# Patient Record
Sex: MALE | Race: WHITE | NOT HISPANIC OR LATINO | Employment: OTHER | URBAN - METROPOLITAN AREA
[De-identification: names, ages, dates, MRNs, and addresses within clinical notes are randomized per-mention and may not be internally consistent; named-entity substitution may affect disease eponyms.]

---

## 2019-08-13 ENCOUNTER — RECORDS - HEALTHEAST (OUTPATIENT)
Dept: LAB | Facility: CLINIC | Age: 74
End: 2019-08-13

## 2019-08-13 LAB
CHOLEST SERPL-MCNC: 190 MG/DL
FASTING STATUS PATIENT QL REPORTED: ABNORMAL
HDLC SERPL-MCNC: 37 MG/DL
LDLC SERPL CALC-MCNC: 111 MG/DL
PSA SERPL-MCNC: 5.3 NG/ML (ref 0–6.5)
TRIGL SERPL-MCNC: 210 MG/DL

## 2019-11-01 ENCOUNTER — RECORDS - HEALTHEAST (OUTPATIENT)
Dept: LAB | Facility: CLINIC | Age: 74
End: 2019-11-01

## 2019-11-01 LAB — HBA1C MFR BLD: 6 % (ref 4.2–6.1)

## 2019-11-07 ENCOUNTER — RECORDS - HEALTHEAST (OUTPATIENT)
Dept: ADMINISTRATIVE | Facility: OTHER | Age: 74
End: 2019-11-07

## 2019-11-07 ENCOUNTER — RECORDS - HEALTHEAST (OUTPATIENT)
Dept: LAB | Facility: CLINIC | Age: 74
End: 2019-11-07

## 2019-11-07 LAB
ALBUMIN SERPL-MCNC: 4 G/DL (ref 3.5–5)
ALP SERPL-CCNC: 93 U/L (ref 45–120)
ALT SERPL W P-5'-P-CCNC: 53 U/L (ref 0–45)
ANION GAP SERPL CALCULATED.3IONS-SCNC: 7 MMOL/L (ref 5–18)
AST SERPL W P-5'-P-CCNC: 33 U/L (ref 0–40)
BASOPHILS # BLD AUTO: 0.5 THOU/UL (ref 0–0.2)
BASOPHILS NFR BLD AUTO: 2 % (ref 0–2)
BILIRUB SERPL-MCNC: 0.6 MG/DL (ref 0–1)
BUN SERPL-MCNC: 30 MG/DL (ref 8–28)
CALCIUM SERPL-MCNC: 10.1 MG/DL (ref 8.5–10.5)
CHLORIDE BLD-SCNC: 105 MMOL/L (ref 98–107)
CO2 SERPL-SCNC: 28 MMOL/L (ref 22–31)
CREAT SERPL-MCNC: 1.14 MG/DL (ref 0.7–1.3)
EOSINOPHIL # BLD AUTO: 1.1 THOU/UL (ref 0–0.4)
EOSINOPHIL NFR BLD AUTO: 5 % (ref 0–6)
ERYTHROCYTE [DISTWIDTH] IN BLOOD BY AUTOMATED COUNT: 18.5 % (ref 11–14.5)
GFR SERPL CREATININE-BSD FRML MDRD: >60 ML/MIN/1.73M2
GLUCOSE BLD-MCNC: 88 MG/DL (ref 70–125)
HCT VFR BLD AUTO: 53.5 % (ref 40–54)
HGB BLD-MCNC: 16.8 G/DL (ref 14–18)
LYMPHOCYTES # BLD AUTO: 2.8 THOU/UL (ref 0.8–4.4)
LYMPHOCYTES NFR BLD AUTO: 12 % (ref 20–40)
MAGNESIUM SERPL-MCNC: 2 MG/DL (ref 1.8–2.6)
MCH RBC QN AUTO: 30 PG (ref 27–34)
MCHC RBC AUTO-ENTMCNC: 31.4 G/DL (ref 32–36)
MCV RBC AUTO: 96 FL (ref 80–100)
MONOCYTES # BLD AUTO: 1.2 THOU/UL (ref 0–0.9)
MONOCYTES NFR BLD AUTO: 5 % (ref 2–10)
NEUTROPHILS # BLD AUTO: 17.3 THOU/UL (ref 2–7.7)
NEUTROPHILS NFR BLD AUTO: 74 % (ref 50–70)
PLATELET # BLD AUTO: 805 THOU/UL (ref 140–440)
PMV BLD AUTO: 12.6 FL (ref 8.5–12.5)
POTASSIUM BLD-SCNC: 5.2 MMOL/L (ref 3.5–5)
PROT SERPL-MCNC: 6.9 G/DL (ref 6–8)
RBC # BLD AUTO: 5.6 MILL/UL (ref 4.4–6.2)
SODIUM SERPL-SCNC: 140 MMOL/L (ref 136–145)
WBC: 23.4 THOU/UL (ref 4–11)

## 2019-11-14 ENCOUNTER — RECORDS - HEALTHEAST (OUTPATIENT)
Dept: ADMINISTRATIVE | Facility: OTHER | Age: 74
End: 2019-11-14

## 2019-11-19 ENCOUNTER — RECORDS - HEALTHEAST (OUTPATIENT)
Dept: LAB | Facility: CLINIC | Age: 74
End: 2019-11-19

## 2019-11-19 LAB
BASOPHILS # BLD AUTO: 0.4 THOU/UL (ref 0–0.2)
BASOPHILS NFR BLD AUTO: 2 % (ref 0–2)
EOSINOPHIL # BLD AUTO: 1.1 THOU/UL (ref 0–0.4)
EOSINOPHIL NFR BLD AUTO: 5 % (ref 0–6)
ERYTHROCYTE [DISTWIDTH] IN BLOOD BY AUTOMATED COUNT: 18.5 % (ref 11–14.5)
HCT VFR BLD AUTO: 51.3 % (ref 40–54)
HGB BLD-MCNC: 16.3 G/DL (ref 14–18)
LYMPHOCYTES # BLD AUTO: 2.3 THOU/UL (ref 0.8–4.4)
LYMPHOCYTES NFR BLD AUTO: 12 % (ref 20–40)
MCH RBC QN AUTO: 30.1 PG (ref 27–34)
MCHC RBC AUTO-ENTMCNC: 31.8 G/DL (ref 32–36)
MCV RBC AUTO: 95 FL (ref 80–100)
MONOCYTES # BLD AUTO: 1.1 THOU/UL (ref 0–0.9)
MONOCYTES NFR BLD AUTO: 5 % (ref 2–10)
NEUTROPHILS # BLD AUTO: 14.8 THOU/UL (ref 2–7.7)
NEUTROPHILS NFR BLD AUTO: 74 % (ref 50–70)
PLATELET # BLD AUTO: 800 THOU/UL (ref 140–440)
PMV BLD AUTO: 12.5 FL (ref 8.5–12.5)
RBC # BLD AUTO: 5.41 MILL/UL (ref 4.4–6.2)
WBC: 20 THOU/UL (ref 4–11)

## 2020-10-29 ENCOUNTER — RECORDS - HEALTHEAST (OUTPATIENT)
Dept: LAB | Facility: CLINIC | Age: 75
End: 2020-10-29

## 2020-10-31 LAB — BACTERIA SPEC CULT: NO GROWTH

## 2021-05-03 ENCOUNTER — RECORDS - HEALTHEAST (OUTPATIENT)
Dept: LAB | Facility: CLINIC | Age: 76
End: 2021-05-03

## 2021-05-03 LAB
ANION GAP SERPL CALCULATED.3IONS-SCNC: 8 MMOL/L (ref 5–18)
BUN SERPL-MCNC: 18 MG/DL (ref 8–28)
CALCIUM SERPL-MCNC: 8.9 MG/DL (ref 8.5–10.5)
CHLORIDE BLD-SCNC: 104 MMOL/L (ref 98–107)
CHOLEST SERPL-MCNC: 110 MG/DL
CO2 SERPL-SCNC: 27 MMOL/L (ref 22–31)
CREAT SERPL-MCNC: 0.84 MG/DL (ref 0.7–1.3)
FASTING STATUS PATIENT QL REPORTED: ABNORMAL
GFR SERPL CREATININE-BSD FRML MDRD: >60 ML/MIN/1.73M2
GLUCOSE BLD-MCNC: 112 MG/DL (ref 70–125)
HDLC SERPL-MCNC: 35 MG/DL
LDLC SERPL CALC-MCNC: 52 MG/DL
POTASSIUM BLD-SCNC: 4.2 MMOL/L (ref 3.5–5)
SODIUM SERPL-SCNC: 139 MMOL/L (ref 136–145)
TRIGL SERPL-MCNC: 115 MG/DL

## 2021-06-02 ENCOUNTER — RECORDS - HEALTHEAST (OUTPATIENT)
Dept: ADMINISTRATIVE | Facility: CLINIC | Age: 76
End: 2021-06-02

## 2021-06-14 ENCOUNTER — HOSPITAL ENCOUNTER (EMERGENCY)
Dept: EMERGENCY MEDICINE | Facility: CLINIC | Age: 76
Discharge: HOME | End: 2021-06-14
Attending: EMERGENCY MEDICINE
Payer: COMMERCIAL

## 2021-06-14 DIAGNOSIS — L03.115 CELLULITIS OF RIGHT LOWER EXTREMITY: ICD-10-CM

## 2021-06-14 DIAGNOSIS — R50.9 FEVER, UNSPECIFIED FEVER CAUSE: ICD-10-CM

## 2021-06-14 LAB
ALBUMIN UR-MCNC: ABNORMAL G/DL
ANION GAP SERPL CALCULATED.3IONS-SCNC: 8 MMOL/L (ref 5–18)
APPEARANCE UR: CLEAR
BACTERIA #/AREA URNS HPF: ABNORMAL /[HPF]
BASE EXCESS BLDV CALC-SCNC: 3.3 MMOL/L
BILIRUB UR QL STRIP: NEGATIVE
BUN SERPL-MCNC: 24 MG/DL (ref 8–28)
CALCIUM SERPL-MCNC: 8.7 MG/DL (ref 8.5–10.5)
CHLORIDE BLD-SCNC: 105 MMOL/L (ref 98–107)
CO2 SERPL-SCNC: 25 MMOL/L (ref 22–31)
COLOR UR AUTO: YELLOW
CREAT SERPL-MCNC: 1.08 MG/DL (ref 0.7–1.3)
ERYTHROCYTE [DISTWIDTH] IN BLOOD BY AUTOMATED COUNT: 14.5 % (ref 11–14.5)
GFR SERPL CREATININE-BSD FRML MDRD: >60 ML/MIN/1.73M2
GLUCOSE BLD-MCNC: 118 MG/DL (ref 70–125)
GLUCOSE UR STRIP-MCNC: NEGATIVE MG/DL
HCO3, VENOUS, CALC - HISTORICAL: 25.2 MMOL/L (ref 24–30)
HCT VFR BLD AUTO: 42 % (ref 40–54)
HGB BLD-MCNC: 13.7 G/DL (ref 14–18)
HGB UR QL STRIP: NEGATIVE
KETONES UR STRIP-MCNC: NEGATIVE MG/DL
LACTATE SERPL-SCNC: 1.7 MMOL/L (ref 0.7–2)
LEUKOCYTE ESTERASE UR QL STRIP: NEGATIVE
MCH RBC QN AUTO: 38.7 PG (ref 27–34)
MCHC RBC AUTO-ENTMCNC: 32.6 G/DL (ref 32–36)
MCV RBC AUTO: 119 FL (ref 80–100)
MUCOUS THREADS #/AREA URNS LPF: PRESENT LPF
NITRATE UR QL: NEGATIVE
OXYHEMOGLOBIN (VBG) - HISTORICAL: 32.5 % (ref 70–75)
OXYHGB MFR BLDV: 33.1 % (ref 70–75)
PCO2 BLDV: 45 MM HG (ref 35–50)
PH BLDV: 7.41 [PH] (ref 7.35–7.45)
PH UR STRIP: 6 [PH] (ref 5–8)
PLATELET # BLD AUTO: 457 THOU/UL (ref 140–440)
PMV BLD AUTO: 10.9 FL (ref 8.5–12.5)
PO2 BLDV: 21 MM HG (ref 25–47)
POTASSIUM BLD-SCNC: 4 MMOL/L (ref 3.5–5)
RBC # BLD AUTO: 3.54 MILL/UL (ref 4.4–6.2)
RBC #/AREA URNS AUTO: ABNORMAL HPF
RBC URINE: 1 HPF
SODIUM SERPL-SCNC: 138 MMOL/L (ref 136–145)
SP GR UR STRIP: 1.02 (ref 1–1.03)
SQUAMOUS #/AREA URNS AUTO: ABNORMAL LPF
SQUAMOUS EPITHELIAL: <1 /HPF
UROBILINOGEN UR STRIP-ACNC: NORMAL
WBC #/AREA URNS AUTO: ABNORMAL HPF
WBC URINE: 1 HPF
WBC: 23.7 THOU/UL (ref 4–11)

## 2021-06-14 ASSESSMENT — MIFFLIN-ST. JEOR: SCORE: 1568.61

## 2021-06-15 LAB
AEROBIC BLOOD CULTURE BOTTLE: ABNORMAL
ANAEROBIC BLOOD CULTURE BOTTLE: ABNORMAL

## 2021-06-16 PROBLEM — L03.90 CELLULITIS: Status: ACTIVE | Noted: 2019-10-31

## 2021-06-16 PROBLEM — Z86.73 HISTORY OF STROKE WITHOUT RESIDUAL DEFICITS: Status: ACTIVE | Noted: 2021-06-14

## 2021-06-16 PROBLEM — R26.9 ABNORMAL GAIT: Status: ACTIVE | Noted: 2021-06-14

## 2021-06-18 LAB
BACTERIA SPEC CULT: ABNORMAL
GRAM STAIN RESULT: ABNORMAL
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
## 2021-06-26 ENCOUNTER — HEALTH MAINTENANCE LETTER (OUTPATIENT)
Age: 76
End: 2021-06-26
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
## 2021-06-26 NOTE — ED PROVIDER NOTES
8:36 AM  I called the patient and the wife called me back. She states he is improved from when he was seen yesterday. He continues to be sleeping a lot, but he was up and more active than he previously has been. She has not yet filled his antibiotic prescription and plans to get that filled at 9 am when the pharmacy opens. I discussed the positive blood culture and concern for blood infection vs. Skin contaminant. I recommend she bring him back into the ED if he is not improving and appears worse or not improved. She states she will fill his prescription this morning and see how he is feeling, though she does feel he is feeling better.      Isabella Resendez MD  06/15/21 2400

## 2021-07-06 VITALS — HEIGHT: 65 IN | BODY MASS INDEX: 33.49 KG/M2 | WEIGHT: 201 LBS

## 2021-10-16 ENCOUNTER — HEALTH MAINTENANCE LETTER (OUTPATIENT)
Age: 76
End: 2021-10-16

## 2021-12-11 ENCOUNTER — HEALTH MAINTENANCE LETTER (OUTPATIENT)
Age: 76
End: 2021-12-11

## 2022-04-01 ENCOUNTER — HOSPITAL ENCOUNTER (INPATIENT)
Facility: CLINIC | Age: 77
LOS: 6 days | Discharge: HOME | DRG: 175 | End: 2022-04-07
Attending: EMERGENCY MEDICINE | Admitting: FAMILY MEDICINE
Payer: COMMERCIAL

## 2022-04-01 ENCOUNTER — APPOINTMENT (OUTPATIENT)
Dept: ULTRASOUND IMAGING | Facility: CLINIC | Age: 77
DRG: 175 | End: 2022-04-01
Attending: STUDENT IN AN ORGANIZED HEALTH CARE EDUCATION/TRAINING PROGRAM
Payer: COMMERCIAL

## 2022-04-01 ENCOUNTER — APPOINTMENT (OUTPATIENT)
Dept: RADIOLOGY | Facility: CLINIC | Age: 77
DRG: 175 | End: 2022-04-01
Attending: EMERGENCY MEDICINE
Payer: COMMERCIAL

## 2022-04-01 ENCOUNTER — APPOINTMENT (OUTPATIENT)
Dept: CT IMAGING | Facility: CLINIC | Age: 77
DRG: 175 | End: 2022-04-01
Attending: EMERGENCY MEDICINE
Payer: COMMERCIAL

## 2022-04-01 DIAGNOSIS — D64.9 ANEMIA, UNSPECIFIED TYPE: ICD-10-CM

## 2022-04-01 DIAGNOSIS — R79.89 ELEVATED BRAIN NATRIURETIC PEPTIDE (BNP) LEVEL: ICD-10-CM

## 2022-04-01 DIAGNOSIS — R79.89 ELEVATED D-DIMER: ICD-10-CM

## 2022-04-01 DIAGNOSIS — J96.01 ACUTE RESPIRATORY FAILURE WITH HYPOXIA (H): ICD-10-CM

## 2022-04-01 DIAGNOSIS — I26.94 MULTIPLE SUBSEGMENTAL PULMONARY EMBOLI WITHOUT ACUTE COR PULMONALE (H): ICD-10-CM

## 2022-04-01 DIAGNOSIS — R06.02 SOB (SHORTNESS OF BREATH): ICD-10-CM

## 2022-04-01 DIAGNOSIS — R79.89 ELEVATED TROPONIN: ICD-10-CM

## 2022-04-01 DIAGNOSIS — J18.9 PNEUMONIA OF BOTH LUNGS DUE TO INFECTIOUS ORGANISM, UNSPECIFIED PART OF LUNG: ICD-10-CM

## 2022-04-01 DIAGNOSIS — I24.89 DEMAND ISCHEMIA (H): ICD-10-CM

## 2022-04-01 PROBLEM — D47.1 CHRONIC MYELOPROLIFERATIVE DISEASE (H): Status: ACTIVE | Noted: 2022-04-01

## 2022-04-01 PROBLEM — I87.2 PERIPHERAL VENOUS INSUFFICIENCY: Status: ACTIVE | Noted: 2022-04-01

## 2022-04-01 LAB
ANION GAP SERPL CALCULATED.3IONS-SCNC: 10 MMOL/L (ref 5–18)
APTT PPP: 36 SECONDS (ref 22–38)
ATRIAL RATE - MUSE: 85 BPM
BASE EXCESS BLDV CALC-SCNC: 0.6 MMOL/L
BASOPHILS # BLD AUTO: 0 10E3/UL (ref 0–0.2)
BASOPHILS NFR BLD AUTO: 1 %
BNP SERPL-MCNC: 379 PG/ML (ref 0–78)
BUN SERPL-MCNC: 35 MG/DL (ref 8–28)
CALCIUM SERPL-MCNC: 8.6 MG/DL (ref 8.5–10.5)
CHLORIDE BLD-SCNC: 107 MMOL/L (ref 98–107)
CO2 SERPL-SCNC: 23 MMOL/L (ref 22–31)
CREAT SERPL-MCNC: 1.11 MG/DL (ref 0.7–1.3)
D DIMER PPP FEU-MCNC: 4.49 UG/ML FEU (ref 0–0.5)
DIASTOLIC BLOOD PRESSURE - MUSE: NORMAL MMHG
EOSINOPHIL # BLD AUTO: 0.1 10E3/UL (ref 0–0.7)
EOSINOPHIL NFR BLD AUTO: 1 %
ERYTHROCYTE [DISTWIDTH] IN BLOOD BY AUTOMATED COUNT: 16.8 % (ref 10–15)
FLUAV RNA SPEC QL NAA+PROBE: NEGATIVE
FLUBV RNA RESP QL NAA+PROBE: NEGATIVE
GFR SERPL CREATININE-BSD FRML MDRD: 69 ML/MIN/1.73M2
GLUCOSE BLD-MCNC: 108 MG/DL (ref 70–125)
HCO3 BLDV-SCNC: 24 MMOL/L (ref 24–30)
HCT VFR BLD AUTO: 23.9 % (ref 40–53)
HGB BLD-MCNC: 7.2 G/DL (ref 13.3–17.7)
HGB BLD-MCNC: 7.8 G/DL (ref 13.3–17.7)
HOLD SPECIMEN: NORMAL
IMM GRANULOCYTES # BLD: 0.1 10E3/UL
IMM GRANULOCYTES NFR BLD: 1 %
INR PPP: 1.22 (ref 0.85–1.15)
INTERPRETATION ECG - MUSE: NORMAL
LYMPHOCYTES # BLD AUTO: 0.8 10E3/UL (ref 0.8–5.3)
LYMPHOCYTES NFR BLD AUTO: 20 %
MCH RBC QN AUTO: 45.9 PG (ref 26.5–33)
MCHC RBC AUTO-ENTMCNC: 32.6 G/DL (ref 31.5–36.5)
MCV RBC AUTO: 141 FL (ref 78–100)
MONOCYTES # BLD AUTO: 0.7 10E3/UL (ref 0–1.3)
MONOCYTES NFR BLD AUTO: 17 %
NEUTROPHILS # BLD AUTO: 2.5 10E3/UL (ref 1.6–8.3)
NEUTROPHILS NFR BLD AUTO: 60 %
NRBC # BLD AUTO: 0 10E3/UL
NRBC BLD AUTO-RTO: 1 /100
OXYHGB MFR BLDV: 47.2 % (ref 70–75)
P AXIS - MUSE: 52 DEGREES
PCO2 BLDV: 42 MM HG (ref 35–50)
PH BLDV: 7.39 [PH] (ref 7.35–7.45)
PLATELET # BLD AUTO: 464 10E3/UL (ref 150–450)
PO2 BLDV: 29 MM HG (ref 25–47)
POTASSIUM BLD-SCNC: 4.4 MMOL/L (ref 3.5–5)
PR INTERVAL - MUSE: 130 MS
QRS DURATION - MUSE: 110 MS
QT - MUSE: 360 MS
QTC - MUSE: 428 MS
R AXIS - MUSE: 38 DEGREES
RADIOLOGIST FLAGS: ABNORMAL
RADIOLOGIST FLAGS: ABNORMAL
RBC # BLD AUTO: 1.7 10E6/UL (ref 4.4–5.9)
SAO2 % BLDV: 48.3 % (ref 70–75)
SARS-COV-2 RNA RESP QL NAA+PROBE: NEGATIVE
SODIUM SERPL-SCNC: 140 MMOL/L (ref 136–145)
SYSTOLIC BLOOD PRESSURE - MUSE: NORMAL MMHG
T AXIS - MUSE: 29 DEGREES
TROPONIN I SERPL-MCNC: 0.9 NG/ML (ref 0–0.29)
TROPONIN I SERPL-MCNC: 1.2 NG/ML (ref 0–0.29)
UFH PPP CHRO-ACNC: 0.48 IU/ML
VENTRICULAR RATE- MUSE: 85 BPM
WBC # BLD AUTO: 4.2 10E3/UL (ref 4–11)

## 2022-04-01 PROCEDURE — 96366 THER/PROPH/DIAG IV INF ADDON: CPT

## 2022-04-01 PROCEDURE — 93970 EXTREMITY STUDY: CPT

## 2022-04-01 PROCEDURE — 87636 SARSCOV2 & INF A&B AMP PRB: CPT | Performed by: EMERGENCY MEDICINE

## 2022-04-01 PROCEDURE — 82805 BLOOD GASES W/O2 SATURATION: CPT | Performed by: EMERGENCY MEDICINE

## 2022-04-01 PROCEDURE — 36415 COLL VENOUS BLD VENIPUNCTURE: CPT | Performed by: STUDENT IN AN ORGANIZED HEALTH CARE EDUCATION/TRAINING PROGRAM

## 2022-04-01 PROCEDURE — 85730 THROMBOPLASTIN TIME PARTIAL: CPT | Performed by: EMERGENCY MEDICINE

## 2022-04-01 PROCEDURE — 85520 HEPARIN ASSAY: CPT | Performed by: EMERGENCY MEDICINE

## 2022-04-01 PROCEDURE — 85379 FIBRIN DEGRADATION QUANT: CPT | Performed by: EMERGENCY MEDICINE

## 2022-04-01 PROCEDURE — 250N000013 HC RX MED GY IP 250 OP 250 PS 637: Performed by: EMERGENCY MEDICINE

## 2022-04-01 PROCEDURE — 84484 ASSAY OF TROPONIN QUANT: CPT | Performed by: EMERGENCY MEDICINE

## 2022-04-01 PROCEDURE — 85018 HEMOGLOBIN: CPT | Performed by: STUDENT IN AN ORGANIZED HEALTH CARE EDUCATION/TRAINING PROGRAM

## 2022-04-01 PROCEDURE — 80048 BASIC METABOLIC PNL TOTAL CA: CPT | Performed by: EMERGENCY MEDICINE

## 2022-04-01 PROCEDURE — 36415 COLL VENOUS BLD VENIPUNCTURE: CPT | Performed by: EMERGENCY MEDICINE

## 2022-04-01 PROCEDURE — 96365 THER/PROPH/DIAG IV INF INIT: CPT | Mod: 59

## 2022-04-01 PROCEDURE — 84484 ASSAY OF TROPONIN QUANT: CPT | Mod: 91 | Performed by: STUDENT IN AN ORGANIZED HEALTH CARE EDUCATION/TRAINING PROGRAM

## 2022-04-01 PROCEDURE — 93005 ELECTROCARDIOGRAM TRACING: CPT | Performed by: EMERGENCY MEDICINE

## 2022-04-01 PROCEDURE — 250N000011 HC RX IP 250 OP 636: Performed by: STUDENT IN AN ORGANIZED HEALTH CARE EDUCATION/TRAINING PROGRAM

## 2022-04-01 PROCEDURE — 85610 PROTHROMBIN TIME: CPT | Performed by: EMERGENCY MEDICINE

## 2022-04-01 PROCEDURE — 71045 X-RAY EXAM CHEST 1 VIEW: CPT

## 2022-04-01 PROCEDURE — 99285 EMERGENCY DEPT VISIT HI MDM: CPT | Mod: 25

## 2022-04-01 PROCEDURE — C9113 INJ PANTOPRAZOLE SODIUM, VIA: HCPCS | Performed by: STUDENT IN AN ORGANIZED HEALTH CARE EDUCATION/TRAINING PROGRAM

## 2022-04-01 PROCEDURE — 250N000011 HC RX IP 250 OP 636: Performed by: EMERGENCY MEDICINE

## 2022-04-01 PROCEDURE — 85025 COMPLETE CBC W/AUTO DIFF WBC: CPT | Performed by: EMERGENCY MEDICINE

## 2022-04-01 PROCEDURE — 71275 CT ANGIOGRAPHY CHEST: CPT

## 2022-04-01 PROCEDURE — 210N000002 HC R&B HEART CARE

## 2022-04-01 PROCEDURE — 96367 TX/PROPH/DG ADDL SEQ IV INF: CPT

## 2022-04-01 PROCEDURE — 83880 ASSAY OF NATRIURETIC PEPTIDE: CPT | Performed by: EMERGENCY MEDICINE

## 2022-04-01 PROCEDURE — 99223 1ST HOSP IP/OBS HIGH 75: CPT | Mod: AI

## 2022-04-01 PROCEDURE — 94640 AIRWAY INHALATION TREATMENT: CPT

## 2022-04-01 PROCEDURE — C9803 HOPD COVID-19 SPEC COLLECT: HCPCS

## 2022-04-01 RX ORDER — ACETAMINOPHEN 650 MG/1
650 SUPPOSITORY RECTAL EVERY 6 HOURS PRN
Status: DISCONTINUED | OUTPATIENT
Start: 2022-04-01 | End: 2022-04-07 | Stop reason: HOSPADM

## 2022-04-01 RX ORDER — POLYETHYLENE GLYCOL 3350 17 G/17G
17 POWDER, FOR SOLUTION ORAL DAILY PRN
Status: DISCONTINUED | OUTPATIENT
Start: 2022-04-01 | End: 2022-04-07 | Stop reason: HOSPADM

## 2022-04-01 RX ORDER — ATORVASTATIN CALCIUM 10 MG/1
20 TABLET, FILM COATED ORAL EVERY MORNING
Status: DISCONTINUED | OUTPATIENT
Start: 2022-04-02 | End: 2022-04-07 | Stop reason: HOSPADM

## 2022-04-01 RX ORDER — CEFTRIAXONE 2 G/1
2 INJECTION, POWDER, FOR SOLUTION INTRAMUSCULAR; INTRAVENOUS ONCE
Status: COMPLETED | OUTPATIENT
Start: 2022-04-01 | End: 2022-04-01

## 2022-04-01 RX ORDER — METHYLPREDNISOLONE SODIUM SUCCINATE 125 MG/2ML
125 INJECTION, POWDER, LYOPHILIZED, FOR SOLUTION INTRAMUSCULAR; INTRAVENOUS ONCE
Status: COMPLETED | OUTPATIENT
Start: 2022-04-01 | End: 2022-04-01

## 2022-04-01 RX ORDER — ONDANSETRON 4 MG/1
4 TABLET, ORALLY DISINTEGRATING ORAL EVERY 6 HOURS PRN
Status: DISCONTINUED | OUTPATIENT
Start: 2022-04-01 | End: 2022-04-07 | Stop reason: HOSPADM

## 2022-04-01 RX ORDER — PROCHLORPERAZINE MALEATE 5 MG
5 TABLET ORAL EVERY 6 HOURS PRN
Status: DISCONTINUED | OUTPATIENT
Start: 2022-04-01 | End: 2022-04-07 | Stop reason: HOSPADM

## 2022-04-01 RX ORDER — METOPROLOL SUCCINATE 25 MG/1
25 TABLET, EXTENDED RELEASE ORAL DAILY
COMMUNITY

## 2022-04-01 RX ORDER — PREDNISONE 20 MG/1
40 TABLET ORAL DAILY
Status: COMPLETED | OUTPATIENT
Start: 2022-04-02 | End: 2022-04-05

## 2022-04-01 RX ORDER — PROCHLORPERAZINE 25 MG
12.5 SUPPOSITORY, RECTAL RECTAL EVERY 12 HOURS PRN
Status: DISCONTINUED | OUTPATIENT
Start: 2022-04-01 | End: 2022-04-07 | Stop reason: HOSPADM

## 2022-04-01 RX ORDER — ACETAMINOPHEN 325 MG/1
650 TABLET ORAL EVERY 6 HOURS PRN
Status: DISCONTINUED | OUTPATIENT
Start: 2022-04-01 | End: 2022-04-07 | Stop reason: HOSPADM

## 2022-04-01 RX ORDER — HYDROXYUREA 500 MG/1
1000 CAPSULE ORAL DAILY
Status: ON HOLD | COMMUNITY
End: 2022-05-01

## 2022-04-01 RX ORDER — IOPAMIDOL 755 MG/ML
100 INJECTION, SOLUTION INTRAVASCULAR ONCE
Status: COMPLETED | OUTPATIENT
Start: 2022-04-01 | End: 2022-04-01

## 2022-04-01 RX ORDER — METOPROLOL SUCCINATE 25 MG/1
25 TABLET, EXTENDED RELEASE ORAL DAILY
Status: DISCONTINUED | OUTPATIENT
Start: 2022-04-02 | End: 2022-04-07 | Stop reason: HOSPADM

## 2022-04-01 RX ORDER — HYDROXYUREA 500 MG/1
1000 CAPSULE ORAL
Status: DISCONTINUED | OUTPATIENT
Start: 2022-04-02 | End: 2022-04-07 | Stop reason: HOSPADM

## 2022-04-01 RX ORDER — AZITHROMYCIN 500 MG/5ML
500 INJECTION, POWDER, LYOPHILIZED, FOR SOLUTION INTRAVENOUS ONCE
Status: COMPLETED | OUTPATIENT
Start: 2022-04-01 | End: 2022-04-01

## 2022-04-01 RX ORDER — HYDROXYUREA 500 MG/1
1500 CAPSULE ORAL
Status: DISCONTINUED | OUTPATIENT
Start: 2022-04-04 | End: 2022-04-07 | Stop reason: HOSPADM

## 2022-04-01 RX ORDER — HYDROXYUREA 500 MG/1
1500 CAPSULE ORAL
COMMUNITY
End: 2022-04-19

## 2022-04-01 RX ORDER — LEVOFLOXACIN 5 MG/ML
500 INJECTION, SOLUTION INTRAVENOUS EVERY 24 HOURS
Status: DISCONTINUED | OUTPATIENT
Start: 2022-04-01 | End: 2022-04-04

## 2022-04-01 RX ORDER — ALBUTEROL SULFATE 90 UG/1
6 AEROSOL, METERED RESPIRATORY (INHALATION)
Status: DISCONTINUED | OUTPATIENT
Start: 2022-04-01 | End: 2022-04-05

## 2022-04-01 RX ORDER — LIDOCAINE 40 MG/G
CREAM TOPICAL
Status: DISCONTINUED | OUTPATIENT
Start: 2022-04-01 | End: 2022-04-07 | Stop reason: HOSPADM

## 2022-04-01 RX ORDER — HEPARIN SODIUM 10000 [USP'U]/100ML
0-5000 INJECTION, SOLUTION INTRAVENOUS CONTINUOUS
Status: DISCONTINUED | OUTPATIENT
Start: 2022-04-01 | End: 2022-04-07 | Stop reason: HOSPADM

## 2022-04-01 RX ORDER — ONDANSETRON 2 MG/ML
4 INJECTION INTRAMUSCULAR; INTRAVENOUS EVERY 6 HOURS PRN
Status: DISCONTINUED | OUTPATIENT
Start: 2022-04-01 | End: 2022-04-07 | Stop reason: HOSPADM

## 2022-04-01 RX ORDER — CEFTRIAXONE 1 G/1
1 INJECTION, POWDER, FOR SOLUTION INTRAMUSCULAR; INTRAVENOUS ONCE
Status: DISCONTINUED | OUTPATIENT
Start: 2022-04-01 | End: 2022-04-01

## 2022-04-01 RX ADMIN — CEFTRIAXONE SODIUM 2 G: 2 INJECTION, POWDER, FOR SOLUTION INTRAMUSCULAR; INTRAVENOUS at 14:08

## 2022-04-01 RX ADMIN — IOPAMIDOL 100 ML: 755 INJECTION, SOLUTION INTRAVENOUS at 13:02

## 2022-04-01 RX ADMIN — LEVOFLOXACIN 500 MG: 5 INJECTION, SOLUTION INTRAVENOUS at 19:58

## 2022-04-01 RX ADMIN — HEPARIN SODIUM 1650 UNITS/HR: 10000 INJECTION, SOLUTION INTRAVENOUS at 22:04

## 2022-04-01 RX ADMIN — ALBUTEROL SULFATE 6 PUFF: 90 AEROSOL, METERED RESPIRATORY (INHALATION) at 18:12

## 2022-04-01 RX ADMIN — METHYLPREDNISOLONE SODIUM SUCCINATE 125 MG: 125 INJECTION, POWDER, FOR SOLUTION INTRAMUSCULAR; INTRAVENOUS at 12:47

## 2022-04-01 RX ADMIN — Medication 500 MG: at 14:40

## 2022-04-01 RX ADMIN — ALBUTEROL SULFATE 6 PUFF: 90 AEROSOL, METERED RESPIRATORY (INHALATION) at 12:50

## 2022-04-01 RX ADMIN — HEPARIN SODIUM 1650 UNITS/HR: 10000 INJECTION, SOLUTION INTRAVENOUS at 14:09

## 2022-04-01 RX ADMIN — PANTOPRAZOLE SODIUM 40 MG: 40 INJECTION, POWDER, FOR SOLUTION INTRAVENOUS at 19:53

## 2022-04-01 ASSESSMENT — ACTIVITIES OF DAILY LIVING (ADL)
ADLS_ACUITY_SCORE: 7
WEAR_GLASSES_OR_BLIND: YES
ADLS_ACUITY_SCORE: 10
CHANGE_IN_FUNCTIONAL_STATUS_SINCE_ONSET_OF_CURRENT_ILLNESS/INJURY: NO
ADLS_ACUITY_SCORE: 10
ADLS_ACUITY_SCORE: 7
DOING_ERRANDS_INDEPENDENTLY_DIFFICULTY: NO
ADLS_ACUITY_SCORE: 6
ADLS_ACUITY_SCORE: 10
ADLS_ACUITY_SCORE: 6
DRESSING/BATHING_DIFFICULTY: NO
TOILETING_ISSUES: NO
ADLS_ACUITY_SCORE: 10
WALKING_OR_CLIMBING_STAIRS_DIFFICULTY: NO
DIFFICULTY_EATING/SWALLOWING: NO
DIFFICULTY_COMMUNICATING: NO
HEARING_DIFFICULTY_OR_DEAF: NO
ADLS_ACUITY_SCORE: 7
VISION_MANAGEMENT: GLASSES
CONCENTRATING,_REMEMBERING_OR_MAKING_DECISIONS_DIFFICULTY: NO
FALL_HISTORY_WITHIN_LAST_SIX_MONTHS: NO
ADLS_ACUITY_SCORE: 7

## 2022-04-01 NOTE — H&P
[facility]    History and Physical - Hospitalist Service       Date of Admission:  4/1/2022    Assessment & Plan      Shaji Pompa is a 76 year old male with a past medical history of COPD, HTN, HLD, myeloproliferative disease, and cerebral infarction. He was admitted on 4/1/2022 due to acute respiratory failure with hypoxia secondary to pneumonia and bilateral pulmonary emboli.    Acute respiratory distress with hypoxia  PE, bilateral  Pneumonia  COPD  Elevated troponin and BNP  BNP of 379, D-dimer 4.49, and troponin 1.2. CT PE with small bilateral pulmonary emboli without evidence of right heart strain and patch areas of infectious/inflammatory opacities with likely component of superimposed aspiration. Provoked due to drive from 3 day drive from [place] to [place]. ED started patient on heparin drip and given azithromycin and ceftriaxone. Troponin and BNP elevated likely due to PE but would like to rule out cardiac issue.  - Cardiac telemetry  - Trend troponin  - Lower extremity ultrasound for DVT  - Echocardiogram  - Start Levaquin 500 mg daily. Switch to oral on discharge  - IP consult for insurance coverage of DOAC  - Consider SLP for swallow study for possible aspiration pneumonia    Macrocytic anemia  On admission it was 7.8. Last lab on 6/14 was 13.7 and wife has information of hemoglobin being 11.1 on 2/14. Hemoglobin expected to decrease with heparin use. Patient denies bleeding and any black or bloody stools.  - q8h hemoglobin  - Hemoccult test  - Transfuse if hemoglobin <7  - Start on Protonix for possible GI bleed    Hypertension  - Continue PTA furosemide 20 mg daily  - Continue metoprolol succinate 25 mg daily    Hyperlipidemia  - Continue PTA atorvastatin 20 mg daily    COPD  - Hold PTA Spiriva daily when doing duonebs  - Begin duoneb 3 times during the day  - Prednisone 40 mg for 5 days    CML  - PTA hydroxyurea 1500 mg Monday-Friday  - PTA hydroxyurea 1000 mg  Saturday-Sunday     Diet: Regular Diet Adult  DVT Prophylaxis: heparin gtt  Garrett Catheter: Not present  Fluids: PO  Central Lines: None  Cardiac Monitoring: None  Code Status:  Full Code      Disposition Plan   Expected Discharge: 04/03/2022  Anticipated discharge location:  Awaiting care coordination huddle       The patient's care was discussed with the David Gray.    Bing Gutierrez MD  Hospitalist Service  [facility]  Text page via [employer] Paging/Directory       ______________________________________________________________________    Chief Complaint   Shortness of breath    History is obtained from the patient and his wife.    History of Present Illness   Shaji Pompa is a 76 year old male with a past medical history of COPD, HTN, HLD, myeloproliferative disease, and cerebral infarction. He was admitted on 4/1/2022 due to acute respiratory failure with hypoxia secondary to pneumonia and bilateral pulmonary emboli.    He has been short of breath for about a week. He denies fever, chills, cough, chest pain, and abdominal pain. He and his wife started driving from [place] on 3/30 (2 days prior to coming to the ED). They drove 9 hours, 9 hours, and 5 hours with very few stop times to move and stretch throughout the road trip.     Tingling in feet bilaterally; it has been going on for 3 years.     Review of Systems    The 10 point Review of Systems is negative other than noted in the HPI.    Past Medical History    I have reviewed this patient's medical history and updated it with pertinent information if needed.   Past Medical History:   Diagnosis Date     Cerebral infarction (H)      COPD (chronic obstructive pulmonary disease) (H)      HLD (hyperlipidemia)      Hypertension      Myeloproliferative disease (H)      Past Surgical History   I have reviewed this patient's surgical history and updated it with pertinent information if needed.  Past Surgical History:   Procedure  Laterality Date     OPEN REDUCTION INTERNAL FIXATION FOOT Right 2010     OTHER SURGICAL HISTORY  2001    Lip lesion removal     TONSILLECTOMY & ADENOIDECTOMY        Social History   I have reviewed this patient's social history and updated it with pertinent information if needed. Shaji Pompa  reports that he quit smoking about 10 years ago. He started smoking about 56 years ago. He smoked 1.00 pack per day. He has never used smokeless tobacco. He reports current alcohol use of about 19.0 standard drinks of alcohol per week. He reports that he does not use drugs.    He has 2 adult sons.     Family History   I have reviewed this patient's family history and updated it with pertinent information if needed.  Family History   Problem Relation Age of Onset     Alcoholism Mother        Prior to Admission Medications   Prior to Admission Medications   Prescriptions Last Dose Informant Patient Reported? Taking?   albuterol (PROAIR HFA;PROVENTIL HFA;VENTOLIN HFA) 90 mcg/actuation inhaler 4/1/2022 at dispensed to ED  Yes Yes   Sig: [ALBUTEROL (PROAIR HFA;PROVENTIL HFA;VENTOLIN HFA) 90 MCG/ACTUATION INHALER] Inhale 2 puffs every 6 (six) hours as needed for wheezing.   aspirin 325 MG EC tablet 4/1/2022 at am  Yes Yes   Sig: [ASPIRIN 325 MG EC TABLET] Take 325 mg by mouth daily.   atorvastatin (LIPITOR) 20 MG tablet 4/1/2022 at Unknown time  Yes Yes   Sig: Take 20 mg by mouth every morning    hydroxyurea (HYDREA) 500 MG capsule 4/1/2022 at Unknown time  Yes Yes   Sig: Take 1,500 mg by mouth five times a week Monday-Friday   hydroxyurea (HYDREA) 500 MG capsule 3/27/2022  Yes Yes   Sig: Take 1,000 mg by mouth twice a week Saturday and Sunday   metoprolol succinate ER (TOPROL-XL) 25 MG 24 hr tablet 4/1/2022 at am  Yes Yes   Sig: Take 25 mg by mouth daily   tiotropium (SPIRIVA) 18 mcg inhalation capsule 4/1/2022 at am; can bring  Yes Yes   Sig: [TIOTROPIUM (SPIRIVA) 18 MCG INHALATION CAPSULE] Place 18 mcg into inhaler and  inhale daily.      Facility-Administered Medications: None     Allergies   No Known Allergies    Physical Exam   Vital Signs: Temp: 98.1  F (36.7  C) Temp src: Oral BP: 124/61 Pulse: 97   Resp: 28 SpO2: 94 %      Weight: 205 lbs 0 oz    General appearance: alert, in no distress, cooperative, appears stated age  Head: normocephalic, without obvious abnormalities  Eyes: conjunctivae/corneas clear, PERRLA, EOM intact  Ears: hearing grossly intact  Nose: nares normal, no drainage  Throat: lips, mucosa, and tongue normal, teeth and gums normal, no palpable lymphadenopathy  Lung: Increased work of breathing, expiratory wheezing in all lung fields, increased rhonchi of the right lower lung field, bibasilar crackles   Chest wall: no tenderness  Heart: regular rate and rhythm, S1, S2 normal, no murmur, click, rub, or gallop  Abdomen: limited by obesity, soft, non-tender, bowel sounds present in all four quadrants  Extremities: warm, dry, atraumatic, no cyanosis, +1 pitting edema right leg up to mid shin, and trace edema in left foot.   Skin: normal color, texture, and turgor. No rashes or lesions  Neurologic: sensation intact of lower extremities bilaterally, 5/5 strength in upper extremities  Psychologic: appropriate mood, regular rate of speech, no tangential thoughts    Data   Data reviewed today: I reviewed all medications, new labs and imaging results over the last 24 hours.    Recent Labs   Lab 04/01/22  1208 04/01/22  1207   WBC  --  4.2   HGB  --  7.8*   MCV  --  141*   PLT  --  464*   INR 1.22*  --      --    POTASSIUM 4.4  --    CHLORIDE 107  --    CO2 23  --    BUN 35*  --    CR 1.11  --    ANIONGAP 10  --    GLENDY 8.6  --      --      Recent Results (from the past 24 hour(s))   XR Chest Port 1 View    Narrative    EXAM: XR CHEST PORT 1 VIEW  LOCATION: [facility]  DATE/TIME: 4/1/2022 12:30 PM    INDICATION: Shortness of breath  COMPARISON: 06/14/2021      Impression     IMPRESSION: Lungs are clear. Heart and pulmonary vascularity are normal. No signs of acute disease. Periarticular loose bodies again seen in the region of the left subacromial bursa.   CT Chest Pulmonary Embolism w Contrast   Result Value    Radiologist flags New diagnosis of pulmonary embolism (AA)    Narrative    EXAM: CT CHEST PULMONARY EMBOLISM W CONTRAST  LOCATION: [facility]  DATE/TIME: 4/1/2022 1:01 PM    INDICATION: PE suspected, low/intermediate probability, positive D-dimer, shortness of breath.  COMPARISON: None.  TECHNIQUE: CT chest pulmonary angiogram during arterial phase injection of IV contrast. Multiplanar reformats and MIP reconstructions were performed. Dose reduction techniques were used.   CONTRAST: Isovue 370 100 mL    FINDINGS:  ANGIOGRAM CHEST: Small volume bilateral nonocclusive segmental and subsegmental pulmonary artery filling defects. No evidence of right heart strain. The aorta is normal in caliber.    LUNGS AND PLEURA: Small left effusion and associated atelectasis/consolidation. There are a few patchy areas of groundglass, tree-in-bud, consolidative opacities seen throughout the bilateral lungs. Small volume airway secretions and mild bronchial wall   thickening. No pneumothorax.    MEDIASTINUM/AXILLAE: Heart size is normal. There are a few borderline enlarged mediastinal lymph nodes with the largest in the left lower paratracheal chain measuring 15 x 11 mm (series 5/32). These are likely reactive. Small hiatal hernia with findings   suggestive of reflux esophagitis.    CORONARY ARTERY CALCIFICATION: Moderate.    UPPER ABDOMEN: No significant finding.    MUSCULOSKELETAL: Mild degenerative changes of the spine and bilateral shoulders.      Impression    IMPRESSION:  1.  Small volume bilateral pulmonary emboli. No evidence of right heart strain.  2.  Small left effusion and associated atelectasis.  3.  Patchy areas of infectious/inflammatory opacities seen  throughout the bilateral lungs with likely a component of superimposed aspiration.  4.  Small hiatal hernia with findings suggestive of reflux esophagitis.      [Critical Result: New diagnosis of pulmonary embolism]    Finding was identified on 4/1/2022 1:34 PM.     Armida Cancino MD, was contacted by me on 4/1/2022 1:39 PM and verbalized understanding of the critical result.

## 2022-04-01 NOTE — PHARMACY-ADMISSION MEDICATION HISTORY
Pharmacy Note - Admission Medication History    Pertinent Provider Information: n/a     ______________________________________________________________________    Prior To Admission (PTA) med list completed and updated in EMR.       PTA Med List   Medication Sig Last Dose     albuterol (PROAIR HFA;PROVENTIL HFA;VENTOLIN HFA) 90 mcg/actuation inhaler [ALBUTEROL (PROAIR HFA;PROVENTIL HFA;VENTOLIN HFA) 90 MCG/ACTUATION INHALER] Inhale 2 puffs every 6 (six) hours as needed for wheezing. PRN     aspirin 325 MG EC tablet [ASPIRIN 325 MG EC TABLET] Take 325 mg by mouth daily. 4/1/2022 at am     atorvastatin (LIPITOR) 20 MG tablet Take 20 mg by mouth every morning  4/1/2022 at Unknown time     hydroxyurea (HYDREA) 500 MG capsule Take 1,500 mg by mouth five times a week Monday-Friday 4/1/2022 at Unknown time     hydroxyurea (HYDREA) 500 MG capsule Take 1,000 mg by mouth twice a week Saturday and Daniel 3/27/2022     metoprolol succinate ER (TOPROL-XL) 25 MG 24 hr tablet Take 25 mg by mouth daily 4/1/2022 at am     tiotropium (SPIRIVA) 18 mcg inhalation capsule [TIOTROPIUM (SPIRIVA) 18 MCG INHALATION CAPSULE] Place 18 mcg into inhaler and inhale daily. 4/1/2022 at am; can bring       Information source(s): Patient and CareEverywhere/[facility]  Method of interview communication: in-person    Summary of Changes to PTA Med List  New: hydroxyurea, metoprolol  Discontinued: fish oil, lasix  Changed: none    Patient was asked about OTC/herbal products specifically.  PTA med list reflects this.    In the past week, patient estimated taking medication this percent of the time:  greater than 90%.    Allergies were reviewed, assessed, and updated with the patient.      Medications available for use during hospital stay: spiriva.     The information provided in this note is only as accurate as the sources available at the time of the update(s).    Thank you for the opportunity to participate in the care of this patient.    Coleen ODONNELL  [facility]  4/1/2022 3:20 PM

## 2022-04-01 NOTE — ED PROVIDER NOTES
EMERGENCY DEPARTMENT ENCOUNTER      NAME: Shaji Pompa  AGE: 76 year old male  YOB: 1945  MRN: [id number]  EVALUATION DATE & TIME: No admission date for patient encounter.    PCP: Steven Caraballo    ED PROVIDER: Armida Cancino MD    Chief Complaint   Patient presents with     Shortness of Breath         FINAL IMPRESSION:  1. Acute respiratory failure with hypoxia (H)    2. SOB (shortness of breath)    3. Elevated d-dimer    4. Anemia, unspecified type    5. Elevated brain natriuretic peptide (BNP) level    6. Multiple subsegmental pulmonary emboli without acute cor pulmonale (H)    7. Elevated troponin    8. Demand ischemia (H)    9. Pneumonia of both lungs due to infectious organism, unspecified part of lung          ED COURSE & MEDICAL DECISION MAKING:    Pertinent Labs & Imaging studies reviewed. (See chart for details)  76 year old male with history of HTN, HLD, previous CVA, COPD and underlying myeloproliferative disease who presents to the Emergency Department for evaluation of approximately 9 days of shortness of breath.  Hypoxic upon arrival.  Differential includes viral syndrome, influenza, COVID-19, pneumonia, COPD exacerbation.  Patient became symptomatic while in [place] but had been there for some time.  Overall low risk for PE.  He does have some chronic peripheral edema, unchanged.  No known heart failure to suspect pulmonary edema, CHF.  No associated chest pain, and with hypoxia less likely that this is ACS, symptomatic anemia or arrhythmia.    Patient placed on monitor, IV established and blood obtained.  Placed on 2 L nasal cannula with sats in the mid 90s.  Given 125 mg Solu-Medrol, 6 puffs of albuterol MDI.  Twelve-lead EKG shows sinus rhythm with premature supraventricular complexes.  No ischemic changes.  Portable chest x-ray unremarkable.  CBC, BMP, BNP, troponin, D-dimer, VBG notable for hemoglobin of 7.8.  Most recent I have to compare to is from 9 months ago and  was 13.7.  D-dimer elevated at 4.49.  .  No previous to compare.  Troponin of 1.20.  Covid/influenza swab negative.  CT PE study shows evidence of small volume bilateral PE.  No evidence of RV strain radiographically.  Patchy infectious or inflammatory changes concerning for aspiration.  Patient was covered with heparin, Rocephin and azithromycin.  He again is anemic.  Wife was able to pull some outpatient laboratory testing and hemoglobin most recent was at 11.1 back on 14 February.  He vehemently denies any sources of bleeding and at baseline is not anticoagulated.  Will certainly need to trend his hemoglobin given anticoagulation here.  Case discussed with body interventional radiology as well as intensivist.  Neither of whom feel patient would be candidate for any interventional thrombectomy, catheter directed lytics, systemic thrombolytics and agree with heparinization and admission.    11:49 AM I met with patient for initial interview and encounter. PPE worn includes surgical mask.  1:43 PM I updated patient with plan for admission.  1:53 PM I spoke with the resident regarding plan for admission.      ED Course as of 04/01/22 1441   Fri Apr 01, 2022   1146 SpO2(!): 85 %   1221 Hemoglobin(!): 7.8  Down from 13.7, 9mo ago   1240 D-Dimer Quantitative(!): 4.49   1252 BNP(!): 379  No previous to compare   1259 SARS CoV2 PCR: Negative   1311 Troponin I(!!): 1.20   1338 Bilat small volume segmental/subsegmental. Infectious opacities.  No RV strain.    1352 Spoke w body IR re: thrombectomy/cath directed lytics. Recommend heparin.    1400 Hgb 11.1 on 2/14/22   1406 Spoke w Dr. Zepeda, intensivist          At the conclusion of the encounter I discussed the results of all of the tests and the disposition. The questions were answered. The patient or family acknowledged understanding and was agreeable with the care plan.    CONSULTS:  Body IR  Intensivist    CRITICAL CARE:  Critical Care  Performed by: Armida  MD Barak  Authorized by: Armida Cancino MD     Total critical care time: 67 minutes  Criticalcare time was exclusive of separately billable procedures and treating other patients.  Critical care was necessary to treat or prevent imminent or life-threatening deterioration of the following conditions: Acute hypoxic respiratory failure  Critical care was time spent personally by me on the following activities: development of treatment plan with patient or surrogate, discussions with consultants, examination of patient, evaluation of patient's response totreatment, obtaining history from patient or surrogate, ordering and performing treatments and interventions, ordering and review of laboratory studies, ordering and review of radiographic studies and re-evaluation ofpatient's condition, this excludes any separately billable procedures.      MEDICATIONS GIVEN IN THE EMERGENCY:  Medications   albuterol (PROVENTIL HFA/VENTOLIN HFA) inhaler (6 puffs Inhalation Given 4/1/22 1250)   heparin 25,000 units in 0.45% NaCl 250 mL ANTICOAGULANT infusion (1,650 Units/hr Intravenous New Bag 4/1/22 1409)   azithromycin 500 mg (ZITHROMAX) in 0.9% NaCl 250 mL intermittent infusion 500 mg (500 mg Intravenous New Bag 4/1/22 1440)   methylPREDNISolone sodium succinate (solu-MEDROL) injection 125 mg (125 mg Intravenous Given 4/1/22 1247)   iopamidol (ISOVUE-370) solution 100 mL (100 mLs Intravenous Given 4/1/22 1302)   heparin ANTICOAGULANT Loading dose for HIGH INTENSITY TREATMENT * Give BEFORE starting heparin infusion (7,450 Units Intravenous Given 4/1/22 1415)   cefTRIAXone (ROCEPHIN) 2 g vial to attach to  ml bag for ADULTS or NS 50 ml bag for PEDS (0 g Intravenous Stopped 4/1/22 1430)       NEW PRESCRIPTIONS STARTED AT TODAY'S ER VISIT  New Prescriptions    No medications on file          =================================================================    HPI    Patient information was obtained from: Patient     Use of  Intrepreter: N/A        Shaji Pompa is a 76 year old male with pertinent medical history of COPD, stroke, HTN, HLD who presents via walk-in for evaluation of shortness of breath.    Patient reports he has had increasing shortness of breath and mild increased cough and wheezing over the past 9 days.  He takes his breathing treatments, will with slight relief.  He adds that he has had 2-3 episodes of sharp, left-sided chest pain with coughing over the past week, but no other chest pain. He does have chronic peripheral edema which he states is unchanged. States that he has intentionally lost ~15 pounds over last several months however. Patient had been vaccinationing down in [place] for the past month and drove 1700 miles from [place] to this ED. no history of blood clots, bleeding disorders.  Denies any other complaints.       REVIEW OF SYSTEMS  Constitutional:  Denies fever, chills, weight loss or weakness  HENT:  Denies sore throat, ear pain, congestion  Respiratory: Positive for shortness of breath, cough, wheezing  Cardiovascular:  No CP, palpitations  GI:  Denies abdominal pain, nausea, vomiting, diarrhea  Musculoskeletal:  Denies any new muscle/joint pain, swelling or loss of function.  All other systems negative unless noted in HPI.      PAST MEDICAL HISTORY:  Past Medical History:   Diagnosis Date     Cerebral infarction (H)      COPD (chronic obstructive pulmonary disease) (H)      HLD (hyperlipidemia)      Hypertension      Myeloproliferative disease (H)        PAST SURGICAL HISTORY:  Past Surgical History:   Procedure Laterality Date     OPEN REDUCTION INTERNAL FIXATION FOOT Right 2010     OTHER SURGICAL HISTORY  2001    Lip lesion removal     TONSILLECTOMY & ADENOIDECTOMY         CURRENT MEDICATIONS:    Prior to Admission Medications   Prescriptions Last Dose Informant Patient Reported? Taking?   albuterol (PROAIR HFA;PROVENTIL HFA;VENTOLIN HFA) 90 mcg/actuation inhaler   Yes No   Sig: [ALBUTEROL  "(PROAIR HFA;PROVENTIL HFA;VENTOLIN HFA) 90 MCG/ACTUATION INHALER] Inhale 2 puffs every 6 (six) hours as needed for wheezing.   aspirin 325 MG EC tablet   Yes No   Sig: [ASPIRIN 325 MG EC TABLET] Take 325 mg by mouth daily.   atorvastatin (LIPITOR) 10 MG tablet   Yes No   Sig: [ATORVASTATIN (LIPITOR) 10 MG TABLET] Take 20 mg by mouth every morning.   furosemide (LASIX) 20 MG tablet   Yes No   Sig: [FUROSEMIDE (LASIX) 20 MG TABLET] Take 20 mg by mouth daily.   omega-3/dha/epa/fish oil (FISH OIL-OMEGA-3 FATTY ACIDS) 300-1,000 mg capsule   Yes No   Sig: [OMEGA-3/DHA/EPA/FISH OIL (FISH OIL-OMEGA-3 FATTY ACIDS) 300-1,000 MG CAPSULE] Take 1 g by mouth daily.   tiotropium (SPIRIVA) 18 mcg inhalation capsule   Yes No   Sig: [TIOTROPIUM (SPIRIVA) 18 MCG INHALATION CAPSULE] Place 18 mcg into inhaler and inhale daily.      Facility-Administered Medications: None       ALLERGIES:  No Known Allergies    FAMILY HISTORY:  Family History   Problem Relation Age of Onset     Heart Disease Mother        SOCIAL HISTORY:  Social History     Tobacco Use     Smoking status: Former Smoker     Packs/day: 1.00     Start date: 6/8/1965     Quit date: 1/1/2012     Years since quitting: 10.2     Smokeless tobacco: Never Used   Substance Use Topics     Alcohol use: Yes     Alcohol/week: 19.0 standard drinks     Drug use: Never        VITALS:  Patient Vitals for the past 24 hrs:   BP Temp Temp src Pulse Resp SpO2 Height Weight   04/01/22 1300 125/68 -- -- 79 28 97 % -- --   04/01/22 1200 127/59 -- -- 80 26 97 % -- --   04/01/22 1140 (!) 147/69 98.1  F (36.7  C) Oral 88 22 (!) 85 % 1.727 m (5' 8\") 93 kg (205 lb)       PHYSICAL EXAM    General Appearance: Pleasant elderly male in no acute distress  Head:  Normocephalic, atraumatic  Eyes:  conjunctiva/corneas clear  ENT:   membranes are moist without pallor  Neck:  Supple  Chest:  No tenderness or deformity, no crepitus  Cardio:  Regular rate and rhythm, no murmur/gallop/rub, 2+ pulses symmetric in " all extremities, 1+ periferal edema  Pulm:  hypoxic 85% on room air improved to mid 90% on L NC. No respiratory distress, clear to auscultation bilaterally, expiratory wheezing  Back:  No CVA tenderness, normal ROM  Abdomen:  Soft, non-tender, non distended,no rebound or guarding.  Extremities: Moves all extremities normally, normal gait.  No peripheral edema  Skin:  Skin warm, dry, no rashes  Neuro:  Alert and oriented ×3, moving all extremities, no gross sensory defects     RADIOLOGY/LABS:  Reviewed all pertinent imaging. Please see official radiology report. All pertinent labs reviewed and interpreted.    Results for orders placed or performed during the hospital encounter of 04/01/22   XR Chest Port 1 View    Impression    IMPRESSION: Lungs are clear. Heart and pulmonary vascularity are normal. No signs of acute disease. Periarticular loose bodies again seen in the region of the left subacromial bursa.   CT Chest Pulmonary Embolism w Contrast   Result Value Ref Range    Radiologist flags New diagnosis of pulmonary embolism (AA)     Impression    IMPRESSION:  1.  Small volume bilateral pulmonary emboli. No evidence of right heart strain.  2.  Small left effusion and associated atelectasis.  3.  Patchy areas of infectious/inflammatory opacities seen throughout the bilateral lungs with likely a component of superimposed aspiration.  4.  Small hiatal hernia with findings suggestive of reflux esophagitis.      [Critical Result: New diagnosis of pulmonary embolism]    Finding was identified on 4/1/2022 1:34 PM.     Armida Cancino MD, was contacted by me on 4/1/2022 1:39 PM and verbalized understanding of the critical result.      Basic metabolic panel   Result Value Ref Range    Sodium 140 136 - 145 mmol/L    Potassium 4.4 3.5 - 5.0 mmol/L    Chloride 107 98 - 107 mmol/L    Carbon Dioxide (CO2) 23 22 - 31 mmol/L    Anion Gap 10 5 - 18 mmol/L    Urea Nitrogen 35 (H) 8 - 28 mg/dL    Creatinine 1.11 0.70 - 1.30 mg/dL     Calcium 8.6 8.5 - 10.5 mg/dL    Glucose 108 70 - 125 mg/dL    GFR Estimate 69 >60 mL/min/1.73m2   D dimer quantitative   Result Value Ref Range    D-Dimer Quantitative 4.49 (H) 0.00 - 0.50 ug/mL FEU   Result Value Ref Range    Troponin I 1.20 (HH) 0.00 - 0.29 ng/mL   B-Type Natriuretic Peptide ([facility] Only)   Result Value Ref Range     (H) 0 - 78 pg/mL   Blood gas venous   Result Value Ref Range    pH Venous 7.39 7.35 - 7.45    pCO2 Venous 42 35 - 50 mm Hg    pO2 Venous 29 25 - 47 mm Hg    Bicarbonate Venous 24 24 - 30 mmol/L    Base Excess/Deficit (+/-) 0.6   mmol/L    Oxyhemoglobin Venous 47.2 (L) 70.0 - 75.0 %    O2 Sat, Venous 48.3 (L) 70.0 - 75.0 %   Symptomatic; Unknown Influenza A/B & SARS-CoV2 (COVID-19) Virus PCR Multiplex Nasopharyngeal    Specimen: Nasopharyngeal; Swab   Result Value Ref Range    Influenza A PCR Negative Negative    Influenza B PCR Negative Negative    SARS CoV2 PCR Negative Negative   CBC with platelets and differential   Result Value Ref Range    WBC Count 4.2 4.0 - 11.0 10e3/uL    RBC Count 1.70 (L) 4.40 - 5.90 10e6/uL    Hemoglobin 7.8 (L) 13.3 - 17.7 g/dL    Hematocrit 23.9 (L) 40.0 - 53.0 %     (H) 78 - 100 fL    MCH 45.9 (H) 26.5 - 33.0 pg    MCHC 32.6 31.5 - 36.5 g/dL    RDW 16.8 (H) 10.0 - 15.0 %    Platelet Count 464 (H) 150 - 450 10e3/uL    % Neutrophils 60 %    % Lymphocytes 20 %    % Monocytes 17 %    % Eosinophils 1 %    % Basophils 1 %    % Immature Granulocytes 1 %    NRBCs per 100 WBC 1 (H) <1 /100    Absolute Neutrophils 2.5 1.6 - 8.3 10e3/uL    Absolute Lymphocytes 0.8 0.8 - 5.3 10e3/uL    Absolute Monocytes 0.7 0.0 - 1.3 10e3/uL    Absolute Eosinophils 0.1 0.0 - 0.7 10e3/uL    Absolute Basophils 0.0 0.0 - 0.2 10e3/uL    Absolute Immature Granulocytes 0.1 <=0.4 10e3/uL    Absolute NRBCs 0.0 10e3/uL   Extra Green Top (Lithium Heparin) Tube   Result Value Ref Range    Hold Specimen JIC    Result Value Ref Range    INR 1.22 (H) 0.85 - 1.15   Result Value Ref  Range    aPTT 36 22 - 38 Seconds       EKG:  Performed at: 1149    Impression:   Sinus rhythm with premature supraventricular complexes  Possible inferior infarct (cited on or before 4/1/2022)  Cannot rule out anterior infarct,age undetermined  Abnormal ECG    Rate: 85  Rhythm: Sinus rhythm with premature supraventricular complexes  Axis: 38  IA Interval: 130  QRS Interval: 110  QTc Interval: 428  ST Changes: None  Comparison:   When compared to ECG of 10/31/2019: Premature supraventricular complexes are now present.     I have independently reviewed and interpreted the EKG(s) documented above.      The creation of this record is based on the scribe s observations of the work being performed by Armida Cancino MD and the provider s statements to them. It was created on his behalf by Garfield Davison, a trained medical scribe. This document has been checked and approved by the attending provider.    Armida Cancino MD  Emergency Medicine  [facility] EMERGENCY ROOM  [address] [id number] [phone]  Dept: [phone]     Armida Cancino MD  04/01/22 1442

## 2022-04-02 ENCOUNTER — APPOINTMENT (OUTPATIENT)
Dept: PHYSICAL THERAPY | Facility: CLINIC | Age: 77
DRG: 175 | End: 2022-04-02
Attending: STUDENT IN AN ORGANIZED HEALTH CARE EDUCATION/TRAINING PROGRAM
Payer: COMMERCIAL

## 2022-04-02 ENCOUNTER — APPOINTMENT (OUTPATIENT)
Dept: CARDIOLOGY | Facility: CLINIC | Age: 77
DRG: 175 | End: 2022-04-02
Attending: STUDENT IN AN ORGANIZED HEALTH CARE EDUCATION/TRAINING PROGRAM
Payer: COMMERCIAL

## 2022-04-02 LAB
ABO/RH(D): NORMAL
ANION GAP SERPL CALCULATED.3IONS-SCNC: 7 MMOL/L (ref 5–18)
ANTIBODY SCREEN: NEGATIVE
BLD PROD TYP BPU: NORMAL
BLOOD COMPONENT TYPE: NORMAL
BUN SERPL-MCNC: 32 MG/DL (ref 8–28)
CALCIUM SERPL-MCNC: 8.4 MG/DL (ref 8.5–10.5)
CHLORIDE BLD-SCNC: 109 MMOL/L (ref 98–107)
CO2 SERPL-SCNC: 24 MMOL/L (ref 22–31)
CODING SYSTEM: NORMAL
CREAT SERPL-MCNC: 0.88 MG/DL (ref 0.7–1.3)
CROSSMATCH: NORMAL
ERYTHROCYTE [DISTWIDTH] IN BLOOD BY AUTOMATED COUNT: 16.5 % (ref 10–15)
GFR SERPL CREATININE-BSD FRML MDRD: 89 ML/MIN/1.73M2
GLUCOSE BLD-MCNC: 133 MG/DL (ref 70–125)
HCT VFR BLD AUTO: 21.1 % (ref 40–53)
HGB BLD-MCNC: 6.9 G/DL (ref 13.3–17.7)
HGB BLD-MCNC: 7.9 G/DL (ref 13.3–17.7)
HGB BLD-MCNC: 8.1 G/DL (ref 13.3–17.7)
ISSUE DATE AND TIME: NORMAL
LVEF ECHO: NORMAL
MCH RBC QN AUTO: 45.4 PG (ref 26.5–33)
MCHC RBC AUTO-ENTMCNC: 32.7 G/DL (ref 31.5–36.5)
MCV RBC AUTO: 139 FL (ref 78–100)
PLATELET # BLD AUTO: 397 10E3/UL (ref 150–450)
POTASSIUM BLD-SCNC: 4.4 MMOL/L (ref 3.5–5)
RBC # BLD AUTO: 1.52 10E6/UL (ref 4.4–5.9)
SODIUM SERPL-SCNC: 140 MMOL/L (ref 136–145)
SPECIMEN EXPIRATION DATE: NORMAL
TROPONIN I SERPL-MCNC: 0.67 NG/ML (ref 0–0.29)
UFH PPP CHRO-ACNC: 0.33 IU/ML
UNIT ABO/RH: NORMAL
UNIT NUMBER: NORMAL
UNIT STATUS: NORMAL
UNIT TYPE ISBT: 5100
WBC # BLD AUTO: 4.2 10E3/UL (ref 4–11)

## 2022-04-02 PROCEDURE — 250N000009 HC RX 250: Performed by: STUDENT IN AN ORGANIZED HEALTH CARE EDUCATION/TRAINING PROGRAM

## 2022-04-02 PROCEDURE — P9016 RBC LEUKOCYTES REDUCED: HCPCS | Performed by: STUDENT IN AN ORGANIZED HEALTH CARE EDUCATION/TRAINING PROGRAM

## 2022-04-02 PROCEDURE — 36415 COLL VENOUS BLD VENIPUNCTURE: CPT | Performed by: STUDENT IN AN ORGANIZED HEALTH CARE EDUCATION/TRAINING PROGRAM

## 2022-04-02 PROCEDURE — 86923 COMPATIBILITY TEST ELECTRIC: CPT | Performed by: STUDENT IN AN ORGANIZED HEALTH CARE EDUCATION/TRAINING PROGRAM

## 2022-04-02 PROCEDURE — 250N000011 HC RX IP 250 OP 636: Performed by: STUDENT IN AN ORGANIZED HEALTH CARE EDUCATION/TRAINING PROGRAM

## 2022-04-02 PROCEDURE — 97162 PT EVAL MOD COMPLEX 30 MIN: CPT | Mod: GP | Performed by: PHYSICAL THERAPIST

## 2022-04-02 PROCEDURE — C9113 INJ PANTOPRAZOLE SODIUM, VIA: HCPCS | Performed by: STUDENT IN AN ORGANIZED HEALTH CARE EDUCATION/TRAINING PROGRAM

## 2022-04-02 PROCEDURE — 86901 BLOOD TYPING SEROLOGIC RH(D): CPT | Performed by: FAMILY MEDICINE

## 2022-04-02 PROCEDURE — 999N000157 HC STATISTIC RCP TIME EA 10 MIN

## 2022-04-02 PROCEDURE — 250N000012 HC RX MED GY IP 250 OP 636 PS 637: Performed by: STUDENT IN AN ORGANIZED HEALTH CARE EDUCATION/TRAINING PROGRAM

## 2022-04-02 PROCEDURE — 36415 COLL VENOUS BLD VENIPUNCTURE: CPT

## 2022-04-02 PROCEDURE — 255N000002 HC RX 255 OP 636: Performed by: FAMILY MEDICINE

## 2022-04-02 PROCEDURE — 97110 THERAPEUTIC EXERCISES: CPT | Mod: GP | Performed by: PHYSICAL THERAPIST

## 2022-04-02 PROCEDURE — 85018 HEMOGLOBIN: CPT | Performed by: STUDENT IN AN ORGANIZED HEALTH CARE EDUCATION/TRAINING PROGRAM

## 2022-04-02 PROCEDURE — 80048 BASIC METABOLIC PNL TOTAL CA: CPT

## 2022-04-02 PROCEDURE — 97116 GAIT TRAINING THERAPY: CPT | Mod: GP | Performed by: PHYSICAL THERAPIST

## 2022-04-02 PROCEDURE — 85041 AUTOMATED RBC COUNT: CPT

## 2022-04-02 PROCEDURE — 99233 SBSQ HOSP IP/OBS HIGH 50: CPT | Mod: GC | Performed by: STUDENT IN AN ORGANIZED HEALTH CARE EDUCATION/TRAINING PROGRAM

## 2022-04-02 PROCEDURE — 250N000013 HC RX MED GY IP 250 OP 250 PS 637

## 2022-04-02 PROCEDURE — 93306 TTE W/DOPPLER COMPLETE: CPT | Mod: 26 | Performed by: INTERNAL MEDICINE

## 2022-04-02 PROCEDURE — 210N000002 HC R&B HEART CARE

## 2022-04-02 PROCEDURE — 94640 AIRWAY INHALATION TREATMENT: CPT

## 2022-04-02 PROCEDURE — 85520 HEPARIN ASSAY: CPT | Performed by: FAMILY MEDICINE

## 2022-04-02 PROCEDURE — 94664 DEMO&/EVAL PT USE INHALER: CPT

## 2022-04-02 RX ORDER — IPRATROPIUM BROMIDE AND ALBUTEROL SULFATE 2.5; .5 MG/3ML; MG/3ML
3 SOLUTION RESPIRATORY (INHALATION) EVERY 4 HOURS PRN
Status: DISCONTINUED | OUTPATIENT
Start: 2022-04-02 | End: 2022-04-05

## 2022-04-02 RX ORDER — IPRATROPIUM BROMIDE AND ALBUTEROL SULFATE 2.5; .5 MG/3ML; MG/3ML
3 SOLUTION RESPIRATORY (INHALATION)
Status: DISCONTINUED | OUTPATIENT
Start: 2022-04-02 | End: 2022-04-02

## 2022-04-02 RX ADMIN — PERFLUTREN 2 ML: 6.52 INJECTION, SUSPENSION INTRAVENOUS at 08:30

## 2022-04-02 RX ADMIN — HYDROXYUREA 1000 MG: 500 CAPSULE ORAL at 10:08

## 2022-04-02 RX ADMIN — METOPROLOL SUCCINATE 25 MG: 25 TABLET, EXTENDED RELEASE ORAL at 10:08

## 2022-04-02 RX ADMIN — PREDNISONE 40 MG: 20 TABLET ORAL at 10:08

## 2022-04-02 RX ADMIN — PANTOPRAZOLE SODIUM 40 MG: 40 INJECTION, POWDER, FOR SOLUTION INTRAVENOUS at 10:08

## 2022-04-02 RX ADMIN — IPRATROPIUM BROMIDE AND ALBUTEROL SULFATE 3 ML: 2.5; .5 SOLUTION RESPIRATORY (INHALATION) at 04:24

## 2022-04-02 RX ADMIN — HEPARIN SODIUM 1650 UNITS/HR: 10000 INJECTION, SOLUTION INTRAVENOUS at 13:46

## 2022-04-02 RX ADMIN — LEVOFLOXACIN 500 MG: 5 INJECTION, SOLUTION INTRAVENOUS at 18:58

## 2022-04-02 RX ADMIN — ATORVASTATIN CALCIUM 20 MG: 10 TABLET, FILM COATED ORAL at 10:10

## 2022-04-02 ASSESSMENT — ACTIVITIES OF DAILY LIVING (ADL)
ADLS_ACUITY_SCORE: 6
ADLS_ACUITY_SCORE: 6
ADLS_ACUITY_SCORE: 10
ADLS_ACUITY_SCORE: 6
ADLS_ACUITY_SCORE: 10
ADLS_ACUITY_SCORE: 6
ADLS_ACUITY_SCORE: 10
ADLS_ACUITY_SCORE: 6
ADLS_ACUITY_SCORE: 10
ADLS_ACUITY_SCORE: 6
ADLS_ACUITY_SCORE: 10
ADLS_ACUITY_SCORE: 6
ADLS_ACUITY_SCORE: 6
ADLS_ACUITY_SCORE: 10
ADLS_ACUITY_SCORE: 6
ADLS_ACUITY_SCORE: 10

## 2022-04-02 NOTE — PROVIDER NOTIFICATION
04/02/22 0424   Vital Signs   Resp 20   Pulse 83   Pulse Rate Source Monitor   Oxygen Therapy   SpO2 95 %   O2 Device Nasal cannula   Oxygen Delivery 3 LPM   Breath Sounds   Breath Sounds All Fields   All Lung Fields Breath Sounds diminished   RLL Breath Sounds crackles, fine;diminished   LLL Breath Sounds crackles, fine;diminished   Nebulizer Assessment & Treatment   $RT Use ONLY Delivery Method Nebulizer - Initial   $Demo/Eval Nebulizer/Inhaler - RT ONLY Completed   Pretreatment Heart Rate (beats/min) 73   Pretreatment Resp Rate (breaths/min) 20   Pretreatment O2 sats - (TCU only) 95   Pretreat Breath Sounds - Bilat - All Lobes diminished   Pretreat Breath Sounds - LLL crackles, fine;pleural rub   Pretreat Breath Sounds - RLL crackles, fine;diminished   Patient Position semi-Saucedo's     PRN treatment given, no change in BS post treatment will continue to follow.    Liliana Lanier BSRC, RRT

## 2022-04-02 NOTE — PROGRESS NOTES
Brief Progress Note:    Hgb 6.9 at 0239, down from 7.8 on admission. No signs or symptoms of bleeding. Discussed risks and benefits of PRBC transfusion, patient accepts. Consent signed. Type and screen pending. 1 unit PRBCs ordered.     Daja Tobias MD, PGY-2  [facility] Medicine Residency  April 2, 2022

## 2022-04-02 NOTE — PROVIDER NOTIFICATION
Paged MD regarding positive results from DVT.    Addendum: MD okay with current heparin orders. Next Hep10A draw @ 8355.

## 2022-04-02 NOTE — PROGRESS NOTES
[facility]    Progress Note - Hospitalist Service       Date of Admission:  4/1/2022    Assessment and Plan  Shaji Pompa is a 76 year old male with a past medical history of COPD, HTN, HLD, myeloproliferative disease, and cerebral infarction. He was admitted on 4/1/2022 due to acute respiratory failure with hypoxia secondary to pneumonia and bilateral pulmonary emboli. Patient also found to have R-sided DVTs. Patient required 1 unit PRBC transfusion for hgb <7.      Acute respiratory distress with hypoxia  PE, bilateral  Right DVT  Pneumonia  COPD  Elevated troponin and BNP  BNP of 379, D-dimer 4.49, and troponin 1.2. CT PE with small bilateral pulmonary emboli without evidence of right heart strain and patch areas of infectious/inflammatory opacities with likely component of superimposed aspiration. Nonocclusive DVT of R popliteal vein and occlusive DVT in posterior tibial vein. Provoked due to drive from 3 day drive from [place] to [place]. ED started patient on heparin drip and given azithromycin and ceftriaxone. Troponin and BNP elevated, trop now down-trending, likely due to PE but would like to rule out cardiac issue.  - Cardiac telemetry  - Echocardiogram  - Start Levaquin 500 mg IV daily. Switch to oral on discharge  - IP consult for insurance coverage of DOAC  - Consider SLP for swallow study for possible aspiration pneumonia  - plan to call outpatient hem/onc to see what they'd recommend for anticoagulation on discharge for provoked DVT/PE     Macrocytic anemia  On admission it was 7.8, 6.9 on repeat now s/p 1 unit PRBC 4/2 AM with improvement to 7.9. Last lab on 6/14 was 13.7 and wife has information of hemoglobin being 11.1 on 2/14. Hemoglobin expected to decrease with heparin use. Patient denies bleeding and any black or bloody stools.  - hemoglobin recheck at 3PM  - Hemoccult test  - Transfuse if hemoglobin <7  - Started on Protonix for possible GI bleed  -  "recommend following up with hem/onc outpatient as soon as able     COPD  - Hold PTA Spiriva daily when doing duonebs  - Begin duoneb 3 times during the day  - Prednisone 40 mg for 5 days    Hypertension  - Continue PTA furosemide 20 mg daily  - Continue metoprolol succinate 25 mg daily     Hyperlipidemia  - Continue PTA atorvastatin 20 mg daily     CML  - PTA hydroxyurea 1500 mg Monday-Friday  - PTA hydroxyurea 1000 mg Saturday-Sunday  - follow up with hem/onc outpatient to discuss ?hydroxyurea-induced neuropathy     Diet: Regular Diet Adult    DVT Prophylaxis: Heparin gtt  Garrett Catheter: Not present  Fluids: PO  Central Lines: None  Cardiac Monitoring: None  Code Status: Full Code      Disposition Plan   Expected Discharge: 04/03/2022     Anticipated discharge location:  Awaiting care coordination huddle  Delays: medical        The patient's care was discussed with the Attending Physician, Dr. David Gray, patient and his wife. .    Benita Behm, MD PGY2  [facility]  Senior Pager: [phone], available 24/7  Text page via [employer] Paging/Directory     Clinically Significant Risk Factors Present on Admission     # Coagulation Defect: INR = 1.22 (Ref range: 0.85 - 1.15) and/or PTT = 36 Seconds (Ref range: 22 - 38 Seconds) on admission, will monitor for bleeding  # Platelet Defect: home medication list includes an antiplatelet medication   # Obesity: Estimated body mass index is 32.12 kg/m  as calculated from the following:    Height as of this encounter: 1.676 m (5' 5.98\").    Weight as of this encounter: 90.2 kg (198 lb 14.4 oz).      ______________________________________________________________________    Interval History  Hemoglobin <7 overnight, patient was consented for PRBCs and finished transfusion ~8 AM.     Patient notes his breathing feels much improved today, still on ~5L supplemental O2 via nasal canula. Denies headache, fevers, chills, chest pain, abdominal pain, constipation, " diarrhea, dysuria, swelling. Denies hemoptysis, hematemesis, melena, hematochezia.     Patient has chronic numbness/tingling in his fingers and lower extremities, wife notes he was told hydroxyurea can have this effect.      Data reviewed today: I reviewed all medications, new labs and imaging results over the last 24 hours.    Physical Exam  Vital Signs: Temp: 98.1  F (36.7  C) Temp src: Oral BP: 125/58 Pulse: 73   Resp: 21 SpO2: 95 % O2 Device: Nasal cannula Oxygen Delivery: 2 LPM  Weight: 198 lbs 14.38 oz  General appearance: alert, cooperative, appears stated age and no distress  Head: Normocephalic, without obvious abnormality, atraumatic  Eyes: conjunctivae/corneas clear. PERRL, EOM's intact.   Ears: hearing grossly intact  Nose: nares normal, septum midline, mucosa normal, no drainage  Throat: lips, mucosa, and tongue normal; teeth and gums normal  Lungs: clear to auscultation bilaterally though distant, respirations unlabored  Chest wall: no tenderness to palpation  Heart: regular rate and rhythm, S1, S2 normal, no murmur, click, rub or gallop  Abdomen: soft, non-tender; bowel sounds normal; no masses, no organomegaly  Extremities: extremities normal, atraumatic, no cyanosis. 1+ pedal edema bilaterally  Pulses: 2+, R pedal pulse not as brisk compared to L pedal pulse. Cap refill <2 sec bilaterally  Skin: Skin color, texture, turgor normal. No rashes or lesions, non-diaphoretic  Neurologic: CNII-XII intact, normal strength, sensation and reflexes throughout    Data  Recent Labs   Lab 04/02/22  1019 04/02/22  0239 04/01/22  1948 04/01/22  1208 04/01/22  1207   WBC  --  4.2  --   --  4.2   HGB 7.9* 6.9* 7.2*  --  7.8*   MCV  --  139*  --   --  141*   PLT  --  397  --   --  464*   INR  --   --   --  1.22*  --    NA  --  140  --  140  --    POTASSIUM  --  4.4  --  4.4  --    CHLORIDE  --  109*  --  107  --    CO2  --  24  --  23  --    BUN  --  32*  --  35*  --    CR  --  0.88  --  1.11  --    ANIONGAP  --  7  --   10  --    GLENDY  --  8.4*  --  8.6  --    GLC  --  133*  --  108  --      Recent Results (from the past 24 hour(s))   CT Chest Pulmonary Embolism w Contrast   Result Value    Radiologist flags New diagnosis of pulmonary embolism (AA)    Narrative    EXAM: CT CHEST PULMONARY EMBOLISM W CONTRAST  LOCATION: [facility]  DATE/TIME: 4/1/2022 1:01 PM    INDICATION: PE suspected, low/intermediate probability, positive D-dimer, shortness of breath.  COMPARISON: None.  TECHNIQUE: CT chest pulmonary angiogram during arterial phase injection of IV contrast. Multiplanar reformats and MIP reconstructions were performed. Dose reduction techniques were used.   CONTRAST: Isovue 370 100 mL    FINDINGS:  ANGIOGRAM CHEST: Small volume bilateral nonocclusive segmental and subsegmental pulmonary artery filling defects. No evidence of right heart strain. The aorta is normal in caliber.    LUNGS AND PLEURA: Small left effusion and associated atelectasis/consolidation. There are a few patchy areas of groundglass, tree-in-bud, consolidative opacities seen throughout the bilateral lungs. Small volume airway secretions and mild bronchial wall   thickening. No pneumothorax.    MEDIASTINUM/AXILLAE: Heart size is normal. There are a few borderline enlarged mediastinal lymph nodes with the largest in the left lower paratracheal chain measuring 15 x 11 mm (series 5/32). These are likely reactive. Small hiatal hernia with findings   suggestive of reflux esophagitis.    CORONARY ARTERY CALCIFICATION: Moderate.    UPPER ABDOMEN: No significant finding.    MUSCULOSKELETAL: Mild degenerative changes of the spine and bilateral shoulders.      Impression    IMPRESSION:  1.  Small volume bilateral pulmonary emboli. No evidence of right heart strain.  2.  Small left effusion and associated atelectasis.  3.  Patchy areas of infectious/inflammatory opacities seen throughout the bilateral lungs with likely a component of superimposed  aspiration.  4.  Small hiatal hernia with findings suggestive of reflux esophagitis.      [Critical Result: New diagnosis of pulmonary embolism]    Finding was identified on 4/1/2022 1:34 PM.     Armida Cancino MD, was contacted by me on 4/1/2022 1:39 PM and verbalized understanding of the critical result.      US Lower Extremity Venous Duplex Bilateral   Result Value    Radiologist flags DVT right leg (AA)    Narrative    EXAM: US LOWER EXTREMITY VENOUS DUPLEX BILATERAL  LOCATION: [facility]  DATE/TIME: 4/1/2022 10:09 PM    INDICATION: New PE, more swelling in right lower leg. Please scan bilaterally.  COMPARISON: Left leg ultrasound examination 10/31/2019  TECHNIQUE: Venous Duplex ultrasound of bilateral lower extremities with and without compression, augmentation and duplex. Color flow and spectral Doppler with waveform analysis performed.    FINDINGS: Exam includes the common femoral, femoral, popliteal veins as well as segmentally visualized deep calf veins and greater saphenous vein.     RIGHT: There is partially occlusive deep vein thrombosis seen involving the popliteal vein with some extension into the gastrocnemius veins. There is occlusive deep vein thrombosis seen in the posterior tibial vein from its origin to the ankle. No   superficial thrombophlebitis. No popliteal cyst.    LEFT: No deep vein thrombosis. No superficial thrombophlebitis. No popliteal cyst.      Impression    IMPRESSION:    [Critical Result: DVT right leg]    Finding was identified on 4/1/2022 11:29 PM.     Glenny HIDALGO RN was contacted by me on 4/1/2022 11:45PM and verbalized understanding of the critical result.     1.  Nonocclusive deep vein thrombosis in the right popliteal vein with some extension into the gastrocnemius veins. Occlusive deep vein thrombosis in the posterior tibial vein from its origin to the ankle.       Echocardiogram Complete   Result Value    LVEF  60-65%    Narrative     [id number]  [id number]  [id number]  [id number]^CHRIS^INES^[place] [address]     Name: HELENA FNETON  MRN: [id number]  : 1945  Study Date: 2022 08:05 AM  Age: 76 yrs  Gender: Male  Patient Location: [facility]  Reason For Study: Pulmonary Emboli  Ordering Physician: INES PEREZ  Performed By: JEANINE     BSA: 2.0 m2  Height: 66 in  Weight: 198 lb  HR: 84  ______________________________________________________________________________  Procedure  Complete Echo Adult. Definity (NDC #15056-944) given intravenously.  ______________________________________________________________________________  Interpretation Summary     Left ventricular size, wall motion and function are normal. The ejection  fraction is 60-65%.  Normal right ventricle size and systolic function.  The right ventricular systolic pressure is approximated at 50mmHg plus the  right atrial pressure.  IVC diameter and respiratory changes fall into an intermediate range  suggesting an RA pressure of 8 mmHg.  ______________________________________________________________________________  Left Ventricle  Left ventricular size, wall motion and function are normal. The ejection  fraction is 60-65%. There is normal left ventricular wall thickness. Left  ventricular diastolic function is indeterminate. No regional wall motion  abnormalities noted.     Right Ventricle  Normal right ventricle size and systolic function.     Atria  The left atrium is mildly dilated. Right atrial size is normal. There is no  color Doppler evidence of an atrial shunt.     Mitral Valve  Mitral valve leaflets appear normal. There is no evidence of mitral stenosis  or clinically significant mitral regurgitation.     Tricuspid Valve  Tricuspid valve leaflets appear normal. There is mild (1+) tricuspid  regurgitation. The right ventricular systolic pressure is approximated at  50mmHg plus the right atrial pressure. IVC  diameter and respiratory changes  fall into an intermediate range suggesting an RA pressure of 8 mmHg.     Aortic Valve  Aortic valve leaflets appear normal. There is no evidence of aortic stenosis  or clinically significant aortic regurgitation.     Pulmonic Valve  The pulmonic valve is not well seen, but is grossly normal. This degree of  valvular regurgitation is within normal limits.     Vessels  The aorta root is normal. Normal size ascending aorta. IVC diameter <2.1 cm  collapsing >50% with sniff suggests a normal RA pressure of 3 mmHg.     Pericardium  There is no pericardial effusion.     ______________________________________________________________________________  MMode/2D Measurements & Calculations  Ao root diam: 3.2 cm  asc Aorta Diam: 2.9 cm     LVOT diam: 2.0 cm  LVOT area: 3.2 cm2  LA Volume Indexed (AL/bp): 34.6 ml/m2     Time Measurements  MM HR: 84.0 BPM     Doppler Measurements & Calculations  MV E max joe: 122.0 cm/sec  MV A max joe: 129.6 cm/sec  MV E/A: 0.94  MV max P.9 mmHg  MV mean PG: 3.4 mmHg  MV V2 VTI: 28.4 cm  MVA(VTI): 2.7 cm2  MV dec slope: 844.9 cm/sec2  MV dec time: 0.14 sec     Ao V2 max: 181.3 cm/sec  Ao max P.0 mmHg  Ao V2 mean: 119.4 cm/sec  Ao mean P.5 mmHg  Ao V2 VTI: 34.9 cm  JAVIER(I,D): 2.2 cm2  JAVIER(V,D): 2.2 cm2  LV V1 max P.1 mmHg  LV V1 max: 123.8 cm/sec  LV V1 VTI: 23.8 cm  SV(LVOT): 77.3 ml  SI(LVOT): 38.8 ml/m2  PA acc time: 0.08 sec  PI end-d joe: 108.6 cm/sec  TR max joe: 354.2 cm/sec  TR max P.2 mmHg  AV Joe Ratio (DI): 0.68  JAVIER Index (cm2/m2): 1.1  E/E' avg: 15.7  Lateral E/e': 10.0  Medial E/e': 21.4     ______________________________________________________________________________  Report approved by: Ryan Gonzalez 2022 11:35 AM

## 2022-04-02 NOTE — PLAN OF CARE
Problem: Respiratory Compromise (Pneumonia)  Goal: Effective Oxygenation and Ventilation  Outcome: Ongoing, Progressing    Problem: VTE (Venous Thromboembolism)  Goal: VTE (Venous Thromboembolism) Symptom Resolution  Outcome: Ongoing, Progressing     A&Ox4, pleasant. Denied any pain overnight. Weaned from 4 to 3 liters overnight.  RR tachypneic @ times. Heparin infusing @ 16.5 mL/hr. Next Hep10A due in AM on 4/3. Hemoglobin back @ 6.9 on shift. One unit of blood ordered and is currently infusing. Next check @ 1000. LS diminished with crackles in bases. Infrequent non-productive cough. PRN neb given on shift. BELL, even moving to edge of bed. Patient doesn't c/o being SOB though despite WOB being elevated and RR elevated in mid 20's. Voided in urinal on shift @ edge of bed. Still need stool sample. Tele: NSR. Discharge pending.

## 2022-04-02 NOTE — PROGRESS NOTES
04/02/22 1313   Quick Adds   Type of Visit Initial PT Evaluation   Living Environment   People in Home spouse   Current Living Arrangements house   Home Accessibility stairs to enter home;stairs within home   Number of Stairs, Main Entrance 2   Stair Railings, Main Entrance none   Number of Stairs, Within Home, Primary ten   Stair Railings, Within Home, Primary railing on left side (ascending)   Transportation Anticipated family or friend will provide   Living Environment Comments patient is independent with mobility in the home at baseline   Self-Care   Equipment Currently Used at Home none   Fall history within last six months no   General Information   Onset of Illness/Injury or Date of Surgery 04/01/22   Referring Physician David Gray   Patient/Family Therapy Goals Statement (PT) return to home   Pertinent History of Current Problem (include personal factors and/or comorbidities that impact the POC) Patient became anemic during recent travels home from [place]; precise etiology of current illness still under investigation   General Observations Encountered on 1L O2 via nasal cannula   Cognition   Affect/Mental Status (Cognition) WNL   Posture    Posture Forward head position;Protracted shoulders;Kyphosis;Lordosis   Strength (Manual Muscle Testing)   Strength Comments WFL   Transfers   Transfers sit-stand transfer   Sit-Stand Transfer   Sit-Stand [place] (Transfers) modified independence;verbal cues;set up;supervision   Assistive Device (Sit-Stand Transfers) walker, front-wheeled   Gait/Stairs (Locomotion)   [place] Level (Gait) supervision   Assistive Device (Gait) walker, front-wheeled   Distance in Feet (Required for LE Total Joints) 35   Pattern (Gait) step-through   Deviations/Abnormal Patterns (Gait) rosa decreased;gait speed decreased;stride length decreased;weight shifting decreased   Comment, (Gait/Stairs) On 1L O2 via NC; patient desaturated to 84% during this brief walk and  resaturated to 90% following 1 min with 4L   Balance   Balance Comments WFL   Clinical Impression   Criteria for Skilled Therapeutic Intervention Yes, treatment indicated   PT Diagnosis (PT) impaired functional moblity    Influenced by the following impairments activity tolerance; need for supplemental O2   Functional limitations due to impairments gait, stair climbing   Clinical Presentation (PT Evaluation Complexity) Evolving/Changing   Clinical Presentation Rationale presents as diagnosed   Clinical Decision Making (Complexity) moderate complexity   Planned Therapy Interventions (PT) gait training;home exercise program;strengthening;transfer training;stair training   Risk & Benefits of therapy have been explained evaluation/treatment results reviewed;care plan/treatment goals reviewed;risks/benefits reviewed;current/potential barriers reviewed;participants voiced agreement with care plan;participants included;patient;spouse/significant other   PT Discharge Planning   PT Discharge Recommendation (DC Rec) home with assist   PT Rationale for DC Rec Patient may need some assistance from spouse upone return home   Total Evaluation Time   Total Evaluation Time (Minutes) 15

## 2022-04-02 NOTE — PROGRESS NOTES
04/02/22 1313   Signing Clinician's Name / Credentials   Signing clinician's name / credentials Driss Diaz DPT   Quick Adds   Rehab Discipline PT   Functional Transfer Training   Minutes of Treatment 6   Symptoms Noted During/After Treatment none   Treatment Detail STS x 2 Erick with VCs for hand position and procedure   Gait Training   Minutes of Treatment (Gait Training) 9   Symptoms Noted During/After Treatment (Gait Training) shortness of breath;fatigue   Distance in Feet (Required for LE Total Joints) 35   Treatment Detail ambulated in room on 1L via NC. Desated to 84% and required 2 min to resat to 90% SnO2 on 4L O2.    Lane Level (Gait Training) contact guard   Physical Assistance Level (Gait Training) supervision;verbal cues;nonverbal cues (demo/gestures);1 person assist   Assistive Device (Gait Training) rolling walker   Gait Analysis Deviations decreased rosa   Therapeutic Exercise   Minutes of Treatment 8   Symptoms Noted During/After Treatment none   Treatment Detail Seated LAQ alternating 5s holds to fatigue; education regarding importance of threx during periods of NWB    PT Discharge Planning   PT Discharge Recommendation (DC Rec) home with assist   PT Rationale for DC Rec Patient may need some assistance from spouse upone return home   Additional Documentation   PT Plan activity tolerance, gait, stairs    Total Session Time   Total Session Time (minutes) 23 minutes

## 2022-04-03 ENCOUNTER — APPOINTMENT (OUTPATIENT)
Dept: PHYSICAL THERAPY | Facility: CLINIC | Age: 77
DRG: 175 | End: 2022-04-03
Payer: COMMERCIAL

## 2022-04-03 PROBLEM — I10 ESSENTIAL HYPERTENSION: Status: ACTIVE | Noted: 2021-06-14

## 2022-04-03 PROBLEM — J44.9 CHRONIC OBSTRUCTIVE PULMONARY DISEASE (H): Status: ACTIVE | Noted: 2021-06-14

## 2022-04-03 PROBLEM — E78.2 MIXED HYPERLIPIDEMIA: Status: ACTIVE | Noted: 2021-06-14

## 2022-04-03 PROBLEM — I82.431 ACUTE DEEP VEIN THROMBOSIS (DVT) OF POPLITEAL VEIN OF RIGHT LOWER EXTREMITY (H): Status: ACTIVE | Noted: 2022-04-03

## 2022-04-03 LAB
ANION GAP SERPL CALCULATED.3IONS-SCNC: 11 MMOL/L (ref 5–18)
BUN SERPL-MCNC: 31 MG/DL (ref 8–28)
CALCIUM SERPL-MCNC: 8.5 MG/DL (ref 8.5–10.5)
CHLORIDE BLD-SCNC: 111 MMOL/L (ref 98–107)
CO2 SERPL-SCNC: 22 MMOL/L (ref 22–31)
CREAT SERPL-MCNC: 0.81 MG/DL (ref 0.7–1.3)
ERYTHROCYTE [DISTWIDTH] IN BLOOD BY AUTOMATED COUNT: ABNORMAL %
GFR SERPL CREATININE-BSD FRML MDRD: >90 ML/MIN/1.73M2
GLUCOSE BLD-MCNC: 101 MG/DL (ref 70–125)
HCT VFR BLD AUTO: 26.2 % (ref 40–53)
HEMOCCULT STL QL: NEGATIVE
HGB BLD-MCNC: 8.8 G/DL (ref 13.3–17.7)
HOLD SPECIMEN: NORMAL
MCH RBC QN AUTO: 44.4 PG (ref 26.5–33)
MCHC RBC AUTO-ENTMCNC: 33.6 G/DL (ref 31.5–36.5)
MCV RBC AUTO: 132 FL (ref 78–100)
PLATELET # BLD AUTO: 526 10E3/UL (ref 150–450)
POTASSIUM BLD-SCNC: 4.7 MMOL/L (ref 3.5–5)
RBC # BLD AUTO: 1.98 10E6/UL (ref 4.4–5.9)
SODIUM SERPL-SCNC: 144 MMOL/L (ref 136–145)
UFH PPP CHRO-ACNC: 0.32 IU/ML
WBC # BLD AUTO: 6.9 10E3/UL (ref 4–11)

## 2022-04-03 PROCEDURE — 99233 SBSQ HOSP IP/OBS HIGH 50: CPT | Mod: GC

## 2022-04-03 PROCEDURE — 250N000011 HC RX IP 250 OP 636: Performed by: STUDENT IN AN ORGANIZED HEALTH CARE EDUCATION/TRAINING PROGRAM

## 2022-04-03 PROCEDURE — 210N000002 HC R&B HEART CARE

## 2022-04-03 PROCEDURE — 250N000012 HC RX MED GY IP 250 OP 636 PS 637: Performed by: STUDENT IN AN ORGANIZED HEALTH CARE EDUCATION/TRAINING PROGRAM

## 2022-04-03 PROCEDURE — 36415 COLL VENOUS BLD VENIPUNCTURE: CPT | Performed by: STUDENT IN AN ORGANIZED HEALTH CARE EDUCATION/TRAINING PROGRAM

## 2022-04-03 PROCEDURE — 97110 THERAPEUTIC EXERCISES: CPT | Mod: GP | Performed by: PHYSICAL THERAPIST

## 2022-04-03 PROCEDURE — 36415 COLL VENOUS BLD VENIPUNCTURE: CPT | Performed by: FAMILY MEDICINE

## 2022-04-03 PROCEDURE — 85520 HEPARIN ASSAY: CPT | Performed by: FAMILY MEDICINE

## 2022-04-03 PROCEDURE — 250N000013 HC RX MED GY IP 250 OP 250 PS 637

## 2022-04-03 PROCEDURE — 80048 BASIC METABOLIC PNL TOTAL CA: CPT | Performed by: STUDENT IN AN ORGANIZED HEALTH CARE EDUCATION/TRAINING PROGRAM

## 2022-04-03 PROCEDURE — 97530 THERAPEUTIC ACTIVITIES: CPT | Mod: GP | Performed by: PHYSICAL THERAPIST

## 2022-04-03 PROCEDURE — C9113 INJ PANTOPRAZOLE SODIUM, VIA: HCPCS | Performed by: STUDENT IN AN ORGANIZED HEALTH CARE EDUCATION/TRAINING PROGRAM

## 2022-04-03 PROCEDURE — 85027 COMPLETE CBC AUTOMATED: CPT | Performed by: STUDENT IN AN ORGANIZED HEALTH CARE EDUCATION/TRAINING PROGRAM

## 2022-04-03 PROCEDURE — 82272 OCCULT BLD FECES 1-3 TESTS: CPT | Performed by: FAMILY MEDICINE

## 2022-04-03 RX ADMIN — HYDROXYUREA 1000 MG: 500 CAPSULE ORAL at 08:14

## 2022-04-03 RX ADMIN — LEVOFLOXACIN 500 MG: 5 INJECTION, SOLUTION INTRAVENOUS at 17:33

## 2022-04-03 RX ADMIN — PREDNISONE 40 MG: 20 TABLET ORAL at 08:16

## 2022-04-03 RX ADMIN — METOPROLOL SUCCINATE 25 MG: 25 TABLET, EXTENDED RELEASE ORAL at 08:16

## 2022-04-03 RX ADMIN — ATORVASTATIN CALCIUM 20 MG: 10 TABLET, FILM COATED ORAL at 08:16

## 2022-04-03 RX ADMIN — HEPARIN SODIUM 1650 UNITS/HR: 10000 INJECTION, SOLUTION INTRAVENOUS at 21:17

## 2022-04-03 RX ADMIN — HEPARIN SODIUM 1650 UNITS/HR: 10000 INJECTION, SOLUTION INTRAVENOUS at 04:28

## 2022-04-03 RX ADMIN — PANTOPRAZOLE SODIUM 40 MG: 40 INJECTION, POWDER, FOR SOLUTION INTRAVENOUS at 08:13

## 2022-04-03 ASSESSMENT — ACTIVITIES OF DAILY LIVING (ADL)
ADLS_ACUITY_SCORE: 6
DEPENDENT_IADLS:: INDEPENDENT
ADLS_ACUITY_SCORE: 6

## 2022-04-03 NOTE — DISCHARGE SUMMARY
[facility] [place] [facility]  Discharge Summary - Medicine & Pediatrics       Date of Admission:  4/1/2022  Date of Discharge:  4/7/2022  2:10 PM  Discharging Provider: Dr. Gayle  Discharge Service: Hospitalist Service    Discharge Diagnoses   Active Problems:    Chronic obstructive pulmonary disease (H) (6/14/2021)    Essential hypertension (6/14/2021)    Mixed hyperlipidemia (6/14/2021)    Chronic myeloproliferative disease (H) (4/1/2022)    SOB (shortness of breath) (4/1/2022)    Elevated brain natriuretic peptide (BNP) level (4/1/2022)    Acute respiratory failure with hypoxia (H) (4/1/2022)    Elevated d-dimer (4/1/2022)    Anemia, unspecified type (4/1/2022)    Elevated troponin (4/1/2022)    Pneumonia of both lungs due to infectious organism, unspecified part of lung (4/1/2022)    Multiple subsegmental pulmonary emboli without acute cor pulmonale (H) (4/1/2022)    Acute deep vein thrombosis (DVT) of popliteal vein of right lower extremity (H) (4/3/2022)    Follow-ups Needed After Discharge   Routine, Follow-up with hem onc doctor, Dr. Cee, with appointment scheduled for Monday 4/11/22 for management of new warfarin. Aspirin 325 was discontinued, can reassess. Follow-up with PCP or lung doctor in 2-4 weeks to reassess oxygen needs and COPD      Discharge Disposition   Discharged to home  Condition at discharge: Stable    Hospital Course   Shaji Pompa is a 76 year old male with a past medical history of COPD, HTN, HLD, myeloproliferative disease, and cerebral infarction. He was admitted on 4/1/2022 due to acute respiratory failure with hypoxia secondary to pneumonia and bilateral pulmonary emboli. Patient also found to have right-sided DVTs. Patient required 1 unit PRBC transfusion for hgb <7.     Patient was experiencing shortness of breath in [place]. They doctor in [place], so they took 3 days to drive back to [place]. Patient was found to be hypoxic requiring maximum of 5 LPM. BNP,  d-dimer, and troponin elevated. CT PE showed bilateral PE with pneumonia and patient was started on heparin drip and antibiotics. Lower extremity ultrsound found multiple DVTs in right leg. Troponin and BNP elevations likely due to PE. Completed 5 day of antibiotics for pneumonia. Given duo-nebs three times daily, finished prednisone course with good improvement and decrease in oxygen requirement.  Patient bridged to warfarin and warfarin in therapeutic range at time of discharge.  Home oxygen trial showing patient requires 2 L of oxygen to maintain appropriate O2 saturations.  Patient with significant clinical improvement including significant decrease in oxygen requirements at discharge, given stability of course appropriate to discharge to home at this time with home oxygen.  Patient to have close follow-up with heme-onc doctor scheduled for Monday, 4/11/2022 to discuss warfarin management.  Patient was provided education on warfarin and patient, all questions answered.  Patient to follow-up with PCP regarding COPD and oxygen needs.  All questions answered, no further concerns at this time.      Will be sent with home oxygen (see below)  Oxygen Documentation:   I certify that this patient, Shaji Pompa has been under my care (or a nurse practitioner or physican's assistant working with me). This is the face-to-face encounter for oxygen medical necessity.      Shaji Pompa is now in a chronic stable state and continues to require supplemental oxygen. Patient has continued oxygen desaturation due to acute respiratory failure secondary to bilateral PE and pneumonia, complicated by history of COPD.    Alternative treatment(s) tried or considered and deemed clinically infective for treatment of acute respiratory failure secondary to bilateral PE and pneumonia, complicated by history of COPD. include inhalers and steroids.  If portability is ordered, is the patient mobile within the home?  yes    **Patients who qualify for home O2 coverage under the CMS guidelines require ABG tests or O2 sat readings obtained closest to, but no earlier than 2 days prior to the discharge, as evidence of the need for home oxygen therapy. Testing must be performed while patient is in the chronic stable state. See notes for O2 sats.**        Consultations This Hospital Stay   PHARMACY IP CONSULT  PHARMACY IP CONSULT  PHYSICAL THERAPY ADULT IP CONSULT  PHARMACY IP CONSULT  SOCIAL WORK IP CONSULT  PHARMACY TO DOSE WARFARIN    Code Status   Full Code       The patient was discussed with Dr. Irwin Rodarte MD  [facility]  [address] [address]  Phone: [phone]  Fax: [phone]  ______________________________________________________________________    Physical Exam   Vital Signs: Temp: 98.1  F (36.7  C) Temp src: Oral BP: 119/56 Pulse: 75   Resp: 20 SpO2: 94 % O2 Device: Nasal cannula Oxygen Delivery: 1.5 LPM  Weight: 204 lbs 11.2 oz  General appearance: alert, cooperative, sitting up comfortably in chair, speaking in full sentences  Head: Normocephalic, without obvious abnormality  Eyes: conjunctivae/corneas clear, EOM's intact.   Ears: hearing grossly intact  Lungs: Clear to ascultation throughout all lung fields, no crackles or wheezing, no respiratory distress  Heart: regular rate and rhythm, S1, S2 normal, no murmur, click, rub or gallop  Extremities: extremities normal, atraumatic, no cyanosis, increased edema of the right leg compared to the left though improved, capillary refill <2 sec  Skin: Skin color, texture, turgor normal. No rashes or lesions, non-diaphoretic         Primary Care Physician   Steven Caraballo MD    Discharge Orders      Reason for your hospital stay    Respiratory failure and low oxygen due to bilateral PE and pneumonia     Activity    Your activity upon discharge: activity as  tolerated     Follow-up and recommended labs and tests     Follow-up with hem onc doctor, Dr. Cee, with appointment scheduled for Monday 4/11/22 for management of new warfarin. Aspirin 325 was discontinued, can reassess.    Follow-up with PCP or lung doctor in 2-4 weeks to reassess oxygen needs and COPD     Home Oxygen Order for DME - ONLY FOR DME    Oxygen Documentation:   I certify that this patient, Shaji Pompa has been under my care (or a nurse practitioner or physican's assistant working with me). This is the face-to-face encounter for oxygen medical necessity.      Shaji Pompa is now in a chronic stable state and continues to require supplemental oxygen. Patient has continued oxygen desaturation due to acute respiratory failure secondary to bilateral PE and pneumonia, complicated by history of COPD.    Alternative treatment(s) tried or considered and deemed clinically infective for treatment of acute respiratory failure secondary to bilateral PE and pneumonia, complicated by history of COPD. include inhalers and steroids.  If portability is ordered, is the patient mobile within the home? yes    **Patients who qualify for home O2 coverage under the CMS guidelines require ABG tests or O2 sat readings obtained closest to, but no earlier than 2 days prior to the discharge, as evidence of the need for home oxygen therapy. Testing must be performed while patient is in the chronic stable state. See notes for O2 sats.**     Diet    Follow this diet upon discharge: Please see warfarin list for reference       Significant Results and Procedures   Most Recent 3 CBC's:  Recent Labs   Lab Test 04/07/22  0435 04/06/22  0432 04/05/22  0436   WBC 11.3* 10.1 8.7   HGB 9.5* 9.1* 9.1*   * 132* 135*    398 444     Most Recent 3 BMP's:  Recent Labs   Lab Test 04/06/22  0432 04/05/22  0436 04/04/22  0440    142 142   POTASSIUM 3.8 4.6 4.5   CHLORIDE 109* 110* 108*   CO2 23 23 22   BUN 25 27  30*   CR 0.77 0.77 0.79   ANIONGAP 10 9 12   GLENDY 8.4* 8.6 8.6   GLC 90 97 114     Most Recent 3 Hemoglobins:  Recent Labs   Lab Test 04/07/22  0435 04/06/22  0432 04/05/22  0436   HGB 9.5* 9.1* 9.1*     Most Recent 3 Troponin's:No lab results found.  Most Recent D-dimer:  Recent Labs   Lab Test 04/01/22  1208   DD 4.49*   ,   Results for orders placed or performed during the hospital encounter of 04/01/22   XR Chest Port 1 View    Narrative    EXAM: XR CHEST PORT 1 VIEW  LOCATION: [facility]  DATE/TIME: 4/1/2022 12:30 PM    INDICATION: Shortness of breath  COMPARISON: 06/14/2021      Impression    IMPRESSION: Lungs are clear. Heart and pulmonary vascularity are normal. No signs of acute disease. Periarticular loose bodies again seen in the region of the left subacromial bursa.   CT Chest Pulmonary Embolism w Contrast     Value    Radiologist flags New diagnosis of pulmonary embolism (AA)    Narrative    EXAM: CT CHEST PULMONARY EMBOLISM W CONTRAST  LOCATION: [facility]  DATE/TIME: 4/1/2022 1:01 PM    INDICATION: PE suspected, low/intermediate probability, positive D-dimer, shortness of breath.  COMPARISON: None.  TECHNIQUE: CT chest pulmonary angiogram during arterial phase injection of IV contrast. Multiplanar reformats and MIP reconstructions were performed. Dose reduction techniques were used.   CONTRAST: Isovue 370 100 mL    FINDINGS:  ANGIOGRAM CHEST: Small volume bilateral nonocclusive segmental and subsegmental pulmonary artery filling defects. No evidence of right heart strain. The aorta is normal in caliber.    LUNGS AND PLEURA: Small left effusion and associated atelectasis/consolidation. There are a few patchy areas of groundglass, tree-in-bud, consolidative opacities seen throughout the bilateral lungs. Small volume airway secretions and mild bronchial wall   thickening. No pneumothorax.    MEDIASTINUM/AXILLAE: Heart size is normal. There are a few  borderline enlarged mediastinal lymph nodes with the largest in the left lower paratracheal chain measuring 15 x 11 mm (series 5/32). These are likely reactive. Small hiatal hernia with findings   suggestive of reflux esophagitis.    CORONARY ARTERY CALCIFICATION: Moderate.    UPPER ABDOMEN: No significant finding.    MUSCULOSKELETAL: Mild degenerative changes of the spine and bilateral shoulders.      Impression    IMPRESSION:  1.  Small volume bilateral pulmonary emboli. No evidence of right heart strain.  2.  Small left effusion and associated atelectasis.  3.  Patchy areas of infectious/inflammatory opacities seen throughout the bilateral lungs with likely a component of superimposed aspiration.  4.  Small hiatal hernia with findings suggestive of reflux esophagitis.      [Critical Result: New diagnosis of pulmonary embolism]    Finding was identified on 4/1/2022 1:34 PM.     Armida Cancino MD, was contacted by me on 4/1/2022 1:39 PM and verbalized understanding of the critical result.      US Lower Extremity Venous Duplex Bilateral     Value    Radiologist flags DVT right leg (AA)    Narrative    EXAM: US LOWER EXTREMITY VENOUS DUPLEX BILATERAL  LOCATION: [facility]  DATE/TIME: 4/1/2022 10:09 PM    INDICATION: New PE, more swelling in right lower leg. Please scan bilaterally.  COMPARISON: Left leg ultrasound examination 10/31/2019  TECHNIQUE: Venous Duplex ultrasound of bilateral lower extremities with and without compression, augmentation and duplex. Color flow and spectral Doppler with waveform analysis performed.    FINDINGS: Exam includes the common femoral, femoral, popliteal veins as well as segmentally visualized deep calf veins and greater saphenous vein.     RIGHT: There is partially occlusive deep vein thrombosis seen involving the popliteal vein with some extension into the gastrocnemius veins. There is occlusive deep vein thrombosis seen in the posterior tibial vein from  its origin to the ankle. No   superficial thrombophlebitis. No popliteal cyst.    LEFT: No deep vein thrombosis. No superficial thrombophlebitis. No popliteal cyst.      Impression    IMPRESSION:    [Critical Result: DVT right leg]    Finding was identified on 2022 11:29 PM.     Glenny HIDALGO RN was contacted by me on 2022 11:45PM and verbalized understanding of the critical result.     1.  Nonocclusive deep vein thrombosis in the right popliteal vein with some extension into the gastrocnemius veins. Occlusive deep vein thrombosis in the posterior tibial vein from its origin to the ankle.       Echocardiogram Complete     Value    LVEF  60-65%    Narrative    [id number]  [id number]  [id number]  [id number]^CHRIS^INES^[place] [address]     Name: HELENA FENTON  MRN: [id number]  : 1945  Study Date: 2022 08:05 AM  Age: 76 yrs  Gender: Male  Patient Location: [facility]  Reason For Study: Pulmonary Emboli  Ordering Physician: INES PEREZ  Performed By: JEANINE     BSA: 2.0 m2  Height: 66 in  Weight: 198 lb  HR: 84  ______________________________________________________________________________  Procedure  Complete Echo Adult. Definity (NDC #[id number]) given intravenously.  ______________________________________________________________________________  Interpretation Summary     Left ventricular size, wall motion and function are normal. The ejection  fraction is 60-65%.  Normal right ventricle size and systolic function.  The right ventricular systolic pressure is approximated at 50mmHg plus the  right atrial pressure.  IVC diameter and respiratory changes fall into an intermediate range  suggesting an RA pressure of 8 mmHg.  ______________________________________________________________________________  Left Ventricle  Left ventricular size, wall motion and function are normal. The ejection  fraction is 60-65%. There is normal left ventricular wall  thickness. Left  ventricular diastolic function is indeterminate. No regional wall motion  abnormalities noted.     Right Ventricle  Normal right ventricle size and systolic function.     Atria  The left atrium is mildly dilated. Right atrial size is normal. There is no  color Doppler evidence of an atrial shunt.     Mitral Valve  Mitral valve leaflets appear normal. There is no evidence of mitral stenosis  or clinically significant mitral regurgitation.     Tricuspid Valve  Tricuspid valve leaflets appear normal. There is mild (1+) tricuspid  regurgitation. The right ventricular systolic pressure is approximated at  50mmHg plus the right atrial pressure. IVC diameter and respiratory changes  fall into an intermediate range suggesting an RA pressure of 8 mmHg.     Aortic Valve  Aortic valve leaflets appear normal. There is no evidence of aortic stenosis  or clinically significant aortic regurgitation.     Pulmonic Valve  The pulmonic valve is not well seen, but is grossly normal. This degree of  valvular regurgitation is within normal limits.     Vessels  The aorta root is normal. Normal size ascending aorta. IVC diameter <2.1 cm  collapsing >50% with sniff suggests a normal RA pressure of 3 mmHg.     Pericardium  There is no pericardial effusion.     ______________________________________________________________________________  MMode/2D Measurements & Calculations  Ao root diam: 3.2 cm  asc Aorta Diam: 2.9 cm     LVOT diam: 2.0 cm  LVOT area: 3.2 cm2  LA Volume Indexed (AL/bp): 34.6 ml/m2     Time Measurements  MM HR: 84.0 BPM     Doppler Measurements & Calculations  MV E max aaron: 122.0 cm/sec  MV A max aaron: 129.6 cm/sec  MV E/A: 0.94  MV max P.9 mmHg  MV mean PG: 3.4 mmHg  MV V2 VTI: 28.4 cm  MVA(VTI): 2.7 cm2  MV dec slope: 844.9 cm/sec2  MV dec time: 0.14 sec     Ao V2 max: 181.3 cm/sec  Ao max P.0 mmHg  Ao V2 mean: 119.4 cm/sec  Ao mean P.5 mmHg  Ao V2 VTI: 34.9 cm  JAVIER(I,D): 2.2 cm2  JAVIER(V,D):  2.2 cm2  LV V1 max P.1 mmHg  LV V1 max: 123.8 cm/sec  LV V1 VTI: 23.8 cm  SV(LVOT): 77.3 ml  SI(LVOT): 38.8 ml/m2  PA acc time: 0.08 sec  PI end-d joe: 108.6 cm/sec  TR max joe: 354.2 cm/sec  TR max P.2 mmHg  AV Jeo Ratio (DI): 0.68  JAVIER Index (cm2/m2): 1.1  E/E' avg: 15.7  Lateral E/e': 10.0  Medial E/e': 21.4     ______________________________________________________________________________  Report approved by: Ryan Gonzalez 2022 11:35 AM               Discharge Medications   Discharge Medication List as of 2022  2:00 PM      START taking these medications    Details   warfarin ANTICOAGULANT (COUMADIN) 2.5 MG tablet Take 1 tablet (2.5 mg) by mouth daily, Disp-14 tablet, R-0, E-Prescribe         CONTINUE these medications which have NOT CHANGED    Details   albuterol (PROAIR HFA;PROVENTIL HFA;VENTOLIN HFA) 90 mcg/actuation inhaler [ALBUTEROL (PROAIR HFA;PROVENTIL HFA;VENTOLIN HFA) 90 MCG/ACTUATION INHALER] Inhale 2 puffs every 6 (six) hours as needed for wheezing., HistoricalMay substitute the equivalent medication per insurance preference.      atorvastatin (LIPITOR) 20 MG tablet Take 20 mg by mouth every morning , Historical      !! hydroxyurea (HYDREA) 500 MG capsule Take 1,500 mg by mouth five times a week Monday-Friday, Historical      !! hydroxyurea (HYDREA) 500 MG capsule Take 1,000 mg by mouth twice a week Saturday and , Historical      metoprolol succinate ER (TOPROL-XL) 25 MG 24 hr tablet Take 25 mg by mouth daily, Historical      tiotropium (SPIRIVA) 18 mcg inhalation capsule [TIOTROPIUM (SPIRIVA) 18 MCG INHALATION CAPSULE] Place 18 mcg into inhaler and inhale daily., Historical       !! - Potential duplicate medications found. Please discuss with provider.      STOP taking these medications       aspirin 325 MG EC tablet Comments:   Reason for Stopping:             Allergies   No Known Allergies

## 2022-04-03 NOTE — CONSULTS
Care Management Initial Consult    General Information  Assessment completed with: Patient,    Type of CM/SW Visit: Initial Assessment    Primary Care Provider verified and updated as needed: Yes   Readmission within the last 30 days: no previous admission in last 30 days      Reason for Consult: discharge planning  Advance Care Planning:            Communication Assessment  Patient's communication style: spoken language (English or Bilingual)    Hearing Difficulty or Deaf: no   Wear Glasses or Blind: yes    Cognitive  Cognitive/Neuro/Behavioral: WDL                      Living Environment:   People in home: spouse     Current living Arrangements: house      Able to return to prior arrangements: yes       Family/Social Support:  Care provided by: self  Provides care for: no one  Marital Status:   Wife, Neighbor, Other (specify) (family)          Description of Support System: Supportive, Involved    Support Assessment: Adequate family and caregiver support    Current Resources:   Patient receiving home care services: No     Community Resources: None  Equipment currently used at home: none  Supplies currently used at home: None    Employment/Financial:  Employment Status:          Financial Concerns:             Lifestyle & Psychosocial Needs:  Social Determinants of Health     Tobacco Use: Medium Risk     Smoking Tobacco Use: Former Smoker     Smokeless Tobacco Use: Never Used   Alcohol Use: Not on file   Financial Resource Strain: Not on file   Food Insecurity: Not on file   Transportation Needs: Not on file   Physical Activity: Not on file   Stress: Not on file   Social Connections: Not on file   Intimate Partner Violence: Not on file   Depression: Not on file   Housing Stability: Not on file       Functional Status:  Prior to admission patient needed assistance:   Dependent ADLs:: Independent  Dependent IADLs:: Independent       Mental Health Status:          Chemical Dependency Status:                 Values/Beliefs:  Spiritual, Cultural Beliefs, Scientology Practices, Values that affect care:                 Additional Information:  11:21 AM  Assessed.  Lives in a house with wife.  Independent at baseline.  Wife will transport home at discharge.      PATSY Dee

## 2022-04-03 NOTE — PLAN OF CARE
Problem: Plan of Care - These are the overarching goals to be used throughout the patient stay.    Goal: Absence of Hospital-Acquired Illness or Injury  Intervention: Identify and Manage Fall Risk  Recent Flowsheet Documentation  Taken 4/3/2022 0800 by Jeanmarie Park RN  Safety Promotion/Fall Prevention:    patient and family education    nonskid shoes/slippers when out of bed    mobility aid in reach    lighting adjusted    increase visualization of patient    increased rounding and observation    fall prevention program maintained     Problem: Respiratory Compromise (Pneumonia)  Goal: Effective Oxygenation and Ventilation  Intervention: Promote Airway Secretion Clearance  Recent Flowsheet Documentation  Taken 4/3/2022 0800 by Jeanmarie Park RN  Cough And Deep Breathing: done independently per patient     Problem: VTE (Venous Thromboembolism)  Goal: VTE (Venous Thromboembolism) Symptom Resolution  Outcome: Ongoing, Progressing     Patient alert and oriented. On Heparin gtt. VSS. LS clear diminished. O2 1-2L NC O2 Sat 94%. Shortness of breath with activity. Home O2 eval completed. Tolerating regular diet. Voiding spontaneously. Worked with PT/OT. On Tele NSR. Denies chest pain or shortness of breath. On IV Abx Levaquin. Plan discharge home pending medical progression. Will monitor and continue plan of care.

## 2022-04-03 NOTE — PLAN OF CARE
Problem: Plan of Care - These are the overarching goals to be used throughout the patient stay.    Goal: Plan of Care Review/Shift Note  Description: The Plan of Care Review/Shift note should be completed every shift.  The Outcome Evaluation is a brief statement about your assessment that the patient is improving, declining, or no change.  This information will be displayed automatically on your shift note.  Outcome: Ongoing, Progressing   Goal Outcome Evaluation:             Pt denies pain, VSS. NSR on tele, getting oxygen via NC, has SOB with exertion. Tolerating regular diet, voiding. Heparin infusing per orders, next recheck in the AM. Pt is hopeful to go home in the next couple of days.

## 2022-04-03 NOTE — PLAN OF CARE
Problem: VTE (Venous Thromboembolism)  Goal: VTE (Venous Thromboembolism) Symptom Resolution  Outcome: Ongoing, Progressing  Intervention: Prevent or Manage VTE (Venous Thromboembolism)    Problem: Infection (Pneumonia)  Goal: Resolution of Infection Signs and Symptoms  Outcome: Ongoing, Progressing     Pt remains on heparin infusion. Min cough, dry. Dyspnea with exertion, ok at rest with 2.5L NC. VSS, tele NSR.

## 2022-04-03 NOTE — PROGRESS NOTES
[facility]    Progress Note - Hospitalist Service       Date of Admission:  4/1/2022    Assessment and Plan  Shaji Pompa is a 76 year old male with a past medical history of COPD, HTN, HLD, myeloproliferative disease, and cerebral infarction. He was admitted on 4/1/2022 due to acute respiratory failure with hypoxia secondary to pneumonia and bilateral pulmonary emboli. Patient also found to have right-sided DVTs. Patient required 1 unit PRBC transfusion for hgb <7.      Acute respiratory distress with hypoxia  PE, bilateral  Right DVT  Pneumonia  COPD  Elevated troponin and BNP  BNP of 379, D-dimer 4.49, and troponin 1.2. CT PE with small bilateral pulmonary emboli without evidence of right heart strain and patch areas of infectious/inflammatory opacities with likely component of superimposed aspiration. Nonocclusive DVT of R popliteal vein and occlusive DVT in posterior tibial vein. Provoked due to drive from 3 day drive from [place] to [place]. ED started patient on heparin drip and given azithromycin and ceftriaxone. Troponin now down-trending, likely due to PE but would like to rule out cardiac issue.  - Cardiac telemetry  - Echocardiogram showing normal ejection fraction with diastolic dysfunction  - Levaquin 500 mg IV daily for 5 days (3/31-4/4)  - IP consult for insurance coverage of DOAC  - Plan to call outpatient hem/onc to see what they'd recommend for anticoagulation on discharge for provoked DVT/PE on 4/3  - Home oxygen trial morning of 4/3 (ordered)     Macrocytic anemia  On admission hemoglobin was 7.8. Recheck was 6.9. Given 1 unit PRBC 4/2 AM with improvement to 7.9. Lat hemoglobin found was 11.1 on 2/14. Hemoglobin expected to decrease with heparin use. Patient denies bleeding and any black or bloody stools, and bloody emesis. Hemoccult negative.  - Transfuse if hemoglobin <7  - Started on Protonix for possible GI bleed  - Recommend following up with hem/onc  outpatient as soon as able. Dr. Cee.     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 David Gray, patient and his wife.     Bing Gutierrez MD PGY1  [facility]  Senior Pager: [phone], available 24/7  Text page via [facility] Paging/Directory     ______________________________________________________________________    Interval History  Patient was seen and examined in his chair. He states he is doing much better since he was admitted. However, he continue to have shortness of breath when moving around or going to the bathroom. He is comfortable at rest. He is requiring 2 LPM now, but required 4 LPM overnight. He endorses pain in his left lower ribs with coughing. His coughing is much improved though since admission.    Denies headache, fevers, chills, abdominal pain, constipation, diarrhea, dysuria, swelling. Denies hemoptysis, hematemesis, melena, hematochezia.     Data reviewed today: I reviewed all medications, new labs and imaging results over the last 24 hours.    Physical Exam  Vital Signs: Temp: 97.6  F (36.4  C) Temp src: Oral BP: 120/58 Pulse: 66   Resp: 20 SpO2: 97 % O2 Device:  Nasal cannula Oxygen Delivery: 2 LPM  Weight: 206 lbs 4.8 oz  General appearance: alert, cooperative, appears stated age and no distress  Head: Normocephalic, without obvious abnormality  Eyes: conjunctivae/corneas clear, EOM's intact.   Ears: hearing grossly intact  Nose: nares normal, no drainage, nasal cannula in place  Throat: lips, mucosa, and tongue normal  Lungs: slight bibasilar crackles, spontaneous wheezing auscultated diffusely but improved from admission, respirations unlabored without audible wheezing without stethoscope  Chest wall: no tenderness to palpation  Heart: regular rate and rhythm, S1, S2 normal, no murmur, click, rub or gallop  Abdomen: soft, non-tender; bowel sounds normal; no masses, no organomegaly  Extremities: extremities normal, atraumatic, no cyanosis, increased edema of the right leg compared to the left, capillary refill <2 sec  Skin: Skin color, texture, turgor normal. No rashes or lesions, non-diaphoretic  Neurologic: ambulatory, spontaneously moving upper and lower extremities without pain    Data  Recent Labs   Lab 04/03/22  1022 04/03/22  0656 04/02/22  1551 04/02/22  1019 04/02/22  0239 04/01/22  1948 04/01/22  1208 04/01/22  1207   WBC 6.9  --   --   --  4.2  --   --  4.2   HGB 8.8*  --  8.1* 7.9* 6.9*   < >  --  7.8*   *  --   --   --  139*  --   --  141*   *  --   --   --  397  --   --  464*   INR  --   --   --   --   --   --  1.22*  --    NA  --  144  --   --  140  --  140  --    POTASSIUM  --  4.7  --   --  4.4  --  4.4  --    CHLORIDE  --  111*  --   --  109*  --  107  --    CO2  --  22  --   --  24  --  23  --    BUN  --  31*  --   --  32*  --  35*  --    CR  --  0.81  --   --  0.88  --  1.11  --    ANIONGAP  --  11  --   --  7  --  10  --    GLENDY  --  8.5  --   --  8.4*  --  8.6  --    GLC  --  101  --   --  133*  --  108  --     < > = values in this interval not displayed.     No results found for this or any previous visit (from the past 24 hour(s)).

## 2022-04-03 NOTE — PLAN OF CARE
Goal Outcome Evaluation:    Problem: Plan of Care - These are the overarching goals to be used throughout the patient stay.    Goal: Optimal Comfort and Wellbeing  Outcome: Ongoing, Progressing     Problem: Infection (Pneumonia)  Goal: Resolution of Infection Signs and Symptoms  Outcome: Ongoing, Progressing     Problem: Respiratory Compromise (Pneumonia)  Goal: Effective Oxygenation and Ventilation  Outcome: Ongoing, Progressing  Patient is up with a standby assist. Tolerating activity fairly well at this time. Dyspnea with exertion and shortness of breath. Titrated down to 2L of oxygen via nasal cannula. Continues to be on heparin gtt - following protocol. IV abx. Patient in good spirits and reports feeling better today. Echo. X1 unit of PRBCs - last hgb was 8.1. Spouse present throughout day - supportive. Call light placed within reach and purposeful rounding performed. Kiya Delatorre RN

## 2022-04-03 NOTE — PLAN OF CARE
Problem: Infection (Pneumonia)  Goal: Resolution of Infection Signs and Symptoms  Outcome: Ongoing, Progressing     Problem: VTE (Venous Thromboembolism)  Goal: VTE (Venous Thromboembolism) Symptom Resolution  Outcome: Ongoing, Progressing  Intervention: Prevent or Manage VTE (Venous Thromboembolism)     Goal Outcome Evaluation:  Pt resting in bed through the night. Denies pain. Gets significantly SOB with activity. Heparin infusing.

## 2022-04-04 ENCOUNTER — APPOINTMENT (OUTPATIENT)
Dept: PHYSICAL THERAPY | Facility: CLINIC | Age: 77
DRG: 175 | End: 2022-04-04
Payer: COMMERCIAL

## 2022-04-04 LAB
ANION GAP SERPL CALCULATED.3IONS-SCNC: 12 MMOL/L (ref 5–18)
BUN SERPL-MCNC: 30 MG/DL (ref 8–28)
CALCIUM SERPL-MCNC: 8.6 MG/DL (ref 8.5–10.5)
CHLORIDE BLD-SCNC: 108 MMOL/L (ref 98–107)
CO2 SERPL-SCNC: 22 MMOL/L (ref 22–31)
CREAT SERPL-MCNC: 0.79 MG/DL (ref 0.7–1.3)
ERYTHROCYTE [DISTWIDTH] IN BLOOD BY AUTOMATED COUNT: ABNORMAL %
GFR SERPL CREATININE-BSD FRML MDRD: >90 ML/MIN/1.73M2
GLUCOSE BLD-MCNC: 114 MG/DL (ref 70–125)
HCT VFR BLD AUTO: 26.7 % (ref 40–53)
HGB BLD-MCNC: 8.5 G/DL (ref 13.3–17.7)
HOLD SPECIMEN: NORMAL
MCH RBC QN AUTO: 42.7 PG (ref 26.5–33)
MCHC RBC AUTO-ENTMCNC: 31.8 G/DL (ref 31.5–36.5)
MCV RBC AUTO: 134 FL (ref 78–100)
PLATELET # BLD AUTO: 468 10E3/UL (ref 150–450)
POTASSIUM BLD-SCNC: 4.5 MMOL/L (ref 3.5–5)
RBC # BLD AUTO: 1.99 10E6/UL (ref 4.4–5.9)
SODIUM SERPL-SCNC: 142 MMOL/L (ref 136–145)
UFH PPP CHRO-ACNC: 0.28 IU/ML
UFH PPP CHRO-ACNC: 0.52 IU/ML
UFH PPP CHRO-ACNC: 0.6 IU/ML
WBC # BLD AUTO: 7.5 10E3/UL (ref 4–11)

## 2022-04-04 PROCEDURE — 80048 BASIC METABOLIC PNL TOTAL CA: CPT | Performed by: STUDENT IN AN ORGANIZED HEALTH CARE EDUCATION/TRAINING PROGRAM

## 2022-04-04 PROCEDURE — 97116 GAIT TRAINING THERAPY: CPT | Mod: GP | Performed by: PHYSICAL THERAPIST

## 2022-04-04 PROCEDURE — 97530 THERAPEUTIC ACTIVITIES: CPT | Mod: GP | Performed by: PHYSICAL THERAPIST

## 2022-04-04 PROCEDURE — 99233 SBSQ HOSP IP/OBS HIGH 50: CPT | Mod: AI

## 2022-04-04 PROCEDURE — 250N000013 HC RX MED GY IP 250 OP 250 PS 637: Performed by: FAMILY MEDICINE

## 2022-04-04 PROCEDURE — 250N000013 HC RX MED GY IP 250 OP 250 PS 637: Performed by: STUDENT IN AN ORGANIZED HEALTH CARE EDUCATION/TRAINING PROGRAM

## 2022-04-04 PROCEDURE — 85048 AUTOMATED LEUKOCYTE COUNT: CPT | Performed by: STUDENT IN AN ORGANIZED HEALTH CARE EDUCATION/TRAINING PROGRAM

## 2022-04-04 PROCEDURE — 36415 COLL VENOUS BLD VENIPUNCTURE: CPT | Performed by: FAMILY MEDICINE

## 2022-04-04 PROCEDURE — 85520 HEPARIN ASSAY: CPT | Performed by: FAMILY MEDICINE

## 2022-04-04 PROCEDURE — 250N000011 HC RX IP 250 OP 636: Performed by: STUDENT IN AN ORGANIZED HEALTH CARE EDUCATION/TRAINING PROGRAM

## 2022-04-04 PROCEDURE — 250N000013 HC RX MED GY IP 250 OP 250 PS 637

## 2022-04-04 PROCEDURE — 36415 COLL VENOUS BLD VENIPUNCTURE: CPT | Performed by: STUDENT IN AN ORGANIZED HEALTH CARE EDUCATION/TRAINING PROGRAM

## 2022-04-04 PROCEDURE — 85027 COMPLETE CBC AUTOMATED: CPT | Performed by: STUDENT IN AN ORGANIZED HEALTH CARE EDUCATION/TRAINING PROGRAM

## 2022-04-04 PROCEDURE — 250N000012 HC RX MED GY IP 250 OP 636 PS 637: Performed by: STUDENT IN AN ORGANIZED HEALTH CARE EDUCATION/TRAINING PROGRAM

## 2022-04-04 PROCEDURE — 210N000002 HC R&B HEART CARE

## 2022-04-04 PROCEDURE — C9113 INJ PANTOPRAZOLE SODIUM, VIA: HCPCS | Performed by: STUDENT IN AN ORGANIZED HEALTH CARE EDUCATION/TRAINING PROGRAM

## 2022-04-04 RX ORDER — LEVOFLOXACIN 500 MG/1
500 TABLET, FILM COATED ORAL DAILY
Status: DISCONTINUED | OUTPATIENT
Start: 2022-04-04 | End: 2022-04-05

## 2022-04-04 RX ORDER — WARFARIN SODIUM 5 MG/1
5 TABLET ORAL
Status: COMPLETED | OUTPATIENT
Start: 2022-04-04 | End: 2022-04-04

## 2022-04-04 RX ADMIN — ATORVASTATIN CALCIUM 20 MG: 10 TABLET, FILM COATED ORAL at 09:05

## 2022-04-04 RX ADMIN — PREDNISONE 40 MG: 20 TABLET ORAL at 09:05

## 2022-04-04 RX ADMIN — LEVOFLOXACIN 500 MG: 500 TABLET, FILM COATED ORAL at 18:39

## 2022-04-04 RX ADMIN — METOPROLOL SUCCINATE 25 MG: 25 TABLET, EXTENDED RELEASE ORAL at 09:04

## 2022-04-04 RX ADMIN — PANTOPRAZOLE SODIUM 40 MG: 40 INJECTION, POWDER, FOR SOLUTION INTRAVENOUS at 09:06

## 2022-04-04 RX ADMIN — HYDROXYUREA 1500 MG: 500 CAPSULE ORAL at 09:08

## 2022-04-04 RX ADMIN — WARFARIN SODIUM 5 MG: 5 TABLET ORAL at 18:39

## 2022-04-04 RX ADMIN — HEPARIN SODIUM 1800 UNITS/HR: 10000 INJECTION, SOLUTION INTRAVENOUS at 10:44

## 2022-04-04 ASSESSMENT — ACTIVITIES OF DAILY LIVING (ADL)
ADLS_ACUITY_SCORE: 6
ADLS_ACUITY_SCORE: 8
ADLS_ACUITY_SCORE: 6

## 2022-04-04 NOTE — PLAN OF CARE
Problem: VTE (Venous Thromboembolism)  Goal: VTE (Venous Thromboembolism) Symptom Resolution  Outcome: Ongoing, Progressing     Problem: Respiratory Compromise (Pneumonia)  Goal: Effective Oxygenation and Ventilation  Outcome: Ongoing, Progressing     Problem: Infection (Pneumonia)  Goal: Resolution of Infection Signs and Symptoms  Outcome: Ongoing, Progressing     Resting comfortably, no c/o pain. NSR on telemetry. On 2LNC with SpO2 = 99%. Lung sounds clear/diminished. Receiving heparin, Anti Xa timed for AM draw.     Current drips- heparin.

## 2022-04-04 NOTE — PLAN OF CARE
Vss. Pt denies pain. Titrated down from 2.5L to 1.5L O2 via NC at rest and 3L O2 with activity. Sating in the low to mid 90's. Anti Xa came back within therapeutic range. Will re-check for possible 2nd therapeutic range at 1900. Starting on Warfarin tonight. Switched antibiotic to PO. Heparin currently running at 1,800 units/hr. Barriers to discharge include bridging from IV to PO anti-coag and trying to titrate O2 down to RA which is his baseline. Will continue to monitor.

## 2022-04-04 NOTE — PHARMACY-ANTICOAGULATION SERVICE
Clinical Pharmacy - Warfarin Dosing Consult     Pharmacy has been consulted to manage this patient s warfarin therapy.  Indication: DVT/ PE Treatment  Therapy Goal: INR 2-3  Provider/Team: BRANDON  Warfarin Prior to Admission: No  Significant drug interactions: qlon and prednisone  Dose Comments: baseline INR 1.22    INR   Date Value Ref Range Status   04/01/2022 1.22 (H) 0.85 - 1.15 Final       Recommend warfarin 5 mg today.  Pharmacy will monitor Shaji Pompa daily and order warfarin doses to achieve specified goal.      Please contact pharmacy as soon as possible if the warfarin needs to be held for a procedure or if the warfarin goals change.      
no

## 2022-04-04 NOTE — PROGRESS NOTES
[facility]    Progress Note - Hospitalist Service       Date of Admission:  4/1/2022    Assessment and Plan  Shaji Pompa is a 76 year old male with a past medical history of COPD, HTN, HLD, myeloproliferative disease, and cerebral infarction. He was admitted on 4/1/2022 due to acute respiratory failure with hypoxia secondary to pneumonia and bilateral pulmonary emboli following long road trip. Patient also found to have right-sided DVTs. Patient required 1 unit PRBC transfusion for hgb <7.  Patient is stable on with slightly improved oxygen requirements, bridging to warfarin.     Acute respiratory distress with hypoxia  PE, bilateral, provoked  Right DVT  Pneumonia  History of COPD  Elevated BNP and troponin, downtrended  BNP of 379, D-dimer 4.49, and troponin 1.2, downtrending x3. ED started patient on heparin drip and given azithromycin and ceftriaxone. Xarelto and Eliquis with high co-pay, patient cannot afford.  Discussed with patient Dr. Cee, patient's heme-onc doctor, that best option is to start warfarin. On therapeutic dose of heparin.  - Continue cardiac telemetry  - Levaquin 500 mg IV daily for 5 days (3/31-4/4), plan to discontinue 3-5 afternoon  -Pharmacy to bridge to warfarin, recommended range of 2-3  -Patient to follow-up with Dr. Cee outpatient for warfarin management, patient to discuss outpatient length of warfarin course.  -Prior to discharge, will discuss with pharmacy warfarin medication plan  -Plan to do home O2 trial prior to discharge  -Continue to encourage IS     Macrocytic anemia, s/p 1 unit PRBC 4/2  On admission hemoglobin was 7.8, given 1 unit PRBC 4/2, hemoglobin expected to decrease with heparin use. Discussed high MCV with Dr. Cee, no further work-up needed as this is secondary to patient's hydroxyurea.   - Transfuse if hemoglobin <7  - Started on Protonix for possible GI bleed  - Hem/onc outpatient follow-up as above Dr. Cee.     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 Kelsey Rodarte MD PGY1  [facility]  Senior Pager: [phone], available 24/7  Text page via [facility] Paging/Directory     ______________________________________________________________________    Interval History  No acute overnight events, VSS, afebrile.  Patient stable and satting well at 2 L NC.  Patient was seen and examined in his chair.  Wife was present and attentive in the room.  Patient reports feeling better today and would like to go home soon as possible.  Patient is frustrated that he is not able to walk on his own.  Discussed overall course and concerns around continued low oxygen.  Discussed risk of falls.  Patient reports improvement of lower extremity swelling.  Patient reports continued coughing and shortness of breath.  Reports no chest pain, abdominal discomfort, diarrhea/constipation, headache, fevers, dizziness.      Discussed with patient and wife treatment plan.  They report that the co-pay for Xarelto or Eliquis is too high and they are unable to budget for that this time.  Discussed warfarin as the  best option given this.  Discussed risks and benefits of warfarin, patient and wife agree to treatment.    Patient's outpatient heme-onc doctor, Dr. Cee, was called and discussed overall course and warfarin treatment.  Okay from heme-onc perspective and patient can follow-up with him following discharge.  Discussed elevation of MCV is secondary to hydroxyurea and no further outpatient or inpatient follow-up as needed.    Data reviewed today: I reviewed all medications, new labs and imaging results over the last 24 hours.    Physical Exam  Vital Signs: Temp: 98  F (36.7  C) Temp src: Oral BP: (!) 145/66 (pt up to use urinal) Pulse: 76   Resp: 22 SpO2: 99 % O2 Device: Nasal cannula Oxygen Delivery: 2.5 LPM  Weight: 208 lbs 1.6 oz  General appearance: alert, cooperative, sitting up comfortably in chair, speaking in full sentences  Head: Normocephalic, without obvious abnormality  Eyes: conjunctivae/corneas clear, EOM's intact.   Ears: hearing grossly intact  Lungs: slight bibasilar crackles, diffuse wheezing, good air movement throughout all lung fields, no respiratory distress  Chest wall: no tenderness to palpation  Heart: regular rate and rhythm, S1, S2 normal, no murmur, click, rub or gallop  Abdomen: soft, non-tender; bowel sounds normal; no masses  Extremities: extremities normal, atraumatic, no cyanosis, increased edema of the right leg compared to the left though improved, capillary refill <2 sec  Skin: Skin color, texture, turgor normal. No rashes or lesions, non-diaphoretic  Neurologic: ambulatory, spontaneously moving upper and lower extremities without pain    Data  Recent Labs   Lab 04/04/22  0440 04/03/22  1022 04/03/22  0656 04/02/22  1551 04/02/22  1019 04/02/22  0239 04/01/22  1948 04/01/22  1208   WBC 7.5 6.9  --   --   --  4.2  --   --    HGB 8.5* 8.8*  --  8.1*   < > 6.9*   < >  --    * 132*  --   --   --  139*  --   --    * 526*  --   --   --  397  --   --    INR  --   --   --   --    --   --   --  1.22*     --  144  --   --  140  --  140   POTASSIUM 4.5  --  4.7  --   --  4.4  --  4.4   CHLORIDE 108*  --  111*  --   --  109*  --  107   CO2 22  --  22  --   --  24  --  23   BUN 30*  --  31*  --   --  32*  --  35*   CR 0.79  --  0.81  --   --  0.88  --  1.11   ANIONGAP 12  --  11  --   --  7  --  10   GLENDY 8.6  --  8.5  --   --  8.4*  --  8.6     --  101  --   --  133*  --  108    < > = values in this interval not displayed.     No results found for this or any previous visit (from the past 24 hour(s)).

## 2022-04-05 ENCOUNTER — APPOINTMENT (OUTPATIENT)
Dept: PHYSICAL THERAPY | Facility: CLINIC | Age: 77
DRG: 175 | End: 2022-04-05
Payer: COMMERCIAL

## 2022-04-05 LAB
ANION GAP SERPL CALCULATED.3IONS-SCNC: 9 MMOL/L (ref 5–18)
BUN SERPL-MCNC: 27 MG/DL (ref 8–28)
CALCIUM SERPL-MCNC: 8.6 MG/DL (ref 8.5–10.5)
CHLORIDE BLD-SCNC: 110 MMOL/L (ref 98–107)
CO2 SERPL-SCNC: 23 MMOL/L (ref 22–31)
CREAT SERPL-MCNC: 0.77 MG/DL (ref 0.7–1.3)
ERYTHROCYTE [DISTWIDTH] IN BLOOD BY AUTOMATED COUNT: 23 % (ref 10–15)
GFR SERPL CREATININE-BSD FRML MDRD: >90 ML/MIN/1.73M2
GLUCOSE BLD-MCNC: 97 MG/DL (ref 70–125)
HCT VFR BLD AUTO: 28.8 % (ref 40–53)
HGB BLD-MCNC: 9.1 G/DL (ref 13.3–17.7)
INR PPP: 1.25 (ref 0.85–1.15)
MCH RBC QN AUTO: 42.7 PG (ref 26.5–33)
MCHC RBC AUTO-ENTMCNC: 31.6 G/DL (ref 31.5–36.5)
MCV RBC AUTO: 135 FL (ref 78–100)
PLATELET # BLD AUTO: 444 10E3/UL (ref 150–450)
POTASSIUM BLD-SCNC: 4.6 MMOL/L (ref 3.5–5)
RBC # BLD AUTO: 2.13 10E6/UL (ref 4.4–5.9)
SODIUM SERPL-SCNC: 142 MMOL/L (ref 136–145)
UFH PPP CHRO-ACNC: 0.51 IU/ML
WBC # BLD AUTO: 8.7 10E3/UL (ref 4–11)

## 2022-04-05 PROCEDURE — 250N000013 HC RX MED GY IP 250 OP 250 PS 637: Performed by: FAMILY MEDICINE

## 2022-04-05 PROCEDURE — C9113 INJ PANTOPRAZOLE SODIUM, VIA: HCPCS | Performed by: STUDENT IN AN ORGANIZED HEALTH CARE EDUCATION/TRAINING PROGRAM

## 2022-04-05 PROCEDURE — 36415 COLL VENOUS BLD VENIPUNCTURE: CPT | Performed by: STUDENT IN AN ORGANIZED HEALTH CARE EDUCATION/TRAINING PROGRAM

## 2022-04-05 PROCEDURE — 80048 BASIC METABOLIC PNL TOTAL CA: CPT | Performed by: STUDENT IN AN ORGANIZED HEALTH CARE EDUCATION/TRAINING PROGRAM

## 2022-04-05 PROCEDURE — 250N000011 HC RX IP 250 OP 636: Performed by: STUDENT IN AN ORGANIZED HEALTH CARE EDUCATION/TRAINING PROGRAM

## 2022-04-05 PROCEDURE — 85520 HEPARIN ASSAY: CPT | Performed by: EMERGENCY MEDICINE

## 2022-04-05 PROCEDURE — 85610 PROTHROMBIN TIME: CPT

## 2022-04-05 PROCEDURE — 97116 GAIT TRAINING THERAPY: CPT | Mod: GP

## 2022-04-05 PROCEDURE — 250N000013 HC RX MED GY IP 250 OP 250 PS 637

## 2022-04-05 PROCEDURE — 99233 SBSQ HOSP IP/OBS HIGH 50: CPT | Mod: GC

## 2022-04-05 PROCEDURE — 85027 COMPLETE CBC AUTOMATED: CPT | Performed by: STUDENT IN AN ORGANIZED HEALTH CARE EDUCATION/TRAINING PROGRAM

## 2022-04-05 PROCEDURE — 210N000002 HC R&B HEART CARE

## 2022-04-05 PROCEDURE — 250N000012 HC RX MED GY IP 250 OP 636 PS 637: Performed by: STUDENT IN AN ORGANIZED HEALTH CARE EDUCATION/TRAINING PROGRAM

## 2022-04-05 RX ORDER — ALBUTEROL SULFATE 90 UG/1
6 AEROSOL, METERED RESPIRATORY (INHALATION) EVERY 6 HOURS
Status: ACTIVE | OUTPATIENT
Start: 2022-04-05 | End: 2022-04-06

## 2022-04-05 RX ORDER — TIOTROPIUM BROMIDE 18 UG/1
18 CAPSULE ORAL; RESPIRATORY (INHALATION) DAILY
Status: DISCONTINUED | OUTPATIENT
Start: 2022-04-05 | End: 2022-04-07 | Stop reason: HOSPADM

## 2022-04-05 RX ORDER — WARFARIN SODIUM 5 MG/1
5 TABLET ORAL
Status: COMPLETED | OUTPATIENT
Start: 2022-04-05 | End: 2022-04-05

## 2022-04-05 RX ADMIN — PREDNISONE 40 MG: 20 TABLET ORAL at 09:42

## 2022-04-05 RX ADMIN — HEPARIN SODIUM 1800 UNITS/HR: 10000 INJECTION, SOLUTION INTRAVENOUS at 02:50

## 2022-04-05 RX ADMIN — METOPROLOL SUCCINATE 25 MG: 25 TABLET, EXTENDED RELEASE ORAL at 09:48

## 2022-04-05 RX ADMIN — HYDROXYUREA 1500 MG: 500 CAPSULE ORAL at 09:45

## 2022-04-05 RX ADMIN — WARFARIN SODIUM 5 MG: 5 TABLET ORAL at 17:42

## 2022-04-05 RX ADMIN — ATORVASTATIN CALCIUM 20 MG: 10 TABLET, FILM COATED ORAL at 09:42

## 2022-04-05 RX ADMIN — PANTOPRAZOLE SODIUM 40 MG: 40 INJECTION, POWDER, FOR SOLUTION INTRAVENOUS at 09:43

## 2022-04-05 RX ADMIN — HEPARIN SODIUM 1800 UNITS/HR: 10000 INJECTION, SOLUTION INTRAVENOUS at 17:38

## 2022-04-05 ASSESSMENT — ACTIVITIES OF DAILY LIVING (ADL)
ADLS_ACUITY_SCORE: 8

## 2022-04-05 NOTE — PROGRESS NOTES
[facility]    Progress Note - Hospitalist Service       Date of Admission:  4/1/2022    Assessment and Plan  Shaji Pompa is a 76 year old male with a past medical history of COPD, HTN, HLD, myeloproliferative disease, and cerebral infarction. He was admitted on 4/1/2022 due to acute respiratory failure with hypoxia secondary to pneumonia and bilateral pulmonary emboli following long road trip. Patient also found to have right-sided DVTs. Patient required 1 unit PRBC transfusion for hgb <7.  Patient is stable on with slightly improved oxygen requirements, bridging to warfarin 4/4.     Acute respiratory distress with hypoxia, improved  PE, bilateral, provoked  Right DVT  Pneumonia  History of COPD  Elevated BNP and troponin, downtrended  ED started patient on heparin drip and given azithromycin and ceftriaxone. Xarelto and Eliquis with high co-pay, patient cannot afford.  Discussed with patient Dr. Cee, patient's heme-onc doctor, that best option is to start warfarin. On therapeutic dose of heparin, warfarin started 4/4, goal range is 2-3. Levequin course completed 4/4. VSS, afebrile, decreased oxygen requirements.   - Discontinued cardiac telemetry  - Discontinued Levaquin 500 mg IV daily after 5 day course (3/31-4/4)  -Pharmacy to bridge to warfarin, recommended range of 2-3  -Patient to follow-up with Dr. Cee outpatient for warfarin management, patient to discuss outpatient length of warfarin course.  -Prior to discharge, will discuss with pharmacy warfarin medication plan  -Plan to do home O2 trial prior to discharge  -Continue to encourage IS  -Scheduled albuterol q6h x3 then PRN  -Umeclidinium daily      Macrocytic anemia, s/p 1 unit PRBC 4/2  On admission hemoglobin was 7.8, given 1 unit PRBC 4/2, hemoglobin expected to decrease with heparin use. Discussed high MCV with Dr. Cee, no further work-up needed as this is secondary to patient's hydroxyurea.   - Transfuse if  hemoglobin <7  - Started on Protonix for possible GI bleed  - Hem/onc outpatient follow-up as above Dr. Cee.     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 Irwin Rodarte MD PGY1  [facility]  Senior Pager: [phone], available 24/7  Text page via [facility] Paging/Directory     ______________________________________________________________________    Interval History  No acute overnight events, VSS, afebrile.  Patient stable and satting well at 2.5 L NC.  Patient was seen and examined in his chair.  Wife was present and attentive in the room.  Patient reports feeling well and eager to go home soon as possible. Patient reports no shortness of breath.  Reports no chest pain, abdominal discomfort, diarrhea/constipation, headache, fevers, dizziness.      Discussed with patient and wife importance of having warfarin in therapeutic range that this can take time, but this and oxygen requirements are the barriers to discharge.  All questions answered      Data reviewed today: I reviewed  all medications, new labs and imaging results over the last 24 hours.    Physical Exam  Vital Signs: Temp: 97.6  F (36.4  C) Temp src: Oral BP: 121/58 Pulse: 71   Resp: 20 SpO2: 97 % O2 Device: Nasal cannula Oxygen Delivery: 2.5 LPM  Weight: 208 lbs 14.4 oz  General appearance: alert, cooperative, sitting up comfortably in chair, speaking in full sentences  Head: Normocephalic, without obvious abnormality  Eyes: conjunctivae/corneas clear, EOM's intact.   Ears: hearing grossly intact  Lungs: Diffuse wheezing, good air movement throughout all lung fields, no respiratory distress  Heart: regular rate and rhythm, S1, S2 normal, no murmur, click, rub or gallop  Extremities: extremities normal, atraumatic, no cyanosis, increased edema of the right leg compared to the left though improved, capillary refill <2 sec  Skin: Skin color, texture, turgor normal. No rashes or lesions, non-diaphoretic    Data  Recent Labs   Lab 04/05/22  0436 04/04/22  0440 04/03/22  1022 04/03/22  0656 04/01/22  1948 04/01/22  1208   WBC 8.7 7.5 6.9  --    < >  --    HGB 9.1* 8.5* 8.8*  --    < >  --    * 134* 132*  --    < >  --     468* 526*  --    < >  --    INR 1.25*  --   --   --   --  1.22*    142  --  144   < > 140   POTASSIUM 4.6 4.5  --  4.7   < > 4.4   CHLORIDE 110* 108*  --  111*   < > 107   CO2 23 22  --  22   < > 23   BUN 27 30*  --  31*   < > 35*   CR 0.77 0.79  --  0.81   < > 1.11   ANIONGAP 9 12  --  11   < > 10   GLENDY 8.6 8.6  --  8.5   < > 8.6   GLC 97 114  --  101   < > 108    < > = values in this interval not displayed.     No results found for this or any previous visit (from the past 24 hour(s)).

## 2022-04-05 NOTE — PLAN OF CARE
Goal Outcome Evaluation:    Plan of Care Reviewed With: patient     Patient has been up in the lounge chair for most of the day. His wife, Nidhi was here for a few hours. Discussed with both of them the use of Coumadin and the monitoring of Vitamin K foods and follow up labs. Patient continues to require oxygen per Nasal Cannula. Will attempt to wean down, as patient is not on oxygen at home. Heparin remains at 1800 units. Will continue to monitor.

## 2022-04-06 ENCOUNTER — APPOINTMENT (OUTPATIENT)
Dept: PHYSICAL THERAPY | Facility: CLINIC | Age: 77
DRG: 175 | End: 2022-04-06
Payer: COMMERCIAL

## 2022-04-06 LAB
ANION GAP SERPL CALCULATED.3IONS-SCNC: 10 MMOL/L (ref 5–18)
BUN SERPL-MCNC: 25 MG/DL (ref 8–28)
CALCIUM SERPL-MCNC: 8.4 MG/DL (ref 8.5–10.5)
CHLORIDE BLD-SCNC: 109 MMOL/L (ref 98–107)
CO2 SERPL-SCNC: 23 MMOL/L (ref 22–31)
CREAT SERPL-MCNC: 0.77 MG/DL (ref 0.7–1.3)
ERYTHROCYTE [DISTWIDTH] IN BLOOD BY AUTOMATED COUNT: 22.3 % (ref 10–15)
GFR SERPL CREATININE-BSD FRML MDRD: >90 ML/MIN/1.73M2
GLUCOSE BLD-MCNC: 90 MG/DL (ref 70–125)
HCT VFR BLD AUTO: 28.3 % (ref 40–53)
HGB BLD-MCNC: 9.1 G/DL (ref 13.3–17.7)
INR PPP: 1.7 (ref 0.85–1.15)
MCH RBC QN AUTO: 42.3 PG (ref 26.5–33)
MCHC RBC AUTO-ENTMCNC: 32.2 G/DL (ref 31.5–36.5)
MCV RBC AUTO: 132 FL (ref 78–100)
PLATELET # BLD AUTO: 398 10E3/UL (ref 150–450)
POTASSIUM BLD-SCNC: 3.8 MMOL/L (ref 3.5–5)
RBC # BLD AUTO: 2.15 10E6/UL (ref 4.4–5.9)
SODIUM SERPL-SCNC: 142 MMOL/L (ref 136–145)
UFH PPP CHRO-ACNC: 0.47 IU/ML
WBC # BLD AUTO: 10.1 10E3/UL (ref 4–11)

## 2022-04-06 PROCEDURE — 210N000002 HC R&B HEART CARE

## 2022-04-06 PROCEDURE — 250N000013 HC RX MED GY IP 250 OP 250 PS 637: Performed by: FAMILY MEDICINE

## 2022-04-06 PROCEDURE — 85014 HEMATOCRIT: CPT | Performed by: STUDENT IN AN ORGANIZED HEALTH CARE EDUCATION/TRAINING PROGRAM

## 2022-04-06 PROCEDURE — 85520 HEPARIN ASSAY: CPT | Performed by: EMERGENCY MEDICINE

## 2022-04-06 PROCEDURE — 250N000011 HC RX IP 250 OP 636: Performed by: STUDENT IN AN ORGANIZED HEALTH CARE EDUCATION/TRAINING PROGRAM

## 2022-04-06 PROCEDURE — 250N000013 HC RX MED GY IP 250 OP 250 PS 637

## 2022-04-06 PROCEDURE — 80048 BASIC METABOLIC PNL TOTAL CA: CPT | Performed by: STUDENT IN AN ORGANIZED HEALTH CARE EDUCATION/TRAINING PROGRAM

## 2022-04-06 PROCEDURE — 36415 COLL VENOUS BLD VENIPUNCTURE: CPT | Performed by: EMERGENCY MEDICINE

## 2022-04-06 PROCEDURE — 99232 SBSQ HOSP IP/OBS MODERATE 35: CPT | Mod: GC

## 2022-04-06 PROCEDURE — C9113 INJ PANTOPRAZOLE SODIUM, VIA: HCPCS | Performed by: STUDENT IN AN ORGANIZED HEALTH CARE EDUCATION/TRAINING PROGRAM

## 2022-04-06 PROCEDURE — 85610 PROTHROMBIN TIME: CPT

## 2022-04-06 PROCEDURE — 97116 GAIT TRAINING THERAPY: CPT | Mod: GP

## 2022-04-06 RX ORDER — ALBUTEROL SULFATE 90 UG/1
2 AEROSOL, METERED RESPIRATORY (INHALATION) EVERY 4 HOURS PRN
Status: DISCONTINUED | OUTPATIENT
Start: 2022-04-06 | End: 2022-04-07 | Stop reason: HOSPADM

## 2022-04-06 RX ORDER — WARFARIN SODIUM 5 MG/1
5 TABLET ORAL
Status: COMPLETED | OUTPATIENT
Start: 2022-04-06 | End: 2022-04-06

## 2022-04-06 RX ADMIN — HEPARIN SODIUM 1800 UNITS/HR: 10000 INJECTION, SOLUTION INTRAVENOUS at 08:46

## 2022-04-06 RX ADMIN — HYDROXYUREA 1500 MG: 500 CAPSULE ORAL at 08:56

## 2022-04-06 RX ADMIN — PANTOPRAZOLE SODIUM 40 MG: 40 INJECTION, POWDER, FOR SOLUTION INTRAVENOUS at 08:55

## 2022-04-06 RX ADMIN — WARFARIN SODIUM 5 MG: 5 TABLET ORAL at 17:37

## 2022-04-06 RX ADMIN — METOPROLOL SUCCINATE 25 MG: 25 TABLET, EXTENDED RELEASE ORAL at 08:56

## 2022-04-06 RX ADMIN — TIOTROPIUM BROMIDE 18 MCG: 18 CAPSULE ORAL; RESPIRATORY (INHALATION) at 08:57

## 2022-04-06 RX ADMIN — HEPARIN SODIUM 1800 UNITS/HR: 10000 INJECTION, SOLUTION INTRAVENOUS at 22:51

## 2022-04-06 RX ADMIN — ATORVASTATIN CALCIUM 20 MG: 10 TABLET, FILM COATED ORAL at 08:55

## 2022-04-06 ASSESSMENT — ACTIVITIES OF DAILY LIVING (ADL)
ADLS_ACUITY_SCORE: 8
ADLS_ACUITY_SCORE: 6
ADLS_ACUITY_SCORE: 6
ADLS_ACUITY_SCORE: 8
ADLS_ACUITY_SCORE: 6
ADLS_ACUITY_SCORE: 8
ADLS_ACUITY_SCORE: 6

## 2022-04-06 NOTE — PROGRESS NOTES
[facility]    Progress Note - Hospitalist Service       Date of Admission:  4/1/2022    Assessment and Plan  Shaji Pompa is a 76 year old male with a past medical history of COPD, HTN, HLD, myeloproliferative disease, and cerebral infarction. He was admitted on 4/1/2022 due to acute respiratory failure with hypoxia secondary to pneumonia and bilateral pulmonary emboli following long road trip. Patient also found to have right-sided DVTs. Patient required 1 unit PRBC transfusion for hgb <7.  Patient is stable with improved oxygen requirements, bridging to warfarin 4/4.     Acute respiratory distress with hypoxia, improved  PE, bilateral, provoked  Right DVT  Pneumonia  History of COPD  Elevated BNP and troponin, downtrended  On therapeutic dose of heparin, warfarin started 4/4, goal range is 2-3, he is not yet in goal range. Levequin course completed 4/4. VSS, afebrile, decreased oxygen requirements, currently satting well on 1L nc.   -Continue pharmacy bridging to warfarin, goal range of 2-3  -Patient to follow-up with Dr. Cee outpatient for warfarin management, patient to discuss outpatient length of warfarin course.  -Prior to discharge, will discuss with pharmacy warfarin medication plan  -Plan to do home O2 trial prior to discharge, likely 4/7  -Continue to encourage IS  -Continue albuterol PRN q6h  -Continue umeclidinium daily      Macrocytic anemia, s/p 1 unit PRBC 4/2, stable  On admission hemoglobin was 7.8, given 1 unit PRBC 4/2, hemoglobin expected to decrease with heparin use. Hgb has been stable.   - Transfuse if hemoglobin <7  - Started on Protonix for possible GI bleed  - Hem/onc outpatient follow-up as above Dr. Cee.     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 Irwin Rodarte MD PGY1  [facility]  Senior Pager: [phone], available 24/7  Text page via [facility] Paging/Directory     ______________________________________________________________________    Interval History  No acute overnight events, VSS, afebrile.  Patient's O2 status improved, now satting well on 1L NC.  Patient was seen and examined in his chair.  Wife was present and attentive in the room.  Patient reports feeling well and eager to go home soon as possible. Patient reports no shortness of breath.  Reports no chest pain, abdominal discomfort, diarrhea/constipation, headache, fevers, dizziness.      Further discussion of barriers to discharge being the INR in goal range and passing home O2 trial.  All questions answered      Data reviewed today: I reviewed all medications, new labs and imaging results over the last 24 hours.    Physical Exam  Vital Signs: Temp: 97.4  F (36.3  C) Temp src: Oral BP: 138/64 Pulse: 70   Resp: 20 SpO2: 95 % O2 Device: Nasal cannula Oxygen Delivery: 1 LPM  Weight: 205 lbs 9.6 oz  General appearance: alert, cooperative, sitting up comfortably in chair, speaking in full sentences  Head: Normocephalic, without obvious abnormality  Eyes:  conjunctivae/corneas clear, EOM's intact.   Ears: hearing grossly intact  Lungs: Clear to ascultation throughout all lung fields, no crackles or wheezing, no respiratory distress  Heart: regular rate and rhythm, S1, S2 normal, no murmur, click, rub or gallop  Extremities: extremities normal, atraumatic, no cyanosis, increased edema of the right leg compared to the left though improved, capillary refill <2 sec  Skin: Skin color, texture, turgor normal. No rashes or lesions, non-diaphoretic    Data  Recent Labs   Lab 04/06/22  0432 04/05/22  0436 04/04/22  0440 04/01/22  1948 04/01/22  1208   WBC 10.1 8.7 7.5   < >  --    HGB 9.1* 9.1* 8.5*   < >  --    * 135* 134*   < >  --     444 468*   < >  --    INR 1.70* 1.25*  --   --  1.22*    142 142   < > 140   POTASSIUM 3.8 4.6 4.5   < > 4.4   CHLORIDE 109* 110* 108*   < > 107   CO2 23 23 22   < > 23   BUN 25 27 30*   < > 35*   CR 0.77 0.77 0.79   < > 1.11   ANIONGAP 10 9 12   < > 10   GLENDY 8.4* 8.6 8.6   < > 8.6   GLC 90 97 114   < > 108    < > = values in this interval not displayed.     No results found for this or any previous visit (from the past 24 hour(s)).

## 2022-04-06 NOTE — PROGRESS NOTES
Pt slept until 04, got up to BR & and is sitting in the chair. States he is feeling much better & wanting to go home today. On 3l/nc sats 90-93% and BELL with fairly quick recovery. Up with standby assist. Heparin Gtt infusing.

## 2022-04-06 NOTE — PROGRESS NOTES
Care Management Follow Up    Length of Stay (days): 5    Expected Discharge Date: 04/07/2022     Concerns to be Addressed:       Patient plan of care discussed at interdisciplinary rounds: Yes    Anticipated Discharge Disposition: Home     Anticipated Discharge Services: None  Anticipated Discharge DME:      Patient/family educated on Medicare website which has current facility and service quality ratings:    Education Provided on the Discharge Plan:    Patient/Family in Agreement with the Plan: yes    Referrals Placed by CM/SW:    Private pay costs discussed: Not applicable    Additional Information:  Met with pt and wife to discuss recommendation for home PT.  Pt declining services at this time, does not feel home PT is needed.  Care Management available as needed for discharge plans.  Pt planning to return home with wife.      TATO Young

## 2022-04-07 VITALS
DIASTOLIC BLOOD PRESSURE: 56 MMHG | RESPIRATION RATE: 20 BRPM | HEIGHT: 66 IN | TEMPERATURE: 98.1 F | SYSTOLIC BLOOD PRESSURE: 119 MMHG | HEART RATE: 75 BPM | BODY MASS INDEX: 32.9 KG/M2 | OXYGEN SATURATION: 94 % | WEIGHT: 204.7 LBS

## 2022-04-07 LAB
ERYTHROCYTE [DISTWIDTH] IN BLOOD BY AUTOMATED COUNT: 22 % (ref 10–15)
HCT VFR BLD AUTO: 29.7 % (ref 40–53)
HGB BLD-MCNC: 9.5 G/DL (ref 13.3–17.7)
INR PPP: 2.4 (ref 0.85–1.15)
MCH RBC QN AUTO: 42.4 PG (ref 26.5–33)
MCHC RBC AUTO-ENTMCNC: 32 G/DL (ref 31.5–36.5)
MCV RBC AUTO: 133 FL (ref 78–100)
PLATELET # BLD AUTO: 341 10E3/UL (ref 150–450)
RBC # BLD AUTO: 2.24 10E6/UL (ref 4.4–5.9)
UFH PPP CHRO-ACNC: 0.42 IU/ML
WBC # BLD AUTO: 11.3 10E3/UL (ref 4–11)

## 2022-04-07 PROCEDURE — C9113 INJ PANTOPRAZOLE SODIUM, VIA: HCPCS | Performed by: STUDENT IN AN ORGANIZED HEALTH CARE EDUCATION/TRAINING PROGRAM

## 2022-04-07 PROCEDURE — 99238 HOSP IP/OBS DSCHRG MGMT 30/<: CPT | Mod: GC

## 2022-04-07 PROCEDURE — 85610 PROTHROMBIN TIME: CPT

## 2022-04-07 PROCEDURE — 250N000013 HC RX MED GY IP 250 OP 250 PS 637

## 2022-04-07 PROCEDURE — 36415 COLL VENOUS BLD VENIPUNCTURE: CPT | Performed by: STUDENT IN AN ORGANIZED HEALTH CARE EDUCATION/TRAINING PROGRAM

## 2022-04-07 PROCEDURE — 250N000011 HC RX IP 250 OP 636: Performed by: STUDENT IN AN ORGANIZED HEALTH CARE EDUCATION/TRAINING PROGRAM

## 2022-04-07 PROCEDURE — 85027 COMPLETE CBC AUTOMATED: CPT | Performed by: STUDENT IN AN ORGANIZED HEALTH CARE EDUCATION/TRAINING PROGRAM

## 2022-04-07 PROCEDURE — 85520 HEPARIN ASSAY: CPT | Performed by: FAMILY MEDICINE

## 2022-04-07 RX ORDER — WARFARIN SODIUM 2.5 MG/1
2.5 TABLET ORAL DAILY
Qty: 14 TABLET | Refills: 0 | Status: SHIPPED | OUTPATIENT
Start: 2022-04-07 | End: 2022-04-19

## 2022-04-07 RX ADMIN — ATORVASTATIN CALCIUM 20 MG: 10 TABLET, FILM COATED ORAL at 09:46

## 2022-04-07 RX ADMIN — PANTOPRAZOLE SODIUM 40 MG: 40 INJECTION, POWDER, FOR SOLUTION INTRAVENOUS at 09:51

## 2022-04-07 RX ADMIN — TIOTROPIUM BROMIDE 18 MCG: 18 CAPSULE ORAL; RESPIRATORY (INHALATION) at 09:53

## 2022-04-07 RX ADMIN — HYDROXYUREA 1500 MG: 500 CAPSULE ORAL at 09:47

## 2022-04-07 RX ADMIN — ALBUTEROL SULFATE 2 PUFF: 90 INHALANT RESPIRATORY (INHALATION) at 09:51

## 2022-04-07 RX ADMIN — METOPROLOL SUCCINATE 25 MG: 25 TABLET, EXTENDED RELEASE ORAL at 09:47

## 2022-04-07 ASSESSMENT — ACTIVITIES OF DAILY LIVING (ADL)
ADLS_ACUITY_SCORE: 6

## 2022-04-07 NOTE — PLAN OF CARE
Goal Outcome Evaluation:    Plan of Care Reviewed With: patient, spouse       Reviewed Discharge Instructions with patient and his wife,Nidhi. Home oxygen delivered to patient in his room, and patient instructed on proper use.Patient will follow up with his primary MD on Monday, April 11,2022. All questions answered and patient was very happy to be going home.

## 2022-04-07 NOTE — DISCHARGE INSTRUCTIONS
Oxygen Provider:  Arranged through [employer], contact number [phone].  If you have any questions or concerns please call the oxygen company directly.

## 2022-04-07 NOTE — PROGRESS NOTES
Received intake call for home oxygen at 10:55AM.   11:00AM- Spoke with nurse, Madiha confirmed we received the order, but need some further documentation. She will get it completed  11:20am-Documents are in. Reviewed patient's chart; Patient qualifies under insurance guidelines and all documentation is in the chart including a good order.

## 2022-04-07 NOTE — PROGRESS NOTES
Physical Therapy Discharge Summary    Reason for therapy discharge:    Discharged to home.    Progress towards therapy goal(s). See goals on Care Plan in [facility] electronic health record for goal details.  Goals partially met.  Barriers to achieving goals:   limited tolerance for therapy.    Therapy recommendation(s):    Continue home exercise program.   Home PT had been recommended, patient declining at this time.

## 2022-04-07 NOTE — PROGRESS NOTES
Patient continues to be alert and oriented. Appetie is good on a Regular Diet. Home Oxygen Evaluation completed and patient's sat level was 91% on Room Air when resting. Oxygen level was 92 % on 1 Liter of Oxygen at rest. Patient was up walking in his room and Oxygen Level dropped to 85% with Activity on Room Air. @ Liters of Oxygen was administered and Oxygen Sat went to 89 % on 2 Liters per nasal cannula with Activity. After about 5 minutes patient returned to 90 % on 2 Liters per Nasal Cannula. Resident updated on need for home Oxygen.

## 2022-04-07 NOTE — PLAN OF CARE
Patient A&Ox4. Patient is currently on 1L O2 nasal canula, with sats remaining 92-93%. Patient continues on the heparin drip @1800 units/hr. Patient denies pain. VSS

## 2022-04-07 NOTE — PLAN OF CARE
Goal Outcome Evaluation:     Patient has no complaints of pain or SOB. Pt remains on a heparin drip running at 1800 units/hr. Pt's O2 fell to 88% so oxygen was increased to 2L and saturations went to 91%. Pt is normal sinus rhythm on tele. Will continue to monitor.    Leslie Corona RN

## 2022-04-08 ENCOUNTER — PATIENT OUTREACH (OUTPATIENT)
Dept: CARE COORDINATION | Facility: CLINIC | Age: 77
End: 2022-04-08
Payer: COMMERCIAL

## 2022-04-08 DIAGNOSIS — Z71.89 OTHER SPECIFIED COUNSELING: ICD-10-CM

## 2022-04-08 NOTE — PROGRESS NOTES
Clinic Care Coordination Contact  Care Team Conversations    Patient identified for care management outreach, however [facility] RN staff has already followed up with patient to ensure they are following up with PCP and have needs and resources met. Clinic RN will refer back to [facility] if needed/appropriate.    TATO Li  Social Work Care Coordinator - [facility]  Care Coordination  [email or web address]  Cell Phone: [phone]  Gender pronouns: she/her  Employed by [facility]

## 2022-04-11 ENCOUNTER — TRANSFERRED RECORDS (OUTPATIENT)
Dept: HEALTH INFORMATION MANAGEMENT | Facility: CLINIC | Age: 77
End: 2022-04-11
Payer: COMMERCIAL

## 2022-04-12 ENCOUNTER — DOCUMENTATION ONLY (OUTPATIENT)
Dept: OTHER | Facility: CLINIC | Age: 77
End: 2022-04-12
Payer: COMMERCIAL

## 2022-04-19 ENCOUNTER — HOSPITAL ENCOUNTER (INPATIENT)
Facility: CLINIC | Age: 77
LOS: 7 days | Discharge: ANOTHER HOSPITAL | DRG: 186 | End: 2022-04-26
Attending: EMERGENCY MEDICINE | Admitting: STUDENT IN AN ORGANIZED HEALTH CARE EDUCATION/TRAINING PROGRAM
Payer: COMMERCIAL

## 2022-04-19 ENCOUNTER — APPOINTMENT (OUTPATIENT)
Dept: CT IMAGING | Facility: CLINIC | Age: 77
DRG: 186 | End: 2022-04-19
Attending: EMERGENCY MEDICINE
Payer: COMMERCIAL

## 2022-04-19 ENCOUNTER — TRANSFERRED RECORDS (OUTPATIENT)
Dept: HEALTH INFORMATION MANAGEMENT | Facility: CLINIC | Age: 77
End: 2022-04-19

## 2022-04-19 ENCOUNTER — APPOINTMENT (OUTPATIENT)
Dept: ULTRASOUND IMAGING | Facility: CLINIC | Age: 77
DRG: 186 | End: 2022-04-19
Attending: EMERGENCY MEDICINE
Payer: COMMERCIAL

## 2022-04-19 DIAGNOSIS — I50.21 ACUTE SYSTOLIC CONGESTIVE HEART FAILURE (H): Primary | ICD-10-CM

## 2022-04-19 DIAGNOSIS — J90 PLEURAL EFFUSION ON LEFT: ICD-10-CM

## 2022-04-19 DIAGNOSIS — J96.01 ACUTE RESPIRATORY FAILURE WITH HYPOXIA (H): ICD-10-CM

## 2022-04-19 DIAGNOSIS — I25.5 ISCHEMIC CARDIOMYOPATHY: ICD-10-CM

## 2022-04-19 DIAGNOSIS — I25.119 CORONARY ARTERY DISEASE INVOLVING NATIVE CORONARY ARTERY OF NATIVE HEART WITH ANGINA PECTORIS (H): ICD-10-CM

## 2022-04-19 DIAGNOSIS — D64.9 ANEMIA, UNSPECIFIED TYPE: ICD-10-CM

## 2022-04-19 DIAGNOSIS — J81.0 ACUTE PULMONARY EDEMA (H): ICD-10-CM

## 2022-04-19 PROBLEM — I50.9 ACUTE CONGESTIVE HEART FAILURE (H): Status: ACTIVE | Noted: 2022-04-19

## 2022-04-19 PROBLEM — Z86.711 HISTORY OF PULMONARY EMBOLISM: Status: ACTIVE | Noted: 2022-04-19

## 2022-04-19 PROBLEM — D53.9 MACROCYTIC ANEMIA: Status: ACTIVE | Noted: 2022-04-01

## 2022-04-19 PROBLEM — D69.6 THROMBOCYTOPENIA (H): Status: ACTIVE | Noted: 2022-04-19

## 2022-04-19 PROBLEM — J44.1 COPD EXACERBATION (H): Status: ACTIVE | Noted: 2022-04-19

## 2022-04-19 LAB
ABO/RH(D): NORMAL
ALBUMIN SERPL-MCNC: 1.9 G/DL (ref 3.5–5)
ALP SERPL-CCNC: 88 U/L (ref 45–120)
ALT SERPL W P-5'-P-CCNC: 22 U/L (ref 0–45)
ANION GAP SERPL CALCULATED.3IONS-SCNC: 10 MMOL/L (ref 5–18)
ANION GAP SERPL CALCULATED.3IONS-SCNC: 9 MMOL/L (ref 5–18)
ANTIBODY SCREEN: NEGATIVE
APPEARANCE FLD: ABNORMAL
APTT PPP: 50 SECONDS (ref 22–38)
AST SERPL W P-5'-P-CCNC: 18 U/L (ref 0–40)
ATRIAL RATE - MUSE: 89 BPM
BASOPHILS # BLD AUTO: 0.1 10E3/UL (ref 0–0.2)
BASOPHILS # BLD AUTO: 0.1 10E3/UL (ref 0–0.2)
BASOPHILS NFR BLD AUTO: 1 %
BASOPHILS NFR BLD AUTO: 1 %
BILIRUB SERPL-MCNC: 1 MG/DL (ref 0–1)
BLD PROD TYP BPU: NORMAL
BLOOD COMPONENT TYPE: NORMAL
BNP SERPL-MCNC: 1221 PG/ML (ref 0–78)
BUN SERPL-MCNC: 18 MG/DL (ref 8–28)
BUN SERPL-MCNC: 21 MG/DL (ref 8–28)
CALCIUM SERPL-MCNC: 8 MG/DL (ref 8.5–10.5)
CALCIUM SERPL-MCNC: 8.2 MG/DL (ref 8.5–10.5)
CELL COUNT BODY FLUID SOURCE: ABNORMAL
CHLORIDE BLD-SCNC: 104 MMOL/L (ref 98–107)
CHLORIDE BLD-SCNC: 106 MMOL/L (ref 98–107)
CO2 SERPL-SCNC: 25 MMOL/L (ref 22–31)
CO2 SERPL-SCNC: 26 MMOL/L (ref 22–31)
CODING SYSTEM: NORMAL
COLOR FLD: ABNORMAL
CREAT SERPL-MCNC: 0.76 MG/DL (ref 0.7–1.3)
CREAT SERPL-MCNC: 0.79 MG/DL (ref 0.7–1.3)
CROSSMATCH: NORMAL
DIASTOLIC BLOOD PRESSURE - MUSE: 73 MMHG
EOSINOPHIL # BLD AUTO: 0 10E3/UL (ref 0–0.7)
EOSINOPHIL # BLD AUTO: 0 10E3/UL (ref 0–0.7)
EOSINOPHIL NFR BLD AUTO: 0 %
EOSINOPHIL NFR BLD AUTO: 0 %
ERYTHROCYTE [DISTWIDTH] IN BLOOD BY AUTOMATED COUNT: 19.9 % (ref 10–15)
ERYTHROCYTE [DISTWIDTH] IN BLOOD BY AUTOMATED COUNT: 26.3 % (ref 10–15)
GFR SERPL CREATININE-BSD FRML MDRD: >90 ML/MIN/1.73M2
GFR SERPL CREATININE-BSD FRML MDRD: >90 ML/MIN/1.73M2
GLUCOSE BLD-MCNC: 102 MG/DL (ref 70–125)
GLUCOSE BLD-MCNC: 106 MG/DL (ref 70–125)
GLUCOSE BLDC GLUCOMTR-MCNC: 101 MG/DL (ref 70–99)
GLUCOSE BODY FLUID SOURCE: NORMAL
GLUCOSE FLD-MCNC: 20 MG/DL
HCT VFR BLD AUTO: 21.6 % (ref 40–53)
HCT VFR BLD AUTO: 21.6 % (ref 40–53)
HGB BLD-MCNC: 6.7 G/DL (ref 13.3–17.7)
HGB BLD-MCNC: 7 G/DL (ref 13.3–17.7)
IMM GRANULOCYTES # BLD: 0.1 10E3/UL
IMM GRANULOCYTES # BLD: 0.3 10E3/UL
IMM GRANULOCYTES NFR BLD: 1 %
IMM GRANULOCYTES NFR BLD: 2 %
INR PPP: 3.06 (ref 0.85–1.15)
INTERPRETATION ECG - MUSE: NORMAL
ISSUE DATE AND TIME: NORMAL
LD BODY BODY FLUID SOURCE: NORMAL
LDH FLD L TO P-CCNC: 1282 U/L
LDH SERPL L TO P-CCNC: 351 U/L (ref 125–220)
LYMPHOCYTES # BLD AUTO: 0.9 10E3/UL (ref 0.8–5.3)
LYMPHOCYTES # BLD AUTO: 1 10E3/UL (ref 0.8–5.3)
LYMPHOCYTES NFR BLD AUTO: 10 %
LYMPHOCYTES NFR BLD AUTO: 9 %
LYMPHOCYTES NFR FLD MANUAL: NORMAL %
MAGNESIUM SERPL-MCNC: 2.1 MG/DL (ref 1.8–2.6)
MAGNESIUM SERPL-MCNC: 2.1 MG/DL (ref 1.8–2.6)
MCH RBC QN AUTO: 39.1 PG (ref 26.5–33)
MCH RBC QN AUTO: 40.4 PG (ref 26.5–33)
MCHC RBC AUTO-ENTMCNC: 31 G/DL (ref 31.5–36.5)
MCHC RBC AUTO-ENTMCNC: 32.4 G/DL (ref 31.5–36.5)
MCV RBC AUTO: 121 FL (ref 78–100)
MCV RBC AUTO: 130 FL (ref 78–100)
MONOCYTES # BLD AUTO: 0.4 10E3/UL (ref 0–1.3)
MONOCYTES # BLD AUTO: 0.5 10E3/UL (ref 0–1.3)
MONOCYTES NFR BLD AUTO: 5 %
MONOCYTES NFR BLD AUTO: 5 %
MONOS+MACROS NFR FLD MANUAL: 3 %
NEUTROPHILS # BLD AUTO: 7.2 10E3/UL (ref 1.6–8.3)
NEUTROPHILS # BLD AUTO: 8.9 10E3/UL (ref 1.6–8.3)
NEUTROPHILS NFR BLD AUTO: 83 %
NEUTROPHILS NFR BLD AUTO: 83 %
NEUTS BAND NFR FLD MANUAL: 97 %
NRBC # BLD AUTO: 0 10E3/UL
NRBC # BLD AUTO: 0 10E3/UL
NRBC BLD AUTO-RTO: 0 /100
NRBC BLD AUTO-RTO: 0 /100
P AXIS - MUSE: 48 DEGREES
PLATELET # BLD AUTO: 120 10E3/UL (ref 150–450)
PLATELET # BLD AUTO: 123 10E3/UL (ref 150–450)
POTASSIUM BLD-SCNC: 3.7 MMOL/L (ref 3.5–5)
POTASSIUM BLD-SCNC: 4 MMOL/L (ref 3.5–5)
PR INTERVAL - MUSE: 136 MS
PROCALCITONIN SERPL-MCNC: 0.09 NG/ML (ref 0–0.49)
PROT FLD-MCNC: 3.5 G/DL
PROT SERPL-MCNC: 5.9 G/DL (ref 6–8)
PROT SERPL-MCNC: 6.1 G/DL (ref 6–8)
PROTEIN BODY FLUID SOURCE: NORMAL
QRS DURATION - MUSE: 108 MS
QT - MUSE: 368 MS
QTC - MUSE: 447 MS
R AXIS - MUSE: 39 DEGREES
RADIOLOGIST FLAGS: ABNORMAL
RBC # BLD AUTO: 1.66 10E6/UL (ref 4.4–5.9)
RBC # BLD AUTO: 1.79 10E6/UL (ref 4.4–5.9)
RBC # FLD: ABNORMAL /UL
SARS-COV-2 RNA RESP QL NAA+PROBE: NEGATIVE
SODIUM SERPL-SCNC: 140 MMOL/L (ref 136–145)
SODIUM SERPL-SCNC: 140 MMOL/L (ref 136–145)
SPECIMEN EXPIRATION DATE: NORMAL
SYSTOLIC BLOOD PRESSURE - MUSE: 146 MMHG
T AXIS - MUSE: -53 DEGREES
TROPONIN I SERPL-MCNC: 0.1 NG/ML (ref 0–0.29)
TROPONIN I SERPL-MCNC: 0.11 NG/ML (ref 0–0.29)
UFH PPP CHRO-ACNC: <=0.1 IU/ML
UNIT ABO/RH: NORMAL
UNIT NUMBER: NORMAL
UNIT STATUS: NORMAL
UNIT TYPE ISBT: 5100
VENTRICULAR RATE- MUSE: 89 BPM
WBC # BLD AUTO: 10.7 10E3/UL (ref 4–11)
WBC # BLD AUTO: 8.7 10E3/UL (ref 4–11)
WBC # FLD AUTO: 1087 /UL

## 2022-04-19 PROCEDURE — 87635 SARS-COV-2 COVID-19 AMP PRB: CPT | Performed by: EMERGENCY MEDICINE

## 2022-04-19 PROCEDURE — 84484 ASSAY OF TROPONIN QUANT: CPT | Performed by: EMERGENCY MEDICINE

## 2022-04-19 PROCEDURE — 250N000011 HC RX IP 250 OP 636: Performed by: EMERGENCY MEDICINE

## 2022-04-19 PROCEDURE — 83735 ASSAY OF MAGNESIUM: CPT | Performed by: STUDENT IN AN ORGANIZED HEALTH CARE EDUCATION/TRAINING PROGRAM

## 2022-04-19 PROCEDURE — 93005 ELECTROCARDIOGRAM TRACING: CPT | Performed by: EMERGENCY MEDICINE

## 2022-04-19 PROCEDURE — 93010 ELECTROCARDIOGRAM REPORT: CPT | Performed by: INTERNAL MEDICINE

## 2022-04-19 PROCEDURE — 82945 GLUCOSE OTHER FLUID: CPT | Performed by: EMERGENCY MEDICINE

## 2022-04-19 PROCEDURE — 84157 ASSAY OF PROTEIN OTHER: CPT | Performed by: EMERGENCY MEDICINE

## 2022-04-19 PROCEDURE — 85610 PROTHROMBIN TIME: CPT | Performed by: EMERGENCY MEDICINE

## 2022-04-19 PROCEDURE — 89050 BODY FLUID CELL COUNT: CPT | Performed by: EMERGENCY MEDICINE

## 2022-04-19 PROCEDURE — C9803 HOPD COVID-19 SPEC COLLECT: HCPCS

## 2022-04-19 PROCEDURE — 272N000706 US THORACENTESIS

## 2022-04-19 PROCEDURE — 83615 LACTATE (LD) (LDH) ENZYME: CPT | Performed by: EMERGENCY MEDICINE

## 2022-04-19 PROCEDURE — 71250 CT THORAX DX C-: CPT

## 2022-04-19 PROCEDURE — 93005 ELECTROCARDIOGRAM TRACING: CPT | Performed by: STUDENT IN AN ORGANIZED HEALTH CARE EDUCATION/TRAINING PROGRAM

## 2022-04-19 PROCEDURE — 86923 COMPATIBILITY TEST ELECTRIC: CPT | Performed by: EMERGENCY MEDICINE

## 2022-04-19 PROCEDURE — 250N000009 HC RX 250: Performed by: EMERGENCY MEDICINE

## 2022-04-19 PROCEDURE — 999N000157 HC STATISTIC RCP TIME EA 10 MIN

## 2022-04-19 PROCEDURE — 85520 HEPARIN ASSAY: CPT | Performed by: STUDENT IN AN ORGANIZED HEALTH CARE EDUCATION/TRAINING PROGRAM

## 2022-04-19 PROCEDURE — 84484 ASSAY OF TROPONIN QUANT: CPT | Performed by: STUDENT IN AN ORGANIZED HEALTH CARE EDUCATION/TRAINING PROGRAM

## 2022-04-19 PROCEDURE — 36430 TRANSFUSION BLD/BLD COMPNT: CPT

## 2022-04-19 PROCEDURE — 99291 CRITICAL CARE FIRST HOUR: CPT

## 2022-04-19 PROCEDURE — 86901 BLOOD TYPING SEROLOGIC RH(D): CPT | Performed by: EMERGENCY MEDICINE

## 2022-04-19 PROCEDURE — 250N000012 HC RX MED GY IP 250 OP 636 PS 637: Performed by: STUDENT IN AN ORGANIZED HEALTH CARE EDUCATION/TRAINING PROGRAM

## 2022-04-19 PROCEDURE — 93005 ELECTROCARDIOGRAM TRACING: CPT

## 2022-04-19 PROCEDURE — 85025 COMPLETE CBC W/AUTO DIFF WBC: CPT | Performed by: EMERGENCY MEDICINE

## 2022-04-19 PROCEDURE — 250N000011 HC RX IP 250 OP 636: Performed by: STUDENT IN AN ORGANIZED HEALTH CARE EDUCATION/TRAINING PROGRAM

## 2022-04-19 PROCEDURE — 83880 ASSAY OF NATRIURETIC PEPTIDE: CPT | Performed by: EMERGENCY MEDICINE

## 2022-04-19 PROCEDURE — 94640 AIRWAY INHALATION TREATMENT: CPT

## 2022-04-19 PROCEDURE — 36415 COLL VENOUS BLD VENIPUNCTURE: CPT | Performed by: EMERGENCY MEDICINE

## 2022-04-19 PROCEDURE — 0W9B3ZZ DRAINAGE OF LEFT PLEURAL CAVITY, PERCUTANEOUS APPROACH: ICD-10-PCS | Performed by: RADIOLOGY

## 2022-04-19 PROCEDURE — 85025 COMPLETE CBC W/AUTO DIFF WBC: CPT | Performed by: STUDENT IN AN ORGANIZED HEALTH CARE EDUCATION/TRAINING PROGRAM

## 2022-04-19 PROCEDURE — 84145 PROCALCITONIN (PCT): CPT | Performed by: STUDENT IN AN ORGANIZED HEALTH CARE EDUCATION/TRAINING PROGRAM

## 2022-04-19 PROCEDURE — 85730 THROMBOPLASTIN TIME PARTIAL: CPT | Performed by: STUDENT IN AN ORGANIZED HEALTH CARE EDUCATION/TRAINING PROGRAM

## 2022-04-19 PROCEDURE — 210N000002 HC R&B HEART CARE

## 2022-04-19 PROCEDURE — 84155 ASSAY OF PROTEIN SERUM: CPT | Performed by: EMERGENCY MEDICINE

## 2022-04-19 PROCEDURE — 87070 CULTURE OTHR SPECIMN AEROBIC: CPT | Performed by: EMERGENCY MEDICINE

## 2022-04-19 PROCEDURE — 999N000054 HC STATISTIC EKG NON-CHARGEABLE

## 2022-04-19 PROCEDURE — 96374 THER/PROPH/DIAG INJ IV PUSH: CPT

## 2022-04-19 PROCEDURE — 83735 ASSAY OF MAGNESIUM: CPT | Performed by: EMERGENCY MEDICINE

## 2022-04-19 PROCEDURE — P9016 RBC LEUKOCYTES REDUCED: HCPCS | Performed by: EMERGENCY MEDICINE

## 2022-04-19 RX ORDER — HEPARIN SODIUM 10000 [USP'U]/100ML
0-5000 INJECTION, SOLUTION INTRAVENOUS CONTINUOUS
Status: DISCONTINUED | OUTPATIENT
Start: 2022-04-19 | End: 2022-04-25 | Stop reason: CLARIF

## 2022-04-19 RX ORDER — PREDNISONE 20 MG/1
40 TABLET ORAL DAILY
Status: DISCONTINUED | OUTPATIENT
Start: 2022-04-19 | End: 2022-04-20

## 2022-04-19 RX ORDER — ALBUTEROL SULFATE 5 MG/ML
2.5 SOLUTION RESPIRATORY (INHALATION) EVERY 6 HOURS PRN
Status: DISCONTINUED | OUTPATIENT
Start: 2022-04-19 | End: 2022-04-19

## 2022-04-19 RX ORDER — CEFTRIAXONE 1 G/1
1 INJECTION, POWDER, FOR SOLUTION INTRAMUSCULAR; INTRAVENOUS EVERY 24 HOURS
Status: DISCONTINUED | OUTPATIENT
Start: 2022-04-19 | End: 2022-04-20

## 2022-04-19 RX ORDER — ONDANSETRON 2 MG/ML
4 INJECTION INTRAMUSCULAR; INTRAVENOUS EVERY 6 HOURS PRN
Status: DISCONTINUED | OUTPATIENT
Start: 2022-04-19 | End: 2022-04-26 | Stop reason: HOSPADM

## 2022-04-19 RX ORDER — FUROSEMIDE 10 MG/ML
40 INJECTION INTRAMUSCULAR; INTRAVENOUS ONCE
Status: COMPLETED | OUTPATIENT
Start: 2022-04-19 | End: 2022-04-19

## 2022-04-19 RX ORDER — ATORVASTATIN CALCIUM 10 MG/1
20 TABLET, FILM COATED ORAL EVERY MORNING
Status: DISCONTINUED | OUTPATIENT
Start: 2022-04-20 | End: 2022-04-21

## 2022-04-19 RX ORDER — ALBUTEROL SULFATE 5 MG/ML
2.5 SOLUTION RESPIRATORY (INHALATION) EVERY 6 HOURS PRN
Status: DISCONTINUED | OUTPATIENT
Start: 2022-04-19 | End: 2022-04-26 | Stop reason: HOSPADM

## 2022-04-19 RX ORDER — LIDOCAINE 40 MG/G
CREAM TOPICAL
Status: DISCONTINUED | OUTPATIENT
Start: 2022-04-19 | End: 2022-04-26 | Stop reason: HOSPADM

## 2022-04-19 RX ORDER — HYDROXYUREA 500 MG/1
1000 CAPSULE ORAL DAILY
Status: DISCONTINUED | OUTPATIENT
Start: 2022-04-20 | End: 2022-04-20

## 2022-04-19 RX ORDER — ONDANSETRON 4 MG/1
4 TABLET, ORALLY DISINTEGRATING ORAL EVERY 6 HOURS PRN
Status: DISCONTINUED | OUTPATIENT
Start: 2022-04-19 | End: 2022-04-26 | Stop reason: HOSPADM

## 2022-04-19 RX ORDER — WARFARIN SODIUM 2 MG/1
2 TABLET ORAL DAILY
Status: ON HOLD | COMMUNITY
End: 2022-04-25 | Stop reason: ALTCHOICE

## 2022-04-19 RX ORDER — HYDRALAZINE HYDROCHLORIDE 20 MG/ML
10 INJECTION INTRAMUSCULAR; INTRAVENOUS EVERY 6 HOURS PRN
Status: DISCONTINUED | OUTPATIENT
Start: 2022-04-19 | End: 2022-04-26 | Stop reason: HOSPADM

## 2022-04-19 RX ORDER — METOPROLOL SUCCINATE 25 MG/1
25 TABLET, EXTENDED RELEASE ORAL DAILY
Status: DISCONTINUED | OUTPATIENT
Start: 2022-04-20 | End: 2022-04-26 | Stop reason: HOSPADM

## 2022-04-19 RX ORDER — PROCHLORPERAZINE 25 MG
12.5 SUPPOSITORY, RECTAL RECTAL EVERY 12 HOURS PRN
Status: DISCONTINUED | OUTPATIENT
Start: 2022-04-19 | End: 2022-04-26 | Stop reason: HOSPADM

## 2022-04-19 RX ORDER — IPRATROPIUM BROMIDE AND ALBUTEROL SULFATE 2.5; .5 MG/3ML; MG/3ML
3 SOLUTION RESPIRATORY (INHALATION) ONCE
Status: COMPLETED | OUTPATIENT
Start: 2022-04-19 | End: 2022-04-19

## 2022-04-19 RX ORDER — PROCHLORPERAZINE MALEATE 5 MG
5 TABLET ORAL EVERY 6 HOURS PRN
Status: DISCONTINUED | OUTPATIENT
Start: 2022-04-19 | End: 2022-04-26 | Stop reason: HOSPADM

## 2022-04-19 RX ORDER — TIOTROPIUM BROMIDE 18 UG/1
18 CAPSULE ORAL; RESPIRATORY (INHALATION) DAILY
Status: DISCONTINUED | OUTPATIENT
Start: 2022-04-19 | End: 2022-04-19 | Stop reason: CLARIF

## 2022-04-19 RX ORDER — ALBUTEROL SULFATE 90 UG/1
2 AEROSOL, METERED RESPIRATORY (INHALATION) EVERY 6 HOURS PRN
Status: DISCONTINUED | OUTPATIENT
Start: 2022-04-19 | End: 2022-04-26 | Stop reason: HOSPADM

## 2022-04-19 RX ADMIN — IPRATROPIUM BROMIDE AND ALBUTEROL SULFATE 3 ML: 2.5; .5 SOLUTION RESPIRATORY (INHALATION) at 15:16

## 2022-04-19 RX ADMIN — PREDNISONE 40 MG: 20 TABLET ORAL at 21:18

## 2022-04-19 RX ADMIN — CEFTRIAXONE 1 G: 1 INJECTION, POWDER, FOR SOLUTION INTRAMUSCULAR; INTRAVENOUS at 21:03

## 2022-04-19 RX ADMIN — FUROSEMIDE 40 MG: 10 INJECTION, SOLUTION INTRAMUSCULAR; INTRAVENOUS at 16:08

## 2022-04-19 ASSESSMENT — ACTIVITIES OF DAILY LIVING (ADL)
WALKING_OR_CLIMBING_STAIRS_DIFFICULTY: NO
ADLS_ACUITY_SCORE: 6
DOING_ERRANDS_INDEPENDENTLY_DIFFICULTY: NO
ADLS_ACUITY_SCORE: 6
DIFFICULTY_EATING/SWALLOWING: NO
ADLS_ACUITY_SCORE: 8
ADLS_ACUITY_SCORE: 6
TOILETING_ISSUES: NO
DIFFICULTY_COMMUNICATING: NO
ADLS_ACUITY_SCORE: 8
CONCENTRATING,_REMEMBERING_OR_MAKING_DECISIONS_DIFFICULTY: NO
ADLS_ACUITY_SCORE: 6
HEARING_DIFFICULTY_OR_DEAF: NO
VISION_MANAGEMENT: GLASSES
CHANGE_IN_FUNCTIONAL_STATUS_SINCE_ONSET_OF_CURRENT_ILLNESS/INJURY: NO
DRESSING/BATHING_DIFFICULTY: NO
ADLS_ACUITY_SCORE: 8
WEAR_GLASSES_OR_BLIND: YES
FALL_HISTORY_WITHIN_LAST_SIX_MONTHS: NO

## 2022-04-19 ASSESSMENT — ENCOUNTER SYMPTOMS
CHOKING: 0
WHEEZING: 0
SORE THROAT: 0
DIAPHORESIS: 0
DIFFICULTY URINATING: 0
PALPITATIONS: 0
APNEA: 0
CONSTIPATION: 0
SHORTNESS OF BREATH: 1
NUMBNESS: 0
COUGH: 1
FEVER: 0
NAUSEA: 0
WEAKNESS: 0
FATIGUE: 0
CHILLS: 0
ABDOMINAL PAIN: 0
DIARRHEA: 0
VOMITING: 0
FACIAL SWELLING: 0
ABDOMINAL DISTENTION: 0

## 2022-04-19 NOTE — ED PROVIDER NOTES
Patient coming from [facility] with worsening shortness of breath and white out of the left lung.  Recent diagnosis of PE.  Currently on anticoagulation.  Recent admission.         Ohl, Mathew Negrete,   04/19/22 4190

## 2022-04-19 NOTE — H&P
[facility]    History and Physical - Hospitalist Service       Date of Admission:  4/19/2022    Assessment & Plan      Shaji Pompa is a 76 year old old male with a past medical history of myeloproliferative disease, COPD, HTN, CVA, recent DVT and PEs who presented to the [facility] Emergency Department on the day of admission with complaints of worsening dyspnea.    Acute Hypoxic Respiratory Failure  Left complex loculated pleural effusion    Patient with progressive dyspnea with recent hx of PNA and PE with CT chest showing complex loculated left pleural effusion concerning for pulmonary infarct with necrosis vs pulmonary abscess/empyema. Thoracentesis with output of 100 cc of reddish-brown fluid, fluid studies pending.  COVID-negative.  Received Lasix 40 mg IV in ED.  Possible that patient is having a COPD exacerbation as well. Symptoms likely multifactorial including acute congestive heart failure, complex pleural effusion, recent bilateral pulmonary emboli, suspected COPD exacerbation. Satting 89-93% on 2L NC comfortably.     Admit inpatient    Follow thoracentesis fluid studies    O2 supplementation    Congestive heart failure, acute  BNP 1221.  Last Echo in our system 4/2/22 with EF 60-65% without evidence of right heart strain, interval change from EF 55% in 2019, negative troponin x1.  Recent hospitalization with acute congestive heart failure 4/1/2022.  Previously charted as taking Lasix 20 mg daily but both patient and wife did denied him ever using Lasix at home, received Lasix 40 mg IV in ER.  Bilateral lower extremity edema  R>L, EKG sinus rhythm with occasional PVCs.    Cardiology consult, appreciate cares and recs    A.m. BMP    Trend troponin    Telemetry    Daily weights, strict I's and O's    O2 supplementation as needed    Cardiac diet    Continue PTA metoprolol    COPD exacerbation, suspected  Presents with dyspnea, productive cough, chest tightness with  history of COPD.  No viewable PFTs in chart, 30+ pack year history with smoking cessation 15 years ago.  Exam significant for diminished bibasilar breath sounds, currently on 2L NC.  CT with findings as noted above.  EKG with normal sinus rhythm with occasional PVCs, troponin negative x1.  Recently received 5-day course of levofloxacin during 4/1/22 hospitalization.     Prednisone 40 mg x 5 days    Ceftriaxone 1 g IV every 24 hours    Pro-Get    Cardiac telemetry    Supplemental O2 as needed    R CAT consult    Sputum culture if possible    Bedside swallow study     PTA Spiriva daily    Albuterol as needed    Macrocytic Anemia  Thrombocytopenia  Myelodysplastic syndrome  Hgb 6.7 on admission, now s/p 1 unit PRBC with repeat check pending. Patient's macrocytic anemia attributed to hydroxyurea on last admission, can be attributed to myelodysplastic syndrome as well. No current concerns of GI bleed, patient denies black or bloody stools or hematemesis.  Anemia worsened by bleeding into pleural cavity as well. Follows with oncologist Dr. Cee as outpatient    Hemoglobin 2 and 6 hours posttransfusion    AM CBC    Heme-onc consult, appreciate cares and recs    Transfuse if hemoglobin <7    Continue PTA hydroxyurea     Recent bilateral pulmonary emboli  On warfarin for provoked DVT and PE after a long road trip 4/1/2022. INR 3.06 on ED intake. Will transition to heparin despite anemia and monitor for signs of bleeds, possible that he is bleeding into his left pleural cavity as well.  With INR near therapeutic range, will hold any pharmacologic anticoagulation until tomorrow    Hold heparin drip    Hold PTA Warfarin    Hypertension    Continue PTA metoprolol    Hydralazine IV as needed    CVA, 2019    Continue PTA statin     Fluids: PO  Diet: Low Saturated Fat Na <2400 mg   PPX: SCDs, INR currently therapeutic  Garrett Catheter: Not present  Central Lines: None  Cardiac Monitoring: ACTIVE order. Indication: Acute  "decompensated heart failure (48 hours)  Code Status: Full Code    Clinically Significant Risk Factors Present on Admission             # Hypoalbuminemia: Albumin = 1.9 g/dL (Ref range: 3.5 - 5.0 g/dL) on admission, will monitor as appropriate   # Coagulation Defect: home medication list includes an anticoagulant medication  # Thrombocytopenia: Plts = 123 10e3/uL (Ref range: 150 - 450 10e3/uL) on admission, will monitor for bleeding   # Obesity: Estimated body mass index is 33.91 kg/m  as calculated from the following:    Height as of 4/1/22: 1.676 m (5' 5.98\").    Weight as of this encounter: 95.3 kg (210 lb).        Disposition Plan   Status: Inpatient  Expected Discharge: TBD   Anticipated discharge location: TBD       Patient was staffed with supervising physician, Dr. Ez Phillips, who agrees with the findings and plan. Patient to be seen in the morning by attending, Dr. Destini Starr,   Hospitalist Service  [facility]  Please page/text page the senior pager with any questions or concerns, 24/7: [phone], or search ID# [id number] on [email or web address]  Securely message with the Vocera Web Console (learn more here)  Text page via [email or web address] Paging/Directory     Chief Complaint   Dyspnea    History of Present Illness   Shaji Pompa is a 76 year old old male with a past medical history of myeloproliferative disease, COPD, HTN, CVA, recent DVT and PEs who presented to the [facility] Emergency Department on the day of admission with complaints of worsening dyspnea.    Since discharge from hospital on 4/7/2022, initially improved in terms of dyspnea but has been worsening 2 to 3 days after discharge.  Now dyspneic at rest that worsens whenever he lays back with productive cough.  Has been taking his medications as directed.     Outpatient heme-onc follow-up with Dr. Hong on 4/11/2022 resulted in patient increasing his hydroxyurea to 2 tabs daily, otherwise no changes.     Patient was " recently admitted 4/1-4/7 for AHRF 2/2 pneumonia and bilateral PE with R-sided DVTs and required 1unit PRBC.  Finished a 5-day course of antibiotics and prednisone course while hospitalized, patient was bridged to warfarin from heparin and was scheduled with heme/onc  for outpatient follow up. Discharged out on 2 L of oxygen to use at home    History is obtained from the patient and patient's wife, Nidhi    ED COURSE:  ED Triage Vitals   Enc Vitals Group      BP 04/19/22 1323 (!) 143/68      Pulse 04/19/22 1323 105      Resp 04/19/22 1323 (!) 34      Temp 04/19/22 1328 98.1  F (36.7  C)      Temp src 04/19/22 1328 Oral      SpO2 04/19/22 1323 90 %      Weight 04/19/22 1323 95.3 kg (210 lb)      Height --       Head Circumference --       Peak Flow --       Pain Score --       Pain Loc --       Pain Edu? --       Excl. in GC? --      Initial evaluation in the Emergency Department: Dyspneic on presentation to ED with using abdominal muscles.  CMP fairly unremarkable, BNP 1200, .  Troponin negative x1.  CBC with hemoglobin 6.7, received 1 unit PRBCs.  INR 3.06.  CT chest showed increase in left-sided loculated pleural effusion with bubbles of air at left base concerning for pulmonary infarct with necrosis versus sterile or infected pulmonary abscess/empyema.  US thoracentesis with 100 cc of fluid removed and sent for analysis.    Received Lasix 40 mg IV x1, DuoNebs x1.    Patient was admitted to the Med Surg for further workup and management.     Review of Systems   Review of Systems   Constitutional: Negative for chills, diaphoresis, fatigue and fever.   HENT: Negative for congestion, facial swelling, hearing loss and sore throat.    Respiratory: Positive for cough (Productive), chest tightness and shortness of breath. Negative for apnea, choking and wheezing.    Cardiovascular: Positive for leg swelling (Bilateral, R>L). Negative for chest pain and palpitations.   Gastrointestinal: Negative for  abdominal distention, abdominal pain, constipation, diarrhea, nausea and vomiting.   Genitourinary: Negative for decreased urine volume and difficulty urinating.   Skin: Negative for rash.   Neurological: Negative for syncope, weakness and numbness.     A 12 point comprehensive review of systems was negative except as noted above or in HPI    Past Medical History    I have reviewed this patient's medical history and updated it with pertinent information if needed.   Past Medical History:   Diagnosis Date     Cerebral infarction (H)      COPD (chronic obstructive pulmonary disease) (H)      HLD (hyperlipidemia)      Hypertension      Myeloproliferative disease (H)      Past Surgical History   I have reviewed this patient's surgical history and updated it with pertinent information if needed.  Past Surgical History:   Procedure Laterality Date     OPEN REDUCTION INTERNAL FIXATION FOOT Right 2010     OTHER SURGICAL HISTORY  2001    Lip lesion removal     TONSILLECTOMY & ADENOIDECTOMY       Social History   I have reviewed this patient's social history and updated it with pertinent information if needed.  Marital Status:   Work History: Retired:  for [employer]  Surrogate decision maker/POA: Wife, Nidhi  Social History     Social History Narrative     Not on file     Social History     Tobacco Use     Smoking status: Former Smoker     Packs/day: 1.00     Start date: 6/8/1965     Quit date: 1/1/2012     Years since quitting: 10.3     Smokeless tobacco: Never Used   Substance Use Topics     Alcohol use: Yes     Alcohol/week: 19.0 standard drinks     Drug use: Never       Family History   I have reviewed this patient's family history and updated it with pertinent information if needed.  Family History   Problem Relation Age of Onset     Alcoholism Mother        Prior to Admission Medications   Prior to Admission Medications   Prescriptions Last Dose Informant Patient Reported? Taking?   albuterol  (PROAIR HFA;PROVENTIL HFA;VENTOLIN HFA) 90 mcg/actuation inhaler prn at does not have  Yes Yes   Sig: [ALBUTEROL (PROAIR HFA;PROVENTIL HFA;VENTOLIN HFA) 90 MCG/ACTUATION INHALER] Inhale 2 puffs every 6 (six) hours as needed for wheezing.   atorvastatin (LIPITOR) 20 MG tablet 4/19/2022  Yes Yes   Sig: Take 20 mg by mouth every morning    hydroxyurea (HYDREA) 500 MG capsule 4/19/2022  Yes Yes   Sig: Take 1,000 mg by mouth daily   metoprolol succinate ER (TOPROL-XL) 25 MG 24 hr tablet 4/19/2022  Yes Yes   Sig: Take 25 mg by mouth daily   tiotropium (SPIRIVA) 18 mcg inhalation capsule 4/19/2022 at does not have  Yes Yes   Sig: [TIOTROPIUM (SPIRIVA) 18 MCG INHALATION CAPSULE] Place 18 mcg into inhaler and inhale daily.   warfarin ANTICOAGULANT (COUMADIN) 2 MG tablet 4/18/2022  Yes Yes   Sig: Take 2 mg by mouth daily      Facility-Administered Medications: None     Allergies   No Known Allergies    Physical Exam   Temp:  [97.8  F (36.6  C)-98.7  F (37.1  C)] 98.7  F (37.1  C)  Pulse:  [] 75  Resp:  [20-38] 22  BP: (123-160)/(59-83) 160/75  SpO2:  [89 %-99 %] 93 %+  Vital Signs: Temp: 98.7  F (37.1  C) Temp src: Oral BP: (!) 160/75 Pulse: 75   Resp: 22 SpO2: 93 % O2 Device: Nasal cannula Oxygen Delivery: 2 LPM  Weight: 210 lbs 0 oz    Physical Exam  Vitals reviewed.   Constitutional:       Appearance: He is obese.   HENT:      Head: Normocephalic.      Right Ear: Tympanic membrane normal.      Left Ear: Tympanic membrane normal.      Nose: Nose normal.      Mouth/Throat:      Mouth: Mucous membranes are moist.   Eyes:      Extraocular Movements: Extraocular movements intact.      Pupils: Pupils are equal, round, and reactive to light.   Cardiovascular:      Rate and Rhythm: Normal rate and regular rhythm.      Pulses: Normal pulses.   Pulmonary:      Effort: Pulmonary effort is normal. No respiratory distress.      Comments: Nasal cannula in place, diminished bibasilar breath sounds L>R  Chest:      Chest wall: No  tenderness.   Abdominal:      General: Abdomen is flat. Bowel sounds are normal. There is no distension.      Palpations: Abdomen is soft.   Musculoskeletal:         General: Normal range of motion.      Cervical back: Normal range of motion and neck supple.      Right lower leg: Edema (2+ nonpitting) present.      Left lower leg: Edema (1+ nonpitting) present.   Skin:     General: Skin is warm and dry.      Findings: No rash.      Comments: Scattered hyperpigmentation on upper and lower extremities   Neurological:      General: No focal deficit present.      Mental Status: He is alert and oriented to person, place, and time.       Data   Pertinent Labs  Lab Results: personally reviewed.   Recent Labs   Lab 04/19/22  1441   WBC 10.7   HGB 6.7*   *   *   INR 3.06*      POTASSIUM 4.0   CHLORIDE 106   CO2 25   BUN 21   CR 0.79   ANIONGAP 9   GLENDY 8.2*      ALBUMIN 1.9*   PROTTOTAL 5.9*   BILITOTAL 1.0   ALKPHOS 88   ALT 22   AST 18     Most Recent 3 CBC's:  Recent Labs   Lab Test 04/19/22  1441 04/07/22  0435 04/06/22  0432   WBC 10.7 11.3* 10.1   HGB 6.7* 9.5* 9.1*   * 133* 132*   * 341 398     Most Recent 3 BMP's:  Recent Labs   Lab Test 04/19/22  1441 04/06/22  0432 04/05/22  0436    142 142   POTASSIUM 4.0 3.8 4.6   CHLORIDE 106 109* 110*   CO2 25 23 23   BUN 21 25 27   CR 0.79 0.77 0.77   ANIONGAP 9 10 9   GLENDY 8.2* 8.4* 8.6    90 97     Most Recent 2 LFT's:  Recent Labs   Lab Test 04/19/22  1441 11/07/19  1042   AST 18 33   ALT 22 53*   ALKPHOS 88 93   BILITOTAL 1.0 0.6     Most Recent 3 INR's:  Recent Labs   Lab Test 04/19/22  1441 04/07/22  0435 04/06/22  0432   INR 3.06* 2.40* 1.70*     Most Recent Anemia Panel:  Recent Labs   Lab Test 04/19/22  1441   WBC 10.7   HGB 6.7*   HCT 21.6*   *   *       Pertinent Radiology:  Radiology Results: personally reviewed.   Recent Results (from the past 24 hour(s))   Chest CT w/o contrast   Result Value     Radiologist flags (AA)     Pulmonary abscess and/or empyema on the left as noted above along with severe anemia.    Narrative    EXAM: CT CHEST W/O CONTRAST  LOCATION: [facility]  DATE/TIME: 4/19/2022 3:04 PM    INDICATION: Shortness of breath. Dyspnea. Recent pulmonary embolus.  COMPARISON: Chest CTA 04/01/2022  TECHNIQUE: CT chest without IV contrast. Multiplanar reformats were obtained. Dose reduction techniques were used.  CONTRAST: None.    FINDINGS:   LUNGS AND PLEURA: There is a large, complex loculated left pleural effusion with loculated components of septated are inferiorly air  and increase in the amount of fluid since study done 18 days ago. Fluid has a density of 10 Hounsfield units. There is   associated compressive atelectasis of most of the left lower lobe. Few small rounded or ovoid groundglass opacities have developed in the right lower lobe (images 148, 160 171).      MEDIASTINUM/AXILLAE: Blood pool is much less dense than the heart muscle. There is mild mediastinal and subcarinal adenopathy with subcarinal nodes measuring 1.2 cm in short axis.    CORONARY ARTERY CALCIFICATION: Severe.    UPPER ABDOMEN: Incidental splenule.    MUSCULOSKELETAL: No suspicious lesions. Severe degenerative changes in both shoulders.      Impression    IMPRESSION:   1.  Notable increase in the complex, loculated left-sided pleural effusion with now loculated bubbles of air in the left base. This fluid has a density of 10 Hounsfield units. Given the prior significant pulmonary emboli, need to  consider pulmonary   infarct with necrosis and either a sterile or infected pulmonary abscess/empyema.  2.  Patient's quite anemic.    [Critical Result: Pulmonary abscess and/or empyema on the left as noted above along with severe anemia.]    Finding was identified on 4/19/2022 3:39 PM.     Dr. Alonso was contacted by me on 4/19/2022 3:48 PM and verbalized understanding of the critical result.      US  Thoracentesis    Narrative    EXAM:   1. LEFT THORACENTESIS  2. ULTRASOUND GUIDANCE  LOCATION: [facility]  DATE/TIME: 4/19/2022 4:17 PM    INDICATION: Loculated left pleural effusion on CT chest..    PROCEDURE: Informed consent obtained. Time out performed. A limited ultrasound of the left thorax demonstrated a complex left pleural effusion with extensive septations/loculations. The largest collection of fluid was localized and the overlying skin was   prepped and draped in sterile fashion. 10 mL of 1 % lidocaine was infused into the local soft tissues. Under direct ultrasound guidance, a 5 [place] catheter system was placed into the pleural effusion.     100 mL of sterile saline is fluid was removed and sent for specified labs.    Patient tolerated procedure well.    Ultrasound imaging was obtained and placed in the patient's permanent medical record.      Impression    IMPRESSION:  Status post left ultrasound-guided diagnostic thoracentesis. The left loculated effusion and demonstrates extensive internal septations/loculations.    Reference CPT Code: 20681       Cardiographics:   EKG Results: personally reviewed.   EKG: normal sinus rhythm, occasional PVC noted, unifocal.         This note was created with help of Dragon dictation system. Grammatical /typing errors are not intentional.

## 2022-04-19 NOTE — ED TRIAGE NOTES
Pt reports worsening SOB and BELL since being discharged from the hospital on 4/7. Pt was being treated for pulmonary emboli and pneumonia. Pt was seen for follow up appointment and sent to ED for further eval. Pt arrives tachypneic with accessory muscle use.

## 2022-04-19 NOTE — ED PROVIDER NOTES
"EMERGENCY DEPARTMENT ENCOUNTER      NAME: Shaji Pompa  AGE: 76 year old male  YOB: 1945  MRN: [id number]  EVALUATION DATE & TIME: 4/19/2022  1:19 PM    PCP: Steven Caraballo    ED PROVIDER: Alexx Alonso M.D.      Chief Complaint   Patient presents with     Shortness of Breath         FINAL IMPRESSION:  Acute dyspnea  Anemia  Left pleural effusion  Pulmonary edema    ED COURSE & MEDICAL DECISION MAKING:    Pertinent Labs & Imaging studies reviewed. (See chart for details)  76 year old male presents to the Emergency Department for evaluation of worsening dyspnea.  Patient hospitalized earlier this month with shortness of breath.  Patient treated for COPD exacerbation and transfused.  Patient with a history of myelodysplastic syndrome.  This was his first transfusion.  Felt better on going home but then began declining within a few days.  Now with marked exertional dyspnea.  Denies any cough.  No obvious fevers.  Was seen in clinic earlier today.  Chest x-ray with \"white out\" in left chest.  Caller did not receive from clinic prior to arrival.  On exam he is a elderly male moderately obese in moderate distress.  Abdominal breathing.  Diminished breath sounds bilaterally.  Cardiac exam unremarkable.  Abdomen soft nontender.  Lower extremities with mild edema on the right moderate edema on the left.  Patient recently diagnosed with DVT/PE.  Baseline blood work being obtained.  CT imaging the chest to be obtained.  Concern is of loculated effusion versus potential hemothorax.  Patient is anticoagulated with Coumadin.  INR being obtained.  Patient likely will require thoracentesis.  Multiple nebulizations ordered for symptomatic relief.    1:26 PM I met with the patient for the initial interview and physical examination. Discussed plan for treatment and workup in the ED.    3:01 PM.  Hemoglobin returns markedly reduced at 6.7.  Patient consented for transfusion.  Patient proceeding for CT scan.  " Patient has not yet received his nebulizations.  He is to receive these as soon as he returns from CT.  4 PM.  CT results discussed with radiology.  Patient with a large loculated effusion.  Possible air-fluid level.  We will proceed with thoracentesis.  Patient also with markedly elevated BNP suggesting failure.  Intravenous Lasix x40 mg ordered to initiate diuresis prior to transfusion.  4:20 PM.  Patient discussed with nurse.  Patient initiated transfusion after return from thoracentesis.  Transfusion to be given over 4 hours given elevated BNP and suggestion of failure.  4:25 PM.  Patient discussed with the resident for admission  At the conclusion of the encounter I discussed the results of all of the tests and the disposition. The questions were answered and return precautions provided. The patient or family acknowledged understanding and was agreeable with the care plan.       Patient represents critical care situation.  Approximately 40-minute spent direct involved patient care and plan of any procedures.    PPE: Provider wore gloves, N95 mask, eye protection, surgical cap.     MEDICATIONS GIVEN IN THE EMERGENCY:  Medications   ipratropium - albuterol 0.5 mg/2.5 mg/3 mL (DUONEB) neb solution 3 mL (has no administration in time range)   albuterol (PROVENTIL) neb solution 2.5 mg (has no administration in time range)       NEW PRESCRIPTIONS STARTED AT TODAY'S ER VISIT  New Prescriptions    No medications on file          =================================================================    HPI    Patient information was obtained from: Patient    Use of Intrepreter: N/A         Shaji Pompa is a 76 year old male with a pertient medical history of HTN, COPD, HLD, and stroke and DVT (on warfarin), who presents to the ED for evaluation of shortness of breath.    Patient reports shortness of breath since he was discharged from the hospital earlier this month. He is now on Warfarin for blood clots, and had a  blood transfusion. Patient states he felt better when he first got home, but has been progressively worsening since then. Patient notes a history of COPD, he uses nebulizers at home, which has not been helping as much recently. He can walk about 20 feet now, which is a lot less than when he was first discharged. He endorses he has a tank of oxygen at home that he uses as needed. Patient reports he had a chest xray today which showed fluid in the lung. Denies falls or chest pain. Patient endorses he is vaccinated against Covid. Denies any other current complaints.      REVIEW OF SYSTEMS   Constitutional:  Denies fever, chills  Respiratory:  Denies productive cough. Positive for increased work of breathing  Cardiovascular:  Denies chest pain, palpitations  GI:  Denies abdominal pain, nausea, vomiting, or change in bowel or bladder habits   Musculoskeletal:  Denies any new muscle/joint swelling  Skin:  Denies rash   Neurologic:  Denies focal weakness  All systems negative except as marked.     PAST MEDICAL HISTORY:  Past Medical History:   Diagnosis Date     Cerebral infarction (H)      COPD (chronic obstructive pulmonary disease) (H)      HLD (hyperlipidemia)      Hypertension      Myeloproliferative disease (H)        PAST SURGICAL HISTORY:  Past Surgical History:   Procedure Laterality Date     OPEN REDUCTION INTERNAL FIXATION FOOT Right 2010     OTHER SURGICAL HISTORY  2001    Lip lesion removal     TONSILLECTOMY & ADENOIDECTOMY           CURRENT MEDICATIONS:      Current Facility-Administered Medications:      albuterol (PROVENTIL) neb solution 2.5 mg, 2.5 mg, Nebulization, Q6H PRN, Alexx Alonso MD     ipratropium - albuterol 0.5 mg/2.5 mg/3 mL (DUONEB) neb solution 3 mL, 3 mL, Nebulization, Once, Alexx Alonso MD    Current Outpatient Medications:      albuterol (PROAIR HFA;PROVENTIL HFA;VENTOLIN HFA) 90 mcg/actuation inhaler, [ALBUTEROL (PROAIR HFA;PROVENTIL HFA;VENTOLIN HFA) 90 MCG/ACTUATION INHALER]  Inhale 2 puffs every 6 (six) hours as needed for wheezing., Disp: , Rfl:      atorvastatin (LIPITOR) 20 MG tablet, Take 20 mg by mouth every morning , Disp: , Rfl:      hydroxyurea (HYDREA) 500 MG capsule, Take 1,500 mg by mouth five times a week Monday-Friday, Disp: , Rfl:      hydroxyurea (HYDREA) 500 MG capsule, Take 1,000 mg by mouth twice a week Saturday and Sunday, Disp: , Rfl:      metoprolol succinate ER (TOPROL-XL) 25 MG 24 hr tablet, Take 25 mg by mouth daily, Disp: , Rfl:      tiotropium (SPIRIVA) 18 mcg inhalation capsule, [TIOTROPIUM (SPIRIVA) 18 MCG INHALATION CAPSULE] Place 18 mcg into inhaler and inhale daily., Disp: , Rfl:      warfarin ANTICOAGULANT (COUMADIN) 2.5 MG tablet, Take 1 tablet (2.5 mg) by mouth daily, Disp: 14 tablet, Rfl: 0    ALLERGIES:  No Known Allergies    FAMILY HISTORY:  Family History   Problem Relation Age of Onset     Alcoholism Mother        SOCIAL HISTORY:   Social History     Socioeconomic History     Marital status:      Spouse name: None     Number of children: None     Years of education: None     Highest education level: None   Tobacco Use     Smoking status: Former Smoker     Packs/day: 1.00     Start date: 6/8/1965     Quit date: 1/1/2012     Years since quitting: 10.3     Smokeless tobacco: Never Used   Substance and Sexual Activity     Alcohol use: Yes     Alcohol/week: 19.0 standard drinks     Drug use: Never       VITALS:  Patient Vitals for the past 24 hrs:   BP Temp Temp src Pulse Resp SpO2 Weight   04/19/22 1328 -- 98.1  F (36.7  C) Oral -- -- 96 % --   04/19/22 1323 (!) 143/68 -- -- 105 (!) 34 90 % 95.3 kg (210 lb)        PHYSICAL EXAM    Constitutional:  Awake, alert, in no apparent distress  HENT:  Normocephalic, Atraumatic. Bilateral external ears normal. Oropharynx moist. Nose normal. Neck- Normal range of motion with no guarding, No midline cervical tenderness, Supple, No stridor.   Eyes:  PERRL, EOMI with no signs of entrapment, Conjunctiva  normal, No discharge.   Respiratory:  Moderate respiratory distress, No wheezing.  Diminished breath sounds.  Cardiovascular:  Normal heart rate, Normal rhythm, No appreciable rubs or gallops.   GI:  Soft, No tenderness, No distension, No palpable masses  Musculoskeletal:  Intact distal pulses. Good range of motion in all major joints. No tenderness to palpation or major deformities noted. Lower extremity edema, moderate on right and mild on left.  Integument:  Warm, Dry, No erythema, No rash. Pallor.  Neurologic:  Alert & oriented, Normal motor function, Normal sensory function, No focal deficits noted.   Psychiatric:  Affect normal, Judgment normal, Mood normal.     LAB:  All pertinent labs reviewed and interpreted.  Results for orders placed or performed during the hospital encounter of 04/19/22   Chest CT w/o contrast     Status: Abnormal   Result Value Ref Range    Radiologist flags (AA)      Pulmonary abscess and/or empyema on the left as noted above along with severe anemia.    Narrative    EXAM: CT CHEST W/O CONTRAST  LOCATION: [facility]  DATE/TIME: 4/19/2022 3:04 PM    INDICATION: Shortness of breath. Dyspnea. Recent pulmonary embolus.  COMPARISON: Chest CTA 04/01/2022  TECHNIQUE: CT chest without IV contrast. Multiplanar reformats were obtained. Dose reduction techniques were used.  CONTRAST: None.    FINDINGS:   LUNGS AND PLEURA: There is a large, complex loculated left pleural effusion with loculated components of septated are inferiorly air  and increase in the amount of fluid since study done 18 days ago. Fluid has a density of 10 Hounsfield units. There is   associated compressive atelectasis of most of the left lower lobe. Few small rounded or ovoid groundglass opacities have developed in the right lower lobe (images 148, 160 171).      MEDIASTINUM/AXILLAE: Blood pool is much less dense than the heart muscle. There is mild mediastinal and subcarinal adenopathy with  subcarinal nodes measuring 1.2 cm in short axis.    CORONARY ARTERY CALCIFICATION: Severe.    UPPER ABDOMEN: Incidental splenule.    MUSCULOSKELETAL: No suspicious lesions. Severe degenerative changes in both shoulders.      Impression    IMPRESSION:   1.  Notable increase in the complex, loculated left-sided pleural effusion with now loculated bubbles of air in the left base. This fluid has a density of 10 Hounsfield units. Given the prior significant pulmonary emboli, need to  consider pulmonary   infarct with necrosis and either a sterile or infected pulmonary abscess/empyema.  2.  Patient's quite anemic.    [Critical Result: Pulmonary abscess and/or empyema on the left as noted above along with severe anemia.]    Finding was identified on 4/19/2022 3:39 PM.     Dr. Alonso was contacted by me on 4/19/2022 3:48 PM and verbalized understanding of the critical result.      Troponin I     Status: Normal   Result Value Ref Range    Troponin I 0.10 0.00 - 0.29 ng/mL   Magnesium     Status: Normal   Result Value Ref Range    Magnesium 2.1 1.8 - 2.6 mg/dL   Comprehensive metabolic panel     Status: Abnormal   Result Value Ref Range    Sodium 140 136 - 145 mmol/L    Potassium 4.0 3.5 - 5.0 mmol/L    Chloride 106 98 - 107 mmol/L    Carbon Dioxide (CO2) 25 22 - 31 mmol/L    Anion Gap 9 5 - 18 mmol/L    Urea Nitrogen 21 8 - 28 mg/dL    Creatinine 0.79 0.70 - 1.30 mg/dL    Calcium 8.2 (L) 8.5 - 10.5 mg/dL    Glucose 106 70 - 125 mg/dL    Alkaline Phosphatase 88 45 - 120 U/L    AST 18 0 - 40 U/L    ALT 22 0 - 45 U/L    Protein Total 5.9 (L) 6.0 - 8.0 g/dL    Albumin 1.9 (L) 3.5 - 5.0 g/dL    Bilirubin Total 1.0 0.0 - 1.0 mg/dL    GFR Estimate >90 >60 mL/min/1.73m2   B-Type Natriuretic Peptide ([facility] Only)     Status: Abnormal   Result Value Ref Range    BNP 1,221 (H) 0 - 78 pg/mL   INR     Status: Abnormal   Result Value Ref Range    INR 3.06 (H) 0.85 - 1.15   CBC with platelets and differential     Status: Abnormal   Result  Value Ref Range    WBC Count 10.7 4.0 - 11.0 10e3/uL    RBC Count 1.66 (L) 4.40 - 5.90 10e6/uL    Hemoglobin 6.7 (LL) 13.3 - 17.7 g/dL    Hematocrit 21.6 (L) 40.0 - 53.0 %     (H) 78 - 100 fL    MCH 40.4 (H) 26.5 - 33.0 pg    MCHC 31.0 (L) 31.5 - 36.5 g/dL    RDW 19.9 (H) 10.0 - 15.0 %    Platelet Count 123 (L) 150 - 450 10e3/uL    % Neutrophils 83 %    % Lymphocytes 9 %    % Monocytes 5 %    % Eosinophils 0 %    % Basophils 1 %    % Immature Granulocytes 2 %    NRBCs per 100 WBC 0 <1 /100    Absolute Neutrophils 8.9 (H) 1.6 - 8.3 10e3/uL    Absolute Lymphocytes 1.0 0.8 - 5.3 10e3/uL    Absolute Monocytes 0.5 0.0 - 1.3 10e3/uL    Absolute Eosinophils 0.0 0.0 - 0.7 10e3/uL    Absolute Basophils 0.1 0.0 - 0.2 10e3/uL    Absolute Immature Granulocytes 0.3 <=0.4 10e3/uL    Absolute NRBCs 0.0 10e3/uL   Asymptomatic COVID-19 Virus (Coronavirus) by PCR Nasopharyngeal     Status: Normal    Specimen: Nasopharyngeal; Swab   Result Value Ref Range    SARS CoV2 PCR Negative Negative    Narrative    Testing was performed using the [employer]le  SARS-CoV-2 & Influenza A/B Assay on the [employer]  Jessica  System.  This test should be ordered for the detection of SARS-COV-2 in individuals who meet SARS-CoV-2 clinical and/or epidemiological criteria. Test performance is unknown in asymptomatic patients.  This test is for in vitro diagnostic use under the FDA EUA for laboratories certified under CLIA to perform moderate and/or high complexity testing. This test has not been FDA cleared or approved.  A negative test does not rule out the presence of PCR inhibitors in the specimen or target RNA in concentration below the limit of detection for the assay. The possibility of a false negative should be considered if the patient's recent exposure or clinical presentation suggests COVID-19.  [facility] are certified under the Clinical Laboratory Improvement Amendments of 1988 (CLIA-88) as qualified to perform moderate and/or  high complexity laboratory testing.   Lactate Dehydrogenase     Status: Abnormal   Result Value Ref Range    Lactate Dehydrogenase 351 (H) 125 - 220 U/L   Adult Type and Screen     Status: None   Result Value Ref Range    ABO/RH(D) O POS     Antibody Screen Negative Negative    SPECIMEN EXPIRATION DATE [id number]    Prepare red blood cells (unit)     Status: None (Preliminary result)   Result Value Ref Range    CROSSMATCH Compatible     UNIT ABO/RH O Pos     Unit Number [id number]     Unit Status Ready     Blood Component Type Red Blood Cells     Product Code O2605S80     CODING SYSTEM [id number]     UNIT TYPE ISBT 5100    CBC with Platelets & Differential     Status: Abnormal    Narrative    The following orders were created for panel order CBC with Platelets & Differential.  Procedure                               Abnormality         Status                     ---------                               -----------         ------                     CBC with platelets and d...[[id number]]  Abnormal            Final result                 Please view results for these tests on the individual orders.   ABO/Rh type and screen     Status: None    Narrative    The following orders were created for panel order ABO/Rh type and screen.  Procedure                               Abnormality         Status                     ---------                               -----------         ------                     Adult Type and Screen[[id number]]                            Edited Result - FINAL        Please view results for these tests on the individual orders.     RADIOLOGY:  Reviewed all pertinent imaging. Please see official radiology report.  XR Chest Port 1 View    (Results Pending)   Chest CT w/o contrast    (Results Pending)       EKG:    Normal sinus rhythm with occasional PVC.  Q waves inferiorly.  No acute ST segment abnormalities.  Essentially unchanged from April 1, 2022.  I have independently reviewed and  interpreted the EKG(s) documented above.        I, Meg Banerjee, am serving as a scribe to document services personally performed by Alexx Alonso MD, based on my observation and the provider's statements to me. I, Alexx Alonso MD attest that Meg Banerjee is acting in a scribe capacity, has observed my performance of the services and has documented them in accordance with my direction.    Alexx Alonso M.D.  Emergency Medicine  [facility] EMERGENCY ROOM     Alexx Alonso MD  04/19/22 [id number]

## 2022-04-19 NOTE — PHARMACY-ADMISSION MEDICATION HISTORY
Pharmacy Note - Admission Medication History    Pertinent Provider Information:      ______________________________________________________________________    Prior To Admission (PTA) med list completed and updated in EMR.       PTA Med List   Medication Sig Last Dose     albuterol (PROAIR HFA;PROVENTIL HFA;VENTOLIN HFA) 90 mcg/actuation inhaler [ALBUTEROL (PROAIR HFA;PROVENTIL HFA;VENTOLIN HFA) 90 MCG/ACTUATION INHALER] Inhale 2 puffs every 6 (six) hours as needed for wheezing. prn at does not have     atorvastatin (LIPITOR) 20 MG tablet Take 20 mg by mouth every morning  4/19/2022     hydroxyurea (HYDREA) 500 MG capsule Take 1,000 mg by mouth daily 4/19/2022     metoprolol succinate ER (TOPROL-XL) 25 MG 24 hr tablet Take 25 mg by mouth daily 4/19/2022     tiotropium (SPIRIVA) 18 mcg inhalation capsule [TIOTROPIUM (SPIRIVA) 18 MCG INHALATION CAPSULE] Place 18 mcg into inhaler and inhale daily. 4/19/2022 at does not have     warfarin ANTICOAGULANT (COUMADIN) 2 MG tablet Take 2 mg by mouth daily 4/18/2022       Information source(s): Family member and CareEverywhere/SureScripts  Method of interview communication: in-person    Summary of Changes to PTA Med List  New: none  Discontinued: none  Changed: hydrea, warfarin    Patient was asked about OTC/herbal products specifically.  PTA med list reflects this.    In the past week, patient estimated taking medication this percent of the time:  greater than 90%.    Allergies were reviewed, assessed, and updated with the patient.      Patient did not bring any medications to the hospital and can't retrieve from home. No multi-dose medications are available for use during hospital stay.     The information provided in this note is only as accurate as the sources available at the time of the update(s).    Thank you for the opportunity to participate in the care of this patient.    Marlene Hall RPH  4/19/2022 2:15 PM

## 2022-04-20 ENCOUNTER — APPOINTMENT (OUTPATIENT)
Dept: CARDIOLOGY | Facility: CLINIC | Age: 77
DRG: 186 | End: 2022-04-20
Attending: INTERNAL MEDICINE
Payer: COMMERCIAL

## 2022-04-20 LAB
ALBUMIN SERPL-MCNC: 1.9 G/DL (ref 3.5–5)
ALP SERPL-CCNC: 78 U/L (ref 45–120)
ALT SERPL W P-5'-P-CCNC: 22 U/L (ref 0–45)
ANION GAP SERPL CALCULATED.3IONS-SCNC: 8 MMOL/L (ref 5–18)
AST SERPL W P-5'-P-CCNC: 17 U/L (ref 0–40)
ATRIAL RATE - MUSE: 85 BPM
BASOPHILS # BLD AUTO: 0 10E3/UL (ref 0–0.2)
BASOPHILS NFR BLD AUTO: 0 %
BILIRUB SERPL-MCNC: 1.3 MG/DL (ref 0–1)
BUN SERPL-MCNC: 19 MG/DL (ref 8–28)
CALCIUM SERPL-MCNC: 8.2 MG/DL (ref 8.5–10.5)
CHLORIDE BLD-SCNC: 105 MMOL/L (ref 98–107)
CO2 SERPL-SCNC: 27 MMOL/L (ref 22–31)
CREAT SERPL-MCNC: 0.79 MG/DL (ref 0.7–1.3)
DIASTOLIC BLOOD PRESSURE - MUSE: NORMAL MMHG
EOSINOPHIL # BLD AUTO: 0 10E3/UL (ref 0–0.7)
EOSINOPHIL NFR BLD AUTO: 0 %
ERYTHROCYTE [DISTWIDTH] IN BLOOD BY AUTOMATED COUNT: 26.3 % (ref 10–15)
GFR SERPL CREATININE-BSD FRML MDRD: >90 ML/MIN/1.73M2
GLUCOSE BLD-MCNC: 128 MG/DL (ref 70–125)
HCT VFR BLD AUTO: 23.6 % (ref 40–53)
HGB BLD-MCNC: 7.4 G/DL (ref 13.3–17.7)
HGB BLD-MCNC: 7.4 G/DL (ref 13.3–17.7)
IMM GRANULOCYTES # BLD: 0.2 10E3/UL
IMM GRANULOCYTES NFR BLD: 2 %
INR PPP: 2.67 (ref 0.85–1.15)
INTERPRETATION ECG - MUSE: NORMAL
LYMPHOCYTES # BLD AUTO: 0.6 10E3/UL (ref 0.8–5.3)
LYMPHOCYTES NFR BLD AUTO: 7 %
MAGNESIUM SERPL-MCNC: 2.1 MG/DL (ref 1.8–2.6)
MCH RBC QN AUTO: 38.1 PG (ref 26.5–33)
MCHC RBC AUTO-ENTMCNC: 31.4 G/DL (ref 31.5–36.5)
MCV RBC AUTO: 122 FL (ref 78–100)
MONOCYTES # BLD AUTO: 0.1 10E3/UL (ref 0–1.3)
MONOCYTES NFR BLD AUTO: 1 %
NEUTROPHILS # BLD AUTO: 7.1 10E3/UL (ref 1.6–8.3)
NEUTROPHILS NFR BLD AUTO: 90 %
NRBC # BLD AUTO: 0 10E3/UL
NRBC BLD AUTO-RTO: 0 /100
P AXIS - MUSE: 50 DEGREES
PLATELET # BLD AUTO: 103 10E3/UL (ref 150–450)
POTASSIUM BLD-SCNC: 3.9 MMOL/L (ref 3.5–5)
PR INTERVAL - MUSE: 136 MS
PROT SERPL-MCNC: 6 G/DL (ref 6–8)
QRS DURATION - MUSE: 110 MS
QT - MUSE: 392 MS
QTC - MUSE: 466 MS
R AXIS - MUSE: 20 DEGREES
RBC # BLD AUTO: 1.94 10E6/UL (ref 4.4–5.9)
SODIUM SERPL-SCNC: 140 MMOL/L (ref 136–145)
SYSTOLIC BLOOD PRESSURE - MUSE: NORMAL MMHG
T AXIS - MUSE: -45 DEGREES
TROPONIN I SERPL-MCNC: 0.09 NG/ML (ref 0–0.29)
VENTRICULAR RATE- MUSE: 85 BPM
WBC # BLD AUTO: 8 10E3/UL (ref 4–11)

## 2022-04-20 PROCEDURE — 210N000002 HC R&B HEART CARE

## 2022-04-20 PROCEDURE — 93306 TTE W/DOPPLER COMPLETE: CPT | Mod: 26 | Performed by: INTERNAL MEDICINE

## 2022-04-20 PROCEDURE — 99221 1ST HOSP IP/OBS SF/LOW 40: CPT | Performed by: INTERNAL MEDICINE

## 2022-04-20 PROCEDURE — 250N000013 HC RX MED GY IP 250 OP 250 PS 637: Performed by: STUDENT IN AN ORGANIZED HEALTH CARE EDUCATION/TRAINING PROGRAM

## 2022-04-20 PROCEDURE — 255N000002 HC RX 255 OP 636: Performed by: STUDENT IN AN ORGANIZED HEALTH CARE EDUCATION/TRAINING PROGRAM

## 2022-04-20 PROCEDURE — 250N000011 HC RX IP 250 OP 636: Performed by: STUDENT IN AN ORGANIZED HEALTH CARE EDUCATION/TRAINING PROGRAM

## 2022-04-20 PROCEDURE — 85610 PROTHROMBIN TIME: CPT | Performed by: STUDENT IN AN ORGANIZED HEALTH CARE EDUCATION/TRAINING PROGRAM

## 2022-04-20 PROCEDURE — 84484 ASSAY OF TROPONIN QUANT: CPT | Performed by: STUDENT IN AN ORGANIZED HEALTH CARE EDUCATION/TRAINING PROGRAM

## 2022-04-20 PROCEDURE — 85018 HEMOGLOBIN: CPT | Performed by: STUDENT IN AN ORGANIZED HEALTH CARE EDUCATION/TRAINING PROGRAM

## 2022-04-20 PROCEDURE — 80053 COMPREHEN METABOLIC PANEL: CPT | Performed by: STUDENT IN AN ORGANIZED HEALTH CARE EDUCATION/TRAINING PROGRAM

## 2022-04-20 PROCEDURE — 99223 1ST HOSP IP/OBS HIGH 75: CPT | Mod: GC | Performed by: INTERNAL MEDICINE

## 2022-04-20 PROCEDURE — 250N000012 HC RX MED GY IP 250 OP 636 PS 637: Performed by: STUDENT IN AN ORGANIZED HEALTH CARE EDUCATION/TRAINING PROGRAM

## 2022-04-20 PROCEDURE — 258N000003 HC RX IP 258 OP 636: Performed by: STUDENT IN AN ORGANIZED HEALTH CARE EDUCATION/TRAINING PROGRAM

## 2022-04-20 PROCEDURE — 94640 AIRWAY INHALATION TREATMENT: CPT

## 2022-04-20 PROCEDURE — 99223 1ST HOSP IP/OBS HIGH 75: CPT | Mod: AI | Performed by: STUDENT IN AN ORGANIZED HEALTH CARE EDUCATION/TRAINING PROGRAM

## 2022-04-20 PROCEDURE — 85025 COMPLETE CBC W/AUTO DIFF WBC: CPT | Performed by: STUDENT IN AN ORGANIZED HEALTH CARE EDUCATION/TRAINING PROGRAM

## 2022-04-20 PROCEDURE — 83735 ASSAY OF MAGNESIUM: CPT | Performed by: INTERNAL MEDICINE

## 2022-04-20 PROCEDURE — 250N000009 HC RX 250

## 2022-04-20 PROCEDURE — 999N000157 HC STATISTIC RCP TIME EA 10 MIN

## 2022-04-20 RX ORDER — PIPERACILLIN SODIUM, TAZOBACTAM SODIUM 3; .375 G/15ML; G/15ML
3.38 INJECTION, POWDER, LYOPHILIZED, FOR SOLUTION INTRAVENOUS EVERY 8 HOURS
Status: DISCONTINUED | OUTPATIENT
Start: 2022-04-20 | End: 2022-04-25

## 2022-04-20 RX ORDER — IPRATROPIUM BROMIDE AND ALBUTEROL SULFATE 2.5; .5 MG/3ML; MG/3ML
3 SOLUTION RESPIRATORY (INHALATION) EVERY 4 HOURS PRN
Status: DISCONTINUED | OUTPATIENT
Start: 2022-04-20 | End: 2022-04-23

## 2022-04-20 RX ORDER — FUROSEMIDE 10 MG/ML
20 INJECTION INTRAMUSCULAR; INTRAVENOUS EVERY 12 HOURS
Status: DISCONTINUED | OUTPATIENT
Start: 2022-04-20 | End: 2022-04-24

## 2022-04-20 RX ORDER — PIPERACILLIN SODIUM, TAZOBACTAM SODIUM 3; .375 G/15ML; G/15ML
3.38 INJECTION, POWDER, LYOPHILIZED, FOR SOLUTION INTRAVENOUS EVERY 8 HOURS
Status: DISCONTINUED | OUTPATIENT
Start: 2022-04-20 | End: 2022-04-20

## 2022-04-20 RX ORDER — PIPERACILLIN SODIUM, TAZOBACTAM SODIUM 3; .375 G/15ML; G/15ML
3.38 INJECTION, POWDER, LYOPHILIZED, FOR SOLUTION INTRAVENOUS ONCE
Status: COMPLETED | OUTPATIENT
Start: 2022-04-20 | End: 2022-04-20

## 2022-04-20 RX ORDER — HYDROXYUREA 500 MG/1
500 CAPSULE ORAL DAILY
Status: DISCONTINUED | OUTPATIENT
Start: 2022-04-21 | End: 2022-04-26 | Stop reason: HOSPADM

## 2022-04-20 RX ORDER — IPRATROPIUM BROMIDE AND ALBUTEROL SULFATE 2.5; .5 MG/3ML; MG/3ML
3 SOLUTION RESPIRATORY (INHALATION) ONCE
Status: COMPLETED | OUTPATIENT
Start: 2022-04-20 | End: 2022-04-20

## 2022-04-20 RX ADMIN — PERFLUTREN 3 ML: 6.52 INJECTION, SUSPENSION INTRAVENOUS at 10:45

## 2022-04-20 RX ADMIN — HYDROXYUREA 1000 MG: 500 CAPSULE ORAL at 08:44

## 2022-04-20 RX ADMIN — METOPROLOL SUCCINATE 25 MG: 25 TABLET, FILM COATED, EXTENDED RELEASE ORAL at 08:45

## 2022-04-20 RX ADMIN — PREDNISONE 40 MG: 20 TABLET ORAL at 08:45

## 2022-04-20 RX ADMIN — IPRATROPIUM BROMIDE AND ALBUTEROL SULFATE 3 ML: 2.5; .5 SOLUTION RESPIRATORY (INHALATION) at 12:24

## 2022-04-20 RX ADMIN — FUROSEMIDE 20 MG: 10 INJECTION, SOLUTION INTRAMUSCULAR; INTRAVENOUS at 22:08

## 2022-04-20 RX ADMIN — PIPERACILLIN AND TAZOBACTAM 3.38 G: 3; .375 INJECTION, POWDER, LYOPHILIZED, FOR SOLUTION INTRAVENOUS at 18:43

## 2022-04-20 RX ADMIN — ATORVASTATIN CALCIUM 20 MG: 10 TABLET, FILM COATED ORAL at 08:44

## 2022-04-20 RX ADMIN — FUROSEMIDE 20 MG: 10 INJECTION, SOLUTION INTRAMUSCULAR; INTRAVENOUS at 11:03

## 2022-04-20 RX ADMIN — PIPERACILLIN AND TAZOBACTAM 3.38 G: 3; .375 INJECTION, POWDER, LYOPHILIZED, FOR SOLUTION INTRAVENOUS at 22:07

## 2022-04-20 RX ADMIN — UMECLIDINIUM 1 PUFF: 62.5 AEROSOL, POWDER ORAL at 08:46

## 2022-04-20 RX ADMIN — VANCOMYCIN HYDROCHLORIDE 1500 MG: 5 INJECTION, POWDER, LYOPHILIZED, FOR SOLUTION INTRAVENOUS at 18:43

## 2022-04-20 ASSESSMENT — ACTIVITIES OF DAILY LIVING (ADL)
ADLS_ACUITY_SCORE: 10
ADLS_ACUITY_SCORE: 6
ADLS_ACUITY_SCORE: 6
ADLS_ACUITY_SCORE: 10
ADLS_ACUITY_SCORE: 6
ADLS_ACUITY_SCORE: 10
ADLS_ACUITY_SCORE: 6
ADLS_ACUITY_SCORE: 10
ADLS_ACUITY_SCORE: 10
DEPENDENT_IADLS:: INDEPENDENT
ADLS_ACUITY_SCORE: 10
ADLS_ACUITY_SCORE: 8
ADLS_ACUITY_SCORE: 10
ADLS_ACUITY_SCORE: 6
ADLS_ACUITY_SCORE: 10
ADLS_ACUITY_SCORE: 6

## 2022-04-20 ASSESSMENT — ENCOUNTER SYMPTOMS: CHEST TIGHTNESS: 1

## 2022-04-20 NOTE — PROVIDER NOTIFICATION
Notified team about EKG with ST inversion and increase Ectopies . Patient denied any chest pain . Plan to check lab , trop and stat EKG ordered .

## 2022-04-20 NOTE — CONSULTS
Impression and Plan     Assessment:  1. Elevated BNP concerning for cardiomyopathy/Acute CHF (HFpEF or pHTN with RV systolic dysfunction). BNP elevated to 1221 compared to 379 two weeks ago. Troponin x2 negative. EKG appears to have new inverted T waves in the inferior leads, concerning for historical ischemic event. Echo completed earlier this month showed normal systolic function, but significantly elevated pulmonary HTN (in the context of bilateral PT). CT chest showed significant calcification in the LAD. Notably he is down 3 lbs from his last admission.   2. Acute hypoxic respiratory failure, left complex loculated pleural effusion  3. Macrocytic anemia, thrombocytopenia, myelodysplastic syndrome/CML  4. Recent bilateral PE   5. HTN  6.   Abnormal ECG with dynamic inferolateral ECG changes.      Plan:    Echo. If continues to have significant pulmonary HTN, will consider catheterization.    Lexiscan tomorrow    Continue PTA metoprolol succinate 25 mg    Start Lasix 20 mg IV BID    Daily weights    Na restriction    Strict I/Os    Transfuse if hgb <7      Primary Cardiologist: NA    History of Present Illness      Mr. Shaji Pompa is a 76 year old male with a past medical history of myeloproliferative disease, COPD, HTN, CVA, recent DVT and PE who presented to the hospital for worsening dyspnea.     Was recently hospitalized for COPD exacerbation with pneumonia and found to have bilateral PE with right-sided DVT.  He reports he was feeling better right after discharge, but then started worsening again 2 to 3 days after discharge.  He had more difficulty with shortness of breath, especially with activity. Shortness of breath is worse when laying down flat. His stomach feels more distended than normal. Continues to have lower extremity swelling the the right leg.     Outpatient heme-onc follow-up with Dr. Hong on 4/11/2022 resulted in patient increasing his hydroxyurea to 2 tabs daily    Other  than noted above, Mr. Pompa denies any chest pain/pressure/tightness, light headedness/dizziness, pre-syncope, syncope, palpitations, paroxysmal nocturnal dyspnea (PND).    Since admission, Mr. Pompa feels his breathing is significantly improved.     Telemetry: Normal sinus rhythm.  No sustained ventricular or atrial arrhythmias.  1 episode of ventricular tachycardia event appears to be artifact. Personally reviewed.     Review of Systems:  Further review of systems is otherwise negative/noncontributory (based on review of medical record (admission H&P) and 13 point review of systems reviewed. Pertinent positives noted).    Cardiac Diagnostics     ECG: Personally reviewed and interpreted: Sinus rhythm, premature atrial complexes present, appears to have new inverted T in inferior leads    Most recent:  Echocardiogram (results reviewed):   Study Date: 04/02/2022 08:05 AM  Interpretation Summary     Left ventricular size, wall motion and function are normal. The ejection  fraction is 60-65%.  Normal right ventricle size and systolic function.  The right ventricular systolic pressure is approximated at 50mmHg plus the  right atrial pressure.  IVC diameter and respiratory changes fall into an intermediate range  suggesting an RA pressure of 8 mmHg.    Cardiac Cath (results reviewed): NA    Cardiac Stress Testing (results reviewed): NA    Medical History  Surgical History Family History Social History   Past Medical History:   Diagnosis Date     Cerebral infarction (H)      COPD (chronic obstructive pulmonary disease) (H)      HLD (hyperlipidemia)      Hypertension      Myeloproliferative disease (H)      Past Surgical History:   Procedure Laterality Date     OPEN REDUCTION INTERNAL FIXATION FOOT Right 2010     OTHER SURGICAL HISTORY  2001    Lip lesion removal     TONSILLECTOMY & ADENOIDECTOMY       Family History   Problem Relation Age of Onset     Alcoholism Mother            Social History     Socioeconomic  History     Marital status:      Spouse name: Not on file     Number of children: Not on file     Years of education: Not on file     Highest education level: Not on file   Occupational History     Not on file   Tobacco Use     Smoking status: Former Smoker     Packs/day: 1.00     Start date: 6/8/1965     Quit date: 1/1/2012     Years since quitting: 10.3     Smokeless tobacco: Never Used   Substance and Sexual Activity     Alcohol use: Yes     Alcohol/week: 19.0 standard drinks     Drug use: Never     Sexual activity: Not on file   Other Topics Concern     Not on file   Social History Narrative     Not on file     Social Determinants of Health     Financial Resource Strain: Not on file   Food Insecurity: Not on file   Transportation Needs: Not on file   Physical Activity: Not on file   Stress: Not on file   Social Connections: Not on file   Intimate Partner Violence: Not on file   Housing Stability: Not on file             Physical Examination   VITALS: BP (!) 145/70 (BP Location: Right arm)   Pulse 76   Temp 98.7  F (37.1  C) (Oral)   Resp 16   Wt 91.3 kg (201 lb 3.2 oz)   SpO2 92%   BMI 32.49 kg/m    BMI: Body mass index is 32.49 kg/m .  Wt Readings from Last 3 Encounters:   04/20/22 91.3 kg (201 lb 3.2 oz)   04/07/22 92.9 kg (204 lb 11.2 oz)       Intake/Output Summary (Last 24 hours) at 4/20/2022 0823  Last data filed at 4/20/2022 0530  Gross per 24 hour   Intake 450 ml   Output 1650 ml   Net -1200 ml       General Appearance:  Alert, cooperative, no distress, appears stated age    Head:  Normocephalic, without obvious abnormality, atraumatic   Eyes:  PERRL, conjunctiva/corneas clear, EOM's intact   Ears:  Normal external ear canals bilaterally   Nose: Nares normal, septum midline, no drainage   Throat: Lips, mucosa, and tongue normal; teeth and gums normal   Neck: Elevated JVD. Supple, symmetrical, trachea midline.   Back:   Symmetric, no abnormal curvature, ROM normal   Lungs:   Diffuse  expiratory wheezing and decreased lung sounds in the bases   Chest Wall:  No tenderness or deformity   Heart:  Regular rate and rhythm , S1, S2 normal,no murmur, rub or gallop   Abdomen:   Soft, non-tender, bowel sounds active all four quadrants,  no masses, no organomegaly   Extremities: 1+ pitting edema to the mid shin in the right lower extremity.   Skin: Skin color, texture, turgor normal, no rashes or lesions   Psychiatric: Normal affect, pleasant and cooperative    Neurologic: Alert and oriented X 3, Moves all 4 extremities            Imaging      EXAM: CT CHEST W/O CONTRAST  LOCATION: [facility]  DATE/TIME: 4/19/2022 3:04 PM     INDICATION: Shortness of breath. Dyspnea. Recent pulmonary embolus.  COMPARISON: Chest CTA 04/01/2022                                                                   IMPRESSION:   1.  Notable increase in the complex, loculated left-sided pleural effusion with now loculated bubbles of air in the left base. This fluid has a density of 10 Hounsfield units. Given the prior significant pulmonary emboli, need to  consider pulmonary   infarct with necrosis and either a sterile or infected pulmonary abscess/empyema.  2.  Patient's quite anemic.    EXAM:   1. LEFT THORACENTESIS  2. ULTRASOUND GUIDANCE  LOCATION: [facility]  DATE/TIME: 4/19/2022 4:17 PM     INDICATION: Loculated left pleural effusion on CT chest..                                                                   IMPRESSION:  Status post left ultrasound-guided diagnostic thoracentesis. The left loculated effusion and demonstrates extensive internal septations/loculations.        Lab Results    Chemistry/lipid CBC Cardiac Enzymes/BNP/TSH/INR   Recent Labs   Lab Test 05/03/21  1557   CHOL 110   HDL 35*   LDL 52   TRIG 115     Recent Labs   Lab Test 05/03/21  1557 08/13/19  0926   LDL 52 111     Recent Labs   Lab Test 04/20/22  0405      POTASSIUM 3.9   CHLORIDE 105   CO2  27   *   BUN 19   CR 0.79   GFRESTIMATED >90   GLENDY 8.2*     Recent Labs   Lab Test 04/20/22  0405 04/19/22  2143 04/19/22  1441   CR 0.79 0.76 0.79     Recent Labs   Lab Test 10/31/19  2000   A1C 6.0          Recent Labs   Lab Test 04/20/22  0405   WBC 8.0   HGB 7.4*   HCT 23.6*   *   *     Recent Labs   Lab Test 04/20/22  0405 04/20/22  0142 04/19/22  2143   HGB 7.4* 7.4* 7.0*    Recent Labs   Lab Test 04/20/22  0405 04/19/22  2143 04/19/22  1441   TROPONINI 0.09 0.11 0.10     Recent Labs   Lab Test 04/19/22  1441 04/01/22  1208   BNP 1,221* 379*     No results for input(s): TSH in the last 77656 hours.  Recent Labs   Lab Test 04/20/22  0405 04/19/22  1441 04/07/22  0435   INR 2.67* 3.06* 2.40*           Current Inpatient Scheduled Medications   Scheduled Meds:    atorvastatin  20 mg Oral QAM     cefTRIAXone  1 g Intravenous Q24H     hydroxyurea  1,000 mg Oral Daily     metoprolol succinate ER  25 mg Oral Daily     predniSONE  40 mg Oral Daily     sodium chloride (PF)  3 mL Intracatheter Q8H     umeclidinium  1 puff Inhalation Daily     Continuous Infusions:    [Held by provider] heparin       No current outpatient medications on file.          Medications Prior to Admission   Prior to Admission medications    Medication Sig Start Date End Date Taking? Authorizing Provider   albuterol (PROAIR HFA;PROVENTIL HFA;VENTOLIN HFA) 90 mcg/actuation inhaler [ALBUTEROL (PROAIR HFA;PROVENTIL HFA;VENTOLIN HFA) 90 MCG/ACTUATION INHALER] Inhale 2 puffs every 6 (six) hours as needed for wheezing. 10/31/19  Yes Provider, Historical   atorvastatin (LIPITOR) 20 MG tablet Take 20 mg by mouth every morning  10/31/19  Yes Provider, Historical   hydroxyurea (HYDREA) 500 MG capsule Take 1,000 mg by mouth daily   Yes Unknown, Entered By History   metoprolol succinate ER (TOPROL-XL) 25 MG 24 hr tablet Take 25 mg by mouth daily   Yes Unknown, Entered By History   tiotropium (SPIRIVA) 18 mcg inhalation capsule  [TIOTROPIUM (SPIRIVA) 18 MCG INHALATION CAPSULE] Place 18 mcg into inhaler and inhale daily. 10/31/19  Yes Provider, Historical   warfarin ANTICOAGULANT (COUMADIN) 2 MG tablet Take 2 mg by mouth daily   Yes Unknown, Entered By History          Lucia Chen MD PGY3  [facility]  4/20/2022, 8:23 AM    Faculty Supervision of Residents  I performed a history and physical examination of the patient and discussed the patients managementwith the resident.  I participated in the history,chief complaint, physical examination and medical decision making.  I was present during key portions of the services provided by the resident.  I reviewed and edited theresident's note and agree with the documented finding and plan of care.    Sp Larson DO  Non-Invasive Cardiologist  [phone]

## 2022-04-20 NOTE — PLAN OF CARE
Major Shift Events:  A&OX4  ,denied any pain . Reported no change in his breathing status  . Hemodynamically stable  afebrile  . Tele SR/ST with ST inversion ( MD is aware) , on 1-2 NC . Breath sound very dim /absent on left side . Passed the swallow study . Voided via the urinal . S/p one unit of PRBC for hemoglobin 6.7  , recheck was 6.7>> 7.4>>7.4 . Troponin 0.11>>0.09 .INR 3.06>> 2.67. Heparin gtt held  per team .   Plan: Heme/pedrito, cardiology consults .pt is a hard stick ,needs a PICC line .  continue to monitor patient condition closely .  For vital signs and complete assessments, please see documentation flowsheets.      Problem: Fluid Imbalance (Pneumonia)  Goal: Fluid Balance  Outcome: Ongoing, Not Progressing     Problem: Infection (Pneumonia)  Goal: Resolution of Infection Signs and Symptoms  Outcome: Ongoing, Not Progressing  Intervention: Prevent Infection Progression  Recent Flowsheet Documentation  Taken 4/20/2022 0000 by Cj Lawrence RN  Isolation Precautions: cytotoxic precautions maintained    Problem: Respiratory Compromise (Pneumonia)  Goal: Effective Oxygenation and Ventilation  Outcome: Ongoing, Not Progressing  Intervention: Promote Airway Secretion Clearance  Recent Flowsheet Documentation  Taken 4/20/2022 0000 by Cj Lawrence RN  Cough And Deep Breathing: done independently per patient    Problem: COPD (Chronic Obstructive Pulmonary Disease) Comorbidity  Goal: Maintenance of COPD Symptom Control  Outcome: Ongoing, Not Progressing

## 2022-04-20 NOTE — CONSULTS
HEMATOLOGY/ONCOLOGY CONSULT:    DOS:  4/20/2022    PRIMARY ONCOLOGIST:  Gerardo Cee MD  [facility] [phone]    REASON FOR ADMISSION:  1)  Worsening dyspnea with acute hypoxic respriatory failure.  Left complex loculated pleural effusion.  2)  Congestive heart failure.  3)  Suspected COPD exacerbation  4)  Macrocytic Anemia/Thrombocytopenia/MDS  5)  Bilateral pulmonary emboli - diagnosed 4/1/2022.  On warfarin.    HEMATOLOGIC HISTORY:    August 2019:  Evaluated for 2 episodes of lightheadedness and dizziness. There was no actual syncopal episode. He had a CBC on 8/13/2019 which showed WBC of 23.4 and a platelet count of 805K. Differential WBC shows neutrophils of 17.3 with a slightly elevated monocytes, eosinophils and basophils but normal lymphocytes. Hemoglobin was normal.    November 2019:  Evaluated for hypertension and had a CBC which showed a WBC of 20.0 and platelet count of 800K. Differential counts show a neutrophil count of 14.8 with a slightly elevated monocytes, eosinophils and basophils. Lymphocytes and hemoglobin are normal.    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 Bone marrow cellularity 75%. Bone marrow blasts less than 1%. Market myeloid hyperplasia with market atypical megakaryocytes. Marrow reticulin fibrosis: Increased [MF-2 of 3]. No collagen fibrosis. Pradeep 2 mutation positive.    12/26/2019:  Initiated treatment with Hydrea 500 mg po daily.  Dose increased to 1000 mg daily on 4/9/2020.  On 12/21/2021, dose increased to 1500 mg daily.      4/1/2022 through 4/7/2022: Admitted to the hospital with DVT, PE and pneumonia. Started on anticoagulation of warfarin 2.5 mg daily.       4/11/2022:  Office visit with Dr. Cee.  Hgb = 9.0, WBC = 15.4 with ANC of 11.9, Platelets = 280,000.  Hydrea dose dropped to 1000 mg daily.      4/14/2022:  INR = 3.5.  Dose decreased to 2 mg  daily.      4/19/2022:  Presented to [facility].  INR = 3.06.  Hgb = 6.9, Platelets = 123,000, WBC = 10.7, ANC = 8.9.  CT Scan of chest shows notable increase in the complex, loculated left-sided pleural effusion with now loculated bubbles of air in the left base. This fluid has a density of 10 Hounsfield units. Given the prior significant pulmonary emboli, need to  consider pulmonary infarct with necrosis and either a sterile or infected pulmonary abscess/empyema.    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 Vitale.  Would recommend decreasing Hydrea to 500 mg daily in light of multiple medical issues and decreased platelet count.      2)  Anemia:  Labs reviewed with Dr. Vitale.  Please check B12 and folate levels to rule out any vitamin deficiency contributing to his anemia.  Continue daily CBC.  Transfuse if Hgb <7.0.  3)  Leukocytosis:  Current administration of prednisone is contributing to elevation of WBC.  Will need to be followed as an outpatient.    3)  Anticoagulation for bilateral PE/DVT.  Currently on hold due to potential bleed into lung.      SUBJECTIVE:  Patient reports that his breathing is much better after having thoracentesis.  Is aware of plan for chest tube placement and abnormal echocardiogram.  Reviewed that platelet count of 103,000 is acceptable for undergoing procedures.        OBJECTIVE:    Vitals reviewed.  General:  Alert, oriented and breathing comfortably on supplemental nasal cannula oxygen.    No further exam.      Labs:    WBC = 8.0, ANC = 7.1, Hgb = 7.4, Platelets = 103,000.    Please contact our office with any questions or concerns.      Day Layne, RN, MS, CNP  [facility] [place] [facility]  [phone]  Cell:  [phone] (Off Thursday and Friday this week)  Please call office number for assistance.

## 2022-04-20 NOTE — PHARMACY-VANCOMYCIN DOSING SERVICE
"Pharmacy Vancomycin Initial Note  Date of Service 2022  Patient's  1945  76 year old, male    Indication: Loculated effusion, work up for empyema    Current estimated CrCl = Estimated Creatinine Clearance: 84.2 mL/min (based on SCr of 0.79 mg/dL).    Creatinine for last 3 days  2022:  2:41 PM Creatinine 0.79 mg/dL;  9:43 PM Creatinine 0.76 mg/dL  2022:  4:05 AM Creatinine 0.79 mg/dL    Recent Vancomycin Level(s) for last 3 days  No results found for requested labs within last 72 hours.      Vancomycin IV Administrations (past 72 hours)      No vancomycin orders with administrations in past 72 hours.                Nephrotoxins and other renal medications (From now, onward)    Start     Dose/Rate Route Frequency Ordered Stop    22 0430  vancomycin 1000 mg in 0.9% NaCl 250 mL intermittent infusion 1,000 mg         1,000 mg  over 60 Minutes Intravenous EVERY 12 HOURS 22 1553      22 2200  piperacillin-tazobactam (ZOSYN) 3.375 g vial to attach to  mL bag        Note to Pharmacy: Extended infusion dosing to start 6 hours after initial infusion.   \"Followed by\" Linked Group Details    3.375 g  over 240 Minutes Intravenous EVERY 8 HOURS 22 1600      22 1630  vancomycin 1500 mg in 0.9% NaCl 250 ml intermittent infusion 1,500 mg         1,500 mg  over 90 Minutes Intravenous ONCE 22 1605      22 1600  piperacillin-tazobactam (ZOSYN) 3.375 g vial to attach to  mL bag        \"Followed by\" Linked Group Details    3.375 g  over 30 Minutes Intravenous ONCE 22 1600      22 1000  furosemide (LASIX) injection 20 mg         20 mg  over 1-3 Minutes Intravenous EVERY 12 HOURS 22 1005            Contrast Orders - past 72 hours (72h ago, onward)    Start     Dose/Rate Route Frequency Stop    22 1100  perflutren lipid microsphere (DEFINITY) injection SUSP 3 mL         3 mL Intravenous ONCE 22 1045          [employer] Prediction of " Planned Initial Vancomycin Regimen  Loading dose: 1500 mg at 16:30 04/20/2022.  Regimen: 1000 mg IV every 12 hours.  Start time: 16:06 on 04/20/2022  Exposure target: AUC24 (range)400-600 mg/L.hr   AUC24,ss: 497 mg/L.hr  Probability of AUC24 > 400: 73 %  Ctrough,ss: 16.1 mg/L  Probability of Ctrough,ss > 20: 31 %  Probability of nephrotoxicity (Lodise LAINA 2009): 11 %        Plan:  1. Start vancomycin  1500 mg IV once, then 1000 mg IV q12h.   2. Vancomycin monitoring method: AUC  3. Vancomycin therapeutic monitoring goal: 400-600 mg*h/L  4. Pharmacy will check vancomycin levels as appropriate in 1-3 Days.    5. Serum creatinine levels will be ordered daily for the first week of therapy and at least twice weekly for subsequent weeks.      Sandip Reed [facility]

## 2022-04-20 NOTE — PROGRESS NOTES
Care Management Initial Consult    General Information  Assessment completed with: Patient and wife (bedside)  Type of CM/SW Visit: Initial Assessment    Primary Care Provider verified and updated as needed: Yes (Dr Aj Aponte- CM updated in [facility] per patient request)   Readmission within the last 30 days: previous discharge plan unsuccessful   Return Category: New Diagnosis    Reason for Consult: initial assessment and discharge planning  Advance Care Planning: has scanned documents       Communication Assessment  Patient's communication style: spoken language (English or Bilingual)    Hearing Difficulty or Deaf: no   Wear Glasses or Blind: yes    Living Environment:   People in home: spouse (Nidhi)  Current living Arrangements: house      Able to return to prior arrangements: yes       Family/Social Support:  Care provided by: self  Provides care for: no one  Marital Status:   Wife  Nidhi       Description of Support System: Supportive, Involved    Support Assessment: Adequate social supports    Current Resources:   Patient receiving home care services: No     Community Resources: None  Equipment currently used at home: none  Supplies currently used at home: Oxygen Tubing/Supplies    Employment/Financial:  Employment Status: retired        Financial Concerns: No concerns identified      Functional Status:  Prior to admission patient needed assistance:   Dependent ADLs:: Independent  Dependent IADLs:: Independent       Mental Health Status:  Mental Health Status: No Current Concerns       Chemical Dependency Status:  Chemical Dependency Status: No Current Concerns             Values/Beliefs:  Spiritual, Cultural Beliefs, Taoism Practices, Values that affect care: no               Additional Information:  Chart reviewed. CM met with patient and wife (Nidhi- bedside). Patient and wife live in a private home. Patient is independent at baselines including driving. CM updated PCP per patient request. Patient  states he has a health care directive (scanned into [employer]). He had no home care services prior to this hospital admission. No community services prior to admission.  Patient is a readmit. Patient discharged home from the last admission with  home 02. Patient is retired. He worked as a  at the [employer]. Patient anticipates discharge home no needs. Wife will transport at time of discharge.     Disha Lund RN

## 2022-04-20 NOTE — TREATMENT PLAN
Echocardiogram was reviewed which demonstrated focal wall motion abnormality concerning for obstructive coronary disease.  CT scan of the chest demonstrated severe coronary calcifications.  EKG does demonstrate inferior lateral ST segment abnormalities.  Findings likely represent chronic occlusive coronary artery disease.  Given severity of his pleural effusion Case was discussed with pulmonology team.  Plan will be to proceed with chest tube given concern for infectious process.  Once loculated pleural effusion is stabilized would then consider coronary angiogram.  No plan for coronary angiogram tomorrow.  We will cancel stress testing.  Recommend keeping hemoglobin above 7.  If any chest pain or worsening hypoxia consider increasing hemoglobin above 8 (give 20 IV lasix with each unit of pRBCs if required).

## 2022-04-20 NOTE — PROVIDER NOTIFICATION
Notified team about Hemoglobin level 7.0 post One Unit of PRBC transfusion . PTT 50 , INR >3.0 . Plan to hold heparin gtt and recheck Hemoglobin at 0200.

## 2022-04-20 NOTE — CONSULTS
PULMONARY / CRITICAL CARE CONSULT NOTE    Date / Time of Admission:  4/19/2022  1:19 PM    Assessment:     Shaji Pompa is a 76 year old male with history of COPD, HTN, myeloproliferative disease, recent diagnosis of bilateral pulmonary embolism 4/1/22 anticoagulated.  Presents to ED for evaluation of worsening shortness of breath. Denies fever, chills, night sweats. No cough. Denies chests pain, unchanged mild swelling of right LE.   Chest CT scan showed loculated left effusion, air pockets.   Labs showed anemia, Hb 6.7, (previously 9.5), normal WBC, normal basic chem.   Diagnostic thoracentesis , complex effusion, 100 ml of yellowish fluid was obtained, exudate fluid, predominant neutrophilic, gram stain negative. Patient was started on Abx. Systemic steroids.   Received RBC transfusion. EKG showed inverted T wave in inferior leads, concerning for ischemic event, cardiac enzymes negative. Echocardiogram showed EF 40%, severe inferior hypokinesis, moderate AS  wall hypokinesis, distal hypokinesis. Cardiology was consulted and plan for coronary angiogram.    1. Dyspnea  2. Loculated left pleural effusion   Small pleural effusion was seen on 4/1 chest CT scan, patient was diagnosed with bilateral pulmonary embolism at that time, CT done on 4/19 showed loculated left side effusion and air pockets.   US guided thoracentesis showed complex effusion, extensive septations, 100 ml yellowish fluid obtained , predominantly neutrophilic, gram stain negative.   Continue broad Abx. Follow up chest CT scan, plan for chest tube placement.   Continue broad Abx.   3. Acute decompensated cardiomyopathy   Case was discussed with cardiology, significant coronary artery calcifications. Recent echocardiogram showed EF 40%, severe inferior hypokinesis, moderate AS  wall hypokinesis, distal hypokinesis. Plan for coronary angiogram.   Continue diuresis, titrate BP meds, ASA.   4. Anemia s/p RBC transfusion   5. Hx bilateral  pulmonary emboli   Anticoagulation on hold in view of anemia and plan for diagnostic procedure   6. COPD   Stable , no signs of exacerbation , discontinue systemic steroids, continue bronchodilators   7. HTN  8. Myeloproliferative disorder    Advance Directives: Full code    Plan:   1. Titrate FiO2, keep SpO2 > 90%  2. Scheduled bronchodilators  3. No clinical signs to suggest COPD exacerbation, discontinue systemic steroids  4. Follow up pleural fluid cultures   5. Follow up chest CT scan, IR consult for left side chest tube placement in view of loculated effusion.   6. Broad Abx zosyn and vancomycin  7. Continue IV diuresis   8. Titrate BP meds accordingly   9. Follow up cardiology recommendations, plan for coronary angiogram in the near future  10. Hematology is planning to reduce dose of hydroxyurea.   11. Continue to hold anticoagulation for now.     Please contact me if you have any questions.    Aniceto Bynum  Pulmonary / Critical Care  04/20/2022  11:49 AM    Reason for consult : Loculated pleural effusion      HPI:  Shaji Pompa is a 76 year old male with history of COPD, HTN, myeloproliferative disease, recent diagnosis of bilateral pulmonary embolism 4/1/22 anticoagulated.  Presents to ED for evaluation of worsening shortness of breath. Denies fever, chills, night sweats. No cough. Denies chests pain, unchanged mild swelling of right LE.   Chest CT scan showed loculated left effusion, air pockets.   Labs showed anemia, Hb 6.7, (previously 9.5), normal WBC, normal basic chem.   Diagnostic thoracentesis , complex effusion, 100 ml of yellowish fluid was obtained, exudate fluid, predominant neutrophilic, gram stain negative. Patient was started on Abx. Systemic steroids.   Received RBC transfusion. EKG showed inverted T wave in inferior leads, concerning for ischemic event, cardiac enzymes negative. Echocardiogram showed EF 40%, severe inferior hypokinesis, moderate AS  wall hypokinesis,  distal hypokinesis. Cardiology was consulted and plan for coronary angiogram.     Past Medical History:   Diagnosis Date     Cerebral infarction (H)      COPD (chronic obstructive pulmonary disease) (H)      HLD (hyperlipidemia)      Hypertension      Myeloproliferative disease (H)      Allergies: Patient has no known allergies.     MEDS:  Current Facility-Administered Medications   Medication     albuterol (PROVENTIL HFA/VENTOLIN HFA) inhaler     albuterol (PROVENTIL) neb solution 2.5 mg     atorvastatin (LIPITOR) tablet 20 mg     cefTRIAXone (ROCEPHIN) 1 g vial to attach to  mL bag for ADULTS or NS 50 mL bag for PEDS     furosemide (LASIX) injection 20 mg     [Held by provider] heparin 25,000 units in 0.45% NaCl 250 mL ANTICOAGULANT infusion     hydrALAZINE (APRESOLINE) injection 10 mg     hydroxyurea (HYDREA) capsule 1,000 mg     ipratropium - albuterol 0.5 mg/2.5 mg/3 mL (DUONEB) neb solution 3 mL     lidocaine (LMX4) cream     lidocaine 1 % 0.1-1 mL     melatonin tablet 1 mg     metoprolol succinate ER (TOPROL-XL) 24 hr tablet 25 mg     ondansetron (ZOFRAN-ODT) ODT tab 4 mg    Or     ondansetron (ZOFRAN) injection 4 mg     predniSONE (DELTASONE) tablet 40 mg     prochlorperazine (COMPAZINE) injection 5 mg    Or     prochlorperazine (COMPAZINE) tablet 5 mg    Or     prochlorperazine (COMPAZINE) suppository 12.5 mg     sodium chloride (PF) 0.9% PF flush 3 mL     sodium chloride (PF) 0.9% PF flush 3 mL     umeclidinium (INCRUSE ELLIPTA) 62.5 MCG/INH inhaler 1 puff     Social History     Socioeconomic History     Marital status:      Spouse name: Not on file     Number of children: Not on file     Years of education: Not on file     Highest education level: Not on file   Occupational History     Not on file   Tobacco Use     Smoking status: Former Smoker     Packs/day: 1.00     Start date: 6/8/1965     Quit date: 1/1/2012     Years since quitting: 10.3     Smokeless tobacco: Never Used   Substance and  Sexual Activity     Alcohol use: Yes     Alcohol/week: 19.0 standard drinks     Drug use: Never     Sexual activity: Not on file   Other Topics Concern     Not on file   Social History Narrative     Not on file     Social Determinants of Health     Financial Resource Strain: Not on file   Food Insecurity: Not on file   Transportation Needs: Not on file   Physical Activity: Not on file   Stress: Not on file   Social Connections: Not on file   Intimate Partner Violence: Not on file   Housing Stability: Not on file       Objective:   VITALS:  BP (!) 145/70 (BP Location: Right arm)   Pulse 76   Temp 98.7  F (37.1  C) (Oral)   Resp 16   Wt 91.3 kg (201 lb 3.2 oz)   SpO2 92%   BMI 32.49 kg/m    VENT:  Resp: 16    EXAM:   Gen: awake, alert, no distress  HEENT: pale conjunctiva, moist mucosa, Mallampati III/IV  Neck: no thyromegaly, masses or JVD  Lungs: decrease breath sound left base  CV: regular, no murmurs or gallops appreciated  Abdomen: soft, NT, BS wnl  Ext: trace edema  Neuro: CN II-XII intact, non focal       Data Review:  Recent Labs   Lab 04/20/22  0405 04/19/22  2143 04/19/22  2036 04/19/22  1441   * 102 101* 106      04/20/22 04:05   Sodium 140   Potassium 3.9   Chloride 105   Carbon Dioxide 27   Urea Nitrogen 19   Creatinine 0.79   GFR Estimate >90 [1]   Calcium 8.2 (L)   Anion Gap 8   Magnesium 2.1   Albumin 1.9 (L)   Protein Total 6.0   Bilirubin Total 1.3 (H)   Alkaline Phosphatase 78   ALT 22   AST 17      04/20/22 04:05   WBC 8.0   Hemoglobin 7.4 (L)   Hematocrit 23.6 (L)   Platelet Count 103 (L)   RBC Count 1.94 (L)    (H)   MCH 38.1 (H)   MCHC 31.4 (L)   RDW 26.3 (H)   % Neutrophils 90   % Lymphocytes 7   % Monocytes 1   % Eosinophils 0   % Basophils 0      04/19/22 16:53   Cell Count Fluid Source Pleural Cavity, Left   Color Fluid Red !   Appearance Fluid Hazy !   RBC Fluid 14,000   % Mono/Macro Fluid 3   % Neutrophils Fluid 97   Glucose Fluid Source Pleural Cavity, Left   Glucose  Fluid 20   LD Fluid Source Pleural Cavity, Left   Lactate Dehydrogenase Fluid 1,282 [1]   Protein Fluid Source Pleural Cavity, Left   Protein Total Fluid 3.5     Gram Stain Result  No organisms seen      2+ WBC seen          CT CHEST W/O CONTRAST  LOCATION: [facility]  DATE/TIME: 4/19/2022 3:04 PM  INDICATION: Shortness of breath. Dyspnea. Recent pulmonary embolus.  COMPARISON: Chest CTA 04/01/2022  FINDINGS:   LUNGS AND PLEURA: There is a large, complex loculated left pleural effusion with loculated components of septated are inferiorly air  and increase in the amount of fluid since study done 18 days ago. Fluid has a density of 10 Hounsfield units. There is associated compressive atelectasis of most of the left lower lobe. Few small rounded or ovoid groundglass opacities have developed in the right lower lobe (images 148, 160 171).    MEDIASTINUM/AXILLAE: Blood pool is much less dense than the heart muscle. There is mild mediastinal and subcarinal adenopathy with subcarinal nodes measuring 1.2 cm in short axis.  CORONARY ARTERY CALCIFICATION: Severe.  UPPER ABDOMEN: Incidental splenule.  MUSCULOSKELETAL: No suspicious lesions. Severe degenerative changes in both shoulders.                                  IMPRESSION:   1.  Notable increase in the complex, loculated left-sided pleural effusion with now loculated bubbles of air in the left base. This fluid has a density of 10 Hounsfield units. Given the prior significant pulmonary emboli, need to  consider pulmonary infarct with necrosis and either a sterile or infected pulmonary abscess/empyema.  2.  Patient's quite anemic.    By:  Aniceto Bynum MD, 04/20/2022  11:49 AM    Primary Care Physician:  Steven Caraballo

## 2022-04-20 NOTE — PROVIDER NOTIFICATION
RCAT done. Will continue current therapy.       04/19/22 2100   RCAT Assessment   Reason for Assessment COPD   Pulmonary Status 0   Surgical Status 0   Chest X-ray 0   Respiratory Pattern 0   Mental Status 0   Breath Sounds 2   Cough Effectiveness 0   Level of Activity 1   O2 Required for SpO2>=92% 1   Acuity Level (points) 4   Acuity Level  4     Teresa Tamez, RT

## 2022-04-20 NOTE — PROGRESS NOTES
[facility]    Progress Note - Hospitalist Service       Date of Admission:  4/19/2022    Assessment & Plan          Shaji Pompa is a 76 year old old male with a past medical history of myeloproliferative disease, COPD, HTN, CVA, recent DVT and PEs who presented to the [facility] Emergency Department on the day of admission with complaints of worsening dyspnea. CT chest showing complex loculated left pleural effusion concerning for pulmonary infarct with necrosis vs pulmonary abscess/empyema. Thoracentesis with output of 100 cc of reddish-brown fluid.  Received Lasix 40 mg IV in ED. Cardiology, hem/onc, and pulm consulted.      Acute Hypoxic Respiratory Failure, improved  Left complex loculated pleural effusion, transudative    Patient with progressive dyspnea with recent hx of PNA and PE with CT chest showing complex loculated left pleural effusion concerning for pulmonary infarct with necrosis vs pulmonary abscess/empyema. Symptoms likely multifactorial including acute congestive heart failure, complex pleural effusion, recent bilateral pulmonary emboli, suspected COPD exacerbation. Dyspnea significantly improved, satting well on RA.  -Follow thoracentesis fluid studies  -O2 supplementation  - Pulm consult: appreciate recs     Congestive heart failure, acute  BNP 1221. Negative troponin x3. Cardiology consulted. Weight down 3 lbs from previous hospitalization and peripheral edema without significant changes, though had received Lasix in ED. BNP could be due to cardiac etiology though possibly 2/2 lung disease.   -Cardiology consult, appreciate cares and recs   -ECHO   -Lexiscan 4/21   -Start Lasix 20mg IV BID  -Trend BMP  - Telemetry  - Daily weights, strict I's and O's  -O2 supplementation as needed  -Cardiac diet  -Continue PTA metoprolol  -If continued difficult lab draws, will consider PICC placement     COPD exacerbation, suspected  Presents with dyspnea, productive  cough, chest tightness with history of COPD. Was on 2L O2, improved to room air and satting well. CT with findings as noted above. Procal was nml.   -Prednisone 40 mg x 5 days  - Ceftriaxone 1 g IV every 24 hours  - Cardiac telemetry  - Supplemental O2 as needed  - R CAT consult  - Sputum culture if possible  - PTA Spiriva daily  - Albuterol as needed  - Duoneb x1 scheduled  - Duoneb q4h PRN     Macrocytic Anemia  Thrombocytopenia  Myelodysplastic syndrome  Hgb 6.7 on admission, now s/p 1 unit PRBC and hgb stable at 7.4. Brown blood in lungs, though likely no acute bleed. Patient's macrocytic anemia attributed to hydroxyurea on last admission, can be attributed to myelodysplastic syndrome as well. Follows with oncologist Dr. Cee as outpatient. Hem/onc consulted. Thrombocytopenia is new, possibly related to myelodysplastic disease, no obvious medication causes.   - Trend CBC  - Heme-onc consult, appreciate cares and recs  - Transfuse if hemoglobin <7  - Continue PTA hydroxyurea     Recent bilateral pulmonary emboli  On warfarin for provoked DVT and PE after a long road trip 4/1/2022. INR 3.06 on ED intake, now 2.67. With INR near therapeutic range, will hold any pharmacologic anticoagulation until pulm recommendations if will need procedure. Hesitant to start heparin given thrombocytopenia.   - Hold heparin drip  - Hold PTA Warfarin     Hypertension  - Continue PTA metoprolol  - Hydralazine IV as needed     CVA, 2019  -Continue PTA statin       Diet: Low Saturated Fat Na <2400 mg    DVT Prophylaxis: Pneumatic Compression Devices  Garrett Catheter: Not present  Fluids: PO  Central Lines: None  Cardiac Monitoring: ACTIVE order. Indication: Acute decompensated heart failure (48 hours)  Code Status: Full Code      Disposition Plan   Expected Discharge: 04/22/2022     Anticipated discharge location:  Awaiting care coordination huddle  Delays: Diagnostic work-up, hemoglobin stable, improved dyspnea       The patient's care  was discussed with the Attending Physician, Dr. Wakefield.    Shanel Rodarte MD  Hospitalist Service  [facility]  Securely message with the [employer] Web Console (learn more here)  Text page via [email or web address] Paging/Directory     ______________________________________________________________________    Interval History   No acute overnight events, patient is afebrile, VSS.  Patient reports significant improvement in shortness of breath.  Does not know change in leg swelling, no abdominal distention.  Patient reports no chest pain.  Reports no cough, no lightheadedness.  Cardiology resident in room, wife attentive at bedside.  Discussed initial findings and overall course.    Data reviewed today: I reviewed all medications, new labs and imaging results over the last 24 hours.    Physical Exam   Vital Signs: Temp: 98.7  F (37.1  C) Temp src: Oral BP: (!) 145/70 Pulse: 76   Resp: 16 SpO2: 92 % O2 Device: None (Room air) Oxygen Delivery: 1/2 LPM  Weight: 201 lbs 3.2 oz  Physical Exam  Constitutional:       General: He is not in acute distress.     Appearance: He is not ill-appearing or diaphoretic.   HENT:      Head: Normocephalic and atraumatic.   Eyes:      Extraocular Movements: Extraocular movements intact.      Conjunctiva/sclera: Conjunctivae normal.   Cardiovascular:      Rate and Rhythm: Normal rate and regular rhythm.      Pulses: Normal pulses.      Heart sounds: Normal heart sounds. No murmur heard.    No gallop.   Pulmonary:      Effort: Pulmonary effort is normal. No respiratory distress.      Comments: Expiratory wheezes noted throughout, no crackles  Abdominal:      General: There is no distension.      Palpations: Abdomen is soft.      Tenderness: There is no abdominal tenderness.   Musculoskeletal:         General: Normal range of motion.      Comments: Bilateral peripheral edema, R: 2+ pitting edema to calf, L: 1+ pitting edema to calf.  Nontender to palpation.  No significant difference  from last admission   Skin:     General: Skin is warm and dry.      Capillary Refill: Capillary refill takes less than 2 seconds.   Neurological:      General: No focal deficit present.      Mental Status: He is alert and oriented to person, place, and time.   Psychiatric:         Mood and Affect: Mood normal.         Behavior: Behavior normal.           Data   Recent Labs   Lab 04/20/22  0405 04/20/22  0142 04/19/22  2143 04/19/22  2036 04/19/22  1441   WBC 8.0  --  8.7  --  10.7   HGB 7.4* 7.4* 7.0*  --  6.7*   *  --  121*  --  130*   *  --  120*  --  123*   INR 2.67*  --   --   --  3.06*     --  140  --  140   POTASSIUM 3.9  --  3.7  --  4.0   CHLORIDE 105  --  104  --  106   CO2 27  --  26  --  25   BUN 19  --  18  --  21   CR 0.79  --  0.76  --  0.79   ANIONGAP 8  --  10  --  9   GLENDY 8.2*  --  8.0*  --  8.2*   *  --  102 101* 106   ALBUMIN 1.9*  --   --   --  1.9*   PROTTOTAL 6.0  --  6.1  --  5.9*   BILITOTAL 1.3*  --   --   --  1.0   ALKPHOS 78  --   --   --  88   ALT 22  --   --   --  22   AST 17  --   --   --  18     Recent Results (from the past 24 hour(s))   Chest CT w/o contrast   Result Value    Radiologist flags (AA)     Pulmonary abscess and/or empyema on the left as noted above along with severe anemia.    Narrative    EXAM: CT CHEST W/O CONTRAST  LOCATION: [facility]  DATE/TIME: 4/19/2022 3:04 PM    INDICATION: Shortness of breath. Dyspnea. Recent pulmonary embolus.  COMPARISON: Chest CTA 04/01/2022  TECHNIQUE: CT chest without IV contrast. Multiplanar reformats were obtained. Dose reduction techniques were used.  CONTRAST: None.    FINDINGS:   LUNGS AND PLEURA: There is a large, complex loculated left pleural effusion with loculated components of septated are inferiorly air  and increase in the amount of fluid since study done 18 days ago. Fluid has a density of 10 Hounsfield units. There is   associated compressive atelectasis of most of the  left lower lobe. Few small rounded or ovoid groundglass opacities have developed in the right lower lobe (images 148, 160 171).      MEDIASTINUM/AXILLAE: Blood pool is much less dense than the heart muscle. There is mild mediastinal and subcarinal adenopathy with subcarinal nodes measuring 1.2 cm in short axis.    CORONARY ARTERY CALCIFICATION: Severe.    UPPER ABDOMEN: Incidental splenule.    MUSCULOSKELETAL: No suspicious lesions. Severe degenerative changes in both shoulders.      Impression    IMPRESSION:   1.  Notable increase in the complex, loculated left-sided pleural effusion with now loculated bubbles of air in the left base. This fluid has a density of 10 Hounsfield units. Given the prior significant pulmonary emboli, need to  consider pulmonary   infarct with necrosis and either a sterile or infected pulmonary abscess/empyema.  2.  Patient's quite anemic.    [Critical Result: Pulmonary abscess and/or empyema on the left as noted above along with severe anemia.]    Finding was identified on 4/19/2022 3:39 PM.     Dr. Alonso was contacted by me on 4/19/2022 3:48 PM and verbalized understanding of the critical result.      US Thoracentesis    Narrative    EXAM:   1. LEFT THORACENTESIS  2. ULTRASOUND GUIDANCE  LOCATION: [facility]  DATE/TIME: 4/19/2022 4:17 PM    INDICATION: Loculated left pleural effusion on CT chest..    PROCEDURE: Informed consent obtained. Time out performed. A limited ultrasound of the left thorax demonstrated a complex left pleural effusion with extensive septations/loculations. The largest collection of fluid was localized and the overlying skin was   prepped and draped in sterile fashion. 10 mL of 1 % lidocaine was infused into the local soft tissues. Under direct ultrasound guidance, a 5 Burkinan catheter system was placed into the pleural effusion.     100 mL of sterile saline is fluid was removed and sent for specified labs.    Patient tolerated procedure  well.    Ultrasound imaging was obtained and placed in the patient's permanent medical record.      Impression    IMPRESSION:  Status post left ultrasound-guided diagnostic thoracentesis. The left loculated effusion and demonstrates extensive internal septations/loculations.    Reference CPT Code: 48338

## 2022-04-21 ENCOUNTER — APPOINTMENT (OUTPATIENT)
Dept: CT IMAGING | Facility: CLINIC | Age: 77
DRG: 186 | End: 2022-04-21
Attending: INTERNAL MEDICINE
Payer: COMMERCIAL

## 2022-04-21 ENCOUNTER — TELEPHONE (OUTPATIENT)
Dept: CARDIOLOGY | Facility: CLINIC | Age: 77
End: 2022-04-21
Payer: COMMERCIAL

## 2022-04-21 DIAGNOSIS — I50.9 ACUTE CONGESTIVE HEART FAILURE, UNSPECIFIED HEART FAILURE TYPE (H): Primary | ICD-10-CM

## 2022-04-21 LAB
ANION GAP SERPL CALCULATED.3IONS-SCNC: 10 MMOL/L (ref 5–18)
APPEARANCE FLD: ABNORMAL
BUN SERPL-MCNC: 25 MG/DL (ref 8–28)
CALCIUM SERPL-MCNC: 8.1 MG/DL (ref 8.5–10.5)
CELL COUNT BODY FLUID SOURCE: ABNORMAL
CHLORIDE BLD-SCNC: 107 MMOL/L (ref 98–107)
CO2 SERPL-SCNC: 27 MMOL/L (ref 22–31)
COLOR FLD: ABNORMAL
CREAT SERPL-MCNC: 0.86 MG/DL (ref 0.7–1.3)
ERYTHROCYTE [DISTWIDTH] IN BLOOD BY AUTOMATED COUNT: 25.5 % (ref 10–15)
FOLATE SERPL-MCNC: 2.6 NG/ML
GFR SERPL CREATININE-BSD FRML MDRD: 90 ML/MIN/1.73M2
GLUCOSE BLD-MCNC: 111 MG/DL (ref 70–125)
GRAM STAIN RESULT: NORMAL
GRAM STAIN RESULT: NORMAL
HCT VFR BLD AUTO: 23.8 % (ref 40–53)
HGB BLD-MCNC: 7.3 G/DL (ref 13.3–17.7)
INR PPP: 2.82 (ref 0.85–1.15)
LYMPHOCYTES NFR FLD MANUAL: NORMAL %
MCH RBC QN AUTO: 38.4 PG (ref 26.5–33)
MCHC RBC AUTO-ENTMCNC: 30.7 G/DL (ref 31.5–36.5)
MCV RBC AUTO: 125 FL (ref 78–100)
MONOS+MACROS NFR FLD MANUAL: 13 %
NEUTS BAND NFR FLD MANUAL: 87 %
PLATELET # BLD AUTO: 100 10E3/UL (ref 150–450)
POTASSIUM BLD-SCNC: 3.5 MMOL/L (ref 3.5–5)
RBC # BLD AUTO: 1.9 10E6/UL (ref 4.4–5.9)
RBC # FLD: 8000 /UL
SODIUM SERPL-SCNC: 144 MMOL/L (ref 136–145)
VIT B12 SERPL-MCNC: 626 PG/ML (ref 213–816)
WBC # BLD AUTO: 10.1 10E3/UL (ref 4–11)
WBC # FLD AUTO: 1126 /UL

## 2022-04-21 PROCEDURE — 250N000011 HC RX IP 250 OP 636: Performed by: STUDENT IN AN ORGANIZED HEALTH CARE EDUCATION/TRAINING PROGRAM

## 2022-04-21 PROCEDURE — 99233 SBSQ HOSP IP/OBS HIGH 50: CPT | Performed by: INTERNAL MEDICINE

## 2022-04-21 PROCEDURE — 82746 ASSAY OF FOLIC ACID SERUM: CPT

## 2022-04-21 PROCEDURE — 88341 IMHCHEM/IMCYTCHM EA ADD ANTB: CPT | Mod: 26 | Performed by: PATHOLOGY

## 2022-04-21 PROCEDURE — 89050 BODY FLUID CELL COUNT: CPT | Performed by: INTERNAL MEDICINE

## 2022-04-21 PROCEDURE — 258N000003 HC RX IP 258 OP 636: Performed by: INTERNAL MEDICINE

## 2022-04-21 PROCEDURE — 88312 SPECIAL STAINS GROUP 1: CPT | Mod: TC | Performed by: INTERNAL MEDICINE

## 2022-04-21 PROCEDURE — 32557 INSERT CATH PLEURA W/ IMAGE: CPT

## 2022-04-21 PROCEDURE — 250N000011 HC RX IP 250 OP 636: Performed by: INTERNAL MEDICINE

## 2022-04-21 PROCEDURE — 87070 CULTURE OTHR SPECIMN AEROBIC: CPT | Performed by: INTERNAL MEDICINE

## 2022-04-21 PROCEDURE — 88342 IMHCHEM/IMCYTCHM 1ST ANTB: CPT | Mod: 26 | Performed by: PATHOLOGY

## 2022-04-21 PROCEDURE — 250N000013 HC RX MED GY IP 250 OP 250 PS 637: Performed by: STUDENT IN AN ORGANIZED HEALTH CARE EDUCATION/TRAINING PROGRAM

## 2022-04-21 PROCEDURE — 88305 TISSUE EXAM BY PATHOLOGIST: CPT | Mod: 26 | Performed by: PATHOLOGY

## 2022-04-21 PROCEDURE — 88112 CYTOPATH CELL ENHANCE TECH: CPT | Mod: 26 | Performed by: PATHOLOGY

## 2022-04-21 PROCEDURE — 250N000011 HC RX IP 250 OP 636: Performed by: RADIOLOGY

## 2022-04-21 PROCEDURE — 99233 SBSQ HOSP IP/OBS HIGH 50: CPT | Mod: GC

## 2022-04-21 PROCEDURE — 85610 PROTHROMBIN TIME: CPT

## 2022-04-21 PROCEDURE — 88312 SPECIAL STAINS GROUP 1: CPT | Mod: 26 | Performed by: PATHOLOGY

## 2022-04-21 PROCEDURE — 36415 COLL VENOUS BLD VENIPUNCTURE: CPT

## 2022-04-21 PROCEDURE — 87205 SMEAR GRAM STAIN: CPT | Performed by: INTERNAL MEDICINE

## 2022-04-21 PROCEDURE — 82607 VITAMIN B-12: CPT

## 2022-04-21 PROCEDURE — 0W9B30Z DRAINAGE OF LEFT PLEURAL CAVITY WITH DRAINAGE DEVICE, PERCUTANEOUS APPROACH: ICD-10-PCS | Performed by: RADIOLOGY

## 2022-04-21 PROCEDURE — 250N000013 HC RX MED GY IP 250 OP 250 PS 637

## 2022-04-21 PROCEDURE — 250N000013 HC RX MED GY IP 250 OP 250 PS 637: Performed by: INTERNAL MEDICINE

## 2022-04-21 PROCEDURE — 85027 COMPLETE CBC AUTOMATED: CPT

## 2022-04-21 PROCEDURE — 80048 BASIC METABOLIC PNL TOTAL CA: CPT

## 2022-04-21 PROCEDURE — 210N000002 HC R&B HEART CARE

## 2022-04-21 PROCEDURE — 250N000009 HC RX 250: Performed by: INTERNAL MEDICINE

## 2022-04-21 RX ORDER — OXYCODONE HYDROCHLORIDE 5 MG/1
5 TABLET ORAL EVERY 4 HOURS PRN
Status: DISCONTINUED | OUTPATIENT
Start: 2022-04-21 | End: 2022-04-26 | Stop reason: HOSPADM

## 2022-04-21 RX ORDER — FLUMAZENIL 0.1 MG/ML
0.2 INJECTION, SOLUTION INTRAVENOUS
Status: ACTIVE | OUTPATIENT
Start: 2022-04-21 | End: 2022-04-22

## 2022-04-21 RX ORDER — ACETAMINOPHEN 325 MG/1
325-650 TABLET ORAL EVERY 6 HOURS PRN
Status: DISCONTINUED | OUTPATIENT
Start: 2022-04-21 | End: 2022-04-26 | Stop reason: HOSPADM

## 2022-04-21 RX ORDER — FOLIC ACID 1 MG/1
1 TABLET ORAL DAILY
Status: DISCONTINUED | OUTPATIENT
Start: 2022-04-21 | End: 2022-04-26 | Stop reason: HOSPADM

## 2022-04-21 RX ORDER — NALOXONE HYDROCHLORIDE 0.4 MG/ML
0.4 INJECTION, SOLUTION INTRAMUSCULAR; INTRAVENOUS; SUBCUTANEOUS
Status: DISCONTINUED | OUTPATIENT
Start: 2022-04-21 | End: 2022-04-26 | Stop reason: HOSPADM

## 2022-04-21 RX ORDER — LISINOPRIL 5 MG/1
5 TABLET ORAL DAILY
Status: DISCONTINUED | OUTPATIENT
Start: 2022-04-21 | End: 2022-04-26 | Stop reason: HOSPADM

## 2022-04-21 RX ORDER — POTASSIUM CHLORIDE 1500 MG/1
40 TABLET, EXTENDED RELEASE ORAL ONCE
Status: COMPLETED | OUTPATIENT
Start: 2022-04-21 | End: 2022-04-21

## 2022-04-21 RX ORDER — NALOXONE HYDROCHLORIDE 0.4 MG/ML
0.2 INJECTION, SOLUTION INTRAMUSCULAR; INTRAVENOUS; SUBCUTANEOUS
Status: DISCONTINUED | OUTPATIENT
Start: 2022-04-21 | End: 2022-04-26 | Stop reason: HOSPADM

## 2022-04-21 RX ORDER — FENTANYL CITRATE 50 UG/ML
25-50 INJECTION, SOLUTION INTRAMUSCULAR; INTRAVENOUS EVERY 5 MIN PRN
Status: ACTIVE | OUTPATIENT
Start: 2022-04-21 | End: 2022-04-22

## 2022-04-21 RX ORDER — IBUPROFEN 200 MG
200 TABLET ORAL EVERY 6 HOURS PRN
Status: DISCONTINUED | OUTPATIENT
Start: 2022-04-21 | End: 2022-04-22

## 2022-04-21 RX ORDER — ATORVASTATIN CALCIUM 40 MG/1
40 TABLET, FILM COATED ORAL EVERY MORNING
Status: DISCONTINUED | OUTPATIENT
Start: 2022-04-22 | End: 2022-04-26 | Stop reason: HOSPADM

## 2022-04-21 RX ADMIN — MIDAZOLAM HYDROCHLORIDE 0.5 MG: 1 INJECTION, SOLUTION INTRAMUSCULAR; INTRAVENOUS at 12:27

## 2022-04-21 RX ADMIN — FOLIC ACID 1 MG: 1 TABLET ORAL at 13:18

## 2022-04-21 RX ADMIN — ACETAMINOPHEN 650 MG: 325 TABLET ORAL at 20:14

## 2022-04-21 RX ADMIN — HYDROXYUREA 500 MG: 500 CAPSULE ORAL at 08:11

## 2022-04-21 RX ADMIN — UMECLIDINIUM 1 PUFF: 62.5 AEROSOL, POWDER ORAL at 08:13

## 2022-04-21 RX ADMIN — DORNASE ALFA 50 ML: 1 SOLUTION RESPIRATORY (INHALATION) at 13:14

## 2022-04-21 RX ADMIN — ATORVASTATIN CALCIUM 20 MG: 10 TABLET, FILM COATED ORAL at 08:07

## 2022-04-21 RX ADMIN — FENTANYL CITRATE 25 MCG: 50 INJECTION, SOLUTION INTRAMUSCULAR; INTRAVENOUS at 12:28

## 2022-04-21 RX ADMIN — OXYCODONE HYDROCHLORIDE 5 MG: 5 TABLET ORAL at 22:47

## 2022-04-21 RX ADMIN — VANCOMYCIN HYDROCHLORIDE 1000 MG: 5 INJECTION, POWDER, LYOPHILIZED, FOR SOLUTION INTRAVENOUS at 16:18

## 2022-04-21 RX ADMIN — LISINOPRIL 5 MG: 5 TABLET ORAL at 10:48

## 2022-04-21 RX ADMIN — FUROSEMIDE 20 MG: 10 INJECTION, SOLUTION INTRAMUSCULAR; INTRAVENOUS at 21:37

## 2022-04-21 RX ADMIN — FENTANYL CITRATE 25 MCG: 50 INJECTION, SOLUTION INTRAMUSCULAR; INTRAVENOUS at 12:21

## 2022-04-21 RX ADMIN — PIPERACILLIN AND TAZOBACTAM 3.38 G: 3; .375 INJECTION, POWDER, LYOPHILIZED, FOR SOLUTION INTRAVENOUS at 21:37

## 2022-04-21 RX ADMIN — PIPERACILLIN AND TAZOBACTAM 3.38 G: 3; .375 INJECTION, POWDER, LYOPHILIZED, FOR SOLUTION INTRAVENOUS at 05:24

## 2022-04-21 RX ADMIN — DORNASE ALFA 50 ML: 1 SOLUTION RESPIRATORY (INHALATION) at 21:39

## 2022-04-21 RX ADMIN — POTASSIUM CHLORIDE 40 MEQ: 20 TABLET, EXTENDED RELEASE ORAL at 08:10

## 2022-04-21 RX ADMIN — VANCOMYCIN HYDROCHLORIDE 1000 MG: 5 INJECTION, POWDER, LYOPHILIZED, FOR SOLUTION INTRAVENOUS at 04:06

## 2022-04-21 RX ADMIN — METOPROLOL SUCCINATE 25 MG: 25 TABLET, FILM COATED, EXTENDED RELEASE ORAL at 08:07

## 2022-04-21 RX ADMIN — MIDAZOLAM HYDROCHLORIDE 0.5 MG: 1 INJECTION, SOLUTION INTRAMUSCULAR; INTRAVENOUS at 12:22

## 2022-04-21 RX ADMIN — PIPERACILLIN AND TAZOBACTAM 3.38 G: 3; .375 INJECTION, POWDER, LYOPHILIZED, FOR SOLUTION INTRAVENOUS at 13:19

## 2022-04-21 ASSESSMENT — ACTIVITIES OF DAILY LIVING (ADL)
ADLS_ACUITY_SCORE: 8

## 2022-04-21 NOTE — PROGRESS NOTES
PULMONARY / CRITICAL CARE PROGRESS NOTE    Date / Time of Admission:  4/19/2022  1:19 PM    Assessment:     Shaji Pompa is a 76 year old male with history of COPD, HTN, myeloproliferative disease, recent diagnosis of bilateral pulmonary embolism 4/1/22 anticoagulated.  Presents to ED for evaluation of worsening shortness of breath. Denies fever, chills, night sweats. No cough. Denies chests pain, unchanged mild swelling of right LE.   Chest CT scan showed loculated left effusion, air pockets.   Labs showed anemia, Hb 6.7, (previously 9.5), normal WBC, normal basic chem.   Diagnostic thoracentesis , complex effusion, 100 ml of yellowish fluid was obtained, exudate fluid, predominant neutrophilic, gram stain negative. Patient was started on Abx. Systemic steroids.   Received RBC transfusion. EKG showed inverted T wave in inferior leads, concerning for ischemic event, cardiac enzymes negative. Echocardiogram showed EF 40%, severe inferior hypokinesis, moderate AS  wall hypokinesis, distal hypokinesis. Cardiology was consulted and plan for coronary angiogram.    1. Dyspnea  2. Loculated left pleural effusion   Small pleural effusion was seen on 4/1 chest CT scan, patient was diagnosed with bilateral pulmonary embolism at that time, CT done on 4/19 showed loculated left side effusion and air pockets.   US guided thoracentesis showed complex effusion, extensive septations, 100 ml yellowish fluid obtained , predominantly neutrophilic, gram stain negative.   Continue broad Abx. Schedule for chest tube placement 4/21. Continue broad Abx.   Start intrapleural lytics.   3. Acute decompensated cardiomyopathy   Case was discussed with cardiology, significant coronary artery calcifications. Recent echocardiogram showed EF 40%, severe inferior hypokinesis, moderate AS  wall hypokinesis, distal hypokinesis. Plan for coronary angiogram in the near furture.   Continue diuresis, titrate BP meds, ASA.   4. Anemia s/p RBC  transfusion   5. Hx bilateral pulmonary emboli   Resume anticoagulation post chest tube placement   6. COPD   Stable, continue bronchodilators   7. HTN  8. Myeloproliferative disorder    Advance Directives: Full code    Plan:   1. Titrate FiO2, keep SpO2 > 90%  2. Scheduled bronchodilators  3. Chest tube placement   4. Start intrapleural lytics   5. Follow up pleural fluid analysis.   6. Broad Abx zosyn and vancomycin  7. Follow up CXR in AM   8. Continue IV diuresis   9. Titrate BP meds accordingly   10. Cardiology is following , plan for coronary angiogram in the near future  11. Resume anticoagulation    Please contact me if you have any questions.    Aniceto Bynum  Pulmonary / Critical Care  04/21/2022  11:39 AM    Subjective     HPI:  Shaji Pompa is a 76 year old male with history of COPD, HTN, myeloproliferative disease, recent diagnosis of bilateral pulmonary embolism 4/1/22 anticoagulated.  Presents to ED for evaluation of worsening shortness of breath. Denies fever, chills, night sweats. No cough. Denies chests pain, unchanged mild swelling of right LE.   Chest CT scan showed loculated left effusion, air pockets.   Labs showed anemia, Hb 6.7, (previously 9.5), normal WBC, normal basic chem.   Diagnostic thoracentesis , complex effusion, 100 ml of yellowish fluid was obtained, exudate fluid, predominant neutrophilic, gram stain negative. Patient was started on Abx. Systemic steroids.   Received RBC transfusion. EKG showed inverted T wave in inferior leads, concerning for ischemic event, cardiac enzymes negative. Echocardiogram showed EF 40%, severe inferior hypokinesis, moderate AS  wall hypokinesis, distal hypokinesis. Cardiology was consulted and plan for coronary angiogram.     Events overnight  - Doing well , denies chest pain or shortness of breath  - Mild dry cough  - Sundowning in AM..    Past Medical History:   Diagnosis Date     Cerebral infarction (H)      COPD (chronic  obstructive pulmonary disease) (H)      HLD (hyperlipidemia)      Hypertension      Myeloproliferative disease (H)      Allergies: Patient has no known allergies.     MEDS:  Current Facility-Administered Medications   Medication     albuterol (PROVENTIL HFA/VENTOLIN HFA) inhaler     albuterol (PROVENTIL) neb solution 2.5 mg     atorvastatin (LIPITOR) tablet 20 mg     fentaNYL (PF) (SUBLIMAZE) injection 25-50 mcg     flumazenil (ROMAZICON) injection 0.2 mg     furosemide (LASIX) injection 20 mg     [Held by provider] heparin 25,000 units in 0.45% NaCl 250 mL ANTICOAGULANT infusion     hydrALAZINE (APRESOLINE) injection 10 mg     hydroxyurea (HYDREA) capsule 500 mg     ipratropium - albuterol 0.5 mg/2.5 mg/3 mL (DUONEB) neb solution 3 mL     lidocaine (LMX4) cream     lidocaine 1 % 0.1-1 mL     lidocaine 1 % 1-30 mL     lisinopril (ZESTRIL) tablet 5 mg     melatonin tablet 1 mg     metoprolol succinate ER (TOPROL-XL) 24 hr tablet 25 mg     midazolam (VERSED) injection 0.5-2 mg     naloxone (NARCAN) injection 0.2 mg    Or     naloxone (NARCAN) injection 0.4 mg    Or     naloxone (NARCAN) injection 0.2 mg    Or     naloxone (NARCAN) injection 0.4 mg     ondansetron (ZOFRAN-ODT) ODT tab 4 mg    Or     ondansetron (ZOFRAN) injection 4 mg     piperacillin-tazobactam (ZOSYN) 3.375 g vial to attach to  mL bag     prochlorperazine (COMPAZINE) injection 5 mg    Or     prochlorperazine (COMPAZINE) tablet 5 mg    Or     prochlorperazine (COMPAZINE) suppository 12.5 mg     sodium chloride (PF) 0.9% PF flush 3 mL     sodium chloride (PF) 0.9% PF flush 3 mL     umeclidinium (INCRUSE ELLIPTA) 62.5 MCG/INH inhaler 1 puff     vancomycin 1000 mg in 0.9% NaCl 250 mL intermittent infusion 1,000 mg     Social History     Socioeconomic History     Marital status:      Spouse name: Not on file     Number of children: Not on file     Years of education: Not on file     Highest education level: Not on file   Occupational History      Not on file   Tobacco Use     Smoking status: Former Smoker     Packs/day: 1.00     Start date: 6/8/1965     Quit date: 1/1/2012     Years since quitting: 10.3     Smokeless tobacco: Never Used   Substance and Sexual Activity     Alcohol use: Yes     Alcohol/week: 19.0 standard drinks     Drug use: Never     Sexual activity: Not on file   Other Topics Concern     Not on file   Social History Narrative     Not on file     Social Determinants of Health     Financial Resource Strain: Not on file   Food Insecurity: Not on file   Transportation Needs: Not on file   Physical Activity: Not on file   Stress: Not on file   Social Connections: Not on file   Intimate Partner Violence: Not on file   Housing Stability: Not on file       Objective:   VITALS:  /57   Pulse 83   Temp 98.3  F (36.8  C) (Oral)   Resp 26   Wt 90.7 kg (200 lb)   SpO2 97%   BMI 32.30 kg/m    VENT:  Resp: 26    EXAM:   Gen: awake, alert, no distress  HEENT: pale conjunctiva, moist mucosa, Mallampati III/IV  Neck: no thyromegaly, masses or JVD  Lungs: decrease breath sound left base  CV: regular, no murmurs or gallops appreciated  Abdomen: soft, NT, BS wnl  Ext: trace edema  Neuro: CN II-XII intact, non focal       Data Review:  Recent Labs   Lab 04/21/22  0502 04/20/22  0405 04/19/22  2143 04/19/22  2036 04/19/22  1441    128* 102 101* 106      04/20/22 04:05   Sodium 140   Potassium 3.9   Chloride 105   Carbon Dioxide 27   Urea Nitrogen 19   Creatinine 0.79   GFR Estimate >90 [1]   Calcium 8.2 (L)   Anion Gap 8   Magnesium 2.1   Albumin 1.9 (L)   Protein Total 6.0   Bilirubin Total 1.3 (H)   Alkaline Phosphatase 78   ALT 22   AST 17      04/20/22 04:05   WBC 8.0   Hemoglobin 7.4 (L)   Hematocrit 23.6 (L)   Platelet Count 103 (L)   RBC Count 1.94 (L)    (H)   MCH 38.1 (H)   MCHC 31.4 (L)   RDW 26.3 (H)   % Neutrophils 90   % Lymphocytes 7   % Monocytes 1   % Eosinophils 0   % Basophils 0      04/19/22 16:53   Cell Count Fluid  Source Pleural Cavity, Left   Color Fluid Red !   Appearance Fluid Hazy !   RBC Fluid 14,000   % Mono/Macro Fluid 3   % Neutrophils Fluid 97   Glucose Fluid Source Pleural Cavity, Left   Glucose Fluid 20   LD Fluid Source Pleural Cavity, Left   Lactate Dehydrogenase Fluid 1,282 [1]   Protein Fluid Source Pleural Cavity, Left   Protein Total Fluid 3.5     Gram Stain Result  No organisms seen      2+ WBC seen          CT CHEST W/O CONTRAST  LOCATION: [facility]  DATE/TIME: 4/19/2022 3:04 PM  INDICATION: Shortness of breath. Dyspnea. Recent pulmonary embolus.  COMPARISON: Chest CTA 04/01/2022  FINDINGS:   LUNGS AND PLEURA: There is a large, complex loculated left pleural effusion with loculated components of septated are inferiorly air  and increase in the amount of fluid since study done 18 days ago. Fluid has a density of 10 Hounsfield units. There is associated compressive atelectasis of most of the left lower lobe. Few small rounded or ovoid groundglass opacities have developed in the right lower lobe (images 148, 160 171).    MEDIASTINUM/AXILLAE: Blood pool is much less dense than the heart muscle. There is mild mediastinal and subcarinal adenopathy with subcarinal nodes measuring 1.2 cm in short axis.  CORONARY ARTERY CALCIFICATION: Severe.  UPPER ABDOMEN: Incidental splenule.  MUSCULOSKELETAL: No suspicious lesions. Severe degenerative changes in both shoulders.                                  IMPRESSION:   1.  Notable increase in the complex, loculated left-sided pleural effusion with now loculated bubbles of air in the left base. This fluid has a density of 10 Hounsfield units. Given the prior significant pulmonary emboli, need to  consider pulmonary infarct with necrosis and either a sterile or infected pulmonary abscess/empyema.  2.  Patient's quite anemic.    By:  Aniceto Bynum MD, 04/21/2022  11:39 AM    Primary Care Physician:  Steven Caraballo

## 2022-04-21 NOTE — PLAN OF CARE
Problem: Heart Failure Comorbidity  Goal: Maintenance of Heart Failure Symptom Control  Outcome: Ongoing, Progressing  Intervention: Maintain Heart Failure-Management  Recent Flowsheet Documentation  Taken 4/20/2022 2345 by Rebeca Gutierrez RN  Medication Review/Management: medications reviewed     Problem: Hypertension Comorbidity  Goal: Blood Pressure in Desired Range  Outcome: Ongoing, Progressing  Intervention: Maintain Blood Pressure Management  Recent Flowsheet Documentation  Taken 4/20/2022 2345 by Rebeca Gutierrez RN  Medication Review/Management: medications reviewed   Goal Outcome Evaluation:      VSS, see flowsheet. Good UOP after lasix. Dyspnea with minimal exertion. O2 1L/NC.

## 2022-04-21 NOTE — PLAN OF CARE
"Pt initially displaying signs of paranoia, disoriented to time. When asked what day of the month it was pt responded \"See, I'm not that crazy\". After explanation of chest tube placement procedure, pt was more understanding and compliant. One dose of alteplase administered after chest tube placement. Needs extensive CHF diet education.    Problem: Risk for Delirium  Goal: Improved Behavioral Control  Outcome: Ongoing, Progressing     Problem: Respiratory Compromise (Heart Failure)  Goal: Effective Oxygenation and Ventilation  Outcome: Ongoing, Progressing   Goal Outcome Evaluation:    "

## 2022-04-21 NOTE — PROGRESS NOTES
HEART CARE CONSULTATON NOTE        Assessment/Recommendations   Assessment  1.  Acute congestive heart failure with reduced ejection fraction.  Ischemic cardiomyopathy. LVEFL 40-45%.    2.  Severe coronary calcification on CT, coronary artery disease.   3.  Ischemic cardiomyopathy, focal wall motion abnormality  4.  Recent pulmonary embolism, bilateral  5.  DVT right leg  6.  Severe anemia  7.  Thrombocytopenia  8.  Myeloproliferative disorder  9.  Large pleural effusion concerning for complex effusion with infection  10.  Immunocompromise on chemotherapy  11.  COPD with acute exacerbation  12.  Hypertension  13.  Dynamic EKG changes in the inferolateral leads  14.  Hypokalemia  15.  Nonsustained ventricular tachycardia.  4 beats noted on telemetry overnight    Plan:   1.  Patient will proceed with chest tube placement to stabilize pleural effusion as he is concerned for infection given air in pleural cavity.  2.  Once this is stabilized and chest tube removed would proceed with right and left heart catheterization in the coronary Cath Lab at [facility].  3.  Increase atorvastatin to 40 mg daily  4.  Continue Lasix IV 20 mg twice daily for heart failure  5.  Currently on broad-spectrum antibiotics  6.  Continue metoprolol XL at 25 mg daily  7.  Start lisinopril 5 mg daily for HFrEF     Will follow.  Was discussed with Dr. Huynh         History of Present Illness/Subjective    S: No acute events overnight.  Patient denies any active chest pain.  Still on low-flow oxygen therapy to maintain saturations.  Blood pressure is elevated today and will start lisinopril.  Discussed echo findings and concern for obstructive coronary artery disease.  CT exam was reviewed with the patient which demonstrated diffuse coronary calcification.  Currently having no active chest pain symptoms.  He is on a heparin drip at this time given history of DVT and need for chest tube placement.  This is been held this morning for  chest tube placement.  Case discussed with Dr. Huynh with pulmonology.  Plan is to stabilize complex pleural effusion which could be infectious given air within the pleural space.  Once this is stabilized possible coronary angiogram next week versus medical management of his ischemic cardiomyopathy.  Hemoglobin is currently stabilized at 7.  Platelets are stable.      Reviewed telemetry to demonstrate 1 episode of nonsustained ventricular tachycardia.  Otherwise he is in sinus rhythm.  Personally viewed telemetry    Echo: Images personally reviewed.4/21  There is a focal wall motion abnormality with moderate to severe hypokinesis in the mid to distal inferolateral wall.  There is severe hypokinesis of the [place] distal lateral wall.  This is worse compared to prior.  There is signs of moderate pulmonary hypertension as well.  Left ventricular ejection fraction is 40 to 45%    Report 1. The left ventricle is normal in size. Left ventricular systolic performance  is moderately reduced. The ejection fraction is estimated to be 40%.  2. There is severe inferior hypokinesis. There is moderate anteroseptal  hypokinesis. In addition, there is moderate mid to distal posterior  hypokinesis and severe distal lateral hypokinesis  3. No significant valvular heart disease is identified on this study.  4. Borderline right ventricular enlargement with low normal right ventricular  systolic performance.  5. There is mild left atrial enlargement.  6. Right ventricular systolic pressure relative to right atrial pressure is  mildly increased. The pulmonary artery pressure is estimated to be 45-50mmHg  plus right atrial pressure (the IVC is of normal caliber).         Physical Examination  Review of Systems   VITALS: BP (!) 148/70 (BP Location: Right arm)   Pulse 83   Temp 98.3  F (36.8  C) (Oral)   Resp 26   Wt 90.7 kg (200 lb)   SpO2 97%   BMI 32.30 kg/m    BMI: Body mass index is 32.3 kg/m .  Wt Readings from Last 3  Encounters:   04/21/22 90.7 kg (200 lb)   04/07/22 92.9 kg (204 lb 11.2 oz)       Intake/Output Summary (Last 24 hours) at 4/21/2022 0852  Last data filed at 4/21/2022 0600  Gross per 24 hour   Intake 650 ml   Output 2000 ml   Net -1350 ml     General Appearance:   no distress, normal body habitus   ENT/Mouth: membranes moist, no oral lesions or bleeding gums.      EYES:  no scleral icterus, normal conjunctivae   Neck: no carotid bruits or thyromegaly   Chest/Lungs:    Decreased breath sounds on left side.  equal chest wall expansion    Cardiovascular:   Regular. Normal first and second heart sounds with faint systolic murmur,. NO rubs, or gallops; the carotid, radial and posterior tibial pulses are intact, Jugular venous pressure 8 cm, R>L edema (improving).     Abdomen:  no organomegaly, masses, bruits, or tenderness; bowel sounds are present   Extremities: no cyanosis or clubbing   Skin: no xanthelasma, warm.    Neurologic: normal  bilateral, no tremors     Psychiatric: alert and oriented x3, calm     Review Of Systems  Skin: negative  Eyes: negative  Ears/Nose/Throat: negative  Respiratory: Shortness of breath +, Dyspnea on exertion+ and Cough+  Cardiovascular: orthopnea  Gastrointestinal: negative  Genitourinary: negative  Musculoskeletal: negative  Neurologic: negative  Psychiatric: negative  Hematologic/Lymphatic/Immunologic: negative  Endocrine: negative          Lab Results    Chemistry/lipid CBC Cardiac Enzymes/BNP/TSH/INR   Recent Labs   Lab Test 05/03/21  1557   CHOL 110   HDL 35*   LDL 52   TRIG 115     Recent Labs   Lab Test 05/03/21  1557 08/13/19  0926   LDL 52 111     Recent Labs   Lab Test 04/21/22  0502      POTASSIUM 3.5   CHLORIDE 107   CO2 27      BUN 25   CR 0.86   GFRESTIMATED 90   GLENDY 8.1*     Recent Labs   Lab Test 04/21/22  0502 04/20/22  0405 04/19/22  2143   CR 0.86 0.79 0.76     Recent Labs   Lab Test 10/31/19  2000   A1C 6.0          Recent Labs   Lab Test  04/21/22  0502   WBC 10.1   HGB 7.3*   HCT 23.8*   *   *     Recent Labs   Lab Test 04/21/22  0502 04/20/22  0405 04/20/22  0142   HGB 7.3* 7.4* 7.4*    Recent Labs   Lab Test 04/20/22  0405 04/19/22  2143 04/19/22  1441   TROPONINI 0.09 0.11 0.10     Recent Labs   Lab Test 04/19/22  1441 04/01/22  1208   BNP 1,221* 379*     No results for input(s): TSH in the last 30521 hours.  Recent Labs   Lab Test 04/21/22  0502 04/20/22  0405 04/19/22  1441   INR 2.82* 2.67* 3.06*        Medical History  Surgical History Family History Social History   Past Medical History:   Diagnosis Date     Cerebral infarction (H)      COPD (chronic obstructive pulmonary disease) (H)      HLD (hyperlipidemia)      Hypertension      Myeloproliferative disease (H)      Past Surgical History:   Procedure Laterality Date     OPEN REDUCTION INTERNAL FIXATION FOOT Right 2010     OTHER SURGICAL HISTORY  2001    Lip lesion removal     TONSILLECTOMY & ADENOIDECTOMY       Family History   Problem Relation Age of Onset     Alcoholism Mother         Social History     Socioeconomic History     Marital status:      Spouse name: Not on file     Number of children: Not on file     Years of education: Not on file     Highest education level: Not on file   Occupational History     Not on file   Tobacco Use     Smoking status: Former Smoker     Packs/day: 1.00     Start date: 6/8/1965     Quit date: 1/1/2012     Years since quitting: 10.3     Smokeless tobacco: Never Used   Substance and Sexual Activity     Alcohol use: Yes     Alcohol/week: 19.0 standard drinks     Drug use: Never     Sexual activity: Not on file   Other Topics Concern     Not on file   Social History Narrative     Not on file     Social Determinants of Health     Financial Resource Strain: Not on file   Food Insecurity: Not on file   Transportation Needs: Not on file   Physical Activity: Not on file   Stress: Not on file   Social Connections: Not on file   Intimate  Partner Violence: Not on file   Housing Stability: Not on file         Medications  Allergies   No current outpatient medications on file.      No Known Allergies      Sp Larson DO

## 2022-04-21 NOTE — PRE-PROCEDURE
GENERAL PRE-PROCEDURE:   Procedure:  CT guided left chest tube placement with conscious sedation  Date/Time:  4/21/2022 12:07 PM    Written consent obtained?: Yes    Risks and benefits: Risks, benefits and alternatives were discussed    Consent given by:  Patient  Patient states understanding of procedure being performed: Yes    Patient's understanding of procedure matches consent: Yes    Procedure consent matches procedure scheduled: Yes    Expected level of sedation:  Moderate  Appropriately NPO:  Yes  ASA Class:  2  Mallampati  :  Grade 3- soft palate visible, posterior pharyngeal wall not visible  Lungs:  Lungs clear with good breath sounds bilaterally  Heart:  Normal heart sounds and rate  History & Physical reviewed:  History and physical reviewed and no updates needed  Statement of review:  I have reviewed the lab findings, diagnostic data, medications, and the plan for sedation

## 2022-04-21 NOTE — PROCEDURES
[facility]    Procedure: CT guided left pleural drain placement    Date/Time: 4/21/2022 12:43 PM  Performed by: Mykel Willoughby MD  Authorized by: Mykel Willoughby MD       UNIVERSAL PROTOCOL   Site Marked: NA  Prior Images Obtained and Reviewed:  Yes  Required items: Required blood products, implants, devices and special equipment available    Patient identity confirmed:  Verbally with patient, arm band, provided demographic data and hospital-assigned identification number  Patient was reevaluated immediately before administering moderate or deep sedation or anesthesia  Confirmation Checklist:  Patient's identity using two indicators, relevant allergies, procedure was appropriate and matched the consent or emergent situation and correct equipment/implants were available  Time out: Immediately prior to the procedure a time out was called    Universal Protocol: the Joint Commission Universal Protocol was followed    Preparation: Patient was prepped and draped in usual sterile fashion       ANESTHESIA    Anesthesia: Local infiltration  Local Anesthetic:  Lidocaine 1% without epinephrine      SEDATION  Patient Sedated: Yes    Sedation Type:  Moderate (conscious) sedation  Sedation:  Midazolam and fentanyl  Vital signs: Vital signs monitored during sedation    See dictated procedure note for full details.  Findings: 10 Citizen of [place] nonlocking pleural drain placed into the loculated left pleural effusion.     Specimens: none    Complications: None    Condition: Stable    Plan: Drain to -20 cm H2O pleurevac suction      PROCEDURE  Describe Procedure: 1 mg of IV versed and 50 mcg of IV fentanyl for 15 minutes. 10 [place] nonlocking left pleural drain.   Patient Tolerance:  Patient tolerated the procedure well with no immediate complications  Length of time physician/provider present for 1:1 monitoring during sedation: 15

## 2022-04-22 ENCOUNTER — APPOINTMENT (OUTPATIENT)
Dept: RADIOLOGY | Facility: CLINIC | Age: 77
DRG: 186 | End: 2022-04-22
Attending: INTERNAL MEDICINE
Payer: COMMERCIAL

## 2022-04-22 LAB
ANION GAP SERPL CALCULATED.3IONS-SCNC: 10 MMOL/L (ref 5–18)
BUN SERPL-MCNC: 21 MG/DL (ref 8–28)
CALCIUM SERPL-MCNC: 7.8 MG/DL (ref 8.5–10.5)
CHLORIDE BLD-SCNC: 106 MMOL/L (ref 98–107)
CO2 SERPL-SCNC: 27 MMOL/L (ref 22–31)
CREAT SERPL-MCNC: 0.81 MG/DL (ref 0.7–1.3)
ERYTHROCYTE [DISTWIDTH] IN BLOOD BY AUTOMATED COUNT: 25.4 % (ref 10–15)
GFR SERPL CREATININE-BSD FRML MDRD: >90 ML/MIN/1.73M2
GLUCOSE BLD-MCNC: 91 MG/DL (ref 70–125)
HCT VFR BLD AUTO: 24.9 % (ref 40–53)
HGB BLD-MCNC: 7.5 G/DL (ref 13.3–17.7)
MCH RBC QN AUTO: 38.3 PG (ref 26.5–33)
MCHC RBC AUTO-ENTMCNC: 30.1 G/DL (ref 31.5–36.5)
MCV RBC AUTO: 127 FL (ref 78–100)
PLATELET # BLD AUTO: 97 10E3/UL (ref 150–450)
POTASSIUM BLD-SCNC: 3.6 MMOL/L (ref 3.5–5)
RBC # BLD AUTO: 1.96 10E6/UL (ref 4.4–5.9)
SODIUM SERPL-SCNC: 143 MMOL/L (ref 136–145)
VANCOMYCIN SERPL-MCNC: 19.6 MG/L
WBC # BLD AUTO: 7 10E3/UL (ref 4–11)

## 2022-04-22 PROCEDURE — 250N000013 HC RX MED GY IP 250 OP 250 PS 637: Performed by: STUDENT IN AN ORGANIZED HEALTH CARE EDUCATION/TRAINING PROGRAM

## 2022-04-22 PROCEDURE — 80048 BASIC METABOLIC PNL TOTAL CA: CPT

## 2022-04-22 PROCEDURE — 250N000013 HC RX MED GY IP 250 OP 250 PS 637

## 2022-04-22 PROCEDURE — 250N000013 HC RX MED GY IP 250 OP 250 PS 637: Performed by: INTERNAL MEDICINE

## 2022-04-22 PROCEDURE — 250N000011 HC RX IP 250 OP 636: Performed by: STUDENT IN AN ORGANIZED HEALTH CARE EDUCATION/TRAINING PROGRAM

## 2022-04-22 PROCEDURE — 99233 SBSQ HOSP IP/OBS HIGH 50: CPT | Performed by: INTERNAL MEDICINE

## 2022-04-22 PROCEDURE — 250N000012 HC RX MED GY IP 250 OP 636 PS 637

## 2022-04-22 PROCEDURE — 258N000003 HC RX IP 258 OP 636: Performed by: INTERNAL MEDICINE

## 2022-04-22 PROCEDURE — 85027 COMPLETE CBC AUTOMATED: CPT

## 2022-04-22 PROCEDURE — 71045 X-RAY EXAM CHEST 1 VIEW: CPT

## 2022-04-22 PROCEDURE — 210N000002 HC R&B HEART CARE

## 2022-04-22 PROCEDURE — 99233 SBSQ HOSP IP/OBS HIGH 50: CPT | Mod: GC

## 2022-04-22 PROCEDURE — 80202 ASSAY OF VANCOMYCIN: CPT | Performed by: STUDENT IN AN ORGANIZED HEALTH CARE EDUCATION/TRAINING PROGRAM

## 2022-04-22 PROCEDURE — 36415 COLL VENOUS BLD VENIPUNCTURE: CPT

## 2022-04-22 PROCEDURE — 36415 COLL VENOUS BLD VENIPUNCTURE: CPT | Performed by: STUDENT IN AN ORGANIZED HEALTH CARE EDUCATION/TRAINING PROGRAM

## 2022-04-22 PROCEDURE — 250N000009 HC RX 250: Performed by: INTERNAL MEDICINE

## 2022-04-22 PROCEDURE — 250N000011 HC RX IP 250 OP 636: Performed by: INTERNAL MEDICINE

## 2022-04-22 RX ORDER — POLYETHYLENE GLYCOL 3350 17 G/17G
17 POWDER, FOR SOLUTION ORAL DAILY
Status: DISCONTINUED | OUTPATIENT
Start: 2022-04-22 | End: 2022-04-25

## 2022-04-22 RX ORDER — PREDNISONE 20 MG/1
40 TABLET ORAL DAILY
Status: COMPLETED | OUTPATIENT
Start: 2022-04-22 | End: 2022-04-23

## 2022-04-22 RX ADMIN — HYDROXYUREA 500 MG: 500 CAPSULE ORAL at 08:20

## 2022-04-22 RX ADMIN — POLYETHYLENE GLYCOL 3350 17 G: 17 POWDER, FOR SOLUTION ORAL at 13:10

## 2022-04-22 RX ADMIN — PIPERACILLIN AND TAZOBACTAM 3.38 G: 3; .375 INJECTION, POWDER, LYOPHILIZED, FOR SOLUTION INTRAVENOUS at 21:01

## 2022-04-22 RX ADMIN — ALBUTEROL SULFATE 2 PUFF: 90 AEROSOL, METERED RESPIRATORY (INHALATION) at 10:58

## 2022-04-22 RX ADMIN — PIPERACILLIN AND TAZOBACTAM 3.38 G: 3; .375 INJECTION, POWDER, LYOPHILIZED, FOR SOLUTION INTRAVENOUS at 05:56

## 2022-04-22 RX ADMIN — PREDNISONE 40 MG: 20 TABLET ORAL at 13:09

## 2022-04-22 RX ADMIN — OXYCODONE HYDROCHLORIDE 5 MG: 5 TABLET ORAL at 08:20

## 2022-04-22 RX ADMIN — VANCOMYCIN HYDROCHLORIDE 1000 MG: 5 INJECTION, POWDER, LYOPHILIZED, FOR SOLUTION INTRAVENOUS at 03:50

## 2022-04-22 RX ADMIN — PIPERACILLIN AND TAZOBACTAM 3.38 G: 3; .375 INJECTION, POWDER, LYOPHILIZED, FOR SOLUTION INTRAVENOUS at 13:10

## 2022-04-22 RX ADMIN — VANCOMYCIN HYDROCHLORIDE 1000 MG: 5 INJECTION, POWDER, LYOPHILIZED, FOR SOLUTION INTRAVENOUS at 15:43

## 2022-04-22 RX ADMIN — METOPROLOL SUCCINATE 25 MG: 25 TABLET, FILM COATED, EXTENDED RELEASE ORAL at 08:20

## 2022-04-22 RX ADMIN — DORNASE ALFA 50 ML: 1 SOLUTION RESPIRATORY (INHALATION) at 11:00

## 2022-04-22 RX ADMIN — ATORVASTATIN CALCIUM 40 MG: 40 TABLET, FILM COATED ORAL at 08:20

## 2022-04-22 RX ADMIN — LISINOPRIL 5 MG: 5 TABLET ORAL at 08:20

## 2022-04-22 RX ADMIN — OXYCODONE HYDROCHLORIDE 5 MG: 5 TABLET ORAL at 13:10

## 2022-04-22 RX ADMIN — UMECLIDINIUM 1 PUFF: 62.5 AEROSOL, POWDER ORAL at 08:25

## 2022-04-22 RX ADMIN — DORNASE ALFA 50 ML: 1 SOLUTION RESPIRATORY (INHALATION) at 21:01

## 2022-04-22 RX ADMIN — FOLIC ACID 1 MG: 1 TABLET ORAL at 08:20

## 2022-04-22 ASSESSMENT — ACTIVITIES OF DAILY LIVING (ADL)
ADLS_ACUITY_SCORE: 9
ADLS_ACUITY_SCORE: 9
ADLS_ACUITY_SCORE: 8
ADLS_ACUITY_SCORE: 9
ADLS_ACUITY_SCORE: 8
ADLS_ACUITY_SCORE: 8
ADLS_ACUITY_SCORE: 9
ADLS_ACUITY_SCORE: 8
ADLS_ACUITY_SCORE: 8
ADLS_ACUITY_SCORE: 9
ADLS_ACUITY_SCORE: 8
ADLS_ACUITY_SCORE: 9
ADLS_ACUITY_SCORE: 9

## 2022-04-22 NOTE — PROGRESS NOTES
Attempted to instill alteplase to chest tube this AM and unable to push med in. CT had been draining appropriately this AM. Intensivist at beside to assess, also unable to instill medication. CXR shows appropriate placement. Dressing taken down; no kinks seen; attempted to reposition pt, unsuccessful.  Attempted to instill medication again @ 1100 w pt lying flat on back and able to push easily. Will continue to monitor.

## 2022-04-22 NOTE — PROGRESS NOTES
HEART CARE CONSULTATON NOTE        Assessment/Recommendations   Assessment  1.  Acute congestive heart failure with reduced ejection fraction.  Ischemic cardiomyopathy. LVEF: 40-45%.    2.  Severe coronary calcification on CT, coronary artery disease.   3.  Ischemic cardiomyopathy, focal wall motion abnormality on echo  4.  Recent pulmonary embolism, bilateral  5.  DVT right leg  6.  Severe anemia. Hgb: 7.5 today.  7.  Thrombocytopenia, 97,000 today.   8.  Myeloproliferative disorder  9.  Large pleural effusion concerning for complex effusion with infection s/p chest tube 4/21   10.  Immunocompromise on chemotherapy  11.  COPD with acute exacerbation  12.  Hypertension  13.  Dynamic EKG changes in the inferolateral leads  14.  Hypokalemia  15.  Nonsustained ventricular tachycardia.  4 beats noted on telemetry overnight    Plan:   1.  Once chest tube removed would proceed with right and left heart catheterization in the coronary Cath Lab at [facility].   3.  Increased atorvastatin to 40 mg daily  4.  Continue Lasix IV 20 mg twice daily for heart failure (switch to oral in 1-2 days).    5.  Currently on broad-spectrum antibiotics  6.  Continue metoprolol XL at 25 mg daily  7.  Lisinopril 5 mg daily for HFrEF   8.  Check ionized calcium.   9. Discontinued motrin PRN given CAD and acute HFrEF    Will follow.          History of Present Illness/Subjective    S: Patient underwent placement of chest tube yesterday.  Had some confusion after her sedation but alert and oriented this morning.  Has pain over the site of his chest tube which is expected.  Remains on broad-spectrum antibiotics.  Pulmonary medicine is following complex pleural effusion closely.  Currently patient denies any fever chills or sweats.  Breathing feels stable.  No active chest pain.  No nausea or vomiting.  Will change diet to allow him to have a better option food choices (regular diet).  Lower extremity edema on the right has resolved.   Renal function stable.  Platelets and hemoglobin are also stable.    Echo: Images personally reviewed.4/21  There is a focal wall motion abnormality with moderate to severe hypokinesis in the mid to distal inferolateral wall.  There is severe hypokinesis of the [place] distal lateral wall.  This is worse compared to prior.  There is signs of moderate pulmonary hypertension as well.  Left ventricular ejection fraction is 40 to 45%    Report 1. The left ventricle is normal in size. Left ventricular systolic performance  is moderately reduced. The ejection fraction is estimated to be 40%.  2. There is severe inferior hypokinesis. There is moderate anteroseptal  hypokinesis. In addition, there is moderate mid to distal posterior  hypokinesis and severe distal lateral hypokinesis  3. No significant valvular heart disease is identified on this study.  4. Borderline right ventricular enlargement with low normal right ventricular  systolic performance.  5. There is mild left atrial enlargement.  6. Right ventricular systolic pressure relative to right atrial pressure is  mildly increased. The pulmonary artery pressure is estimated to be 45-50mmHg  plus right atrial pressure (the IVC is of normal caliber).         Physical Examination  Review of Systems   VITALS: /58 (BP Location: Right arm)   Pulse 75   Temp 98.3  F (36.8  C) (Oral)   Resp 20   Wt 89.6 kg (197 lb 8 oz)   SpO2 94%   BMI 31.89 kg/m    BMI: Body mass index is 31.89 kg/m .  Wt Readings from Last 3 Encounters:   04/22/22 89.6 kg (197 lb 8 oz)   04/07/22 92.9 kg (204 lb 11.2 oz)       Intake/Output Summary (Last 24 hours) at 4/21/2022 0852  Last data filed at 4/21/2022 0600  Gross per 24 hour   Intake 650 ml   Output 2000 ml   Net -1350 ml     General Appearance:   no distress, normal body habitus   ENT/Mouth: membranes moist, no oral lesions or bleeding gums.      EYES:  no scleral icterus, normal conjunctivae   Neck: no carotid bruits or thyromegaly    Chest/Lungs:    Decreased breath sounds on left side.  equal chest wall expansion    Cardiovascular:   Regular. Normal first and second heart sounds with faint systolic murmur,. NO rubs, or gallops; the carotid, radial and posterior tibial pulses are intact, Jugular venous pressure 8 cm, R>L edema (improving).     Abdomen:  no organomegaly, masses, bruits, or tenderness; bowel sounds are present   Extremities: no cyanosis or clubbing   Skin: no xanthelasma, warm.    Neurologic: normal  bilateral, no tremors     Psychiatric: alert and oriented x3, calm     Review Of Systems  Skin: negative  Eyes: negative  Ears/Nose/Throat: negative  Respiratory: Shortness of breath +, Dyspnea on exertion+ and Cough+  Cardiovascular: orthopnea  Gastrointestinal: negative  Genitourinary: negative  Musculoskeletal: negative  Neurologic: negative  Psychiatric: negative  Hematologic/Lymphatic/Immunologic: negative  Endocrine: negative          Lab Results    Chemistry/lipid CBC Cardiac Enzymes/BNP/TSH/INR   Recent Labs   Lab Test 05/03/21  1557   CHOL 110   HDL 35*   LDL 52   TRIG 115     Recent Labs   Lab Test 05/03/21  1557 08/13/19  0926   LDL 52 111     Recent Labs   Lab Test 04/21/22  0502      POTASSIUM 3.5   CHLORIDE 107   CO2 27      BUN 25   CR 0.86   GFRESTIMATED 90   GLENDY 8.1*     Recent Labs   Lab Test 04/21/22  0502 04/20/22  0405 04/19/22  2143   CR 0.86 0.79 0.76     Recent Labs   Lab Test 10/31/19  2000   A1C 6.0          Recent Labs   Lab Test 04/21/22  0502   WBC 10.1   HGB 7.3*   HCT 23.8*   *   *     Recent Labs   Lab Test 04/21/22  0502 04/20/22  0405 04/20/22  0142   HGB 7.3* 7.4* 7.4*    Recent Labs   Lab Test 04/20/22  0405 04/19/22  2143 04/19/22  1441   TROPONINI 0.09 0.11 0.10     Recent Labs   Lab Test 04/19/22  1441 04/01/22  1208   BNP 1,221* 379*     No results for input(s): TSH in the last 86877 hours.  Recent Labs   Lab Test 04/21/22  0502 04/20/22  0405 04/19/22  1441    INR 2.82* 2.67* 3.06*        Medical History  Surgical History Family History Social History   Past Medical History:   Diagnosis Date     Cerebral infarction (H)      COPD (chronic obstructive pulmonary disease) (H)      HLD (hyperlipidemia)      Hypertension      Myeloproliferative disease (H)      Past Surgical History:   Procedure Laterality Date     OPEN REDUCTION INTERNAL FIXATION FOOT Right 2010     OTHER SURGICAL HISTORY  2001    Lip lesion removal     TONSILLECTOMY & ADENOIDECTOMY       Family History   Problem Relation Age of Onset     Alcoholism Mother         Social History     Socioeconomic History     Marital status:      Spouse name: Not on file     Number of children: Not on file     Years of education: Not on file     Highest education level: Not on file   Occupational History     Not on file   Tobacco Use     Smoking status: Former Smoker     Packs/day: 1.00     Start date: 6/8/1965     Quit date: 1/1/2012     Years since quitting: 10.3     Smokeless tobacco: Never Used   Substance and Sexual Activity     Alcohol use: Yes     Alcohol/week: 19.0 standard drinks     Drug use: Never     Sexual activity: Not on file   Other Topics Concern     Not on file   Social History Narrative     Not on file     Social Determinants of Health     Financial Resource Strain: Not on file   Food Insecurity: Not on file   Transportation Needs: Not on file   Physical Activity: Not on file   Stress: Not on file   Social Connections: Not on file   Intimate Partner Violence: Not on file   Housing Stability: Not on file         Medications  Allergies   No current outpatient medications on file.      No Known Allergies      Sp Larson,

## 2022-04-22 NOTE — PROGRESS NOTES
[facility]    Progress Note - Hospitalist Service       Date of Admission:  4/19/2022    Assessment & Plan          Shaji Pompa is a 76 year old old male with a past medical history of myeloproliferative disease, COPD, HTN, CVA, recent DVT and PEs who presented to the [facility] Emergency Department on the day of admission with complaints of worsening dyspnea. CT chest showing complex loculated left pleural effusion concerning for pulmonary infarct with necrosis vs pulmonary abscess/empyema. Thoracentesis with output of 100 cc of reddish-brown fluid, transudative effusion. Cardiology, hem/onc, and pulm consulted.  Patient with some improvement on Lasix.  Chest tube placement 4/21/2022.  Echo focal wall motion abnormality concerning for ischemic cardiomyopathy, plan to transfer to [facility] for cardiac cath once stabilized and chest tube removed.     Acute Hypoxic Respiratory Failure, improved  Left complex loculated pleural effusion, transudative  Chest tube placement 4/21  Pain  Symptoms likely multifactorial including acute congestive heart failure, complex pleural effusion, recent bilateral pulmonary emboli, suspected COPD exacerbation. Dyspnea improved since admission, patient is satting well at 1 L.  Chest tube placement 4/21.  Chest tube with continued significant drainage.  Chest x-ray showing decreased left pleural effusion, no other significant changes noted.  Patient reports more pain at chest tube site.  -O2 supplementation, goal of >90%  - Pulm consult: appreciate recs   -Chest tube placement, continue suction and monitoring output   -Continue broad-spectrum antibiotics  -Oxycodone 5 mg as needed  -MiraLAX scheduled daily while on opioid  -If pain not well controlled can increase oxycodone to 10 mg as needed     HFrEF, acute  Weight down 13 lbs since admission.  BUN and creatinine stable. Continuing on Lasix and will plan for heart catheterization once stable and  chest tube removed.   -Cardiology consult, appreciate cares and recs   -Right and left heart catheterization at [facility] once stabilized and chest tube removed   -Continue atorvastatin to 40 mg daily   -Continue Lasix IV 20 mg twice daily   -Continue lisinopril 5 mg daily  -Trend BMP  - Telemetry  - Daily weights, strict I's and O's  -O2 supplementation as needed  -Cardiac diet  -Continue PTA metoprolol  -If continued difficult lab draws, will consider PICC placement.  Will try to collect labs once daily at the same time     COPD exacerbation, suspected  Presents with dyspnea, productive cough, chest tightness with history of COPD. Was on 2L O2, improved to room air and satting well, now stable at 1 L oxime mask.   Continued expiratory wheezing noted on exam.  Encouraged DuoNeb use.  Ceftriaxone discontinued  - Prednisone 40 mg x 5 days  -Continue IV Zosyn every 8 hours  -Continue IV vancomycin twice daily  - Cardiac telemetry  - Supplemental O2 as needed  - R CAT consult  - Sputum culture if possible  - PTA Spiriva daily  - Albuterol as needed  - Duoneb q4h PRN     Macrocytic Anemia, s/p 1 unit PRBC  Thrombocytopenia  Myelodysplastic syndrome  Hgb stable following transfusion.  No signs of acute bleed. Patient's macrocytic anemia attributed to hydroxyurea on last admission, can be attributed to myelodysplastic syndrome as well. Follows with oncologist Dr. Cee as outpatient.   - Trend CBC  - Heme-onc consult, appreciate cares and recs  - Transfuse if hemoglobin <7  -If patient is symptomatic for anemia can transfuse if hemoglobin <8  -Continue decreased PTA hydroxyurea now at 500 mg daily  -Continue folic acid 1 mg p.o. daily     Recent bilateral pulmonary emboli  On warfarin for provoked DVT and PE after a long road trip 4/1/2022. INR 3.06 on ED intake, upon recheck was 2.8. With INR near therapeutic range, will hold any pharmacologic anticoagulation until pulm recommendations if will need procedure. Hesitant  to start heparin given thrombocytopenia.   - Hold heparin drip  - Continue to hold PTA Warfarin     Hypertension  - Continue PTA metoprolol  -Started lisinopril 5 mg as above  - Hydralazine IV as needed     CVA, 2019  -Increased PTA atorvastatin from 20 mg to 40 mg       Diet: Regular Diet Adult    DVT Prophylaxis: Pneumatic Compression Devices  Garrett Catheter: Not present  Fluids: PO  Central Lines: None  Cardiac Monitoring: ACTIVE order. Indication: Acute decompensated heart failure (48 hours)  Code Status: Full Code      Disposition Plan   Expected Discharge: 04/25/2022     Anticipated discharge location: home with family    Delays: Diagnostic work-up, hemoglobin stable, improved dyspnea, plan to transfer to [facility] cath next week       The patient's care was discussed with the Attending Physician, Dr. Wakefield.    Shanel Rodarte MD  Hospitalist Service  [facility]  Securely message with the Vocera Web Console (learn more here)  Text page via [employer] Paging/Directory     ______________________________________________________________________    Interval History   Patient is afebrile, VSS.  Patient had somewhat worsened shortness of breath though overall stable.  Patient reported pain at chest tube site, oxycodone helped to bring it down though is currently at a 6/10.  Voiding well.  Patient reports no abdominal pain the last bowel movement was many days ago.  Discussed MiraLAX and DuoNeb use.  Wife attentive in room.    Data reviewed today: I reviewed all medications, new labs and imaging results over the last 24 hours.    Physical Exam   Vital Signs: Temp: 98.3  F (36.8  C) Temp src: Oral BP: 123/58 Pulse: 73   Resp: 20 SpO2: 93 % O2 Device: Oxymask Oxygen Delivery: 1 LPM  Weight: 197 lbs 8 oz  Physical Exam  Constitutional:       General: He is not in acute distress.     Appearance: He is not ill-appearing or diaphoretic.   HENT:      Head: Normocephalic and atraumatic.   Eyes:       Extraocular Movements: Extraocular movements intact.      Conjunctiva/sclera: Conjunctivae normal.   Cardiovascular:      Rate and Rhythm: Normal rate and regular rhythm.      Pulses: Normal pulses.      Heart sounds: Normal heart sounds. No murmur heard.    No gallop.   Pulmonary:      Effort: Pulmonary effort is normal. No respiratory distress.      Comments: Good air movement throughout, expiratory wheezing bilateral lower lung fields.  No crackles  Abdominal:      General: There is no distension.      Palpations: Abdomen is soft.      Tenderness: There is no abdominal tenderness.   Musculoskeletal:         General: Normal range of motion.      Comments: No bilateral peripheral edema, improved   Skin:     General: Skin is warm and dry.      Capillary Refill: Capillary refill takes less than 2 seconds.   Neurological:      General: No focal deficit present.      Mental Status: He is alert and oriented to person, place, and time.   Psychiatric:         Mood and Affect: Mood normal.         Behavior: Behavior normal.           Data   Recent Labs   Lab 04/22/22  0558 04/21/22  0502 04/20/22  0405 04/20/22  0142 04/19/22  2143 04/19/22  2036 04/19/22  1441   WBC 7.0 10.1 8.0  --  8.7  --  10.7   HGB 7.5* 7.3* 7.4*   < > 7.0*  --  6.7*   * 125* 122*  --  121*  --  130*   PLT 97* 100* 103*  --  120*  --  123*   INR  --  2.82* 2.67*  --   --   --  3.06*    144 140  --  140  --  140   POTASSIUM 3.6 3.5 3.9  --  3.7  --  4.0   CHLORIDE 106 107 105  --  104  --  106   CO2 27 27 27  --  26  --  25   BUN 21 25 19  --  18  --  21   CR 0.81 0.86 0.79  --  0.76  --  0.79   ANIONGAP 10 10 8  --  10  --  9   GLENDY 7.8* 8.1* 8.2*  --  8.0*  --  8.2*   GLC 91 111 128*  --  102   < > 106   ALBUMIN  --   --  1.9*  --   --   --  1.9*   PROTTOTAL  --   --  6.0  --  6.1  --  5.9*   BILITOTAL  --   --  1.3*  --   --   --  1.0   ALKPHOS  --   --  78  --   --   --  88   ALT  --   --  22  --   --   --  22   AST  --   --  17  --    --   --  18    < > = values in this interval not displayed.     Recent Results (from the past 24 hour(s))   CT Chest Tube with Cath Placement    Narrative    EXAM:  1. PERCUTANEOUS CHEST TUBE PLACEMENT LEFT PLEURAL SPACE  2. CT GUIDANCE  3. CONSCIOUS SEDATION  LOCATION: [facility]  DATE/TIME: 4/21/2022 11:54 AM    INDICATION: hx myeloproliferative disorder, recnetly treated for PE, loculated left effusion with air pockets. thoracentesis, minimal fluid, exudate effusion.  TECHNIQUE: Dose reduction techniques were used.    PROCEDURE: Informed consent obtained. Site marked. Prior images reviewed. Required items made available. Patient identity confirmed verbally and with arm band. Patient reevaluated immediately before administering sedation. Universal protocol was   followed. Time out performed. The site was prepped and draped in sterile fashion. 10 mL of 1% lidocaine was infused into the local soft tissues. Using standard technique and under direct CT guidance, a 10 [place] chest tube catheter was inserted into the   pleural space. The catheter was fixed in place with sutures and adhesive device, and the tubing was banded. Chest tube placed to Pleur-evac suction.    SPECIMEN: 10 mL of dark serous fluid was aspirated and sent to lab for cultures and Gram stain if requested.    BLOOD LOSS: Minimal.    The patient tolerated the procedure well. No immediate complications.    SEDATION: Versed 1 mg. Fentanyl 50 mcg. The procedure was performed with administration intravenous conscious sedation with appropriate preoperative, intraoperative, and postoperative evaluation.    15 minutes of supervised face to face conscious sedation time was provided by a radiology nurse under my direct supervision.      Impression    IMPRESSION:  1.  Successful CT-guided chest tube placement into the left pleural space.    Reference CPT Codes: 14740, 70723   XR Chest Port 1 View    Narrative    EXAM: XR CHEST PORT 1  VIEW  LOCATION: [facility]  DATE/TIME: 4/22/2022 7:50 AM    INDICATION: Follow up left pleural effusion  COMPARISON: CT 04/19/2022, chest x-ray 04/01/2022      Impression    IMPRESSION: There is a new left pigtail chest tube. Loculated left-sided pleural effusion appears to have slightly decreased since the CT topogram 04/19/2022. Retrocardiac opacities are little changed. No pneumothorax. Minimal subpleural opacities in the   right lateral costophrenic angle are unchanged.     No new findings. Heart is of normal size. Severe degenerative changes in both shoulders.

## 2022-04-22 NOTE — PROGRESS NOTES
PULMONARY / CRITICAL CARE PROGRESS NOTE    Date / Time of Admission:  4/19/2022  1:19 PM    Assessment:     Shaji Pompa is a 76 year old male with history of COPD, HTN, myeloproliferative disease, recent diagnosis of bilateral pulmonary embolism 4/1/22 anticoagulated.  Presents to ED for evaluation of worsening shortness of breath. Denies fever, chills, night sweats. No cough. Denies chests pain, unchanged mild swelling of right LE.   Chest CT scan showed loculated left effusion, air pockets.   Labs showed anemia, Hb 6.7, (previously 9.5), normal WBC, normal basic chem.   Diagnostic thoracentesis , complex effusion, 100 ml of yellowish fluid was obtained, exudate fluid, predominant neutrophilic, gram stain negative. Patient was started on Abx. Systemic steroids.   Received RBC transfusion. EKG showed inverted T wave in inferior leads, concerning for ischemic event, cardiac enzymes negative. Echocardiogram showed EF 40%, severe inferior hypokinesis, moderate AS  wall hypokinesis, distal hypokinesis. Cardiology was consulted and plan for coronary angiogram.    1. Loculated left pleural effusion   Small pleural effusion was seen on 4/1 chest CT scan, patient was diagnosed with bilateral pulmonary embolism at that time, CT done on 4/19 showed loculated left side effusion and air pockets.   US guided thoracentesis showed complex effusion, extensive septations, 100 ml yellowish fluid obtained , predominantly neutrophilic, gram stain negative.   Continue broad Abx. Schedule for chest tube placement 4/21. Continue broad Abx.   Continue intrapleural lytics. Good chest tube output overnight, AM CXR showed left basal opacity, chest tube in left hemithorax.   2. Acute decompensated cardiomyopathy   Case was discussed with cardiology, significant coronary artery calcifications. Recent echocardiogram showed EF 40%, severe inferior hypokinesis, moderate AS  wall hypokinesis, distal hypokinesis. Plan for coronary angiogram  in the near furture.   Continue diuresis, titrate BP meds, ASA.   3. Anemia s/p RBC transfusion   4. Hx bilateral pulmonary emboli   Resume anticoagulation post chest tube placement   5. COPD   Stable, continue bronchodilators   6. HTN  7. Myeloproliferative disorder    Advance Directives: Full code    Plan:   1. Titrate FiO2, keep SpO2 > 90%  2. Scheduled bronchodilators  3. Chest tube connected to suction   4. Intrapleural lytics   5. Follow up pleural fluid analysis.   6. Broad Abx zosyn and vancomycin  7. Follow up CXR in AM   8. Continue IV diuresis   9. Titrate BP meds accordingly   10. Cardiology is following , plan for coronary angiogram in the near future  11. DVT prophylaxis, patient is anticoagulated    Please contact me if you have any questions.    Aniceto Bynum  Pulmonary / Critical Care  04/22/2022  11:46 AM    Subjective     HPI:  Shaji Pompa is a 76 year old male with history of COPD, HTN, myeloproliferative disease, recent diagnosis of bilateral pulmonary embolism 4/1/22 anticoagulated.  Presents to ED for evaluation of worsening shortness of breath. Denies fever, chills, night sweats. No cough. Denies chests pain, unchanged mild swelling of right LE.   Chest CT scan showed loculated left effusion, air pockets.   Labs showed anemia, Hb 6.7, (previously 9.5), normal WBC, normal basic chem.   Diagnostic thoracentesis , complex effusion, 100 ml of yellowish fluid was obtained, exudate fluid, predominant neutrophilic, gram stain negative. Patient was started on Abx. Systemic steroids.   Received RBC transfusion. EKG showed inverted T wave in inferior leads, concerning for ischemic event, cardiac enzymes negative. Echocardiogram showed EF 40%, severe inferior hypokinesis, moderate AS  wall hypokinesis, distal hypokinesis. Cardiology was consulted and plan for coronary angiogram.     Events overnight  - S/p chest tube,  started on intrapleural lytics, good chest tube output  - RN  states difficult giving AM lytics, CXR showed adequate position of chest tube  - Doing well , denies chest pain or shortness of breath    Past Medical History:   Diagnosis Date     Cerebral infarction (H)      COPD (chronic obstructive pulmonary disease) (H)      HLD (hyperlipidemia)      Hypertension      Myeloproliferative disease (H)      Allergies: Patient has no known allergies.     MEDS:  Current Facility-Administered Medications   Medication     acetaminophen (TYLENOL) tablet 325-650 mg     albuterol (PROVENTIL HFA/VENTOLIN HFA) inhaler     albuterol (PROVENTIL) neb solution 2.5 mg     alteplase (ACTIVASE) 10 mg, dornase alpha (PULMOZYME) 5 mg in sodium chloride 0.9 % 50 mL for chest tube instillation in syringe     atorvastatin (LIPITOR) tablet 40 mg     folic acid (FOLVITE) tablet 1 mg     furosemide (LASIX) injection 20 mg     [Held by provider] heparin 25,000 units in 0.45% NaCl 250 mL ANTICOAGULANT infusion     hydrALAZINE (APRESOLINE) injection 10 mg     hydroxyurea (HYDREA) capsule 500 mg     ipratropium - albuterol 0.5 mg/2.5 mg/3 mL (DUONEB) neb solution 3 mL     lidocaine (LMX4) cream     lidocaine 1 % 0.1-1 mL     lidocaine 1 % 1-30 mL     lisinopril (ZESTRIL) tablet 5 mg     melatonin tablet 1 mg     metoprolol succinate ER (TOPROL-XL) 24 hr tablet 25 mg     naloxone (NARCAN) injection 0.2 mg    Or     naloxone (NARCAN) injection 0.4 mg    Or     naloxone (NARCAN) injection 0.2 mg    Or     naloxone (NARCAN) injection 0.4 mg     ondansetron (ZOFRAN-ODT) ODT tab 4 mg    Or     ondansetron (ZOFRAN) injection 4 mg     oxyCODONE (ROXICODONE) tablet 5 mg     piperacillin-tazobactam (ZOSYN) 3.375 g vial to attach to  mL bag     polyethylene glycol (MIRALAX) Packet 17 g     prochlorperazine (COMPAZINE) injection 5 mg    Or     prochlorperazine (COMPAZINE) tablet 5 mg    Or     prochlorperazine (COMPAZINE) suppository 12.5 mg     sodium chloride (PF) 0.9% PF flush 3 mL     sodium chloride (PF) 0.9%  PF flush 3 mL     umeclidinium (INCRUSE ELLIPTA) 62.5 MCG/INH inhaler 1 puff     vancomycin 1000 mg in 0.9% NaCl 250 mL intermittent infusion 1,000 mg     Social History     Socioeconomic History     Marital status:      Spouse name: Not on file     Number of children: Not on file     Years of education: Not on file     Highest education level: Not on file   Occupational History     Not on file   Tobacco Use     Smoking status: Former Smoker     Packs/day: 1.00     Start date: 6/8/1965     Quit date: 1/1/2012     Years since quitting: 10.3     Smokeless tobacco: Never Used   Substance and Sexual Activity     Alcohol use: Yes     Alcohol/week: 19.0 standard drinks     Drug use: Never     Sexual activity: Not on file   Other Topics Concern     Not on file   Social History Narrative     Not on file     Social Determinants of Health     Financial Resource Strain: Not on file   Food Insecurity: Not on file   Transportation Needs: Not on file   Physical Activity: Not on file   Stress: Not on file   Social Connections: Not on file   Intimate Partner Violence: Not on file   Housing Stability: Not on file       Objective:   VITALS:  /58 (BP Location: Right arm)   Pulse 73   Temp 98.3  F (36.8  C) (Oral)   Resp 20   Wt 89.6 kg (197 lb 8 oz)   SpO2 93%   BMI 31.89 kg/m    VENT:  Resp: 20    EXAM:   Gen: awake, alert, no distress  HEENT: pale conjunctiva, moist mucosa, Mallampati III/IV  Neck: no thyromegaly, masses or JVD  Lungs: decrease breath sound left base  CV: regular, no murmurs or gallops appreciated  Abdomen: soft, NT, BS wnl  Ext: trace edema  Neuro: CN II-XII intact, non focal       Data Review:  Recent Labs   Lab 04/22/22  0558 04/21/22  0502 04/20/22  0405 04/19/22  2143 04/19/22  2036 04/19/22  1441   GLC 91 111 128* 102 101* 106      04/22/22 05:58   Sodium 143   Potassium 3.6   Chloride 106   Carbon Dioxide 27   Urea Nitrogen 21   Creatinine 0.81   GFR Estimate >90 [1]   Calcium 7.8 (L)    Anion Gap 10   Glucose 91   WBC 7.0   Hemoglobin 7.5 (L)   Hematocrit 24.9 (L)   Platelet Count 97 (L)   RBC Count 1.96 (L)    (H)   MCH 38.3 (H)   MCHC 30.1 (L)   RDW 25.4 (H)      04/21/22 13:02   Cell Count Fluid Source Pleural Cavity, Left   Color Fluid Orange !   Appearance Fluid Cloudy !   RBC Fluid 8,000   % Mono/Macro Fluid 13   % Neutrophils Fluid 87   Total Nucleated Cells 1,126        04/19/22 16:53   Cell Count Fluid Source Pleural Cavity, Left   Color Fluid Red !   Appearance Fluid Hazy !   RBC Fluid 14,000   % Mono/Macro Fluid 3   % Neutrophils Fluid 97   Glucose Fluid Source Pleural Cavity, Left   Glucose Fluid 20   LD Fluid Source Pleural Cavity, Left   Lactate Dehydrogenase Fluid 1,282 [1]   Protein Fluid Source Pleural Cavity, Left   Protein Total Fluid 3.5     Gram Stain Result  No organisms seen      2+ WBC seen        Fluid Culture No growth, less than 1 day           CT CHEST W/O CONTRAST  LOCATION: [facility]  DATE/TIME: 4/19/2022 3:04 PM  INDICATION: Shortness of breath. Dyspnea. Recent pulmonary embolus.  COMPARISON: Chest CTA 04/01/2022  FINDINGS:   LUNGS AND PLEURA: There is a large, complex loculated left pleural effusion with loculated components of septated are inferiorly air  and increase in the amount of fluid since study done 18 days ago. Fluid has a density of 10 Hounsfield units. There is associated compressive atelectasis of most of the left lower lobe. Few small rounded or ovoid groundglass opacities have developed in the right lower lobe (images 148, 160 171).    MEDIASTINUM/AXILLAE: Blood pool is much less dense than the heart muscle. There is mild mediastinal and subcarinal adenopathy with subcarinal nodes measuring 1.2 cm in short axis.  CORONARY ARTERY CALCIFICATION: Severe.  UPPER ABDOMEN: Incidental splenule.  MUSCULOSKELETAL: No suspicious lesions. Severe degenerative changes in both shoulders.                                   IMPRESSION:   1.  Notable increase in the complex, loculated left-sided pleural effusion with now loculated bubbles of air in the left base. This fluid has a density of 10 Hounsfield units. Given the prior significant pulmonary emboli, need to  consider pulmonary infarct with necrosis and either a sterile or infected pulmonary abscess/empyema.  2.  Patient's quite anemic.    By:  Aniceto Bynum MD, 04/22/2022  11:46 AM    Primary Care Physician:  Steven Caraballo

## 2022-04-22 NOTE — PROGRESS NOTES
Interventional Radiology - Remote Chart Check Note  Inpatient - [facility]: Interventional Radiology   [phone]  04/22/2022       O:  /59 (BP Location: Right arm)   Pulse 71   Temp 98.4  F (36.9  C) (Oral)   Resp 18   Wt 89.6 kg (197 lb 8 oz)   SpO2 93%   BMI 31.89 kg/m      IMAGING:  EXAM:  1. PERCUTANEOUS CHEST TUBE PLACEMENT LEFT PLEURAL SPACE  2. CT GUIDANCE  3. CONSCIOUS SEDATION  LOCATION: [facility]  DATE/TIME: 4/21/2022 11:54 AM     INDICATION: hx myeloproliferative disorder, recnetly treated for PE, loculated left effusion with air pockets. thoracentesis, minimal fluid, exudate effusion.  TECHNIQUE: Dose reduction techniques were used.     PROCEDURE: Informed consent obtained. Site marked. Prior images reviewed. Required items made available. Patient identity confirmed verbally and with arm band. Patient reevaluated immediately before administering sedation. Universal protocol was   followed. Time out performed. The site was prepped and draped in sterile fashion. 10 mL of 1% lidocaine was infused into the local soft tissues. Using standard technique and under direct CT guidance, a 10 Latvian chest tube catheter was inserted into the   pleural space. The catheter was fixed in place with sutures and adhesive device, and the tubing was banded. Chest tube placed to Pleur-evac suction.     SPECIMEN: 10 mL of dark serous fluid was aspirated and sent to lab for cultures and Gram stain if requested.     BLOOD LOSS: Minimal.     The patient tolerated the procedure well. No immediate complications.     SEDATION: Versed 1 mg. Fentanyl 50 mcg. The procedure was performed with administration intravenous conscious sedation with appropriate preoperative, intraoperative, and postoperative evaluation.     15 minutes of supervised face to face conscious sedation time was provided by a radiology nurse under my direct supervision.                                                                       IMPRESSION:  1.  Successful CT-guided chest tube placement into the left pleural space.    LABS:  Recent Labs   Lab 04/22/22  0558 04/21/22  0502 04/20/22  0405 04/20/22  0142 04/19/22  2143 04/19/22  1441   WBC 7.0 10.1 8.0  --  8.7 10.7   HGB 7.5* 7.3* 7.4* 7.4* 7.0* 6.7*   PLT 97* 100* 103*  --  120* 123*   INR  --  2.82* 2.67*  --   --  3.06*   CR 0.81 0.86 0.79  --  0.76 0.79     Drain Outputs (in mL):  DATE OP   4/21 1100   4/22 1038               A:  76 year old yo male that presented to the ED with worsening dyspnea after being previously hospitalized earlier in the month with SOB and treated for PE. Hx of myelodysplastic syndrome. CT showing complex loculated left pleural effusion. S/p thoracentesis with minimal OP and s/p CT guided chest tube placement into left pleural space 4/21.     P:    - Continue present chest tube cares. Chest tube to -20mm pleurevac suction.   -  Recommend pulmonary consult to further manage chest tube and give recommendations. Would appreciate consult.  - Consider ealier imaging if there are changes in chest tube's function or in patient's clinical course.  - IR will continue to follow. Please contact IR with chest tube related questions or concerns.      Total time spent on the date of the encounter is 10 minutes, including time spent counseling the patient, performing a medically appropriate evaluation, reviewing prior medical history, ordering medications and tests, documenting clinical information in the medical record, and communication of results.    Iqra Gordon, APRN CNP  Interventional Radiology  [phone]    E/M codes for reference only:  73813

## 2022-04-22 NOTE — PHARMACY-VANCOMYCIN DOSING SERVICE
Pharmacy Vancomycin Note  Date of Service 2022  Patient's  1945   76 year old, male    Indication: Loculated left pleural effusion  Day of Therapy: 3  Current vancomycin regimen:  1000 mg IV q12h  Current vancomycin monitoring method: AUC  Current vancomycin therapeutic monitoring goal: 400-600 mg*h/L    Insight[employer] Prediction of Current Vancomycin Regimen  Loading dose: 1500 mg  Regimen: 1000 mg IV every 12 hours.  Start time: 15:50 on 2022  Exposure target: AUC24 (range)400-600 mg/L.hr   AUC24,ss: 532 mg/L.hr  Probability of AUC24 > 400: 93 %  Ctrough,ss: 17.4 mg/L  Probability of Ctrough,ss > 20: 30 %  Probability of nephrotoxicity (Lodise LAINA ): 13 %      Current estimated CrCl = Estimated Creatinine Clearance: 81.3 mL/min (based on SCr of 0.81 mg/dL).    Creatinine for last 3 days  2022:  2:41 PM Creatinine 0.79 mg/dL;  9:43 PM Creatinine 0.76 mg/dL  2022:  4:05 AM Creatinine 0.79 mg/dL  2022:  5:02 AM Creatinine 0.86 mg/dL  2022:  5:58 AM Creatinine 0.81 mg/dL    Recent Vancomycin Levels (past 3 days)  2022: 10:58 AM Vancomycin 19.6 mg/L    Vancomycin IV Administrations (past 72 hours)                   vancomycin 1000 mg in 0.9% NaCl 250 mL intermittent infusion 1,000 mg (mg) 1,000 mg New Bag 22 0350     1,000 mg New Bag 22 1618     1,000 mg New Bag  0406    vancomycin 1500 mg in 0.9% NaCl 250 ml intermittent infusion 1,500 mg (mg) 1,500 mg New Bag 22 1843                Nephrotoxins and other renal medications (From now, onward)    Start     Dose/Rate Route Frequency Ordered Stop    22 0900  lisinopril (ZESTRIL) tablet 5 mg         5 mg Oral DAILY 22 0858      22 0430  vancomycin 1000 mg in 0.9% NaCl 250 mL intermittent infusion 1,000 mg         1,000 mg  over 60 Minutes Intravenous EVERY 12 HOURS 22 1553      22 2200  piperacillin-tazobactam (ZOSYN) 3.375 g vial to attach to  mL bag        Note to  "Pharmacy: Extended infusion dosing to start 6 hours after initial infusion.   \"Followed by\" Linked Group Details    3.375 g  over 240 Minutes Intravenous EVERY 8 HOURS 04/20/22 1600      04/20/22 1000  furosemide (LASIX) injection 20 mg         20 mg  over 1-3 Minutes Intravenous EVERY 12 HOURS 04/20/22 1005               Contrast Orders - past 72 hours (72h ago, onward)    Start     Dose/Rate Route Frequency Stop    04/20/22 1100  perflutren lipid microsphere (DEFINITY) injection SUSP 3 mL         3 mL Intravenous ONCE 04/20/22 1045          Interpretation of levels and current regimen:  Vancomycin level is reflective of -600    Has serum creatinine changed greater than 50% in last 72 hours: No    Urine output:  good urine output    Renal Function: Stable    InsightRX Prediction of Planned New Vancomycin Regimen    Regimen: 1000 mg IV every 12 hours.  Start time: 15:50 on 04/22/2022  Exposure target: AUC24 (range)400-600 mg/L.hr   AUC24,ss: 532 mg/L.hr  Probability of AUC24 > 400: 93 %  Ctrough,ss: 17.4 mg/L  Probability of Ctrough,ss > 20: 30 %  Probability of nephrotoxicity (Lodise LAINA 2009): 13 %      Plan:  1. Continue Current Dose  2. Vancomycin monitoring method: AUC  3. Vancomycin therapeutic monitoring goal: 400-600 mg*h/L  4. Pharmacy will check vancomycin levels as appropriate in 3-5 Days.  5. Serum creatinine levels will be ordered daily for the first week of therapy and at least twice weekly for subsequent weeks.    Sofiya Garrido [facility]  "

## 2022-04-22 NOTE — PLAN OF CARE
Problem: Infection (Pneumonia)  Goal: Resolution of Infection Signs and Symptoms  Outcome: Ongoing, Progressing     Problem: Respiratory Compromise (Pneumonia)  Goal: Effective Oxygenation and Ventilation  Outcome: Ongoing, Progressing  Intervention: Promote Airway Secretion Clearance  Recent Flowsheet Documentation  Taken 4/22/2022 0400 by Kathryn Caballero RN  Cough And Deep Breathing: done with encouragement  Taken 4/22/2022 0000 by Kathryn Caballero RN  Cough And Deep Breathing: done with encouragement  Taken 4/21/2022 2014 by aKthryn Caballero RN  Cough And Deep Breathing: done with encouragement  Intervention: Optimize Oxygenation and Ventilation  Recent Flowsheet Documentation  Taken 4/22/2022 0000 by Kathryn Caballero RN  Head of Bed (HOB) Positioning: HOB at 30-45 degrees  Taken 4/21/2022 2308 by Kathryn Caballero RN  Head of Bed (HOB) Positioning: HOB at 30-45 degrees  Taken 4/21/2022 2014 by Kathryn Caballero RN  Head of Bed (HOB) Positioning: HOB at 30-45 degrees     Problem: COPD (Chronic Obstructive Pulmonary Disease) Comorbidity  Goal: Maintenance of COPD Symptom Control  Outcome: Ongoing, Progressing   Goal Outcome Evaluation:             Pt c/o pain from chest tube site earlier in the evening.  Orders received for Tylenol which did not help his pain. Orders received then for Oxycodone and Ibuprofen.  Pt medicated with 5 mg Oxycodone which brought pain level from 7 to 3.  Pt received TPA per order at 2100 and pt turned every 15 min for 4 different position changes.  Chest tube draining serous colored drainage.  Second chest tube collection device in room at bedside.  Pt using O2 via oxymask for comfort.  Pulse oximetry maintained.

## 2022-04-22 NOTE — PROGRESS NOTES
Care Management Follow Up    Length of Stay (days): 3    Expected Discharge Date: 04/25/2022     Concerns to be Addressed:  CT, IV meds and will transfer to [facility] plan of care discussed at interdisciplinary rounds: Yes    Anticipated Discharge Disposition: transfer to [facility]     Anticipated Discharge Services: TBD     Anticipated Discharge DME:  TBD     Education Provided on the Discharge Plan:  CM met with patient. AVS per bedside RN.    Patient/Family in Agreement with the Plan:  yes      Additional Information:  Chart reviewed. CM met with patient and wife (bedside). Patient updated CM that he will be transferring to [facility] at some point this admission. Patient states he won't discharge from [facility] to home but transfer to [facility] for additional procedures/workup. CM will continue to follow.        Disha Lund RN

## 2022-04-22 NOTE — PLAN OF CARE
A&O, pain to CT site, oxy 5mg given x2. Alteplase dwell in CT x2 hrs- cannister changed this AM, minimal serosanguinous output. NSR. On 1L oxymask. Uses urinal. Wife at beside.

## 2022-04-23 ENCOUNTER — APPOINTMENT (OUTPATIENT)
Dept: RADIOLOGY | Facility: CLINIC | Age: 77
DRG: 186 | End: 2022-04-23
Attending: INTERNAL MEDICINE
Payer: COMMERCIAL

## 2022-04-23 LAB
ABO/RH(D): NORMAL
ANION GAP SERPL CALCULATED.3IONS-SCNC: 8 MMOL/L (ref 5–18)
ANTIBODY SCREEN: NEGATIVE
BLD PROD TYP BPU: NORMAL
BLOOD COMPONENT TYPE: NORMAL
BUN SERPL-MCNC: 22 MG/DL (ref 8–28)
CALCIUM SERPL-MCNC: 7.7 MG/DL (ref 8.5–10.5)
CALCIUM, IONIZED MEASURED: 1.09 MMOL/L (ref 1.11–1.3)
CHLORIDE BLD-SCNC: 106 MMOL/L (ref 98–107)
CO2 SERPL-SCNC: 27 MMOL/L (ref 22–31)
CODING SYSTEM: NORMAL
CREAT SERPL-MCNC: 0.76 MG/DL (ref 0.7–1.3)
CROSSMATCH: NORMAL
ERYTHROCYTE [DISTWIDTH] IN BLOOD BY AUTOMATED COUNT: 24.6 % (ref 10–15)
GFR SERPL CREATININE-BSD FRML MDRD: >90 ML/MIN/1.73M2
GLUCOSE BLD-MCNC: 107 MG/DL (ref 70–125)
HCT VFR BLD AUTO: 22.9 % (ref 40–53)
HGB BLD-MCNC: 7 G/DL (ref 13.3–17.7)
HGB BLD-MCNC: 7 G/DL (ref 13.3–17.7)
INR PPP: 1.97 (ref 0.85–1.15)
ION CA PH 7.4: 1.09 MMOL/L (ref 1.11–1.3)
ISSUE DATE AND TIME: NORMAL
MCH RBC QN AUTO: 37.8 PG (ref 26.5–33)
MCHC RBC AUTO-ENTMCNC: 30.6 G/DL (ref 31.5–36.5)
MCV RBC AUTO: 124 FL (ref 78–100)
PH: 7.4 (ref 7.35–7.45)
PLATELET # BLD AUTO: 114 10E3/UL (ref 150–450)
POTASSIUM BLD-SCNC: 3.8 MMOL/L (ref 3.5–5)
RBC # BLD AUTO: 1.85 10E6/UL (ref 4.4–5.9)
SODIUM SERPL-SCNC: 141 MMOL/L (ref 136–145)
SPECIMEN EXPIRATION DATE: NORMAL
UNIT ABO/RH: NORMAL
UNIT NUMBER: NORMAL
UNIT STATUS: NORMAL
UNIT TYPE ISBT: 5100
WBC # BLD AUTO: 9.2 10E3/UL (ref 4–11)

## 2022-04-23 PROCEDURE — 250N000013 HC RX MED GY IP 250 OP 250 PS 637: Performed by: INTERNAL MEDICINE

## 2022-04-23 PROCEDURE — 36415 COLL VENOUS BLD VENIPUNCTURE: CPT | Performed by: STUDENT IN AN ORGANIZED HEALTH CARE EDUCATION/TRAINING PROGRAM

## 2022-04-23 PROCEDURE — 85027 COMPLETE CBC AUTOMATED: CPT

## 2022-04-23 PROCEDURE — 250N000013 HC RX MED GY IP 250 OP 250 PS 637: Performed by: STUDENT IN AN ORGANIZED HEALTH CARE EDUCATION/TRAINING PROGRAM

## 2022-04-23 PROCEDURE — 250N000011 HC RX IP 250 OP 636: Performed by: INTERNAL MEDICINE

## 2022-04-23 PROCEDURE — 250N000012 HC RX MED GY IP 250 OP 636 PS 637

## 2022-04-23 PROCEDURE — 250N000011 HC RX IP 250 OP 636: Performed by: STUDENT IN AN ORGANIZED HEALTH CARE EDUCATION/TRAINING PROGRAM

## 2022-04-23 PROCEDURE — 250N000009 HC RX 250

## 2022-04-23 PROCEDURE — 85018 HEMOGLOBIN: CPT | Performed by: STUDENT IN AN ORGANIZED HEALTH CARE EDUCATION/TRAINING PROGRAM

## 2022-04-23 PROCEDURE — 82330 ASSAY OF CALCIUM: CPT | Performed by: INTERNAL MEDICINE

## 2022-04-23 PROCEDURE — 99233 SBSQ HOSP IP/OBS HIGH 50: CPT | Performed by: INTERNAL MEDICINE

## 2022-04-23 PROCEDURE — P9016 RBC LEUKOCYTES REDUCED: HCPCS

## 2022-04-23 PROCEDURE — 36415 COLL VENOUS BLD VENIPUNCTURE: CPT

## 2022-04-23 PROCEDURE — 250N000009 HC RX 250: Performed by: INTERNAL MEDICINE

## 2022-04-23 PROCEDURE — 71045 X-RAY EXAM CHEST 1 VIEW: CPT

## 2022-04-23 PROCEDURE — 250N000013 HC RX MED GY IP 250 OP 250 PS 637

## 2022-04-23 PROCEDURE — 94640 AIRWAY INHALATION TREATMENT: CPT | Mod: 76

## 2022-04-23 PROCEDURE — 99233 SBSQ HOSP IP/OBS HIGH 50: CPT | Mod: GC

## 2022-04-23 PROCEDURE — 99232 SBSQ HOSP IP/OBS MODERATE 35: CPT | Performed by: INTERNAL MEDICINE

## 2022-04-23 PROCEDURE — 258N000003 HC RX IP 258 OP 636: Performed by: INTERNAL MEDICINE

## 2022-04-23 PROCEDURE — 210N000002 HC R&B HEART CARE

## 2022-04-23 PROCEDURE — 86901 BLOOD TYPING SEROLOGIC RH(D): CPT

## 2022-04-23 PROCEDURE — 86923 COMPATIBILITY TEST ELECTRIC: CPT

## 2022-04-23 PROCEDURE — 999N000157 HC STATISTIC RCP TIME EA 10 MIN

## 2022-04-23 PROCEDURE — 85610 PROTHROMBIN TIME: CPT

## 2022-04-23 PROCEDURE — 82310 ASSAY OF CALCIUM: CPT

## 2022-04-23 RX ORDER — WARFARIN SODIUM 2 MG/1
2 TABLET ORAL
Status: COMPLETED | OUTPATIENT
Start: 2022-04-23 | End: 2022-04-23

## 2022-04-23 RX ORDER — IPRATROPIUM BROMIDE AND ALBUTEROL SULFATE 2.5; .5 MG/3ML; MG/3ML
3 SOLUTION RESPIRATORY (INHALATION)
Status: DISCONTINUED | OUTPATIENT
Start: 2022-04-23 | End: 2022-04-23

## 2022-04-23 RX ORDER — ALBUTEROL SULFATE 0.83 MG/ML
2.5 SOLUTION RESPIRATORY (INHALATION)
Status: DISCONTINUED | OUTPATIENT
Start: 2022-04-23 | End: 2022-04-24

## 2022-04-23 RX ADMIN — DORNASE ALFA 50 ML: 1 SOLUTION RESPIRATORY (INHALATION) at 20:00

## 2022-04-23 RX ADMIN — VANCOMYCIN HYDROCHLORIDE 1000 MG: 5 INJECTION, POWDER, LYOPHILIZED, FOR SOLUTION INTRAVENOUS at 04:00

## 2022-04-23 RX ADMIN — LISINOPRIL 5 MG: 5 TABLET ORAL at 08:25

## 2022-04-23 RX ADMIN — PIPERACILLIN AND TAZOBACTAM 3.38 G: 3; .375 INJECTION, POWDER, LYOPHILIZED, FOR SOLUTION INTRAVENOUS at 13:31

## 2022-04-23 RX ADMIN — FOLIC ACID 1 MG: 1 TABLET ORAL at 08:25

## 2022-04-23 RX ADMIN — ALBUTEROL SULFATE 2.5 MG: 2.5 SOLUTION RESPIRATORY (INHALATION) at 16:07

## 2022-04-23 RX ADMIN — FUROSEMIDE 20 MG: 10 INJECTION, SOLUTION INTRAMUSCULAR; INTRAVENOUS at 13:31

## 2022-04-23 RX ADMIN — ATORVASTATIN CALCIUM 40 MG: 40 TABLET, FILM COATED ORAL at 08:25

## 2022-04-23 RX ADMIN — DORNASE ALFA 50 ML: 1 SOLUTION RESPIRATORY (INHALATION) at 09:45

## 2022-04-23 RX ADMIN — PREDNISONE 40 MG: 20 TABLET ORAL at 08:25

## 2022-04-23 RX ADMIN — PIPERACILLIN AND TAZOBACTAM 3.38 G: 3; .375 INJECTION, POWDER, LYOPHILIZED, FOR SOLUTION INTRAVENOUS at 21:05

## 2022-04-23 RX ADMIN — UMECLIDINIUM 1 PUFF: 62.5 AEROSOL, POWDER ORAL at 09:45

## 2022-04-23 RX ADMIN — METOPROLOL SUCCINATE 25 MG: 25 TABLET, FILM COATED, EXTENDED RELEASE ORAL at 08:25

## 2022-04-23 RX ADMIN — PIPERACILLIN AND TAZOBACTAM 3.38 G: 3; .375 INJECTION, POWDER, LYOPHILIZED, FOR SOLUTION INTRAVENOUS at 05:20

## 2022-04-23 RX ADMIN — WARFARIN SODIUM 2 MG: 2 TABLET ORAL at 18:26

## 2022-04-23 RX ADMIN — HYDROXYUREA 500 MG: 500 CAPSULE ORAL at 08:25

## 2022-04-23 ASSESSMENT — ACTIVITIES OF DAILY LIVING (ADL)
ADLS_ACUITY_SCORE: 8

## 2022-04-23 NOTE — PROGRESS NOTES
PULMONARY / CRITICAL CARE PROGRESS NOTE    Date / Time of Admission:  4/19/2022  1:19 PM    Assessment:     Shaji Pompa is a 76 year old male with history of COPD, HTN, myeloproliferative disease, recent diagnosis of bilateral pulmonary embolism 4/1/22 anticoagulated.  Presents to ED for evaluation of worsening shortness of breath. Denies fever, chills, night sweats. No cough. Denies chests pain, unchanged mild swelling of right LE.   Chest CT scan showed loculated left effusion, air pockets.   Labs showed anemia, Hb 6.7, (previously 9.5), normal WBC, normal basic chem.   Diagnostic thoracentesis , complex effusion, 100 ml of yellowish fluid was obtained, exudate fluid, predominant neutrophilic, gram stain negative. Patient was started on Abx. Systemic steroids.   Received RBC transfusion. EKG showed inverted T wave in inferior leads, concerning for ischemic event, cardiac enzymes negative. Echocardiogram showed EF 40%, severe inferior hypokinesis, moderate AS  wall hypokinesis, distal hypokinesis. Cardiology was consulted and plan for coronary angiogram.    1. Loculated left pleural effusion   Small pleural effusion was seen on 4/1 chest CT scan, patient was diagnosed with bilateral pulmonary embolism at that time, CT done on 4/19 showed loculated left side effusion and air pockets.   US guided thoracentesis showed complex effusion, extensive septations, 100 ml yellowish fluid obtained , exudative, predominantly neutrophilic, gram stain negative. S/p chest tube placement 4/21.   Started on intrapleural lytics. Gram stain and cultures are negative, cytology pending.   Follow up chest CT scan in AM.   Narrow empiric Abx to zosyn.   2. Acute decompensated cardiomyopathy   Case was discussed with cardiology, significant coronary artery calcifications. Recent echocardiogram showed EF 40%, severe inferior hypokinesis, moderate AS  wall hypokinesis, distal hypokinesis. Plan for coronary angiogram in the near  [place].   Continue diuresis, titrate BP meds, ASA.   3. Anemia s/p RBC transfusion   4. Hx bilateral pulmonary emboli and right popliteal DVT 4/1/22, anticoagulated  5. COPD   Stable, continue bronchodilators   6. HTN  7. Myeloproliferative disorder    Advance Directives: Full code    Plan:   1. Titrate FiO2, keep SpO2 > 90%  2. Scheduled bronchodilators  3. Chest tube connected to suction   4. Intrapleural lytics   5. Continue to monitor pleural fluid analysis, (cultures and cytology)  6. Narrow Abx to zosyn  7. Follow up Chest CT scan in AM   8. Continue IV diuresis   9. Titrate BP meds accordingly   10. Cardiology is following , plan for coronary angiogram in the near future  11. DVT prophylaxis, patient is anticoagulated    Please contact me if you have any questions.    Aniceto Bynum  Pulmonary / Critical Care  04/23/2022  11:06 AM    Subjective     HPI:  Shaji Pompa is a 76 year old male with history of COPD, HTN, myeloproliferative disease, recent diagnosis of bilateral pulmonary embolism 4/1/22 anticoagulated.  Presents to ED for evaluation of worsening shortness of breath. Denies fever, chills, night sweats. No cough. Denies chests pain, unchanged mild swelling of right LE.   Chest CT scan showed loculated left effusion, air pockets.   Labs showed anemia, Hb 6.7, (previously 9.5), normal WBC, normal basic chem.   Diagnostic thoracentesis , complex effusion, 100 ml of yellowish fluid was obtained, exudate fluid, predominant neutrophilic, gram stain negative. Patient was started on Abx. Systemic steroids.   Received RBC transfusion. EKG showed inverted T wave in inferior leads, concerning for ischemic event, cardiac enzymes negative. Echocardiogram showed EF 40%, severe inferior hypokinesis, moderate AS  wall hypokinesis, distal hypokinesis. Cardiology was consulted and plan for coronary angiogram.     Events overnight  - Doing well.   - Decrease chest tube output.   - Received third  dose of intrapleural lytics in AM    Allergies: Patient has no known allergies.     MEDS:  Current Facility-Administered Medications   Medication     acetaminophen (TYLENOL) tablet 325-650 mg     albuterol (PROVENTIL HFA/VENTOLIN HFA) inhaler     albuterol (PROVENTIL) neb solution 2.5 mg     alteplase (ACTIVASE) 10 mg, dornase alpha (PULMOZYME) 5 mg in sodium chloride 0.9 % 50 mL for chest tube instillation in syringe     atorvastatin (LIPITOR) tablet 40 mg     folic acid (FOLVITE) tablet 1 mg     furosemide (LASIX) injection 20 mg     [Held by provider] heparin 25,000 units in 0.45% NaCl 250 mL ANTICOAGULANT infusion     hydrALAZINE (APRESOLINE) injection 10 mg     hydroxyurea (HYDREA) capsule 500 mg     ipratropium - albuterol 0.5 mg/2.5 mg/3 mL (DUONEB) neb solution 3 mL     lidocaine (LMX4) cream     lidocaine 1 % 0.1-1 mL     lidocaine 1 % 1-30 mL     lisinopril (ZESTRIL) tablet 5 mg     melatonin tablet 1 mg     metoprolol succinate ER (TOPROL-XL) 24 hr tablet 25 mg     naloxone (NARCAN) injection 0.2 mg    Or     naloxone (NARCAN) injection 0.4 mg    Or     naloxone (NARCAN) injection 0.2 mg    Or     naloxone (NARCAN) injection 0.4 mg     ondansetron (ZOFRAN-ODT) ODT tab 4 mg    Or     ondansetron (ZOFRAN) injection 4 mg     oxyCODONE (ROXICODONE) tablet 5 mg     piperacillin-tazobactam (ZOSYN) 3.375 g vial to attach to  mL bag     polyethylene glycol (MIRALAX) Packet 17 g     prochlorperazine (COMPAZINE) injection 5 mg    Or     prochlorperazine (COMPAZINE) tablet 5 mg    Or     prochlorperazine (COMPAZINE) suppository 12.5 mg     sodium chloride (PF) 0.9% PF flush 3 mL     sodium chloride (PF) 0.9% PF flush 3 mL     umeclidinium (INCRUSE ELLIPTA) 62.5 MCG/INH inhaler 1 puff     vancomycin 1000 mg in 0.9% NaCl 250 mL intermittent infusion 1,000 mg     Objective:   VITALS:  /56 (BP Location: Left arm)   Pulse 70   Temp 98.4  F (36.9  C) (Oral)   Resp 16   Wt 89.5 kg (197 lb 6.4 oz)   SpO2  100%   BMI 31.88 kg/m    VENT:  Resp: 16    EXAM:   Gen: awake, alert, no distress  HEENT: pale conjunctiva, moist mucosa, Mallampati III/IV  Neck: no thyromegaly, masses or JVD  Lungs: clear, mild decrease BS left base  CV: regular, no murmurs or gallops appreciated  Abdomen: soft, NT, BS wnl  Ext: trace edema  Neuro: CN II-XII intact, non focal       Data Review:  Recent Labs   Lab 04/23/22  0441 04/22/22  0558 04/21/22  0502 04/20/22  0405 04/19/22  2143 04/19/22 2036    91 111 128* 102 101*      04/23/22 04:41   Sodium 141   Potassium 3.8   Chloride 106   Carbon Dioxide 27   Urea Nitrogen 22   Creatinine 0.76   GFR Estimate >90 [1]   Calcium 7.7 (L)   Anion Gap 8   Glucose 107   WBC 9.2   Hemoglobin 7.0 (L)   Hematocrit 22.9 (L)   Platelet Count 114 (L)   RBC Count 1.85 (L)    (H)   MCH 37.8 (H)   MCHC 30.6 (L)   RDW 24.6 (H)   INR 1.97 (H)   (L): Data is abnormally low  (H): Data is abnormally high  [1] Effective December 21, 2021 eGFRcr in adults is calculated using the 2021 CKD-EPI creatinine equation which includes age and gender (Anuel quinonez al., [place], DOI: 10.1056/OFMVgj6519840)     04/21/22 13:02   Cell Count Fluid Source Pleural Cavity, Left   Color Fluid Orange !   Appearance Fluid Cloudy !   RBC Fluid 8,000   % Mono/Macro Fluid 13   % Neutrophils Fluid 87   Total Nucleated Cells 1,126        04/19/22 16:53   Cell Count Fluid Source Pleural Cavity, Left   Color Fluid Red !   Appearance Fluid Hazy !   RBC Fluid 14,000   % Mono/Macro Fluid 3   % Neutrophils Fluid 97   Glucose Fluid Source Pleural Cavity, Left   Glucose Fluid 20   LD Fluid Source Pleural Cavity, Left   Lactate Dehydrogenase Fluid 1,282 [1]   Protein Fluid Source Pleural Cavity, Left   Protein Total Fluid 3.5     Gram Stain Result  No organisms seen      2+ WBC seen        Fluid Culture No growth, less than 1 day         XR CHEST PORT 1 VIEW  LOCATION: [facility]  DATE/TIME: 4/23/2022 7:50  AM  INDICATION: Follow up chest tube  COMPARISON: 4/22/2022  IMPRESSION: Left chest tube has been slightly retracted. Minimal change in a loculated left pleural effusion. Unchanged left basilar opacities. Normal heart size. No pneumothorax.    CT CHEST W/O CONTRAST  LOCATION: [facility]  DATE/TIME: 4/19/2022 3:04 PM  INDICATION: Shortness of breath. Dyspnea. Recent pulmonary embolus.  COMPARISON: Chest CTA 04/01/2022  FINDINGS:   LUNGS AND PLEURA: There is a large, complex loculated left pleural effusion with loculated components of septated are inferiorly air  and increase in the amount of fluid since study done 18 days ago. Fluid has a density of 10 Hounsfield units. There is associated compressive atelectasis of most of the left lower lobe. Few small rounded or ovoid groundglass opacities have developed in the right lower lobe (images 148, 160 171).    MEDIASTINUM/AXILLAE: Blood pool is much less dense than the heart muscle. There is mild mediastinal and subcarinal adenopathy with subcarinal nodes measuring 1.2 cm in short axis.  CORONARY ARTERY CALCIFICATION: Severe.  UPPER ABDOMEN: Incidental splenule.  MUSCULOSKELETAL: No suspicious lesions. Severe degenerative changes in both shoulders.                                  IMPRESSION:   1.  Notable increase in the complex, loculated left-sided pleural effusion with now loculated bubbles of air in the left base. This fluid has a density of 10 Hounsfield units. Given the prior significant pulmonary emboli, need to  consider pulmonary infarct with necrosis and either a sterile or infected pulmonary abscess/empyema.  2.  Patient's quite anemic.    By:  Aniceto Bynum MD, 04/23/2022  11:06 AM    Primary Care Physician:  Steven Caraballo

## 2022-04-23 NOTE — PLAN OF CARE
LS diminished but clear. SpO2 mid 90s on 3L O2 via NC. BELL. Restart on IV lasix. Continues on IV lasix. Pt to receive 1unit of PRBCs but did not want another IV. Waiting on IV abx to finish up then will start blood. Chest tube output 4ml of serous fluid. Per MD plan to do chest CT in AM then may possibly be remove.  Problem: Fluid Imbalance (Pneumonia)  Goal: Fluid Balance  Outcome: Ongoing, Progressing     Problem: COPD (Chronic Obstructive Pulmonary Disease) Comorbidity  Goal: Maintenance of COPD Symptom Control  Outcome: Ongoing, Progressing  Intervention: Maintain COPD-Symptom Control  Recent Flowsheet Documentation  Taken 4/23/2022 1711 by Merary Horton RN  Medication Review/Management: medications reviewed  Taken 4/23/2022 1230 by Merary Horton RN  Medication Review/Management: medications reviewed  Taken 4/23/2022 0830 by Merary Horton RN  Medication Review/Management: medications reviewed

## 2022-04-23 NOTE — PLAN OF CARE
Goal Outcome Evaluation:    Problem: Infection (Pneumonia)  Goal: Resolution of Infection Signs and Symptoms     Problem: Respiratory Compromise (Pneumonia)  Goal: Effective Oxygenation and Ventilation    Tele remains SR, BP running low, BFM notified and lasix held. Remains on 1.5L via oxymask per pt preference. CT with no additional returns other than irrigated medication. Denies pain or SOB. IV abx.

## 2022-04-23 NOTE — PROGRESS NOTES
HEART CARE CONSULTATON NOTE        Assessment/Recommendations   Assessment:   1.  Acute congestive heart failure with reduced ejection fraction (improved).  Ischemic cardiomyopathy. LVEF: 40-45%.    2.  Severe coronary calcification on CT, coronary artery disease.   3.  Ischemic cardiomyopathy, focal wall motion abnormality on echo  4.  Recent pulmonary embolism, bilateral  5.  DVT right leg  6.  Severe anemia.   Hemoglobin   Date Value Ref Range Status   04/23/2022 7.0 (L) 13.3 - 17.7 g/dL Final   ]    7.  Thrombocytopenia,  Platelet Count   Date Value Ref Range Status   04/23/2022 114 (L) 150 - 450 10e3/uL Final       8.  Myeloproliferative disorder  9.  Large pleural effusion concerning for complex effusion with infection s/p chest tube 4/21   10.  Immunocompromise on chemotherapy  11.  COPD with acute exacerbation  12.  Hypertension  13.  Dynamic EKG changes in the inferolateral leads  14.  Hypokalemia  15.  Nonsustained ventricular tachycardia.  4 beats noted on telemetry overnight    Plan:   1.  Once chest tube removed would proceed with right and left heart catheterization in the coronary Cath Lab at [facility].   3.  Increased atorvastatin to 40 mg daily  4.  Continue Lasix IV 20 mg twice daily for heart failure (switch to oral in 1-2 days).    5.  Currently on broad-spectrum antibiotics  6.  Continue metoprolol XL at 25 mg daily  7.  Lisinopril 5 mg daily for HFrEF   8.  Check ionized calcium today.      Will follow.      History of Present Illness/Subjective    S: No acute issues overnight.  On review of telemetry patient maintained sinus rhythm.  He currently has no anginal chest pain.  Pain at the site of his chest tube is improving.  He denies any shortness of breath.  Edema has nearly resolved.  Renal function is stable on review of labs.  Reviewed pulmonary notes.  Blood pressure is stable.  Tolerating new lisinopril.  Calcium is low checking ionized calcium today was ordered.  Patient  denies any fever chills or sweats.  Denies any acute neurological symptoms.         Physical Examination  Review of Systems   VITALS: /56 (BP Location: Left arm)   Pulse 70   Temp 98.4  F (36.9  C) (Oral)   Resp 16   Wt 89.5 kg (197 lb 6.4 oz)   SpO2 100%   BMI 31.88 kg/m    BMI: Body mass index is 31.88 kg/m .  Wt Readings from Last 3 Encounters:   04/23/22 89.5 kg (197 lb 6.4 oz)   04/07/22 92.9 kg (204 lb 11.2 oz)       Intake/Output Summary (Last 24 hours) at 4/23/2022 0835  Last data filed at 4/23/2022 0627  Gross per 24 hour   Intake 740 ml   Output 571 ml   Net 169 ml     General Appearance:   no distress, normal body habitus, in bed.    ENT/Mouth: membranes moist, no oral lesions or bleeding gums.      EYES:  no scleral icterus, normal conjunctivae   Neck: no carotid bruits or thyromegaly   Chest/Lungs:   Chest tube. + crackles.   Cardiovascular:   Regular. Normal first and second heart sounds with no murmurs, rubs, or gallops; the carotid, radial and posterior tibial pulses are intact, Jugular venous pressure 6, no edema bilaterally (resolved).    Abdomen:  no organomegaly, masses, bruits, or tenderness; bowel sounds are present   Extremities: no cyanosis or clubbing   Skin: no xanthelasma, warm.    Neurologic: normal  bilateral, no tremors     Psychiatric: alert and oriented x3, calm     Review Of Systems  Skin: negative  Eyes: negative  Ears/Nose/Throat: negative  Respiratory: cough. Chest tube pain  Cardiovascular: negative for and orthopnea  Gastrointestinal: negative  Genitourinary: negative  Musculoskeletal: negative  Neurologic: negative  Psychiatric: negative  Hematologic/Lymphatic/Immunologic: negative  Endocrine: negative          Lab Results    Chemistry/lipid CBC Cardiac Enzymes/BNP/TSH/INR   Recent Labs   Lab Test 05/03/21  1557   CHOL 110   HDL 35*   LDL 52   TRIG 115     Recent Labs   Lab Test 05/03/21  1557 08/13/19  0926   LDL 52 111     Recent Labs   Lab Test 04/23/22  0441       POTASSIUM 3.8   CHLORIDE 106   CO2 27      BUN 22   CR 0.76   GFRESTIMATED >90   GLENDY 7.7*     Recent Labs   Lab Test 04/23/22  0441 04/22/22  0558 04/21/22  0502   CR 0.76 0.81 0.86     Recent Labs   Lab Test 10/31/19  2000   A1C 6.0          Recent Labs   Lab Test 04/23/22  0441   WBC 9.2   HGB 7.0*   HCT 22.9*   *   *     Recent Labs   Lab Test 04/23/22  0441 04/22/22  0558 04/21/22  0502   HGB 7.0* 7.5* 7.3*    Recent Labs   Lab Test 04/20/22  0405 04/19/22  2143 04/19/22  1441   TROPONINI 0.09 0.11 0.10     Recent Labs   Lab Test 04/19/22  1441 04/01/22  1208   BNP 1,221* 379*     No results for input(s): TSH in the last 23594 hours.  Recent Labs   Lab Test 04/23/22  0441 04/21/22  0502 04/20/22  0405   INR 1.97* 2.82* 2.67*        Medical History  Surgical History Family History Social History   Past Medical History:   Diagnosis Date     Cerebral infarction (H)      COPD (chronic obstructive pulmonary disease) (H)      HLD (hyperlipidemia)      Hypertension      Myeloproliferative disease (H)      Past Surgical History:   Procedure Laterality Date     OPEN REDUCTION INTERNAL FIXATION FOOT Right 2010     OTHER SURGICAL HISTORY  2001    Lip lesion removal     TONSILLECTOMY & ADENOIDECTOMY       Family History   Problem Relation Age of Onset     Alcoholism Mother         Social History     Socioeconomic History     Marital status:      Spouse name: Not on file     Number of children: Not on file     Years of education: Not on file     Highest education level: Not on file   Occupational History     Not on file   Tobacco Use     Smoking status: Former Smoker     Packs/day: 1.00     Start date: 6/8/1965     Quit date: 1/1/2012     Years since quitting: 10.3     Smokeless tobacco: Never Used   Substance and Sexual Activity     Alcohol use: Yes     Alcohol/week: 19.0 standard drinks     Drug use: Never     Sexual activity: Not on file   Other Topics Concern     Not on file    Social History Narrative     Not on file     Social Determinants of Health     Financial Resource Strain: Not on file   Food Insecurity: Not on file   Transportation Needs: Not on file   Physical Activity: Not on file   Stress: Not on file   Social Connections: Not on file   Intimate Partner Violence: Not on file   Housing Stability: Not on file         Medications  Allergies   No current outpatient medications on file.      No Known Allergies      Sp Larson DO

## 2022-04-23 NOTE — PLAN OF CARE
Problem: Infection (Pneumonia)  Goal: Resolution of Infection Signs and Symptoms  Outcome: Ongoing, Progressing    Problem: Respiratory Compromise (Pneumonia)  Goal: Effective Oxygenation and Ventilation  Outcome: Ongoing, Progressing     Goal Outcome Evaluation:  Chest tube remain in place. Medication instillation difficult, denies pain, cough or SOB. Remains on 1L via oxymask with sats in the mid 90's. Bps soft, BFM notified and hold put on lasix.

## 2022-04-23 NOTE — PROGRESS NOTES
[facility]    Progress Note - Hospitalist Service       Date of Admission:  4/19/2022    Assessment & Plan          Shaji Pompa is a 76 year old old male with a past medical history of myeloproliferative disease, COPD, HTN, CVA, recent DVT and PEs who presented to the [facility] Emergency Department on the day of admission with complaints of worsening dyspnea. CT chest showing complex loculated left pleural effusion concerning for pulmonary infarct with necrosis vs pulmonary abscess/empyema. Thoracentesis with output of 100 cc of reddish-brown fluid, transudative effusion. Cardiology, hem/onc, and pulm consulted.  Patient with some improvement on Lasix.  Chest tube placement 4/21/2022.  Echo focal wall motion abnormality concerning for ischemic cardiomyopathy, plan to transfer to [facility] for cardiac cath once stabilized and chest tube removed.  Patient received unit of PRBC 4/23.     Acute Hypoxic Respiratory Failure, improved  Left complex loculated pleural effusion, transudative  Chest tube placement 4/21  Pain, improved  Symptoms likely multifactorial including acute congestive heart failure, complex pleural effusion, recent bilateral pulmonary emboli, suspected COPD exacerbation. Dyspnea improved since admission, patient is satting well at 1 L.  Chest tube placement 4/21.  Chest tube with continued appropriate output though decreasing, will likely remove in 1 to 2 days.  Patient reports improved pain.  -O2 supplementation, goal of >90%  - Pulm consult: appreciate recs   -Chest tube placement, continue suction and monitoring output   -Continue broad-spectrum antibiotics   -Scheduled bronchodilators  -Oxycodone 5 mg as needed  -MiraLAX scheduled daily while on opioid  -If pain not well controlled can increase oxycodone to 10 mg as needed     HFrEF, acute  Weight down 13 lbs since admission.  BUN and creatinine stable. Continuing on Lasix and will plan for heart  catheterization once stable and chest tube removed.   -Cardiology consult, appreciate cares and recs   -Right and left heart catheterization at [facility] once stabilized and chest tube removed   -Continue atorvastatin to 40 mg daily   -Continue Lasix IV 20 mg twice daily   -Continue lisinopril 5 mg daily  -Trend BMP  - Telemetry  - Daily weights, strict I's and O's  -O2 supplementation as needed  -Cardiac diet  -Continue PTA metoprolol  -If continued difficult lab draws, will consider PICC placement.  Will try to collect labs once daily at the same time     COPD exacerbation, suspected  Presents with dyspnea, productive cough, chest tightness with history of COPD. Was on 2L O2, improved to room air and satting well, now stable at 1 L oxime mask.   Continued expiratory wheezing noted on exam.  Encouraged DuoNeb use.  Ceftriaxone discontinued vancomycin discontinued 4/23.  Prednisone 5-day course completed.  -Continue IV Zosyn every 8 hours, patient is day 4/7  - Cardiac telemetry  - Supplemental O2 as needed  - R CAT consult  - Sputum culture if possible  - PTA Spiriva daily  -Schedule DuoNeb  -Encouraged IS     Macrocytic Anemia, s/p 2 unit PRBC  Thrombocytopenia  Myelodysplastic syndrome  Hgb decreased to 7, will give additional unit of PRBC.  No signs of acute bleed. Patient's macrocytic anemia attributed to hydroxyurea on last admission, can be attributed to myelodysplastic syndrome as well.   -1 unit PRBC 4/23  - Trend CBC  - Heme-onc consult, appreciate cares and recs  - Transfuse if hemoglobin <7  -If patient is symptomatic for anemia can transfuse if hemoglobin <8  -Continue decreased PTA hydroxyurea now at 500 mg daily  -Continue folic acid 1 mg p.o. daily     Recent bilateral pulmonary emboli  On warfarin for provoked DVT and PE after a long road trip 4/1/2022. INR 3.06 on ED intake, upon recheck was 1.97. With INR near therapeutic range, will hold any pharmacologic anticoagulation until cardiology  recommends range given anticipated cardiac catheterization. Hesitant to start heparin given thrombocytopenia.   - Hold heparin drip  - Continue to hold PTA Warfarin     Hypertension  - Continue PTA metoprolol  -Started lisinopril 5 mg as above  - Hydralazine IV as needed     CVA, 2019  -Increased PTA atorvastatin from 20 mg to 40 mg       Diet: Regular Diet Adult    DVT Prophylaxis: Pneumatic Compression Devices  Garrett Catheter: Not present  Fluids: PO  Central Lines: None  Cardiac Monitoring: ACTIVE order. Indication: Acute decompensated heart failure (48 hours)  Code Status: Full Code      Disposition Plan   Expected Discharge: 04/25/2022     Anticipated discharge location: home with family    Delays: Diagnostic work-up, hemoglobin stable, improved dyspnea, plan to transfer to [facility] for cath next week       The patient's care was discussed with the Attending Physician, Dr. Wakefield.    Shanel Rodarte MD  Hospitalist Service  [facility]  Securely message with the Vocera Web Console (learn more here)  Text page via [employer] Paging/Directory     ______________________________________________________________________    Interval History   Patient is afebrile, VSS.  Patient had somewhat improved shortness of breath though overall stable.  Patient reported no further pain at chest tube site. Voiding well.  Large BM. Wife attentive in room.    Data reviewed today: I reviewed all medications, new labs and imaging results over the last 24 hours.    Physical Exam   Vital Signs: Temp: 98.4  F (36.9  C) Temp src: Oral BP: 115/56 Pulse: 70   Resp: 16 SpO2: 100 % O2 Device: Oxymask Oxygen Delivery: 1.5 LPM  Weight: 197 lbs 6.4 oz  Physical Exam  Constitutional:       General: He is not in acute distress.     Appearance: He is not ill-appearing or diaphoretic.   HENT:      Head: Normocephalic and atraumatic.   Eyes:      Extraocular Movements: Extraocular movements intact.      Conjunctiva/sclera:  Conjunctivae normal.   Cardiovascular:      Rate and Rhythm: Normal rate and regular rhythm.      Pulses: Normal pulses.      Heart sounds: Normal heart sounds. No murmur heard.    No gallop.   Pulmonary:      Effort: Pulmonary effort is normal. No respiratory distress.      Comments: Good air movement throughout, LS CTA.  No wheezes, no crackles  Abdominal:      General: There is no distension.      Palpations: Abdomen is soft.      Tenderness: There is no abdominal tenderness.   Musculoskeletal:         General: Normal range of motion.      Comments: No bilateral peripheral edema, improved   Skin:     General: Skin is warm and dry.      Capillary Refill: Capillary refill takes less than 2 seconds.   Neurological:      General: No focal deficit present.      Mental Status: He is alert and oriented to person, place, and time.   Psychiatric:         Mood and Affect: Mood normal.         Behavior: Behavior normal.           Data   Recent Labs   Lab 04/23/22  1222 04/23/22  0441 04/22/22  0558 04/21/22  0502 04/20/22  0405 04/20/22  0142 04/19/22  2143 04/19/22  2036 04/19/22  1441   WBC  --  9.2 7.0 10.1 8.0  --  8.7  --  10.7   HGB 7.0* 7.0* 7.5* 7.3* 7.4*   < > 7.0*  --  6.7*   MCV  --  124* 127* 125* 122*  --  121*  --  130*   PLT  --  114* 97* 100* 103*  --  120*  --  123*   INR  --  1.97*  --  2.82* 2.67*  --   --   --  3.06*   NA  --  141 143 144 140  --  140  --  140   POTASSIUM  --  3.8 3.6 3.5 3.9  --  3.7  --  4.0   CHLORIDE  --  106 106 107 105  --  104  --  106   CO2  --  27 27 27 27  --  26  --  25   BUN  --  22 21 25 19  --  18  --  21   CR  --  0.76 0.81 0.86 0.79  --  0.76  --  0.79   ANIONGAP  --  8 10 10 8  --  10  --  9   GLENDY  --  7.7* 7.8* 8.1* 8.2*  --  8.0*  --  8.2*   GLC  --  107 91 111 128*  --  102   < > 106   ALBUMIN  --   --   --   --  1.9*  --   --   --  1.9*   PROTTOTAL  --   --   --   --  6.0  --  6.1  --  5.9*   BILITOTAL  --   --   --   --  1.3*  --   --   --  1.0   ALKPHOS  --   --    --   --  78  --   --   --  88   ALT  --   --   --   --  22  --   --   --  22   AST  --   --   --   --  17  --   --   --  18    < > = values in this interval not displayed.     Recent Results (from the past 24 hour(s))   XR Chest Port 1 View    Narrative    EXAM: XR CHEST PORT 1 VIEW  LOCATION: [facility]  DATE/TIME: 4/23/2022 7:50 AM    INDICATION: Follow up chest tube  COMPARISON: 4/22/2022      Impression    IMPRESSION: Left chest tube has been slightly retracted. Minimal change in a loculated left pleural effusion. Unchanged left basilar opacities. Normal heart size. No pneumothorax.

## 2022-04-23 NOTE — PHARMACY-ANTICOAGULATION SERVICE
Clinical Pharmacy - Warfarin Dosing Consult     Pharmacy has been consulted to manage this patient s warfarin therapy for PE/DVT  Warfarin PTA Regimen: 2mg daily    INR   Date Value Ref Range Status   04/23/2022 1.97 (H) 0.85 - 1.15 Final   04/21/2022 2.82 (H) 0.85 - 1.15 Final       Recommend warfarin 2 mg today.  Pharmacy will monitor Shaji Pompa daily and order warfarin doses to achieve specified goal.      Please contact pharmacy as soon as possible if the warfarin needs to be held for a procedure or if the warfarin goals change.

## 2022-04-24 ENCOUNTER — APPOINTMENT (OUTPATIENT)
Dept: CT IMAGING | Facility: CLINIC | Age: 77
DRG: 186 | End: 2022-04-24
Attending: INTERNAL MEDICINE
Payer: COMMERCIAL

## 2022-04-24 LAB
ANION GAP SERPL CALCULATED.3IONS-SCNC: 8 MMOL/L (ref 5–18)
BACTERIA PLR CULT: NO GROWTH
BUN SERPL-MCNC: 23 MG/DL (ref 8–28)
CALCIUM SERPL-MCNC: 7.8 MG/DL (ref 8.5–10.5)
CHLORIDE BLD-SCNC: 106 MMOL/L (ref 98–107)
CO2 SERPL-SCNC: 29 MMOL/L (ref 22–31)
CREAT SERPL-MCNC: 0.79 MG/DL (ref 0.7–1.3)
ERYTHROCYTE [DISTWIDTH] IN BLOOD BY AUTOMATED COUNT: ABNORMAL %
GFR SERPL CREATININE-BSD FRML MDRD: >90 ML/MIN/1.73M2
GLUCOSE BLD-MCNC: 105 MG/DL (ref 70–125)
GRAM STAIN RESULT: NORMAL
GRAM STAIN RESULT: NORMAL
HCT VFR BLD AUTO: 23.1 % (ref 40–53)
HGB BLD-MCNC: 7.2 G/DL (ref 13.3–17.7)
INR PPP: 1.8 (ref 0.85–1.15)
MCH RBC QN AUTO: 36.7 PG (ref 26.5–33)
MCHC RBC AUTO-ENTMCNC: 31.2 G/DL (ref 31.5–36.5)
MCV RBC AUTO: 118 FL (ref 78–100)
PLATELET # BLD AUTO: 105 10E3/UL (ref 150–450)
POTASSIUM BLD-SCNC: 3.7 MMOL/L (ref 3.5–5)
RBC # BLD AUTO: 1.96 10E6/UL (ref 4.4–5.9)
SODIUM SERPL-SCNC: 143 MMOL/L (ref 136–145)
WBC # BLD AUTO: 8.8 10E3/UL (ref 4–11)

## 2022-04-24 PROCEDURE — 99233 SBSQ HOSP IP/OBS HIGH 50: CPT | Performed by: INTERNAL MEDICINE

## 2022-04-24 PROCEDURE — 250N000009 HC RX 250: Performed by: INTERNAL MEDICINE

## 2022-04-24 PROCEDURE — 85610 PROTHROMBIN TIME: CPT

## 2022-04-24 PROCEDURE — 80048 BASIC METABOLIC PNL TOTAL CA: CPT

## 2022-04-24 PROCEDURE — 250N000013 HC RX MED GY IP 250 OP 250 PS 637

## 2022-04-24 PROCEDURE — 99233 SBSQ HOSP IP/OBS HIGH 50: CPT | Mod: GC | Performed by: STUDENT IN AN ORGANIZED HEALTH CARE EDUCATION/TRAINING PROGRAM

## 2022-04-24 PROCEDURE — 250N000013 HC RX MED GY IP 250 OP 250 PS 637: Performed by: STUDENT IN AN ORGANIZED HEALTH CARE EDUCATION/TRAINING PROGRAM

## 2022-04-24 PROCEDURE — 250N000011 HC RX IP 250 OP 636: Performed by: STUDENT IN AN ORGANIZED HEALTH CARE EDUCATION/TRAINING PROGRAM

## 2022-04-24 PROCEDURE — 85027 COMPLETE CBC AUTOMATED: CPT

## 2022-04-24 PROCEDURE — 250N000013 HC RX MED GY IP 250 OP 250 PS 637: Performed by: INTERNAL MEDICINE

## 2022-04-24 PROCEDURE — 71250 CT THORAX DX C-: CPT

## 2022-04-24 PROCEDURE — 36415 COLL VENOUS BLD VENIPUNCTURE: CPT

## 2022-04-24 PROCEDURE — 210N000002 HC R&B HEART CARE

## 2022-04-24 RX ORDER — POTASSIUM CHLORIDE 1500 MG/1
40 TABLET, EXTENDED RELEASE ORAL ONCE
Status: COMPLETED | OUTPATIENT
Start: 2022-04-24 | End: 2022-04-24

## 2022-04-24 RX ORDER — ALBUTEROL SULFATE 0.83 MG/ML
2.5 SOLUTION RESPIRATORY (INHALATION) EVERY 4 HOURS PRN
Status: DISCONTINUED | OUTPATIENT
Start: 2022-04-24 | End: 2022-04-26 | Stop reason: HOSPADM

## 2022-04-24 RX ORDER — CALCIUM GLUCONATE 20 MG/ML
1 INJECTION, SOLUTION INTRAVENOUS ONCE
Status: COMPLETED | OUTPATIENT
Start: 2022-04-24 | End: 2022-04-24

## 2022-04-24 RX ORDER — FUROSEMIDE 40 MG
40 TABLET ORAL DAILY
Status: DISCONTINUED | OUTPATIENT
Start: 2022-04-24 | End: 2022-04-26 | Stop reason: HOSPADM

## 2022-04-24 RX ADMIN — UMECLIDINIUM 1 PUFF: 62.5 AEROSOL, POWDER ORAL at 07:29

## 2022-04-24 RX ADMIN — HYDROXYUREA 500 MG: 500 CAPSULE ORAL at 07:26

## 2022-04-24 RX ADMIN — PIPERACILLIN AND TAZOBACTAM 3.38 G: 3; .375 INJECTION, POWDER, LYOPHILIZED, FOR SOLUTION INTRAVENOUS at 14:53

## 2022-04-24 RX ADMIN — PIPERACILLIN AND TAZOBACTAM 3.38 G: 3; .375 INJECTION, POWDER, LYOPHILIZED, FOR SOLUTION INTRAVENOUS at 20:38

## 2022-04-24 RX ADMIN — CALCIUM GLUCONATE 1 G: 20 INJECTION, SOLUTION INTRAVENOUS at 10:54

## 2022-04-24 RX ADMIN — FUROSEMIDE 40 MG: 40 TABLET ORAL at 09:30

## 2022-04-24 RX ADMIN — METOPROLOL SUCCINATE 25 MG: 25 TABLET, FILM COATED, EXTENDED RELEASE ORAL at 07:26

## 2022-04-24 RX ADMIN — FOLIC ACID 1 MG: 1 TABLET ORAL at 07:26

## 2022-04-24 RX ADMIN — LISINOPRIL 5 MG: 5 TABLET ORAL at 07:27

## 2022-04-24 RX ADMIN — PIPERACILLIN AND TAZOBACTAM 3.38 G: 3; .375 INJECTION, POWDER, LYOPHILIZED, FOR SOLUTION INTRAVENOUS at 06:39

## 2022-04-24 RX ADMIN — ATORVASTATIN CALCIUM 40 MG: 40 TABLET, FILM COATED ORAL at 07:26

## 2022-04-24 RX ADMIN — POTASSIUM CHLORIDE 40 MEQ: 20 TABLET, EXTENDED RELEASE ORAL at 09:30

## 2022-04-24 ASSESSMENT — ACTIVITIES OF DAILY LIVING (ADL)
ADLS_ACUITY_SCORE: 10
ADLS_ACUITY_SCORE: 8
ADLS_ACUITY_SCORE: 10
ADLS_ACUITY_SCORE: 8
ADLS_ACUITY_SCORE: 8
ADLS_ACUITY_SCORE: 10
ADLS_ACUITY_SCORE: 10
ADLS_ACUITY_SCORE: 8
ADLS_ACUITY_SCORE: 8
ADLS_ACUITY_SCORE: 10
ADLS_ACUITY_SCORE: 10
ADLS_ACUITY_SCORE: 8
ADLS_ACUITY_SCORE: 10
ADLS_ACUITY_SCORE: 8
ADLS_ACUITY_SCORE: 10

## 2022-04-24 NOTE — PROGRESS NOTES
Patient on O2 NC 3lpm. Sats 94-95%, diminished BS, tolerated treatment well. Patients refused neb at 2000.    Sam Carter, RT

## 2022-04-24 NOTE — PROGRESS NOTES
[facility]    Progress Note - Hospitalist Service       Date of Admission:  4/19/2022    Assessment & Plan          Shaji Pompa is a 76 year old old male with a past medical history of myeloproliferative disease, COPD, HTN, CVA, recent DVT and PEs who presented to the [facility] Emergency Department on the day of admission with complaints of worsening dyspnea, found to have complex loculated left pleural effusion with thoracentesis of 100cc exudative effusion, gram stain negative. Chest tube placement 4/21/2022 now with decreasing output. Echo focal wall motion abnormality concerning for ischemic cardiomyopathy, plan to transfer to [facility] for cardiac cath once chest tube removed.  Patient received unit of PRBC 4/23.     Acute Hypoxic Respiratory Failure, improved  Left complex loculated pleural effusion, transudative  Chest tube placement 4/21  Pain, improved  Symptoms likely multifactorial including acute congestive heart failure, complex pleural effusion, recent bilateral pulmonary emboli, suspected COPD exacerbation. Down 16lb from admission with diuresis.  Chest tube placement 4/21, removed 4/24/22, with improvement on CT. Patient reports improved pain.  -O2 supplementation, goal of >90%  - Pulm consult: appreciate recs   -Chest tube removed   - Zosyn, changed to augmentin (5/14)   -Scheduled bronchodilators  -Oxycodone 5 mg as needed  -MiraLAX scheduled daily while on opioid  -If pain not well controlled can increase oxycodone to 10 mg as needed     HFrEF, acute  Weight down 16 lbs since admission.  BUN and creatinine stable. Continuing on Lasix and will plan for heart catheterization 4/25 or 4/26.   -Cardiology consult, appreciate cares and recs   -Right and left heart catheterization at [facility] early next week   -Continue atorvastatin 40 mg daily   -Continue Lasix IV 20 mg twice daily   -Continue lisinopril 5 mg daily  -Trend BMP  - Telemetry  - Daily weights,  strict I's and O's  -O2 supplementation as needed  -Cardiac diet  -Continue PTA metoprolol 25mg daily  - Cluster blood draws and cares     COPD exacerbation, suspected  Presents with dyspnea, productive cough, chest tightness with history of COPD.  Continued expiratory wheezing noted on exam.  Encouraged DuoNeb use.  Ceftriaxone discontinued vancomycin discontinued 4/23.  Prednisone 5-day course completed.  - IV Zosyn changed to Augmentin (5/14)  - Cardiac telemetry  - Supplemental O2 as needed  - R CAT consult  - Sputum culture if possible  - PTA Spiriva daily  -Schedule DuoNeb  -Encouraged IS     Macrocytic Anemia, s/p 2 unit PRBC  Thrombocytopenia  Myelodysplastic syndrome  Hgb 7.2 today after 1 unit PRBC yesterday, asymptomatic.  No signs of acute bleed. Patient's macrocytic anemia attributed to hydroxyurea on last admission, can be attributed to myelodysplastic syndrome as well.   - Trend CBC  - Heme-onc consult, appreciate cares and recs  - Transfuse if hemoglobin <7  -If patient is symptomatic for anemia can transfuse if hemoglobin <8  -PTA hydroxyurea decreased to 500 mg daily  -Continue folic acid 1 mg p.o. daily     Recent bilateral pulmonary emboli  On warfarin for provoked DVT and PE after a long road trip 4/1/2022. INR 3.06 on ED intake, upon recheck was 1.97. With INR near therapeutic range, will hold any pharmacologic anticoagulation until cardiology recommends range given anticipated cardiac catheterization. Hesitant to start heparin given thrombocytopenia.   - Hold heparin drip  - Continue to hold PTA Warfarin     Hypertension  - Continue PTA metoprolol  -Started lisinopril 5 mg as above  - Hydralazine IV as needed     CVA, 2019  -Increased PTA atorvastatin from 20 mg to 40 mg       Diet: Regular Diet Adult    DVT Prophylaxis: Pneumatic Compression Devices  Garrett Catheter: Not present  Fluids: PO  Central Lines: None  Cardiac Monitoring: ACTIVE order. Indication: Acute decompensated heart failure  (48 hours)  Code Status: Full Code      Disposition Plan   Expected Discharge: 04/25/2022     Anticipated discharge location: home with family    Delays: Diagnostic work-up, chest tube, hemoglobin stable, improved dyspnea, plan to transfer to [facility] next week       The patient's care was discussed with the Attending Physician, Dr. Wakefield.    Rose Auguste MD  Hospitalist Service  [facility]  Securely message with the Vocera Web Console (learn more here)  Text page via [employer] Paging/Directory     ______________________________________________________________________    Interval History   Hector is excited to be free of the chest tube because he kept getting tangled in the lines. His breathing is improved. NO chest pain, nausea, weakness, numbness.    Data reviewed today: I reviewed all medications, new labs and imaging results over the last 24 hours.    Physical Exam   Vital Signs: Temp: 98.4  F (36.9  C) Temp src: Oral BP: 127/60 Pulse: 68   Resp: 16 SpO2: 93 % O2 Device: Nasal cannula Oxygen Delivery: 3 LPM  Weight: 196 lbs 6.4 oz  Physical Exam  Constitutional:       General: He is not in acute distress.     Appearance: He is not ill-appearing or diaphoretic.   HENT:      Head: Normocephalic and atraumatic.   Eyes:      Extraocular Movements: Extraocular movements intact.      Conjunctiva/sclera: Conjunctivae normal.   Cardiovascular:      Rate and Rhythm: Normal rate and regular rhythm.      Pulses: Normal pulses.      Heart sounds: Normal heart sounds. No murmur heard.    No gallop.   Pulmonary:      Effort: Pulmonary effort is normal. No respiratory distress.      Comments: Intermittent wheezes throughout. Air movement in left lower with some occasional crackles.    Abdominal:      General: There is no distension.      Palpations: Abdomen is soft.      Tenderness: There is no abdominal tenderness.   Musculoskeletal:         General: Normal range of motion.       Comments: No bilateral peripheral edema, improved   Skin:     General: Skin is warm and dry.      Capillary Refill: Capillary refill takes less than 2 seconds.   Neurological:      General: No focal deficit present.      Mental Status: He is alert and oriented to person, place, and time.   Psychiatric:         Mood and Affect: Mood normal.         Behavior: Behavior normal.           Data   Recent Labs   Lab 04/24/22  0408 04/23/22  1222 04/23/22  0441 04/22/22  0558 04/21/22  0502 04/20/22  0405 04/20/22  0142 04/19/22  2143 04/19/22  2036 04/19/22  1441   WBC 8.8  --  9.2 7.0 10.1 8.0  --  8.7  --  10.7   HGB 7.2* 7.0* 7.0* 7.5* 7.3* 7.4*   < > 7.0*  --  6.7*   *  --  124* 127* 125* 122*  --  121*  --  130*   *  --  114* 97* 100* 103*  --  120*  --  123*   INR 1.80*  --  1.97*  --  2.82* 2.67*  --   --   --  3.06*     --  141 143 144 140  --  140  --  140   POTASSIUM 3.7  --  3.8 3.6 3.5 3.9  --  3.7  --  4.0   CHLORIDE 106  --  106 106 107 105  --  104  --  106   CO2 29  --  27 27 27 27  --  26  --  25   BUN 23  --  22 21 25 19  --  18  --  21   CR 0.79  --  0.76 0.81 0.86 0.79  --  0.76  --  0.79   ANIONGAP 8  --  8 10 10 8  --  10  --  9   GLENDY 7.8*  --  7.7* 7.8* 8.1* 8.2*  --  8.0*  --  8.2*     --  107 91 111 128*  --  102   < > 106   ALBUMIN  --   --   --   --   --  1.9*  --   --   --  1.9*   PROTTOTAL  --   --   --   --   --  6.0  --  6.1  --  5.9*   BILITOTAL  --   --   --   --   --  1.3*  --   --   --  1.0   ALKPHOS  --   --   --   --   --  78  --   --   --  88   ALT  --   --   --   --   --  22  --   --   --  22   AST  --   --   --   --   --  17  --   --   --  18    < > = values in this interval not displayed.     Recent Results (from the past 24 hour(s))   CT Chest w/o Contrast    Narrative    EXAM: CT CHEST WITHOUT CONTRAST  LOCATION: [facility]  DATE/TIME: 04/24/2022, 8:09 AM    INDICATION: Follow-up loculated left pleural effusion.  COMPARISON:  04/19/2022.  TECHNIQUE: CT chest without IV contrast. Multiplanar reformats were obtained. Dose reduction techniques were used.  CONTRAST: None.    FINDINGS:   LUNGS AND PLEURA: The left pigtail catheter has pulled out of the pleural space and is in the left chest wall. This can be removed. There is a small residual loculated left hydropneumothorax, significantly decreased in size when compared to previous.   Improved aeration in the left lung with some residual atelectasis and likely pulmonary edema in the left lung base. A few peripheral and peribronchial opacities in the right lung have not appreciably changed. No right pleural effusion.    MEDIASTINUM/AXILLAE: No lymphadenopathy. No thoracic aortic aneurysm.    CORONARY ARTERY CALCIFICATION: Severe.    UPPER ABDOMEN: No significant finding.    MUSCULOSKELETAL: Unremarkable.      Impression    IMPRESSION:   1.  Small residual loculated left hydropneumothorax, status post chest tube placement, with significantly improved aeration in the left lung.  2.  The left chest tube has pulled out of the pleural space and is in the left chest wall. This can be removed.

## 2022-04-24 NOTE — PLAN OF CARE
Pt is doing really well today. Denies SOB or chest discomfort. LS CTA but dim. SpO2 low to mid 90s on 1L O2 via NC. Chest tube removed today by Dr. Huynh. Transitioned to PO lasix. Continues on IV abx.  Problem: Respiratory Compromise (Heart Failure)  Goal: Effective Oxygenation and Ventilation  Outcome: Ongoing, Progressing     Problem: Cardiac Output Decreased (Heart Failure)  Goal: Optimal Cardiac Output  Outcome: Ongoing, Progressing

## 2022-04-24 NOTE — PROGRESS NOTES
HEART CARE CONSULTATON NOTE        Assessment/Recommendations   Assessment:   1.  Acute congestive heart failure with reduced ejection fraction.  Ischemic cardiomyopathy. LVEF: 40-45%.    2.  Severe coronary calcification on CT, coronary artery disease.   3.  Ischemic cardiomyopathy, focal wall motion abnormality on echo (inferolateral wall).    4.  Recent pulmonary embolism, bilateral  5.  DVT right leg  6.  Severe anemia related to myeloproliferative disorder.   Hemoglobin   Date Value Ref Range Status   04/24/2022 7.2 (L) 13.3 - 17.7 g/dL Final   7.  Thrombocytopenia,  Platelet Count   Date Value Ref Range Status   04/24/2022 105 (L) 150 - 450 10e3/uL Final   8.  Myeloproliferative disorder  9.  Large pleural effusion concerning for complex effusion with infection s/p chest tube 4/21   10.  Immunocompromise on chemotherapy  11.  COPD with acute exacerbation  12.  Hypertension  13.  Dynamic EKG changes in the inferolateral leads  14.  Hypokalemia  15.  Nonsustained ventricular tachycardia.  4 beats noted on telemetry overnight    Plan:   1.  Once chest tube removed would proceed with right and left heart catheterization in the coronary Cath Lab at [facility] (Likely Tuesday).  Hold warfarin tonight.   Given patient's had a prolonged hospitalization he could come back as an outpatient for coronary angiogram particularly if there is concern for resolving infection in his pleural space.    3.  Atorvastatin to 40 mg daily  4.  Change Lasix 40 mg daily, euvolemic at this time.  WT down 16 lb since admission.     5.  Currently on antibiotics per pulmonary medicine.   6.  Continue metoprolol XL at 25 mg daily  7.  Lisinopril 5 mg daily for HFrEF   8.  Check ionized calcium tomorrow.   Corrected with IV tin glu today  1 gram.      Will follow.      History of Present Illness/Subjective    S: No acute issues overnight.  Patient still has chest tube in place this morning.  Awaiting CT scan for further evaluation  of his loculated effusion.  Patient has diuresed well and down 16 pounds since admission.  No active chest pain at this time.  Decompensated heart failure is improved.  Blood pressure is stable.  On review of telemetry he has had 1 ventricular triplet, otherwise maintaining sinus rhythm.  Personally viewed telemetry.  No fever chills or sweats.  Pain at left chest tube site is stable.  No abdominal pain.  No nausea or vomiting.  He is alert and oriented.         Physical Examination  Review of Systems   VITALS: /60 (BP Location: Left arm)   Pulse 68   Temp 98.4  F (36.9  C) (Oral)   Resp 16   Wt 89.1 kg (196 lb 6.4 oz)   SpO2 93%   BMI 31.72 kg/m    BMI: Body mass index is 31.72 kg/m .  Wt Readings from Last 3 Encounters:   04/24/22 89.1 kg (196 lb 6.4 oz)   04/07/22 92.9 kg (204 lb 11.2 oz)       Intake/Output Summary (Last 24 hours) at 4/23/2022 0835  Last data filed at 4/23/2022 0627  Gross per 24 hour   Intake 740 ml   Output 571 ml   Net 169 ml     General Appearance:   no distress, normal body habitus, lying in bed.   ENT/Mouth: membranes moist, no oral lesions or bleeding gums.      EYES:  no scleral icterus, normal conjunctivae   Neck: no carotid bruits or thyromegaly   Chest/Lungs:   Chest tube.  Lungs are otherwise clear with exception of few crackles in the left base.   Cardiovascular:   Regular. Normal first and second heart sounds with no murmurs, rubs, or gallops; the carotid, radial and posterior tibial pulses are intact, Jugular venous pressure 6, no edema bilaterally, completely resolved edema.    Abdomen:  no tenderness; bowel sounds are present   Extremities: no cyanosis or clubbing   Skin: no xanthelasma, warm.    Neurologic: normal  bilateral, no tremors     Psychiatric: alert and oriented x3, calm     Review Of Systems  Skin: negative  Eyes: negative  Ears/Nose/Throat: negative  Respiratory: cough.  No pain in his chest.  Cardiovascular: Orthopnea has resolved  Gastrointestinal:  negative  Genitourinary: negative  Musculoskeletal: negative  Neurologic: negative  Psychiatric: negative  Hematologic/Lymphatic/Immunologic: negative  Endocrine: negative          Lab Results    Chemistry/lipid CBC Cardiac Enzymes/BNP/TSH/INR   Recent Labs   Lab Test 05/03/21  1557   CHOL 110   HDL 35*   LDL 52   TRIG 115     Recent Labs   Lab Test 05/03/21  1557 08/13/19  0926   LDL 52 111     Recent Labs   Lab Test 04/23/22  0441      POTASSIUM 3.8   CHLORIDE 106   CO2 27      BUN 22   CR 0.76   GFRESTIMATED >90   GLENDY 7.7*     Recent Labs   Lab Test 04/23/22  0441 04/22/22  0558 04/21/22  0502   CR 0.76 0.81 0.86     Recent Labs   Lab Test 10/31/19  2000   A1C 6.0          Recent Labs   Lab Test 04/23/22  0441   WBC 9.2   HGB 7.0*   HCT 22.9*   *   *     Recent Labs   Lab Test 04/23/22  0441 04/22/22  0558 04/21/22  0502   HGB 7.0* 7.5* 7.3*    Recent Labs   Lab Test 04/20/22  0405 04/19/22  2143 04/19/22  1441   TROPONINI 0.09 0.11 0.10     Recent Labs   Lab Test 04/19/22  1441 04/01/22  1208   BNP 1,221* 379*     No results for input(s): TSH in the last 65280 hours.  Recent Labs   Lab Test 04/23/22  0441 04/21/22  0502 04/20/22  0405   INR 1.97* 2.82* 2.67*        Medical History  Surgical History Family History Social History   Past Medical History:   Diagnosis Date     Cerebral infarction (H)      COPD (chronic obstructive pulmonary disease) (H)      HLD (hyperlipidemia)      Hypertension      Myeloproliferative disease (H)      Past Surgical History:   Procedure Laterality Date     OPEN REDUCTION INTERNAL FIXATION FOOT Right 2010     OTHER SURGICAL HISTORY  2001    Lip lesion removal     TONSILLECTOMY & ADENOIDECTOMY       Family History   Problem Relation Age of Onset     Alcoholism Mother         Social History     Socioeconomic History     Marital status:      Spouse name: Not on file     Number of children: Not on file     Years of education: Not on file     Highest  education level: Not on file   Occupational History     Not on file   Tobacco Use     Smoking status: Former Smoker     Packs/day: 1.00     Start date: 6/8/1965     Quit date: 1/1/2012     Years since quitting: 10.3     Smokeless tobacco: Never Used   Substance and Sexual Activity     Alcohol use: Yes     Alcohol/week: 19.0 standard drinks     Drug use: Never     Sexual activity: Not on file   Other Topics Concern     Not on file   Social History Narrative     Not on file     Social Determinants of Health     Financial Resource Strain: Not on file   Food Insecurity: Not on file   Transportation Needs: Not on file   Physical Activity: Not on file   Stress: Not on file   Social Connections: Not on file   Intimate Partner Violence: Not on file   Housing Stability: Not on file         Medications  Allergies   No current outpatient medications on file.      No Known Allergies      Sp Larson,

## 2022-04-24 NOTE — PLAN OF CARE
"Problem: Infection (Pneumonia)  Goal: Resolution of Infection Signs and Symptoms  Outcome: Ongoing, Progressing     Problem: Fluid Imbalance (Pneumonia)  Goal: Fluid Balance  Outcome: Ongoing, Progressing      Goal Outcome Evaluation:  Tele NSR, Bps still lower at times. Pt denies dizziness, pain or SOB. Remains on 3L via oxymask, desats to low 80's when O2 removed. Chest tube with min drainage. Pt wishing to be left alone between cares overnight to help with quality sleep, however many times pt became tangled in chest tube, telemetry, oximeter and IV tubing. Frustrated \"still needs all this stuff.\"  "

## 2022-04-24 NOTE — PROGRESS NOTES
PULMONARY / CRITICAL CARE PROGRESS NOTE    Date / Time of Admission:  4/19/2022  1:19 PM    Assessment:     Shaji Pompa is a 76 year old male with history of COPD, HTN, myeloproliferative disease, recent diagnosis of bilateral pulmonary embolism 4/1/22 anticoagulated.  Presents to ED for evaluation of worsening shortness of breath. Denies fever, chills, night sweats. No cough. Denies chests pain, unchanged mild swelling of right LE.   Chest CT scan showed loculated left effusion, air pockets.   Labs showed anemia, Hb 6.7, (previously 9.5), normal WBC, normal basic chem.   Diagnostic thoracentesis , complex effusion, 100 ml of yellowish fluid was obtained, exudate fluid, predominant neutrophilic, gram stain negative. Patient was started on Abx. Systemic steroids.   Received RBC transfusion. EKG showed inverted T wave in inferior leads, concerning for ischemic event, cardiac enzymes negative. Echocardiogram showed EF 40%, severe inferior hypokinesis, moderate AS  wall hypokinesis, distal hypokinesis. Cardiology was consulted and plan for coronary angiogram.    1. Loculated left pleural effusion   Small pleural effusion was seen on 4/1 chest CT scan, patient was diagnosed with bilateral pulmonary embolism at that time, CT done on 4/19 showed loculated left side effusion and air pockets.   US guided thoracentesis showed complex effusion, extensive septations, 100 ml yellowish fluid obtained , exudative, predominantly neutrophilic, gram stain negative. S/p chest tube placement 4/21.   Started on intrapleural lytics x 3 days. Gram stain and cultures are negative, cytology pending.   Follow up chest CT scan showed significant improvement of loculated effusion.  Chest tube was removed.  Currently on Abx zosyn, narrow to augmentin and complete 2 weeks.   2. Acute decompensated cardiomyopathy   Case was discussed with cardiology, significant coronary artery calcifications. Recent echocardiogram showed EF 40%, severe  inferior hypokinesis, moderate AS  wall hypokinesis, distal hypokinesis. Plan for coronary angiogram in the near furture.   Continue diuresis, titrate BP meds, ASA.   3. Anemia s/p RBC transfusion   4. Hx bilateral pulmonary emboli and right popliteal DVT 4/1/22, anticoagulated  5. COPD   Stable, continue bronchodilators   6. HTN  7. Myeloproliferative disorder    Advance Directives: Full code    Plan:   1. Titrate FiO2, keep SpO2 > 90%  2. Scheduled bronchodilators  3. Chest tube was removed   4. Continue to monitor pleural fluid analysis, (cultures and cytology)  5. Currently on zosyn, change to oral augmentin and complete 2 weeks of antibiotics  6. Continue IV diuresis   7. Titrate BP meds accordingly   8. Cardiology is following , plan for coronary angiogram in the near future  9. DVT prophylaxis, patient is anticoagulated    Please contact me if you have any questions.    Aniceto Bynum  Pulmonary / Critical Care  04/24/2022  11:53 AM    Subjective     HPI:  Shaji Pompa is a 76 year old male with history of COPD, HTN, myeloproliferative disease, recent diagnosis of bilateral pulmonary embolism 4/1/22 anticoagulated.  Presents to ED for evaluation of worsening shortness of breath. Denies fever, chills, night sweats. No cough. Denies chests pain, unchanged mild swelling of right LE.   Chest CT scan showed loculated left effusion, air pockets.   Labs showed anemia, Hb 6.7, (previously 9.5), normal WBC, normal basic chem.   Diagnostic thoracentesis , complex effusion, 100 ml of yellowish fluid was obtained, exudate fluid, predominant neutrophilic, gram stain negative. Patient was started on Abx. Systemic steroids.   Received RBC transfusion. EKG showed inverted T wave in inferior leads, concerning for ischemic event, cardiac enzymes negative. Echocardiogram showed EF 40%, severe inferior hypokinesis, moderate AS  wall hypokinesis, distal hypokinesis. Cardiology was consulted and plan for  coronary angiogram.     Events overnight  - Doing well.   - Had 3rd dose of intrapleural lytics yesterday  - Follow up chest CT scan showed decrease size of pleural effusion, residual loculated left hydropneumothorax, chest tube in the chest wall.     Allergies: Patient has no known allergies.     MEDS:  Current Facility-Administered Medications   Medication     acetaminophen (TYLENOL) tablet 325-650 mg     albuterol (PROVENTIL HFA/VENTOLIN HFA) inhaler     albuterol (PROVENTIL) neb solution 2.5 mg     albuterol (PROVENTIL) neb solution 2.5 mg     atorvastatin (LIPITOR) tablet 40 mg     calcium gluconate 1 g in 50 mL sodium chloride intermittent infusion (premix)     folic acid (FOLVITE) tablet 1 mg     furosemide (LASIX) tablet 40 mg     [Held by provider] heparin 25,000 units in 0.45% NaCl 250 mL ANTICOAGULANT infusion     hydrALAZINE (APRESOLINE) injection 10 mg     hydroxyurea (HYDREA) capsule 500 mg     lidocaine (LMX4) cream     lidocaine 1 % 0.1-1 mL     lidocaine 1 % 1-30 mL     lisinopril (ZESTRIL) tablet 5 mg     melatonin tablet 1 mg     metoprolol succinate ER (TOPROL-XL) 24 hr tablet 25 mg     naloxone (NARCAN) injection 0.2 mg    Or     naloxone (NARCAN) injection 0.4 mg    Or     naloxone (NARCAN) injection 0.2 mg    Or     naloxone (NARCAN) injection 0.4 mg     ondansetron (ZOFRAN-ODT) ODT tab 4 mg    Or     ondansetron (ZOFRAN) injection 4 mg     oxyCODONE (ROXICODONE) tablet 5 mg     piperacillin-tazobactam (ZOSYN) 3.375 g vial to attach to  mL bag     polyethylene glycol (MIRALAX) Packet 17 g     prochlorperazine (COMPAZINE) injection 5 mg    Or     prochlorperazine (COMPAZINE) tablet 5 mg    Or     prochlorperazine (COMPAZINE) suppository 12.5 mg     sodium chloride (PF) 0.9% PF flush 3 mL     sodium chloride (PF) 0.9% PF flush 3 mL     umeclidinium (INCRUSE ELLIPTA) 62.5 MCG/INH inhaler 1 puff     Warfarin Therapy Reminder (Check START DATE - warfarin may be starting in the FUTURE)      Objective:   VITALS:  /51 (BP Location: Left arm)   Pulse 64   Temp 98.5  F (36.9  C) (Oral)   Resp 20   Wt 89.1 kg (196 lb 6.4 oz)   SpO2 98%   BMI 31.72 kg/m    VENT:  Resp: 20    EXAM:   Gen: awake, alert, no distress  HEENT: pale conjunctiva, moist mucosa, Mallampati III/IV  Neck: no thyromegaly, masses or JVD  Lungs: clear, mild decrease BS left base  CV: regular, no murmurs or gallops appreciated  Abdomen: soft, NT, BS wnl  Ext: trace edema  Neuro: CN II-XII intact, non focal       Data Review:  Recent Labs   Lab 04/24/22  0408 04/23/22  0441 04/22/22  0558 04/21/22  0502 04/20/22  0405 04/19/22  2143    107 91 111 128* 102      04/24/22 04:08   Sodium 143   Potassium 3.7   Chloride 106   Carbon Dioxide 29   Urea Nitrogen 23   Creatinine 0.79   GFR Estimate >90 [1]   Calcium 7.8 (L)   Anion Gap 8   Glucose 105   WBC 8.8   Hemoglobin 7.2 (L)   Hematocrit 23.1 (L)   Platelet Count 105 (L)   RBC Count 1.96 (L)    (H)   MCH 36.7 (H)   MCHC 31.2 (L)   RDW See Comment [2]   INR 1.80 (H)        04/21/22 13:02   Cell Count Fluid Source Pleural Cavity, Left   Color Fluid Orange !   Appearance Fluid Cloudy !   RBC Fluid 8,000   % Mono/Macro Fluid 13   % Neutrophils Fluid 87   Total Nucleated Cells 1,126        04/19/22 16:53   Cell Count Fluid Source Pleural Cavity, Left   Color Fluid Red !   Appearance Fluid Hazy !   RBC Fluid 14,000   % Mono/Macro Fluid 3   % Neutrophils Fluid 97   Glucose Fluid Source Pleural Cavity, Left   Glucose Fluid 20   LD Fluid Source Pleural Cavity, Left   Lactate Dehydrogenase Fluid 1,282 [1]   Protein Fluid Source Pleural Cavity, Left   Protein Total Fluid 3.5     Gram Stain Result  No organisms seen      2+ WBC seen        Fluid Culture No growth, less than 1 day         CT CHEST W/O CONTRAST  LOCATION: [facility]  DATE/TIME: 4/19/2022 3:04 PM  INDICATION: Shortness of breath. Dyspnea. Recent pulmonary embolus.  COMPARISON: Chest CTA  04/01/2022  FINDINGS:   LUNGS AND PLEURA: There is a large, complex loculated left pleural effusion with loculated components of septated are inferiorly air  and increase in the amount of fluid since study done 18 days ago. Fluid has a density of 10 Hounsfield units. There is associated compressive atelectasis of most of the left lower lobe. Few small rounded or ovoid groundglass opacities have developed in the right lower lobe (images 148, 160 171).    MEDIASTINUM/AXILLAE: Blood pool is much less dense than the heart muscle. There is mild mediastinal and subcarinal adenopathy with subcarinal nodes measuring 1.2 cm in short axis.  CORONARY ARTERY CALCIFICATION: Severe.  UPPER ABDOMEN: Incidental splenule.  MUSCULOSKELETAL: No suspicious lesions. Severe degenerative changes in both shoulders.                                  IMPRESSION:   1.  Notable increase in the complex, loculated left-sided pleural effusion with now loculated bubbles of air in the left base. This fluid has a density of 10 Hounsfield units. Given the prior significant pulmonary emboli, need to  consider pulmonary infarct with necrosis and either a sterile or infected pulmonary abscess/empyema.  2.  Patient's quite anemic.    CT CHEST WITHOUT CONTRAST  LOCATION: [facility]  DATE/TIME: 04/24/2022, 8:09 AM  INDICATION: Follow-up loculated left pleural effusion.  COMPARISON: 04/19/2022.  FINDINGS:   LUNGS AND PLEURA: The left pigtail catheter has pulled out of the pleural space and is in the left chest wall. This can be removed. There is a small residual loculated left hydropneumothorax, significantly decreased in size when compared to previous. Improved aeration in the left lung with some residual atelectasis and likely pulmonary edema in the left lung base. A few peripheral and peribronchial opacities in the right lung have not appreciably changed. No right pleural effusion.  MEDIASTINUM/AXILLAE: No lymphadenopathy. No  thoracic aortic aneurysm.  CORONARY ARTERY CALCIFICATION: Severe.  UPPER ABDOMEN: No significant finding.  MUSCULOSKELETAL: Unremarkable.  IMPRESSION:   1.  Small residual loculated left hydropneumothorax, status post chest tube placement, with significantly improved aeration in the left lung.  2.  The left chest tube has pulled out of the pleural space and is in the left chest wall. This can be removed.    By:  Aniceto Bynum MD, 04/24/2022  11:53 AM    Primary Care Physician:  Steven Caraballo

## 2022-04-24 NOTE — PLAN OF CARE
Problem: Fluid Imbalance (Pneumonia)  Goal: Fluid Balance  Outcome: Ongoing, Progressing     Problem: Respiratory Compromise (Pneumonia)  Goal: Effective Oxygenation and Ventilation  Outcome: Ongoing, Progressing    Problem: Hypertension Comorbidity  Goal: Blood Pressure in Desired Range    Completed 1 PRBC transfusion this jodee, no s/s rxn. BP slightly better, asymptomatic. Tele NSR. Denies pain, some SOB with activity. CT medication instillation, no immed returns after unclamped. CT tubing is patent, but difficult to flush.

## 2022-04-25 ENCOUNTER — PREP FOR PROCEDURE (OUTPATIENT)
Dept: CARDIOLOGY | Facility: CLINIC | Age: 77
End: 2022-04-25
Payer: COMMERCIAL

## 2022-04-25 LAB
ANION GAP SERPL CALCULATED.3IONS-SCNC: 10 MMOL/L (ref 5–18)
BUN SERPL-MCNC: 18 MG/DL (ref 8–28)
CALCIUM SERPL-MCNC: 8.2 MG/DL (ref 8.5–10.5)
CHLORIDE BLD-SCNC: 104 MMOL/L (ref 98–107)
CO2 SERPL-SCNC: 26 MMOL/L (ref 22–31)
CREAT SERPL-MCNC: 0.84 MG/DL (ref 0.7–1.3)
ERYTHROCYTE [DISTWIDTH] IN BLOOD BY AUTOMATED COUNT: ABNORMAL %
GFR SERPL CREATININE-BSD FRML MDRD: 90 ML/MIN/1.73M2
GLUCOSE BLD-MCNC: 125 MG/DL (ref 70–125)
HCT VFR BLD AUTO: 27.8 % (ref 40–53)
HGB BLD-MCNC: 8.6 G/DL (ref 13.3–17.7)
INR PPP: 1.49 (ref 0.85–1.15)
MCH RBC QN AUTO: 36.8 PG (ref 26.5–33)
MCHC RBC AUTO-ENTMCNC: 30.9 G/DL (ref 31.5–36.5)
MCV RBC AUTO: 119 FL (ref 78–100)
PLATELET # BLD AUTO: 131 10E3/UL (ref 150–450)
POTASSIUM BLD-SCNC: 4.1 MMOL/L (ref 3.5–5)
RBC # BLD AUTO: 2.34 10E6/UL (ref 4.4–5.9)
SODIUM SERPL-SCNC: 140 MMOL/L (ref 136–145)
UFH PPP CHRO-ACNC: 0.55 IU/ML
UFH PPP CHRO-ACNC: 0.67 IU/ML
WBC # BLD AUTO: 6.8 10E3/UL (ref 4–11)

## 2022-04-25 PROCEDURE — 99233 SBSQ HOSP IP/OBS HIGH 50: CPT | Mod: GC

## 2022-04-25 PROCEDURE — 85014 HEMATOCRIT: CPT

## 2022-04-25 PROCEDURE — 250N000013 HC RX MED GY IP 250 OP 250 PS 637: Performed by: INTERNAL MEDICINE

## 2022-04-25 PROCEDURE — 250N000011 HC RX IP 250 OP 636: Performed by: STUDENT IN AN ORGANIZED HEALTH CARE EDUCATION/TRAINING PROGRAM

## 2022-04-25 PROCEDURE — 85610 PROTHROMBIN TIME: CPT

## 2022-04-25 PROCEDURE — 250N000013 HC RX MED GY IP 250 OP 250 PS 637: Performed by: STUDENT IN AN ORGANIZED HEALTH CARE EDUCATION/TRAINING PROGRAM

## 2022-04-25 PROCEDURE — 80048 BASIC METABOLIC PNL TOTAL CA: CPT

## 2022-04-25 PROCEDURE — 210N000002 HC R&B HEART CARE

## 2022-04-25 PROCEDURE — 36415 COLL VENOUS BLD VENIPUNCTURE: CPT | Performed by: FAMILY MEDICINE

## 2022-04-25 PROCEDURE — 99233 SBSQ HOSP IP/OBS HIGH 50: CPT | Performed by: INTERNAL MEDICINE

## 2022-04-25 PROCEDURE — 250N000013 HC RX MED GY IP 250 OP 250 PS 637

## 2022-04-25 PROCEDURE — 85520 HEPARIN ASSAY: CPT | Performed by: FAMILY MEDICINE

## 2022-04-25 PROCEDURE — 250N000011 HC RX IP 250 OP 636

## 2022-04-25 RX ORDER — LIDOCAINE 40 MG/G
CREAM TOPICAL
Status: CANCELLED | OUTPATIENT
Start: 2022-04-25

## 2022-04-25 RX ORDER — SODIUM CHLORIDE 9 MG/ML
INJECTION, SOLUTION INTRAVENOUS CONTINUOUS
Status: CANCELLED | OUTPATIENT
Start: 2022-04-25

## 2022-04-25 RX ORDER — POLYETHYLENE GLYCOL 3350 17 G/17G
17 POWDER, FOR SOLUTION ORAL DAILY PRN
Status: DISCONTINUED | OUTPATIENT
Start: 2022-04-25 | End: 2022-04-26 | Stop reason: HOSPADM

## 2022-04-25 RX ORDER — ASPIRIN 325 MG
325 TABLET ORAL ONCE
Status: CANCELLED | OUTPATIENT
Start: 2022-04-25 | End: 2022-04-25

## 2022-04-25 RX ORDER — ASPIRIN 81 MG/1
243 TABLET, CHEWABLE ORAL ONCE
Status: CANCELLED | OUTPATIENT
Start: 2022-04-25

## 2022-04-25 RX ORDER — HEPARIN SODIUM 10000 [USP'U]/100ML
0-5000 INJECTION, SOLUTION INTRAVENOUS CONTINUOUS
Status: DISCONTINUED | OUTPATIENT
Start: 2022-04-25 | End: 2022-04-26 | Stop reason: HOSPADM

## 2022-04-25 RX ADMIN — ATORVASTATIN CALCIUM 40 MG: 40 TABLET, FILM COATED ORAL at 07:42

## 2022-04-25 RX ADMIN — AMOXICILLIN AND CLAVULANATE POTASSIUM 1 TABLET: 875; 125 TABLET, FILM COATED ORAL at 18:35

## 2022-04-25 RX ADMIN — FOLIC ACID 1 MG: 1 TABLET ORAL at 07:42

## 2022-04-25 RX ADMIN — LISINOPRIL 5 MG: 5 TABLET ORAL at 07:42

## 2022-04-25 RX ADMIN — METOPROLOL SUCCINATE 25 MG: 25 TABLET, FILM COATED, EXTENDED RELEASE ORAL at 07:42

## 2022-04-25 RX ADMIN — HEPARIN SODIUM 1600 UNITS/HR: 10000 INJECTION, SOLUTION INTRAVENOUS at 19:40

## 2022-04-25 RX ADMIN — HYDROXYUREA 500 MG: 500 CAPSULE ORAL at 07:44

## 2022-04-25 RX ADMIN — PIPERACILLIN AND TAZOBACTAM 3.38 G: 3; .375 INJECTION, POWDER, LYOPHILIZED, FOR SOLUTION INTRAVENOUS at 05:14

## 2022-04-25 RX ADMIN — HEPARIN SODIUM 1600 UNITS/HR: 10000 INJECTION, SOLUTION INTRAVENOUS at 10:55

## 2022-04-25 RX ADMIN — UMECLIDINIUM 1 PUFF: 62.5 AEROSOL, POWDER ORAL at 07:43

## 2022-04-25 RX ADMIN — FUROSEMIDE 40 MG: 40 TABLET ORAL at 07:42

## 2022-04-25 ASSESSMENT — ACTIVITIES OF DAILY LIVING (ADL)
ADLS_ACUITY_SCORE: 10
ADLS_ACUITY_SCORE: 8
ADLS_ACUITY_SCORE: 10
ADLS_ACUITY_SCORE: 8
ADLS_ACUITY_SCORE: 10
ADLS_ACUITY_SCORE: 10
ADLS_ACUITY_SCORE: 8
ADLS_ACUITY_SCORE: 10
ADLS_ACUITY_SCORE: 8

## 2022-04-25 NOTE — PLAN OF CARE
Problem: Risk for Delirium  Goal: Improved Sleep  Outcome: Ongoing, Progressing     Problem: Respiratory Compromise (Pneumonia)  Goal: Effective Oxygenation and Ventilation  Outcome: Ongoing, Progressing    Goal Outcome Evaluation:  Lungs dim, remains on 1-1.5L via oxymask during sleeping, NC 1L while awake. Denies pain. VSS, BP now elevated. IV abx. Tele NSR, BBB at times. Lab unable to draw on first attempts this morning, pt expresses great frustration at this. Lab to reattempt when new staff arrives.

## 2022-04-25 NOTE — PROGRESS NOTES
"SPIRITUAL HEALTH SERVICES Progress Note  [facility]    Saw pt Shaji Pompa per LOS.  Spouse, Nidhi, present.    Illness Narrative - Pt and spouse note that they recently returned from [place] and that pt has spent more time in the hospital than home. Nevertheless, they are glad to be home in [place] and close to family for care; they look forward to \"getting back to normal life\" after pt's upcoming procedure.     Distress - The main distress pt named was feeling unwell and eagerness to return home.    Coping - Pt reports feeling well supported by spouse and children who live in the area. Pt and spouse are looking forward to seasonal activities, like using their camper. They are Denominational and welcomed prayer, which SH provided.    Meaning-Making - Pt and spouse focused on themes of gratitude and hope as pt's hospital course continues.     Plan - No plans to follow given pt's stated expectation to be moved to another hospital for procedure. They acknowledged SH availability throughout [facility] and will request as needed.     Mark Jackson, MDiv, ACPE   & Clinical Pastoral Educator   "

## 2022-04-25 NOTE — PROGRESS NOTES
[employer]    Progress Note - Hospitalist Service       Date of Admission:  4/19/2022    Assessment & Plan          Shaji Pompa is a 76 year old old male with a past medical history of myeloproliferative disease, COPD, HTN, CVA, recent DVT and PEs who presented to the [facility] Emergency Department on the day of admission with complaints of worsening dyspnea, found to have complex loculated left pleural effusion with thoracentesis of 100cc exudative effusion, gram stain negative. Chest tube placement 4/21/2022 and removed 4/24 with improvement to the oxygen status, now on room air. Echo focal wall motion abnormality concerning for ischemic cardiomyopathy, plan to transfer to [facility] for cardiac cath 4/26.  Patient received unit of PRBC 4/23, hemoglobin has been stable.     Acute Hypoxic Respiratory Failure, improved  Left complex loculated pleural effusion, transudative  Chest tube placement 4/21, removed 4/25  Pain, resolved  Symptoms likely multifactorial including acute congestive heart failure, complex pleural effusion, recent bilateral pulmonary emboli, suspected COPD exacerbation. Chest tube placement 4/21, removed 4/24/22, with improvement on CT and clinical improvement, now satting well on room air. Patient was transition from IV Zosyn to Augmentin p.o.  -O2 supplementation as needed, goal of >90%  - Pulm consult: appreciate recs   -Chest tube removed   -Continue p.o. augmentin (6/14)   -Scheduled bronchodilators   -Oxycodone 5 mg as needed     HFrEF, acute  Weight down 14 lbs since admission.  BUN and creatinine stable. Continuing on PO Lasix and will plan for heart catheterization 4/26.   -Cardiology consult, appreciate cares and recs   -Right and left heart catheterization at [facility] 4/26   -NPO at midnight   -Continue atorvastatin 40 mg daily   -Continue Lasix PO 40 mg daily   -Continue lisinopril 5 mg daily  -Discontinued BMP due to stability   -  Telemetry  - Daily weights, strict I's and O's  -O2 supplementation as needed  -Cardiac diet  -Continue PTA metoprolol 25mg daily     COPD exacerbation, suspected  Presents with dyspnea, productive cough, chest tightness with history of COPD, now improved, no wheezes on exam. Ceftriaxone discontinued vancomycin discontinued 4/23.  Prednisone 5-day course completed.  IV Zosyn changed to p.o. Augmentin.  -Continue p.o. Augmentin (6/14)  - Cardiac telemetry  - Supplemental O2 as needed  - R CAT consult  - PTA Spiriva daily  -Schedule DuoNeb  -Encouraged IS     Macrocytic Anemia, s/p 2 unit PRBC  Thrombocytopenia  Myelodysplastic syndrome  Hemoglobin now stable, no signs of acute bleed. Patient's macrocytic anemia attributed to hydroxyurea on last admission, can be attributed to myelodysplastic syndrome as well.   -Discontinue CBC due to difficult lab draw and stable hemoglobin, no acute bleeds  - Heme-onc consult, appreciate cares and recs  -If symptomatic we will draw CBC and transfuse if hemoglobin <8  -Continue PTA hydroxyurea decreased to 500 mg daily  -Continue folic acid 1 mg p.o. daily     Recent bilateral pulmonary emboli  On warfarin for provoked DVT and PE after a long road trip 4/1/2022. INR 3.06 on ED intake, upon recheck was 1. 49.  Cardiac catheterization planned 4/26, will continue to hold PTA warfarin  -Initiated heparin drip  - Continue to hold PTA Warfarin     Hypertension  - Continue PTA metoprolol  -Continue lisinopril 5 mg as above  - Hydralazine IV as needed     CVA, 2019  -Continue 40 mg atorvastatin, up from PTA 20 mg       Diet: Regular Diet Adult  NPO for Medical/Clinical Reasons Except for: Meds    DVT Prophylaxis: Pneumatic Compression Devices  Garrett Catheter: Not present  Fluids: PO  Central Lines: None  Cardiac Monitoring: ACTIVE order. Indication: Acute decompensated heart failure (48 hours)  Code Status: Full Code      Disposition Plan   Expected Discharge: 04/26/2022     Anticipated  discharge location: home with family    Delays: N.p.o. at midnight, plan to transfer to [facility] 4/26 for cardiac catheterization       The patient's care was discussed with the Attending Physician, Dr. Townsend.    Shanel Rodarte MD  Hospitalist Service  [facility]  Securely message with the Vocera Web Console (learn more here)  Text page via [facility] Paging/Directory     ______________________________________________________________________    Interval History   No acute overnight events, afebrile, VSS, satting well on room air.  Patient reports no noticeable shortness of breath, no chest pain in, no abdominal pain.  Patient is frustrated with difficult lab draws.  Discussed overall course and plans for cardiac catheterization tomorrow 4/26.  Wife attentive in room.  All questions answered.    Data reviewed today: I reviewed all medications, new labs and imaging results over the last 24 hours.    Physical Exam   Vital Signs: Temp: 97.8  F (36.6  C) Temp src: Oral BP: 130/63 Pulse: 69   Resp: 18 SpO2: 95 % O2 Device: Nasal cannula Oxygen Delivery: 1 LPM  Weight: 198 lbs 6.4 oz  Physical Exam  Constitutional:       General: He is not in acute distress.     Appearance: He is not ill-appearing or diaphoretic.   HENT:      Head: Normocephalic and atraumatic.   Eyes:      Extraocular Movements: Extraocular movements intact.      Conjunctiva/sclera: Conjunctivae normal.   Cardiovascular:      Rate and Rhythm: Normal rate and regular rhythm.      Pulses: Normal pulses.      Heart sounds: Normal heart sounds. No murmur heard.    No gallop.   Pulmonary:      Effort: Pulmonary effort is normal. No respiratory distress.      Comments: LS CTA, improved, no wheezes, no crackles    Abdominal:      General: There is no distension.      Palpations: Abdomen is soft.      Tenderness: There is no abdominal tenderness.   Musculoskeletal:         General: Normal range of motion.      Comments: No bilateral  peripheral edema, improved   Skin:     General: Skin is warm and dry.      Capillary Refill: Capillary refill takes less than 2 seconds.   Neurological:      General: No focal deficit present.      Mental Status: He is alert and oriented to person, place, and time.   Psychiatric:         Mood and Affect: Mood normal.         Behavior: Behavior normal.           Data   Recent Labs   Lab 04/25/22  0906 04/24/22  0408 04/23/22  1222 04/23/22  0441 04/21/22  0502 04/20/22  0405 04/20/22  0142 04/19/22  2143 04/19/22  2036 04/19/22  1441   WBC 6.8 8.8  --  9.2   < > 8.0  --  8.7  --  10.7   HGB 8.6* 7.2* 7.0* 7.0*   < > 7.4*   < > 7.0*  --  6.7*   * 118*  --  124*   < > 122*  --  121*  --  130*   * 105*  --  114*   < > 103*  --  120*  --  123*   INR 1.49* 1.80*  --  1.97*   < > 2.67*  --   --   --  3.06*    143  --  141   < > 140  --  140  --  140   POTASSIUM 4.1 3.7  --  3.8   < > 3.9  --  3.7  --  4.0   CHLORIDE 104 106  --  106   < > 105  --  104  --  106   CO2 26 29  --  27   < > 27  --  26  --  25   BUN 18 23  --  22   < > 19  --  18  --  21   CR 0.84 0.79  --  0.76   < > 0.79  --  0.76  --  0.79   ANIONGAP 10 8  --  8   < > 8  --  10  --  9   GLENDY 8.2* 7.8*  --  7.7*   < > 8.2*  --  8.0*  --  8.2*    105  --  107   < > 128*  --  102   < > 106   ALBUMIN  --   --   --   --   --  1.9*  --   --   --  1.9*   PROTTOTAL  --   --   --   --   --  6.0  --  6.1  --  5.9*   BILITOTAL  --   --   --   --   --  1.3*  --   --   --  1.0   ALKPHOS  --   --   --   --   --  78  --   --   --  88   ALT  --   --   --   --   --  22  --   --   --  22   AST  --   --   --   --   --  17  --   --   --  18    < > = values in this interval not displayed.     No results found for this or any previous visit (from the past 24 hour(s)).

## 2022-04-25 NOTE — PROGRESS NOTES
Care Management Follow Up    Length of Stay (days): 6    Expected Discharge Date: 04/26/2022     Concerns to be Addressed:  Not medically ready for discharge      Patient plan of care discussed at interdisciplinary rounds: Yes    Anticipated Discharge Disposition: transfer to [facility] on 4/26  Anticipated Discharge Services: transfer to [facility] on 4/26    Referrals Placed by CM/SW: None at this time.    Private pay costs discussed: None indicated.     Additional Information:  Chart reviewed. CM met with patient and wife Nidhi (at bedside).       Bruna Blanco RN

## 2022-04-25 NOTE — PROGRESS NOTES
Assessment/Plan:  1.  Acute systolic congestive heart failure, ischemic cardiomyopathy, LVEF of 40%: The patient responded to diuresis.  He lost more than 10 pounds.  His shortness of breath is significantly improved.  Continue metoprolol succinate, lisinopril, Lasix.    2.  Ischemic cardiomyopathy: Most likely, the patient has significant coronary artery disease due to severe hypokinesis in the inferior wall per echo report, LVEF decreased to 40%.  Dr. Larson discussed coronary angiogram with possible PCI for further evaluation.  Currently pulmonary artery pressure 50 mmHg can be explained by pulmonary embolism and left heart failure.  His pulmonary artery systolic pressure is elevated when he has acute PE which is improved.  Discussed with Dr. Larson, focus on left heart cath as coronary angiogram with possible PCI at this time.  The patient had breakfast this morning, also INR level is not come back today.  Plan to have a coronary angiogram with possible PCI tomorrow.  The order is placed.  Cath Lab is informed.    3.  Recent acute bilateral pulmonary embolism: His INR was 1.8 yesterday.  Primary team did not start heparin infusion due to MDS, anemia, risk of anticoagulation.  Since patient has acute PE, the patient needs heparin infusion.  Started today.    4.  Benign essential hypertension, dyslipidemia: Treated.  His blood pressure is controlled.    5.  COPD, MDS, anemia, thrombocytopenia, left pleural effusion: Deferred to primary team management.    Subjective:  Interval History:  Has no complaints of chest pain or shortness of breath.     Current Medications:  Scheduled Meds:    atorvastatin  40 mg Oral QAM     folic acid  1 mg Oral Daily     furosemide  40 mg Oral Daily     hydroxyurea  500 mg Oral Daily     lisinopril  5 mg Oral Daily     metoprolol succinate ER  25 mg Oral Daily     piperacillin-tazobactam  3.375 g Intravenous Q8H     polyethylene glycol  17 g Oral Daily     sodium chloride (PF)  3  mL Intracatheter Q8H     umeclidinium  1 puff Inhalation Daily     warfarin-No DOSE today  1 each Does not apply no dose today (warfarin)     Continuous Infusions:    [Held by provider] heparin       Warfarin Therapy Reminder       PRN Meds:.acetaminophen, albuterol, albuterol, albuterol, hydrALAZINE, lidocaine 4%, lidocaine (buffered or not buffered), lidocaine (buffered or not buffered), melatonin, naloxone **OR** naloxone **OR** naloxone **OR** naloxone, ondansetron **OR** ondansetron, oxyCODONE, prochlorperazine **OR** prochlorperazine **OR** prochlorperazine, sodium chloride (PF), Warfarin Therapy Reminder    Objective:   Vital signs in last 24 hours:  Temp:  [97.8  F (36.6  C)-98.5  F (36.9  C)] 97.8  F (36.6  C)  Pulse:  [64-86] 69  Resp:  [18-20] 18  BP: (101-141)/(51-63) 130/63  SpO2:  [90 %-99 %] 99 %  Weight:   [unfilled]     Physical Exam:  General Appearance:   Awake, Alert, No acute distress.   HEENT:  No scleral icterus; the mucous membranes were moist.   Neck: No cervical bruits. No jugular venous distention   Lungs:   Diminished breathing sounds. No wheezing.   Cardiovascular:   RRR, normal first and second heart sounds with no murmurs. No rubs or gallops.     Abdomen:  Obese. Soft. No tenderness. Bowels sounds are present   Extremities: No leg edema. Equal posterior tibial pulsese.   Skin: Warm, Dry. No rashes.   Neurologic: Mood and affect are appropriate. No focal deficits.         Cardiographics:   Report: personally reviewed. .      Tele monitoring -  Sinus rhythm, no cardiac arrhythmia    Echocardiogram 4-:  1. The left ventricle is normal in size. Left ventricular systolic performance  is moderately reduced. The ejection fraction is estimated to be 40%.  2. There is severe inferior hypokinesis. There is moderate anteroseptal  hypokinesis. In addition, there is moderate mid to distal posterior  hypokinesis and severe distal lateral hypokinesis  3. No significant valvular heart disease  is identified on this study.  4. Borderline right ventricular enlargement with low normal right ventricular  systolic performance.  5. There is mild left atrial enlargement.  6. Right ventricular systolic pressure relative to right atrial pressure is  mildly increased. The pulmonary artery pressure is estimated to be 45-50mmHg  plus right atrial pressure (the IVC is of normal caliber).     When compared to the prior real-time echocardiogram dated 2 February 2022,  there has been interval diminution in left ventricular systolic performance in  the aforementioned regional wall motion abnormalities appear to be new.      Lab Results:   personally reviewed.     Lab Results   Component Value Date     04/24/2022    CO2 29 04/24/2022    BUN 23 04/24/2022     Lab Results   Component Value Date    TROPONINI 0.09 04/20/2022     Lab Results   Component Value Date    WBC 8.8 04/24/2022    HGB 7.2 04/24/2022    HCT 23.1 04/24/2022     04/24/2022     04/24/2022     Lab Results   Component Value Date    CHOL 110 05/03/2021    TRIG 115 05/03/2021    HDL 35 05/03/2021    LDL 52 05/03/2021

## 2022-04-25 NOTE — PLAN OF CARE
LS diminished bilaterally. No SOB noted. Continues on 1L O2 via NC.   Transitioned to PO abx. Initiated heparin drip. Plan to transfer to [facility] tomorrow for angio. NPO at midnight. VSS.    Problem: Plan of Care - These are the overarching goals to be used throughout the patient stay.    Goal: Absence of Hospital-Acquired Illness or Injury  Intervention: Prevent and Manage VTE (Venous Thromboembolism) Risk  Recent Flowsheet Documentation  Taken 4/25/2022 1145 by Merary Horton, RN  Activity Management: activity adjusted per tolerance  Taken 4/25/2022 0745 by Merary Horton, RN  Activity Management: activity adjusted per tolerance     Problem: Infection (Pneumonia)  Goal: Resolution of Infection Signs and Symptoms  Outcome: Ongoing, Progressing   Goal Outcome Evaluation:

## 2022-04-26 ENCOUNTER — HOSPITAL ENCOUNTER (INPATIENT)
Facility: HOSPITAL | Age: 77
LOS: 5 days | Discharge: HOME | DRG: 286 | End: 2022-05-01
Attending: INTERNAL MEDICINE | Admitting: INTERNAL MEDICINE
Payer: COMMERCIAL

## 2022-04-26 VITALS
BODY MASS INDEX: 31.68 KG/M2 | SYSTOLIC BLOOD PRESSURE: 132 MMHG | RESPIRATION RATE: 16 BRPM | OXYGEN SATURATION: 92 % | DIASTOLIC BLOOD PRESSURE: 60 MMHG | WEIGHT: 196.2 LBS | HEART RATE: 67 BPM | TEMPERATURE: 98.5 F

## 2022-04-26 DIAGNOSIS — I25.119 CORONARY ARTERY DISEASE INVOLVING NATIVE CORONARY ARTERY OF NATIVE HEART WITH ANGINA PECTORIS (H): ICD-10-CM

## 2022-04-26 DIAGNOSIS — D53.9 MACROCYTIC ANEMIA: ICD-10-CM

## 2022-04-26 DIAGNOSIS — Z86.711 HISTORY OF PULMONARY EMBOLISM: ICD-10-CM

## 2022-04-26 DIAGNOSIS — D47.1 CHRONIC MYELOPROLIFERATIVE DISEASE (H): ICD-10-CM

## 2022-04-26 DIAGNOSIS — D69.6 THROMBOCYTOPENIA (H): Primary | ICD-10-CM

## 2022-04-26 DIAGNOSIS — J90 PLEURAL EFFUSION ON LEFT: ICD-10-CM

## 2022-04-26 DIAGNOSIS — I50.21 ACUTE SYSTOLIC CONGESTIVE HEART FAILURE (H): ICD-10-CM

## 2022-04-26 DIAGNOSIS — E78.2 MIXED HYPERLIPIDEMIA: ICD-10-CM

## 2022-04-26 DIAGNOSIS — I25.5 ISCHEMIC CARDIOMYOPATHY: ICD-10-CM

## 2022-04-26 PROBLEM — Z98.890 STATUS POST CORONARY ANGIOGRAM: Status: ACTIVE | Noted: 2022-04-26

## 2022-04-26 LAB
ABO/RH(D): NORMAL
ANION GAP SERPL CALCULATED.3IONS-SCNC: 5 MMOL/L (ref 5–18)
ANTIBODY SCREEN: NEGATIVE
BACTERIA PLR CULT: NO GROWTH
BUN SERPL-MCNC: 13 MG/DL (ref 8–28)
CALCIUM SERPL-MCNC: 8 MG/DL (ref 8.5–10.5)
CHLORIDE BLD-SCNC: 103 MMOL/L (ref 98–107)
CO2 SERPL-SCNC: 30 MMOL/L (ref 22–31)
CREAT SERPL-MCNC: 0.71 MG/DL (ref 0.7–1.3)
ERYTHROCYTE [DISTWIDTH] IN BLOOD BY AUTOMATED COUNT: ABNORMAL %
GFR SERPL CREATININE-BSD FRML MDRD: >90 ML/MIN/1.73M2
GLUCOSE BLD-MCNC: 81 MG/DL (ref 70–125)
HCT VFR BLD AUTO: 27 % (ref 40–53)
HGB BLD-MCNC: 8.4 G/DL (ref 13.3–17.7)
HOLD SPECIMEN: NORMAL
INR PPP: 1.43 (ref 0.9–1.15)
MAGNESIUM SERPL-MCNC: 2.1 MG/DL (ref 1.8–2.6)
MCH RBC QN AUTO: 37.3 PG (ref 26.5–33)
MCHC RBC AUTO-ENTMCNC: 31.1 G/DL (ref 31.5–36.5)
MCV RBC AUTO: 120 FL (ref 78–100)
PATH REPORT.COMMENTS IMP SPEC: NORMAL
PATH REPORT.COMMENTS IMP SPEC: NORMAL
PATH REPORT.FINAL DX SPEC: NORMAL
PATH REPORT.GROSS SPEC: NORMAL
PATH REPORT.RELEVANT HX SPEC: NORMAL
PLATELET # BLD AUTO: 144 10E3/UL (ref 150–450)
POTASSIUM BLD-SCNC: 3.7 MMOL/L (ref 3.5–5)
RBC # BLD AUTO: 2.25 10E6/UL (ref 4.4–5.9)
SODIUM SERPL-SCNC: 138 MMOL/L (ref 136–145)
SPECIMEN EXPIRATION DATE: NORMAL
UFH PPP CHRO-ACNC: 0.46 IU/ML
WBC # BLD AUTO: 7 10E3/UL (ref 4–11)

## 2022-04-26 PROCEDURE — C1894 INTRO/SHEATH, NON-LASER: HCPCS | Performed by: INTERNAL MEDICINE

## 2022-04-26 PROCEDURE — 250N000013 HC RX MED GY IP 250 OP 250 PS 637: Performed by: STUDENT IN AN ORGANIZED HEALTH CARE EDUCATION/TRAINING PROGRAM

## 2022-04-26 PROCEDURE — 210N000001 HC R&B IMCU HEART CARE

## 2022-04-26 PROCEDURE — 255N000002 HC RX 255 OP 636: Performed by: INTERNAL MEDICINE

## 2022-04-26 PROCEDURE — 36415 COLL VENOUS BLD VENIPUNCTURE: CPT | Performed by: INTERNAL MEDICINE

## 2022-04-26 PROCEDURE — 258N000003 HC RX IP 258 OP 636: Performed by: INTERNAL MEDICINE

## 2022-04-26 PROCEDURE — 85520 HEPARIN ASSAY: CPT | Performed by: INTERNAL MEDICINE

## 2022-04-26 PROCEDURE — 250N000013 HC RX MED GY IP 250 OP 250 PS 637: Performed by: INTERNAL MEDICINE

## 2022-04-26 PROCEDURE — 83735 ASSAY OF MAGNESIUM: CPT | Performed by: INTERNAL MEDICINE

## 2022-04-26 PROCEDURE — 86901 BLOOD TYPING SEROLOGIC RH(D): CPT | Performed by: INTERNAL MEDICINE

## 2022-04-26 PROCEDURE — 250N000011 HC RX IP 250 OP 636: Performed by: INTERNAL MEDICINE

## 2022-04-26 PROCEDURE — 250N000011 HC RX IP 250 OP 636

## 2022-04-26 PROCEDURE — 250N000009 HC RX 250: Performed by: INTERNAL MEDICINE

## 2022-04-26 PROCEDURE — 250N000013 HC RX MED GY IP 250 OP 250 PS 637

## 2022-04-26 PROCEDURE — 99223 1ST HOSP IP/OBS HIGH 75: CPT | Mod: AI | Performed by: INTERNAL MEDICINE

## 2022-04-26 PROCEDURE — 258N000003 HC RX IP 258 OP 636

## 2022-04-26 PROCEDURE — 85027 COMPLETE CBC AUTOMATED: CPT | Performed by: INTERNAL MEDICINE

## 2022-04-26 PROCEDURE — 85610 PROTHROMBIN TIME: CPT | Performed by: INTERNAL MEDICINE

## 2022-04-26 PROCEDURE — 4A023N7 MEASUREMENT OF CARDIAC SAMPLING AND PRESSURE, LEFT HEART, PERCUTANEOUS APPROACH: ICD-10-PCS | Performed by: INTERNAL MEDICINE

## 2022-04-26 PROCEDURE — 80048 BASIC METABOLIC PNL TOTAL CA: CPT | Performed by: INTERNAL MEDICINE

## 2022-04-26 PROCEDURE — 99232 SBSQ HOSP IP/OBS MODERATE 35: CPT | Performed by: INTERNAL MEDICINE

## 2022-04-26 PROCEDURE — 99238 HOSP IP/OBS DSCHRG MGMT 30/<: CPT | Mod: GC

## 2022-04-26 PROCEDURE — C1769 GUIDE WIRE: HCPCS | Performed by: INTERNAL MEDICINE

## 2022-04-26 PROCEDURE — 99152 MOD SED SAME PHYS/QHP 5/>YRS: CPT | Performed by: INTERNAL MEDICINE

## 2022-04-26 PROCEDURE — 93458 L HRT ARTERY/VENTRICLE ANGIO: CPT | Performed by: INTERNAL MEDICINE

## 2022-04-26 PROCEDURE — 272N000001 HC OR GENERAL SUPPLY STERILE: Performed by: INTERNAL MEDICINE

## 2022-04-26 PROCEDURE — B2111ZZ FLUOROSCOPY OF MULTIPLE CORONARY ARTERIES USING LOW OSMOLAR CONTRAST: ICD-10-PCS | Performed by: INTERNAL MEDICINE

## 2022-04-26 PROCEDURE — 93458 L HRT ARTERY/VENTRICLE ANGIO: CPT | Mod: 26 | Performed by: INTERNAL MEDICINE

## 2022-04-26 RX ORDER — NALOXONE HYDROCHLORIDE 0.4 MG/ML
0.2 INJECTION, SOLUTION INTRAMUSCULAR; INTRAVENOUS; SUBCUTANEOUS
Status: CANCELLED | OUTPATIENT
Start: 2022-04-26

## 2022-04-26 RX ORDER — PROCHLORPERAZINE MALEATE 5 MG
5 TABLET ORAL EVERY 6 HOURS PRN
Status: CANCELLED | OUTPATIENT
Start: 2022-04-26

## 2022-04-26 RX ORDER — HEPARIN SODIUM 10000 [USP'U]/100ML
0-5000 INJECTION, SOLUTION INTRAVENOUS CONTINUOUS
Status: CANCELLED | OUTPATIENT
Start: 2022-04-26

## 2022-04-26 RX ORDER — POLYETHYLENE GLYCOL 3350 17 G/17G
17 POWDER, FOR SOLUTION ORAL DAILY PRN
Status: DISCONTINUED | OUTPATIENT
Start: 2022-04-26 | End: 2022-05-01 | Stop reason: HOSPADM

## 2022-04-26 RX ORDER — SODIUM CHLORIDE 9 MG/ML
INJECTION, SOLUTION INTRAVENOUS CONTINUOUS
Status: DISCONTINUED | OUTPATIENT
Start: 2022-04-26 | End: 2022-04-26

## 2022-04-26 RX ORDER — FUROSEMIDE 40 MG
40 TABLET ORAL DAILY
Status: CANCELLED | OUTPATIENT
Start: 2022-04-26

## 2022-04-26 RX ORDER — LIDOCAINE 40 MG/G
CREAM TOPICAL
Status: CANCELLED | OUTPATIENT
Start: 2022-04-26

## 2022-04-26 RX ORDER — ASPIRIN 325 MG
325 TABLET ORAL ONCE
Status: COMPLETED | OUTPATIENT
Start: 2022-04-26 | End: 2022-04-26

## 2022-04-26 RX ORDER — SODIUM CHLORIDE 9 MG/ML
75 INJECTION, SOLUTION INTRAVENOUS CONTINUOUS
Status: ACTIVE | OUTPATIENT
Start: 2022-04-26 | End: 2022-04-26

## 2022-04-26 RX ORDER — IODIXANOL 320 MG/ML
INJECTION, SOLUTION INTRAVASCULAR
Status: DISCONTINUED | OUTPATIENT
Start: 2022-04-26 | End: 2022-04-26 | Stop reason: HOSPADM

## 2022-04-26 RX ORDER — NALOXONE HYDROCHLORIDE 0.4 MG/ML
0.4 INJECTION, SOLUTION INTRAMUSCULAR; INTRAVENOUS; SUBCUTANEOUS
Status: DISCONTINUED | OUTPATIENT
Start: 2022-04-26 | End: 2022-05-01 | Stop reason: HOSPADM

## 2022-04-26 RX ORDER — NALOXONE HYDROCHLORIDE 0.4 MG/ML
0.4 INJECTION, SOLUTION INTRAMUSCULAR; INTRAVENOUS; SUBCUTANEOUS
Status: ACTIVE | OUTPATIENT
Start: 2022-04-26 | End: 2022-04-26

## 2022-04-26 RX ORDER — HYDROXYUREA 500 MG/1
1000 CAPSULE ORAL DAILY
Status: DISCONTINUED | OUTPATIENT
Start: 2022-04-26 | End: 2022-04-26 | Stop reason: CLARIF

## 2022-04-26 RX ORDER — ALBUTEROL SULFATE 90 UG/1
2 AEROSOL, METERED RESPIRATORY (INHALATION) EVERY 6 HOURS PRN
Status: DISCONTINUED | OUTPATIENT
Start: 2022-04-26 | End: 2022-05-01 | Stop reason: HOSPADM

## 2022-04-26 RX ORDER — ACETAMINOPHEN 325 MG/1
650 TABLET ORAL EVERY 4 HOURS PRN
Status: DISCONTINUED | OUTPATIENT
Start: 2022-04-26 | End: 2022-05-01 | Stop reason: HOSPADM

## 2022-04-26 RX ORDER — SODIUM CHLORIDE 9 MG/ML
INJECTION, SOLUTION INTRAVENOUS CONTINUOUS
Status: CANCELLED | OUTPATIENT
Start: 2022-04-26

## 2022-04-26 RX ORDER — PROCHLORPERAZINE MALEATE 5 MG
5 TABLET ORAL EVERY 6 HOURS PRN
Status: DISCONTINUED | OUTPATIENT
Start: 2022-04-26 | End: 2022-05-01 | Stop reason: HOSPADM

## 2022-04-26 RX ORDER — OXYCODONE HYDROCHLORIDE 5 MG/1
10 TABLET ORAL EVERY 4 HOURS PRN
Status: DISCONTINUED | OUTPATIENT
Start: 2022-04-26 | End: 2022-05-01 | Stop reason: HOSPADM

## 2022-04-26 RX ORDER — NALOXONE HYDROCHLORIDE 0.4 MG/ML
0.4 INJECTION, SOLUTION INTRAMUSCULAR; INTRAVENOUS; SUBCUTANEOUS
Status: CANCELLED | OUTPATIENT
Start: 2022-04-26

## 2022-04-26 RX ORDER — METOPROLOL SUCCINATE 25 MG/1
25 TABLET, EXTENDED RELEASE ORAL DAILY
Status: DISCONTINUED | OUTPATIENT
Start: 2022-04-26 | End: 2022-04-26

## 2022-04-26 RX ORDER — ATORVASTATIN CALCIUM 40 MG/1
40 TABLET, FILM COATED ORAL EVERY MORNING
Status: CANCELLED | OUTPATIENT
Start: 2022-04-27

## 2022-04-26 RX ORDER — NITROGLYCERIN 0.4 MG/1
TABLET SUBLINGUAL
Qty: 25 TABLET | Refills: 3 | Status: SHIPPED | OUTPATIENT
Start: 2022-04-26

## 2022-04-26 RX ORDER — LIDOCAINE 40 MG/G
CREAM TOPICAL
Status: DISCONTINUED | OUTPATIENT
Start: 2022-04-26 | End: 2022-05-01 | Stop reason: HOSPADM

## 2022-04-26 RX ORDER — NALOXONE HYDROCHLORIDE 0.4 MG/ML
0.2 INJECTION, SOLUTION INTRAMUSCULAR; INTRAVENOUS; SUBCUTANEOUS
Status: ACTIVE | OUTPATIENT
Start: 2022-04-26 | End: 2022-04-26

## 2022-04-26 RX ORDER — SODIUM CHLORIDE 9 MG/ML
INJECTION, SOLUTION INTRAVENOUS CONTINUOUS
Status: DISCONTINUED | OUTPATIENT
Start: 2022-04-26 | End: 2022-04-26 | Stop reason: HOSPADM

## 2022-04-26 RX ORDER — ALBUTEROL SULFATE 0.83 MG/ML
2.5 SOLUTION RESPIRATORY (INHALATION) EVERY 4 HOURS PRN
Status: CANCELLED | OUTPATIENT
Start: 2022-04-26

## 2022-04-26 RX ORDER — OXYCODONE HYDROCHLORIDE 5 MG/1
5 TABLET ORAL EVERY 4 HOURS PRN
Status: CANCELLED | OUTPATIENT
Start: 2022-04-26

## 2022-04-26 RX ORDER — ACETAMINOPHEN 325 MG/1
325-650 TABLET ORAL EVERY 6 HOURS PRN
Status: CANCELLED | OUTPATIENT
Start: 2022-04-26

## 2022-04-26 RX ORDER — FENTANYL CITRATE 50 UG/ML
INJECTION, SOLUTION INTRAMUSCULAR; INTRAVENOUS
Status: DISCONTINUED | OUTPATIENT
Start: 2022-04-26 | End: 2022-04-26 | Stop reason: HOSPADM

## 2022-04-26 RX ORDER — HEPARIN SODIUM 10000 [USP'U]/100ML
0-5000 INJECTION, SOLUTION INTRAVENOUS CONTINUOUS
Status: DISPENSED | OUTPATIENT
Start: 2022-04-26 | End: 2022-05-01

## 2022-04-26 RX ORDER — METOPROLOL SUCCINATE 25 MG/1
25 TABLET, EXTENDED RELEASE ORAL DAILY
Status: CANCELLED | OUTPATIENT
Start: 2022-04-26

## 2022-04-26 RX ORDER — FOLIC ACID 1 MG/1
1 TABLET ORAL DAILY
Status: CANCELLED | OUTPATIENT
Start: 2022-04-26

## 2022-04-26 RX ORDER — HYDRALAZINE HYDROCHLORIDE 20 MG/ML
10 INJECTION INTRAMUSCULAR; INTRAVENOUS EVERY 6 HOURS PRN
Status: CANCELLED | OUTPATIENT
Start: 2022-04-26

## 2022-04-26 RX ORDER — OXYCODONE HYDROCHLORIDE 5 MG/1
5 TABLET ORAL EVERY 4 HOURS PRN
Status: DISCONTINUED | OUTPATIENT
Start: 2022-04-26 | End: 2022-04-27

## 2022-04-26 RX ORDER — ASPIRIN 81 MG/1
81 TABLET ORAL DAILY
Status: DISCONTINUED | OUTPATIENT
Start: 2022-04-27 | End: 2022-05-01 | Stop reason: HOSPADM

## 2022-04-26 RX ORDER — ALBUTEROL SULFATE 0.83 MG/ML
2.5 SOLUTION RESPIRATORY (INHALATION) EVERY 4 HOURS PRN
Status: DISCONTINUED | OUTPATIENT
Start: 2022-04-26 | End: 2022-05-01 | Stop reason: HOSPADM

## 2022-04-26 RX ORDER — METOPROLOL SUCCINATE 25 MG/1
25 TABLET, EXTENDED RELEASE ORAL DAILY
Status: DISCONTINUED | OUTPATIENT
Start: 2022-04-27 | End: 2022-05-01 | Stop reason: HOSPADM

## 2022-04-26 RX ORDER — FUROSEMIDE 40 MG
40 TABLET ORAL DAILY
Status: DISCONTINUED | OUTPATIENT
Start: 2022-04-27 | End: 2022-04-29

## 2022-04-26 RX ORDER — HYDROXYUREA 500 MG/1
500 CAPSULE ORAL DAILY
Status: DISCONTINUED | OUTPATIENT
Start: 2022-04-27 | End: 2022-05-01 | Stop reason: HOSPADM

## 2022-04-26 RX ORDER — LIDOCAINE 40 MG/G
CREAM TOPICAL
Status: DISCONTINUED | OUTPATIENT
Start: 2022-04-26 | End: 2022-04-26 | Stop reason: HOSPADM

## 2022-04-26 RX ORDER — ALBUTEROL SULFATE 5 MG/ML
2.5 SOLUTION RESPIRATORY (INHALATION) EVERY 6 HOURS PRN
Status: CANCELLED | OUTPATIENT
Start: 2022-04-26

## 2022-04-26 RX ORDER — FOLIC ACID 1 MG/1
1 TABLET ORAL DAILY
Status: DISCONTINUED | OUTPATIENT
Start: 2022-04-27 | End: 2022-05-01 | Stop reason: HOSPADM

## 2022-04-26 RX ORDER — ONDANSETRON 4 MG/1
4 TABLET, ORALLY DISINTEGRATING ORAL EVERY 6 HOURS PRN
Status: CANCELLED | OUTPATIENT
Start: 2022-04-26

## 2022-04-26 RX ORDER — ONDANSETRON 4 MG/1
4 TABLET, ORALLY DISINTEGRATING ORAL EVERY 6 HOURS PRN
Status: DISCONTINUED | OUTPATIENT
Start: 2022-04-26 | End: 2022-05-01 | Stop reason: HOSPADM

## 2022-04-26 RX ORDER — POLYETHYLENE GLYCOL 3350 17 G/17G
17 POWDER, FOR SOLUTION ORAL DAILY PRN
Status: CANCELLED | OUTPATIENT
Start: 2022-04-26

## 2022-04-26 RX ORDER — HYDRALAZINE HYDROCHLORIDE 20 MG/ML
10 INJECTION INTRAMUSCULAR; INTRAVENOUS
Status: DISCONTINUED | OUTPATIENT
Start: 2022-04-26 | End: 2022-04-27

## 2022-04-26 RX ORDER — ALBUTEROL SULFATE 90 UG/1
2 AEROSOL, METERED RESPIRATORY (INHALATION) EVERY 6 HOURS PRN
Status: CANCELLED | OUTPATIENT
Start: 2022-04-26

## 2022-04-26 RX ORDER — NALOXONE HYDROCHLORIDE 0.4 MG/ML
0.2 INJECTION, SOLUTION INTRAMUSCULAR; INTRAVENOUS; SUBCUTANEOUS
Status: DISCONTINUED | OUTPATIENT
Start: 2022-04-26 | End: 2022-05-01 | Stop reason: HOSPADM

## 2022-04-26 RX ORDER — ASPIRIN 325 MG
325 TABLET ORAL ONCE
Status: DISCONTINUED | OUTPATIENT
Start: 2022-04-26 | End: 2022-04-26 | Stop reason: HOSPADM

## 2022-04-26 RX ORDER — PROCHLORPERAZINE 25 MG
12.5 SUPPOSITORY, RECTAL RECTAL EVERY 12 HOURS PRN
Status: DISCONTINUED | OUTPATIENT
Start: 2022-04-26 | End: 2022-05-01 | Stop reason: HOSPADM

## 2022-04-26 RX ORDER — LISINOPRIL 5 MG/1
5 TABLET ORAL DAILY
Status: CANCELLED | OUTPATIENT
Start: 2022-04-26

## 2022-04-26 RX ORDER — ATROPINE SULFATE 0.1 MG/ML
0.5 INJECTION INTRAVENOUS
Status: ACTIVE | OUTPATIENT
Start: 2022-04-26 | End: 2022-04-26

## 2022-04-26 RX ORDER — ACETAMINOPHEN 325 MG/1
325-650 TABLET ORAL EVERY 6 HOURS PRN
Status: DISCONTINUED | OUTPATIENT
Start: 2022-04-26 | End: 2022-04-26

## 2022-04-26 RX ORDER — HYDROXYUREA 500 MG/1
500 CAPSULE ORAL DAILY
Status: CANCELLED | OUTPATIENT
Start: 2022-04-26

## 2022-04-26 RX ORDER — ATORVASTATIN CALCIUM 40 MG/1
40 TABLET, FILM COATED ORAL EVERY MORNING
Status: DISCONTINUED | OUTPATIENT
Start: 2022-04-27 | End: 2022-04-27

## 2022-04-26 RX ORDER — ONDANSETRON 2 MG/ML
4 INJECTION INTRAMUSCULAR; INTRAVENOUS EVERY 6 HOURS PRN
Status: CANCELLED | OUTPATIENT
Start: 2022-04-26

## 2022-04-26 RX ORDER — OXYCODONE HYDROCHLORIDE 5 MG/1
5 TABLET ORAL EVERY 4 HOURS PRN
Status: DISCONTINUED | OUTPATIENT
Start: 2022-04-26 | End: 2022-05-01 | Stop reason: HOSPADM

## 2022-04-26 RX ORDER — LISINOPRIL 5 MG/1
5 TABLET ORAL DAILY
Status: DISCONTINUED | OUTPATIENT
Start: 2022-04-27 | End: 2022-04-28

## 2022-04-26 RX ORDER — HYDRALAZINE HYDROCHLORIDE 20 MG/ML
10 INJECTION INTRAMUSCULAR; INTRAVENOUS EVERY 6 HOURS PRN
Status: DISCONTINUED | OUTPATIENT
Start: 2022-04-26 | End: 2022-05-01 | Stop reason: HOSPADM

## 2022-04-26 RX ORDER — PROCHLORPERAZINE 25 MG
12.5 SUPPOSITORY, RECTAL RECTAL EVERY 12 HOURS PRN
Status: CANCELLED | OUTPATIENT
Start: 2022-04-26

## 2022-04-26 RX ORDER — ALBUTEROL SULFATE 0.83 MG/ML
2.5 SOLUTION RESPIRATORY (INHALATION) EVERY 6 HOURS PRN
Status: DISCONTINUED | OUTPATIENT
Start: 2022-04-26 | End: 2022-05-01 | Stop reason: HOSPADM

## 2022-04-26 RX ORDER — FENTANYL CITRATE 50 UG/ML
25 INJECTION, SOLUTION INTRAMUSCULAR; INTRAVENOUS
Status: DISCONTINUED | OUTPATIENT
Start: 2022-04-26 | End: 2022-04-26

## 2022-04-26 RX ORDER — WARFARIN SODIUM 2.5 MG/1
2.5 TABLET ORAL
Status: COMPLETED | OUTPATIENT
Start: 2022-04-26 | End: 2022-04-26

## 2022-04-26 RX ORDER — NITROGLYCERIN 0.4 MG/1
TABLET SUBLINGUAL
Status: DISCONTINUED | OUTPATIENT
Start: 2022-04-26 | End: 2022-04-26 | Stop reason: HOSPADM

## 2022-04-26 RX ORDER — FLUMAZENIL 0.1 MG/ML
0.2 INJECTION, SOLUTION INTRAVENOUS
Status: ACTIVE | OUTPATIENT
Start: 2022-04-26 | End: 2022-04-26

## 2022-04-26 RX ORDER — ONDANSETRON 2 MG/ML
4 INJECTION INTRAMUSCULAR; INTRAVENOUS EVERY 6 HOURS PRN
Status: DISCONTINUED | OUTPATIENT
Start: 2022-04-26 | End: 2022-05-01 | Stop reason: HOSPADM

## 2022-04-26 RX ORDER — ASPIRIN 81 MG/1
243 TABLET, CHEWABLE ORAL ONCE
Status: COMPLETED | OUTPATIENT
Start: 2022-04-26 | End: 2022-04-26

## 2022-04-26 RX ADMIN — LISINOPRIL 5 MG: 5 TABLET ORAL at 08:56

## 2022-04-26 RX ADMIN — SODIUM CHLORIDE 75 ML/HR: 9 INJECTION, SOLUTION INTRAVENOUS at 15:50

## 2022-04-26 RX ADMIN — UMECLIDINIUM 1 PUFF: 62.5 AEROSOL, POWDER ORAL at 08:56

## 2022-04-26 RX ADMIN — WARFARIN SODIUM 2.5 MG: 2.5 TABLET ORAL at 18:10

## 2022-04-26 RX ADMIN — SODIUM CHLORIDE: 9 INJECTION, SOLUTION INTRAVENOUS at 12:06

## 2022-04-26 RX ADMIN — AMOXICILLIN AND CLAVULANATE POTASSIUM 1 TABLET: 875; 125 TABLET, FILM COATED ORAL at 09:01

## 2022-04-26 RX ADMIN — FOLIC ACID 1 MG: 1 TABLET ORAL at 08:56

## 2022-04-26 RX ADMIN — METOPROLOL SUCCINATE 25 MG: 25 TABLET, FILM COATED, EXTENDED RELEASE ORAL at 08:56

## 2022-04-26 RX ADMIN — HEPARIN SODIUM 1600 UNITS/HR: 10000 INJECTION, SOLUTION INTRAVENOUS at 10:00

## 2022-04-26 RX ADMIN — HYDROXYUREA 500 MG: 500 CAPSULE ORAL at 08:57

## 2022-04-26 RX ADMIN — AMOXICILLIN AND CLAVULANATE POTASSIUM 1 TABLET: 875; 125 TABLET, FILM COATED ORAL at 20:49

## 2022-04-26 RX ADMIN — ASPIRIN 325 MG: 325 TABLET ORAL at 13:53

## 2022-04-26 RX ADMIN — FUROSEMIDE 40 MG: 40 TABLET ORAL at 08:56

## 2022-04-26 RX ADMIN — ATORVASTATIN CALCIUM 40 MG: 40 TABLET, FILM COATED ORAL at 08:56

## 2022-04-26 RX ADMIN — HEPARIN SODIUM AND DEXTROSE 1600 UNITS/HR: 10000; 5 INJECTION INTRAVENOUS at 20:42

## 2022-04-26 ASSESSMENT — ACTIVITIES OF DAILY LIVING (ADL)
ADLS_ACUITY_SCORE: 14
FALL_HISTORY_WITHIN_LAST_SIX_MONTHS: NO
ADLS_ACUITY_SCORE: 8
ADLS_ACUITY_SCORE: 14
DIFFICULTY_EATING/SWALLOWING: NO
ADLS_ACUITY_SCORE: 14
WEAR_GLASSES_OR_BLIND: YES
ADLS_ACUITY_SCORE: 14
ADLS_ACUITY_SCORE: 8
DOING_ERRANDS_INDEPENDENTLY_DIFFICULTY: NO
DRESSING/BATHING_DIFFICULTY: NO
CONCENTRATING,_REMEMBERING_OR_MAKING_DECISIONS_DIFFICULTY: NO
HEARING_DIFFICULTY_OR_DEAF: NO
ADLS_ACUITY_SCORE: 8
ADLS_ACUITY_SCORE: 8
TOILETING_ISSUES: NO
ADLS_ACUITY_SCORE: 8
DIFFICULTY_COMMUNICATING: NO
ADLS_ACUITY_SCORE: 12
WALKING_OR_CLIMBING_STAIRS_DIFFICULTY: NO
ADLS_ACUITY_SCORE: 8
ADLS_ACUITY_SCORE: 14
CHANGE_IN_FUNCTIONAL_STATUS_SINCE_ONSET_OF_CURRENT_ILLNESS/INJURY: NO
ADLS_ACUITY_SCORE: 8
VISION_MANAGEMENT: GLASSES
ADLS_ACUITY_SCORE: 8
ADLS_ACUITY_SCORE: 14
ADLS_ACUITY_SCORE: 6
ADLS_ACUITY_SCORE: 8
ADLS_ACUITY_SCORE: 8

## 2022-04-26 ASSESSMENT — EJECTION FRACTION: EF_VALUE: .24

## 2022-04-26 NOTE — PROGRESS NOTES
Patient is up with a standby assist. Tolerating activity well at this time. Dyspnea with exertion. Tolerating room air at this time. Scheduled meds given per MD. Continues to be on heparin drip @ 1600. Patient is transferring to [facility] for an angio. Report given to [place], the nurse who will be resuming care for patient when transferred. Report also given to transport team taking the patient to the next facility. No further questions at this time. Kiya Delatorre RN

## 2022-04-26 NOTE — PRE-PROCEDURE
GENERAL PRE-PROCEDURE:   Procedure:  Coronary angiogram left heart catheterization, possible intervention  Date/Time:  4/26/2022 1:51 PM    Written consent obtained?: Yes    Risks and benefits: Risks, benefits and alternatives were discussed    Consent given by:  Patient  Patient states understanding of procedure being performed: Yes    Patient's understanding of procedure matches consent: Yes    Procedure consent matches procedure scheduled: Yes    Expected level of sedation:  Moderate  Appropriately NPO:  Yes  ASA Class:  2  Mallampati  :  Grade 3- soft palate visible, posterior pharyngeal wall not visible  Lungs:  Lungs clear with good breath sounds bilaterally  Heart:  Normal heart sounds and rate  History & Physical reviewed:  History and physical reviewed and no updates needed  Statement of review:  I have reviewed the lab findings, diagnostic data, medications, and the plan for sedation

## 2022-04-26 NOTE — PROVIDER NOTIFICATION
Notified Team about no CBC .,basic and INR ordered with am lab . Patient plan to have Angiogram today .

## 2022-04-26 NOTE — PLAN OF CARE
Major Shift Events:  A&OX4 , slept between cares . SOB with activity , denied any pain . Hemodynamically stable. Afebrile. Tele SR 60's-70's . On 1-1.5 lit oxi-plus . Heparin gtt at 1600 units/hr . Therapeutic Hep 10a  level . Recheck will be tomorrow am  .  NPO Since midnight . .   Plan: Angiogram at [facility] . Needs a PIV to start Maintain IV fluid .   For vital signs and complete assessments, please see documentation flowsheets.      Problem: Risk for Delirium  Goal: Optimal Coping  Outcome: Met     Problem: Fluid Imbalance (Pneumonia)  Goal: Fluid Balance  Outcome: Met       Problem: Respiratory Compromise (Pneumonia)  Goal: Effective Oxygenation and Ventilation  Outcome: Ongoing, Progressing  Intervention: Optimize Oxygenation and Ventilation  Recent Flowsheet Documentation  Taken 4/26/2022 0400 by Cj Lawrence, RN  Head of Bed (HOB) Positioning: HOB at 30 degrees    Problem: COPD (Chronic Obstructive Pulmonary Disease) Comorbidity  Goal: Maintenance of COPD Symptom Control  Outcome: Ongoing, Progressing  Intervention: Maintain COPD-Symptom Control  Recent Flowsheet Documentation  Taken 4/26/2022 0400 by Cj Lawrence, RN  Medication Review/Management: medications reviewed

## 2022-04-26 NOTE — DISCHARGE SUMMARY
[facility]  Discharge Summary - Medicine & Pediatrics       Date of Admission:  4/19/2022  Date of Discharge:  4/26/2022  Discharging Provider: Dr. Jonh Townsend  Discharge Service: Hospitalist Service    Discharge Diagnoses   Principal Problem:    Acute respiratory failure with hypoxia (H)  Active Problems:    Macrocytic anemia    Acute pulmonary edema (H)    Pleural effusion on left    COPD exacerbation (H)    Acute congestive heart failure (H)    Thrombocytopenia (H)    History of pulmonary embolism        Follow-ups Needed After Discharge   Patient to follow-up with outpatient heme-onc doctor for management of warfarin for treatment of recent PEs.    Unresulted Labs Ordered in the Past 30 Days of this Admission     Date and Time Order Name Status Description    4/21/2022 11:43 AM Cytology, non-gynecologic In process         Discharge Disposition   Discharged and readmitted to [facility] for cardiac catheterization. Will discharge from there.   Condition at discharge: Stable    Hospital Course   Shaji Pompa is a 76 year old old male with a past medical history of myeloproliferative disease, COPD, HTN, CVA, recent DVT and PEs who presented to the [facility] Emergency Department on the day of admission with complaints of worsening dyspnea, found to have complex loculated left pleural effusion with thoracentesis of 100cc exudative effusion, gram stain negative. Chest tube placement 4/21/2022 and removed 4/24 with improvement to the oxygen status, now on room air to 1L oxymask. Echo preformed with focal wall motion abnormality concerning for ischemic cardiomyopathy, LVEF decreased to 40%, this is a new diagnosis of HFrEF. Patient has been on IV lasix, transitioned to oral, and plan to transfer to [facility] for cardiac cath 4/26.  Chronic anemia and hx of myeloproliferative disease, patient received unit of PRBC 4/23, hemoglobin has been stable.  For recent history of PEs treated  with warfarin, warfarin has been held and patient has been on heparin drip.    Patient to continue p.o. Augmentin for suspected COPD exacerbation, currently day 7 of 14.  Patient to continue atorvastatin 40 mg daily and lisinopril 5 mg daily.  Hydroxyurea was decreased to 500 mg daily from PTA dosing per heme-onc.    Patient has been afebrile, VSS, satting appropriately on RA and occasionally requiring 1L oxymask. Signout was given to accepting team at [facility], Dr. Valerie Estrada.  Discussed plan with patient, all questions answered.  No further concerns at this time.    Consultations This Hospital Stay   CARDIOLOGY IP CONSULT  CARDIOLOGY IP CONSULT  HEMATOLOGY & ONCOLOGY IP CONSULT  PHARMACY IP CONSULT  PHARMACY IP CONSULT  CORE CLINIC EVALUATION IP CONSULT  PULMONARY IP CONSULT  PHARMACY TO DOSE VANCO  PHARMACY TO DOSE WARFARIN  PHARMACY IP CONSULT  PHARMACY IP CONSULT  CORE CLINIC EVALUATION IP CONSULT  PHARMACY TO DOSE WARFARIN  PHARMACY IP CONSULT  PHARMACY IP CONSULT    Code Status   Full Code       The patient was discussed with Dr. Jonh Rodarte MD  [facility]  [address] [place] [id number]  Phone: [phone]  Fax: [phone]  ______________________________________________________________________    Physical Exam   Vital Signs: Temp: 98.5  F (36.9  C) Temp src: Oral BP: 132/62 Pulse: 73   Resp: 16 SpO2: 98 % O2 Device: Nasal cannula Oxygen Delivery: 1 LPM  Weight: 196 lbs 3.2 oz  Constitutional:       General: He is not in acute distress.     Appearance: He is not ill-appearing or diaphoretic.   HENT:      Head: Normocephalic and atraumatic.   Eyes:      Extraocular Movements: Extraocular movements intact.      Conjunctiva/sclera: Conjunctivae normal.   Cardiovascular:      Rate and Rhythm: Normal rate and regular rhythm.      Pulses: Normal pulses.      Heart sounds: Normal heart sounds. No murmur heard.    No gallop.   Pulmonary:       Effort: Pulmonary effort is normal. No respiratory distress.      Comments: LS CTA, improved, mild wheezes, no crackles    Abdominal:      General: There is no distension.      Palpations: Abdomen is soft.      Tenderness: There is no abdominal tenderness.   Musculoskeletal:         General: Normal range of motion.      Comments: No bilateral peripheral edema, improved   Skin:     General: Skin is warm and dry.      Capillary Refill: Capillary refill takes less than 2 seconds.   Neurological:      General: No focal deficit present.      Mental Status: He is alert and oriented to person, place, and time.   Psychiatric:         Mood and Affect: Mood normal.         Behavior: Behavior normal.       Primary Care Physician   RAGINI IRVING    Discharge Orders   No discharge procedures on file.    Significant Results and Procedures   Most Recent 3 CBC's:Recent Labs   Lab Test 04/25/22  0906 04/24/22  0408 04/23/22  1222 04/23/22  0441   WBC 6.8 8.8  --  9.2   HGB 8.6* 7.2* 7.0* 7.0*   * 118*  --  124*   * 105*  --  114*     Most Recent 3 INR's:Recent Labs   Lab Test 04/25/22  0906 04/24/22  0408 04/23/22  0441   INR 1.49* 1.80* 1.97*     Most Recent 3 BNP's:No lab results found.,   Results for orders placed or performed during the hospital encounter of 04/19/22   Chest CT w/o contrast     Value    Radiologist flags (AA)     Pulmonary abscess and/or empyema on the left as noted above along with severe anemia.    Narrative    EXAM: CT CHEST W/O CONTRAST  LOCATION: [facility]  DATE/TIME: 4/19/2022 3:04 PM    INDICATION: Shortness of breath. Dyspnea. Recent pulmonary embolus.  COMPARISON: Chest CTA 04/01/2022  TECHNIQUE: CT chest without IV contrast. Multiplanar reformats were obtained. Dose reduction techniques were used.  CONTRAST: None.    FINDINGS:   LUNGS AND PLEURA: There is a large, complex loculated left pleural effusion with loculated components of septated are inferiorly  air  and increase in the amount of fluid since study done 18 days ago. Fluid has a density of 10 Hounsfield units. There is   associated compressive atelectasis of most of the left lower lobe. Few small rounded or ovoid groundglass opacities have developed in the right lower lobe (images 148, 160 171).      MEDIASTINUM/AXILLAE: Blood pool is much less dense than the heart muscle. There is mild mediastinal and subcarinal adenopathy with subcarinal nodes measuring 1.2 cm in short axis.    CORONARY ARTERY CALCIFICATION: Severe.    UPPER ABDOMEN: Incidental splenule.    MUSCULOSKELETAL: No suspicious lesions. Severe degenerative changes in both shoulders.      Impression    IMPRESSION:   1.  Notable increase in the complex, loculated left-sided pleural effusion with now loculated bubbles of air in the left base. This fluid has a density of 10 Hounsfield units. Given the prior significant pulmonary emboli, need to  consider pulmonary   infarct with necrosis and either a sterile or infected pulmonary abscess/empyema.  2.  Patient's quite anemic.    [Critical Result: Pulmonary abscess and/or empyema on the left as noted above along with severe anemia.]    Finding was identified on 4/19/2022 3:39 PM.     Dr. Alonso was contacted by me on 4/19/2022 3:48 PM and verbalized understanding of the critical result.      US Thoracentesis    Narrative    EXAM:   1. LEFT THORACENTESIS  2. ULTRASOUND GUIDANCE  LOCATION: [facility]  DATE/TIME: 4/19/2022 4:17 PM    INDICATION: Loculated left pleural effusion on CT chest..    PROCEDURE: Informed consent obtained. Time out performed. A limited ultrasound of the left thorax demonstrated a complex left pleural effusion with extensive septations/loculations. The largest collection of fluid was localized and the overlying skin was   prepped and draped in sterile fashion. 10 mL of 1 % lidocaine was infused into the local soft tissues. Under direct ultrasound guidance,  a 5 Namibian catheter system was placed into the pleural effusion.     100 mL of sterile saline is fluid was removed and sent for specified labs.    Patient tolerated procedure well.    Ultrasound imaging was obtained and placed in the patient's permanent medical record.      Impression    IMPRESSION:  Status post left ultrasound-guided diagnostic thoracentesis. The left loculated effusion and demonstrates extensive internal septations/loculations.    Reference CPT Code: 03052   CT Chest Tube with Cath Placement    Narrative    EXAM:  1. PERCUTANEOUS CHEST TUBE PLACEMENT LEFT PLEURAL SPACE  2. CT GUIDANCE  3. CONSCIOUS SEDATION  LOCATION: [facility]  DATE/TIME: 4/21/2022 11:54 AM    INDICATION: hx myeloproliferative disorder, recnetly treated for PE, loculated left effusion with air pockets. thoracentesis, minimal fluid, exudate effusion.  TECHNIQUE: Dose reduction techniques were used.    PROCEDURE: Informed consent obtained. Site marked. Prior images reviewed. Required items made available. Patient identity confirmed verbally and with arm band. Patient reevaluated immediately before administering sedation. Universal protocol was   followed. Time out performed. The site was prepped and draped in sterile fashion. 10 mL of 1% lidocaine was infused into the local soft tissues. Using standard technique and under direct CT guidance, a 10 Namibian chest tube catheter was inserted into the   pleural space. The catheter was fixed in place with sutures and adhesive device, and the tubing was banded. Chest tube placed to Pleur-evac suction.    SPECIMEN: 10 mL of dark serous fluid was aspirated and sent to lab for cultures and Gram stain if requested.    BLOOD LOSS: Minimal.    The patient tolerated the procedure well. No immediate complications.    SEDATION: Versed 1 mg. Fentanyl 50 mcg. The procedure was performed with administration intravenous conscious sedation with appropriate preoperative,  intraoperative, and postoperative evaluation.    15 minutes of supervised face to face conscious sedation time was provided by a radiology nurse under my direct supervision.      Impression    IMPRESSION:  1.  Successful CT-guided chest tube placement into the left pleural space.    Reference CPT Codes: 04550, 44263   XR Chest Port 1 View    Narrative    EXAM: XR CHEST PORT 1 VIEW  LOCATION: [facility]  DATE/TIME: 4/22/2022 7:50 AM    INDICATION: Follow up left pleural effusion  COMPARISON: CT 04/19/2022, chest x-ray 04/01/2022      Impression    IMPRESSION: There is a new left pigtail chest tube. Loculated left-sided pleural effusion appears to have slightly decreased since the CT topogram 04/19/2022. Retrocardiac opacities are little changed. No pneumothorax. Minimal subpleural opacities in the   right lateral costophrenic angle are unchanged.     No new findings. Heart is of normal size. Severe degenerative changes in both shoulders.   XR Chest Port 1 View    Narrative    EXAM: XR CHEST PORT 1 VIEW  LOCATION: [facility]  DATE/TIME: 4/23/2022 7:50 AM    INDICATION: Follow up chest tube  COMPARISON: 4/22/2022      Impression    IMPRESSION: Left chest tube has been slightly retracted. Minimal change in a loculated left pleural effusion. Unchanged left basilar opacities. Normal heart size. No pneumothorax.   CT Chest w/o Contrast    Narrative    EXAM: CT CHEST WITHOUT CONTRAST  LOCATION: [facility]  DATE/TIME: 04/24/2022, 8:09 AM    INDICATION: Follow-up loculated left pleural effusion.  COMPARISON: 04/19/2022.  TECHNIQUE: CT chest without IV contrast. Multiplanar reformats were obtained. Dose reduction techniques were used.  CONTRAST: None.    FINDINGS:   LUNGS AND PLEURA: The left pigtail catheter has pulled out of the pleural space and is in the left chest wall. This can be removed. There is a small residual loculated left  hydropneumothorax, significantly decreased in size when compared to previous.   Improved aeration in the left lung with some residual atelectasis and likely pulmonary edema in the left lung base. A few peripheral and peribronchial opacities in the right lung have not appreciably changed. No right pleural effusion.    MEDIASTINUM/AXILLAE: No lymphadenopathy. No thoracic aortic aneurysm.    CORONARY ARTERY CALCIFICATION: Severe.    UPPER ABDOMEN: No significant finding.    MUSCULOSKELETAL: Unremarkable.      Impression    IMPRESSION:   1.  Small residual loculated left hydropneumothorax, status post chest tube placement, with significantly improved aeration in the left lung.  2.  The left chest tube has pulled out of the pleural space and is in the left chest wall. This can be removed.     Echocardiogram Complete    Narrative    [id number]  [id number]  [id number]  [id number]^BEATRIZ^BENNY^[place] [address]     Name: HELENA FENTON  MRN: [id number]  : 1945  Study Date: 2022 10:23 AM  Age: 76 yrs  Gender: Male  Patient Location: [facility]  Reason For Study: Pulmonary Hypertension, CHF  Ordering Physician: BENNY HENDERSON     BSA: 2.0 m2  Height: 65 in  Weight: 201 lb  HR: 95  ______________________________________________________________________________  Procedure  Complete Echo Adult.  ______________________________________________________________________________  Interpretation Summary     1. The left ventricle is normal in size. Left ventricular systolic performance  is moderately reduced. The ejection fraction is estimated to be 40%.  2. There is severe inferior hypokinesis. There is moderate anteroseptal  hypokinesis. In addition, there is moderate mid to distal posterior  hypokinesis and severe distal lateral hypokinesis  3. No significant valvular heart disease is identified on this study.  4. Borderline right ventricular enlargement with low  normal right ventricular  systolic performance.  5. There is mild left atrial enlargement.  6. Right ventricular systolic pressure relative to right atrial pressure is  mildly increased. The pulmonary artery pressure is estimated to be 45-50mmHg  plus right atrial pressure (the IVC is of normal caliber).     When compared to the prior real-time echocardiogram dated 2 February 2022,  there has been interval diminution in left ventricular systolic performance in  the aforementioned regional wall motion abnormalities appear to be new.  ______________________________________________________________________________  Left ventricle:  The left ventricle is normal in size. Left ventricular systolic performance is  moderately reduced. The ejection fraction is estimated to be 40%. There is  severe inferior hypokinesis. There is moderate anteroseptal hypokinesis. In  addition, there is moderate mid to distal posterior hypokinesis and severe  distal lateral hypokinesis. Left ventricular wall thickness is normal.     Assessment of left atrial pressure (LAP): The cumulative findings suggest  moderately elevated left atrial pressure (the E/A is > 0.8 and <2.0 plus 2 or  3 of 3 of the following present: Average E/e' > 14, TRvel > 2.8 m/s, and/or LA  vol. index > 34 ml/mÂ  ).     Right ventricle:  Borderline right ventricular enlargement with low normal right ventricular  systolic performance.     Left atrium:  There is mild left atrial enlargement.     Right atrium:  There is borderline right atrial enlargement.     IVC:  The IVC is of normal caliber.     Aortic valve:  The aortic valve is comprised of three cusps. No significant aortic stenosis  or aortic insufficiency is detected on this study.     Mitral valve:  The mitral valve appears morphologically normal. There is trace mitral  insufficiency.     Tricuspid valve:  The tricuspid valve is grossly morphologically normal. There is mild tricuspid  insufficiency.     Pulmonic  valve:  The pulmonic valve is grossly morphologically normal. There is trace pulmonic  insufficiency.     Thoracic aorta:  The aortic root and proximal ascending aorta are of normal dimension.     Pericardium:  There is no significant pericardial effusion.  ______________________________________________________________________________  ______________________________________________________________________________  MMode/2D Measurements & Calculations  IVSd: 1.1 cm  LVIDd: 5.4 cm  LVIDs: 4.1 cm  LVPWd: 0.88 cm  FS: 23.9 %  LV mass(C)d: 196.8 grams  LV mass(C)dI: 99.3 grams/m2  Ao root diam: 3.1 cm  LA dimension: 4.6 cm  asc Aorta Diam: 3.0 cm  LA/Ao: 1.5  LVOT diam: 2.0 cm  LVOT area: 3.1 cm2     LA Volume Indexed (AL/bp): 46.9 ml/m2  RWT: 0.33     Doppler Measurements & Calculations  MV E max jeo: 89.2 cm/sec  MV A max joe: 108.7 cm/sec  MV E/A: 0.82  MV max P.2 mmHg  MV mean P.3 mmHg  MV V2 VTI: 26.9 cm  MVA(VTI): 2.9 cm2  MV dec slope: 517.3 cm/sec2  MV dec time: 0.17 sec  Ao V2 max: 177.1 cm/sec  Ao max P.0 mmHg  Ao V2 mean: 122.4 cm/sec  Ao mean P.0 mmHg  Ao V2 VTI: 34.9 cm  JAVIER(I,D): 2.2 cm2  JAVIER(V,D): 2.3 cm2  LV V1 max P.9 mmHg  LV V1 max: 131.0 cm/sec  LV V1 VTI: 25.1 cm  SV(LVOT): 77.7 ml  SI(LVOT): 39.2 ml/m2  PA acc time: 0.08 sec  TR max joe: 344.2 cm/sec  TR max P.4 mmHg  AV Joe Ratio (DI): 0.74  JAVIER Index (cm2/m2): 1.1  E/E' av.7     Lateral E/e': 8.6  Medial E/e': 16.7     ______________________________________________________________________________  Report approved by: Ryan Turcios 2022 11:34 AM               Discharge Medications   Current Discharge Medication List      CONTINUE these medications which have NOT CHANGED    Details   albuterol (PROAIR HFA;PROVENTIL HFA;VENTOLIN HFA) 90 mcg/actuation inhaler [ALBUTEROL (PROAIR HFA;PROVENTIL HFA;VENTOLIN HFA) 90 MCG/ACTUATION INHALER] Inhale 2 puffs every 6 (six) hours as needed for wheezing.    Comments:  May substitute the equivalent medication per insurance preference.      atorvastatin (LIPITOR) 20 MG tablet Take 20 mg by mouth every morning       hydroxyurea (HYDREA) 500 MG capsule Take 1,000 mg by mouth daily      metoprolol succinate ER (TOPROL-XL) 25 MG 24 hr tablet Take 25 mg by mouth daily      tiotropium (SPIRIVA) 18 mcg inhalation capsule [TIOTROPIUM (SPIRIVA) 18 MCG INHALATION CAPSULE] Place 18 mcg into inhaler and inhale daily.           Allergies   No Known Allergies

## 2022-04-26 NOTE — PROGRESS NOTES
Care Management Discharge Note    Discharge Date: 04/26/2022       Discharge Disposition:  (transfer to [facility]) for cardiac procedure     Discharge Services:  (transfer to [place]) for cardiac procedure     Discharge DME:  None    Discharge Transportation: medical transport      Education Provided on the Discharge Plan: Patient and wife aware of plan.    Persons Notified of Discharge Plans: patient and wife  Patient/Family in Agreement with the Plan: yes    Additional Information:  Chart reviewed.     CCC referral sent per protocol.     Bruna Blanco RN

## 2022-04-26 NOTE — INTERVAL H&P NOTE
"I have reviewed the surgical (or preoperative) H&P that is linked to this encounter, and examined the patient. There are no significant changes    Clinical Conditions Present on Arrival:  Clinically Significant Risk Factors Present on Admission            # Coagulation Defect: INR = 1.49 (Ref range: 0.85 - 1.15) and/or PTT = N/A on admission, will monitor for bleeding   # Obesity: Estimated body mass index is 32.12 kg/m  as calculated from the following:    Height as of this encounter: 1.676 m (5' 6\").    Weight as of this encounter: 90.3 kg (199 lb).       "

## 2022-04-26 NOTE — H&P
"[facility]    History and Physical - Hospitalist Service       Date of Admission:  4/26/2022    Assessment & Plan      Shaji Pompa is a 76 year old male admitted on 4/26/2022. He as a hx of recent bilateral PE's weeks ago, MDS, COPD on home O2, here with acute systolic heart failure, concern for ischemic cardiomyopathy, s/p chest tube for pleural effusion, sent from  here to [facility] for angiogram    1.BELL  -he denied cp, but had new onset BELL and depressed EF  -?if lower EF related to recent PE or ischemic issues  -sent here for cath today  -on hep gtt due to recent PE and holding warfarin--likely to restart after cath if ok with cards    2.Acute systolic HF  -new onset EF 40%  -improved with iv lasix, now po  -cath today to check for ischemic causes  -cards to follow  -continue--metoprolol, lisinopril, and lasix    3.Pulm htn  -suspect due to recent PE and systolic HF    4.Acute bilateral PE  -weeks ago, at risk with malignancy  -was on warfarin, now hep gtt for cath  -need to restart warfarin when ok with cards--will need bridge    5.hx of COPD  -on home O2  -continue home meds  -watch close needs here    6.hx of MDS  -follows with heme/onc, MOPHA    7.Left pleural effusion-loculated  -s/p chest tube at -out now  -per pulm-- -->\"US guided thoracentesis showed complex effusion, extensive septations, 100 ml yellowish fluid obtained , exudative, predominantly neutrophilic, gram stain negative. S/p chest tube placement 4/21.   Started on intrapleural lytics x 3 days. Gram stain and cultures are negative, cytology pending.\"  Follow up chest CT scan showed significant improvement of loculated effusion.  -pulm did see there, now on Augmentin to complete 2 weeks    8.chronic anemia, with pancytopenia --from MDS  -at  had 2 units pRBC --check labs today--need to check him daily while on hep gtt as well    9.hx of HTN  -on meds, metoprolol and " lisinopril    10.Obesity  -bmi 32    11.Dvt prevent- hep gtt now    12.Code-full    Social -updated wife at bedside          Disposition Plan   Expected Discharge:days       The patient's care was discussed with the Bedside Nurse, Patient and Patient's Family.    Valerie Kim MD  Hospitalist Service  [facility]  Securely message with the Vocera Web Console (learn more here)  Text page via [employer] Paging/Directory         ______________________________________________________________________    Chief Complaint   Sent over from  for angio    History is obtained from the patient    History of Present Illness     Shaji Pompa is a 76 year old male admitted on 4/26/2022. He as a hx of recent bilateral PE's weeks ago, MDS, COPD on home O2, here with acute systolic heart failure, concern for ischemic cardiomyopathy, s/p chest tube for pleural effusion, sent from  here to [facility] for angiogram    He notes he was admitted at  for BELL, not chest pains. He was found to have pleural effusion and lower ER on echo.While at  he was seen by both cards and pulmonary services. He had a thoracentesis and chest tube to drain loculated effusion and then chest tube was removed. Cards did see him for New CHF and diuresed him.  Cards sent him over to eval if new onset HF was related to ischemic issues  He still denied cp  He notes since chest tube out not much pain at chest tube site  Minimal cough  He notes at home uses O2 only prn          Review of Systems    CONSTITUTIONAL:  negative  EYES:  negative  HEENT:  negative  RESPIRATORY:  thoracentesis at  And Chest tube  CARDIOVASCULAR:  positive for  dyspnea  GASTROINTESTINAL:  negative  GENITOURINARY:  negative  INTEGUMENT/BREAST:  negative  HEMATOLOGIC/LYMPHATIC:  positive for got 2 units pRBC at , MDS, MOPHA  ALLERGIC/IMMUNOLOGIC:  negative  ENDOCRINE:  negative  MUSCULOSKELETAL:  negative  NEUROLOGICAL:  negative  BEHAVIOR/PSYCH:   negative    Past Medical History    I have reviewed this patient's medical history and updated it with pertinent information if needed.   Past Medical History:   Diagnosis Date     Cerebral infarction (H)      COPD (chronic obstructive pulmonary disease) (H)      HLD (hyperlipidemia)      Hypertension      Myeloproliferative disease (H)        Past Surgical History   I have reviewed this patient's surgical history and updated it with pertinent information if needed.  Past Surgical History:   Procedure Laterality Date     OPEN REDUCTION INTERNAL FIXATION FOOT Right 2010     OTHER SURGICAL HISTORY  2001    Lip lesion removal     TONSILLECTOMY & ADENOIDECTOMY         Social History   I have reviewed this patient's social history and updated it with pertinent information if needed.  Social History     Tobacco Use     Smoking status: Former Smoker     Packs/day: 1.00     Start date: 6/8/1965     Quit date: 1/1/2012     Years since quitting: 10.3     Smokeless tobacco: Never Used   Substance Use Topics     Alcohol use: Yes     Alcohol/week: 19.0 standard drinks     Drug use: Never       Family History   I have reviewed this patient's family history and updated it with pertinent information if needed.  Family History   Problem Relation Age of Onset     Alcoholism Mother        Prior to Admission Medications   Prior to Admission Medications   Prescriptions Last Dose Informant Patient Reported? Taking?   albuterol (PROAIR HFA;PROVENTIL HFA;VENTOLIN HFA) 90 mcg/actuation inhaler   Yes No   Sig: [ALBUTEROL (PROAIR HFA;PROVENTIL HFA;VENTOLIN HFA) 90 MCG/ACTUATION INHALER] Inhale 2 puffs every 6 (six) hours as needed for wheezing.   atorvastatin (LIPITOR) 20 MG tablet   Yes No   Sig: Take 20 mg by mouth every morning    hydroxyurea (HYDREA) 500 MG capsule   Yes No   Sig: Take 1,000 mg by mouth daily   metoprolol succinate ER (TOPROL-XL) 25 MG 24 hr tablet   Yes No   Sig: Take 25 mg by mouth daily   tiotropium (SPIRIVA) 18 mcg  inhalation capsule   Yes No   Sig: [TIOTROPIUM (SPIRIVA) 18 MCG INHALATION CAPSULE] Place 18 mcg into inhaler and inhale daily.      Facility-Administered Medications: None     Allergies   No Known Allergies    Physical Exam   Vital Signs: Temp: 98.1  F (36.7  C)   BP: 123/58 Pulse: 73     SpO2: 92 %      Weight: 199 lbs 0 oz    Constitutional: awake, fatigued, alert, cooperative and no apparent distress  Eyes: sclera clear  ENT: normocepalic, without obvious abnormality  Respiratory: no increased work of breathing, good air exchange, no retractions and diminished breath sounds right base and left base  Cardiovascular: Normal apical impulse, regular rate and rhythm, normal S1 and S2, no S3 or S4, and no murmur noted  GI: normal bowel sounds, soft, non-distended and non-tender  Skin: no bruising or bleeding  Musculoskeletal: no lower extremity pitting edema present  Neurologic: Mental Status Exam:  Level of Alertness:   awake  Neuropsychiatric: General: normal, calm and normal eye contact    Data   Data reviewed today: I reviewed all medications, new labs and imaging results over the last 24 hours. I personally reviewed labs and images    Recent Labs   Lab 04/25/22  0906 04/24/22  0408 04/23/22  1222 04/23/22  0441 04/21/22  0502 04/20/22  0405 04/20/22  0142 04/19/22  2143 04/19/22  2036 04/19/22  1441   WBC 6.8 8.8  --  9.2   < > 8.0  --  8.7  --  10.7   HGB 8.6* 7.2* 7.0* 7.0*   < > 7.4*   < > 7.0*  --  6.7*   * 118*  --  124*   < > 122*  --  121*  --  130*   * 105*  --  114*   < > 103*  --  120*  --  123*   INR 1.49* 1.80*  --  1.97*   < > 2.67*  --   --   --  3.06*    143  --  141   < > 140  --  140  --  140   POTASSIUM 4.1 3.7  --  3.8   < > 3.9  --  3.7  --  4.0   CHLORIDE 104 106  --  106   < > 105  --  104  --  106   CO2 26 29  --  27   < > 27  --  26  --  25   BUN 18 23  --  22   < > 19  --  18  --  21   CR 0.84 0.79  --  0.76   < > 0.79  --  0.76  --  0.79   ANIONGAP 10 8  --  8   < > 8   --  10  --  9   GLENDY 8.2* 7.8*  --  7.7*   < > 8.2*  --  8.0*  --  8.2*    105  --  107   < > 128*  --  102   < > 106   ALBUMIN  --   --   --   --   --  1.9*  --   --   --  1.9*   PROTTOTAL  --   --   --   --   --  6.0  --  6.1  --  5.9*   BILITOTAL  --   --   --   --   --  1.3*  --   --   --  1.0   ALKPHOS  --   --   --   --   --  78  --   --   --  88   ALT  --   --   --   --   --  22  --   --   --  22   AST  --   --   --   --   --  17  --   --   --  18    < > = values in this interval not displayed.     No results found for this or any previous visit (from the past 24 hour(s)).     Echocardiogram Complete    Result Date: 2022  [id number]^BEATRIZ^[place] [address]  Name: HELENA FENTON MRN: [id number] : 1945 Study Date: 2022 10:23 AM Age: 76 yrs Gender: Male Patient Location: [facility] Reason For Study: Pulmonary Hypertension, CHF Ordering Physician: BENNY HENDERSON  BSA: 2.0 m2 Height: 65 in Weight: 201 lb HR: 95 ______________________________________________________________________________ Procedure Complete Echo Adult. ______________________________________________________________________________ Interpretation Summary  1. The left ventricle is normal in size. Left ventricular systolic performance is moderately reduced. The ejection fraction is estimated to be 40%. 2. There is severe inferior hypokinesis. There is moderate anteroseptal hypokinesis. In addition, there is moderate mid to distal posterior hypokinesis and severe distal lateral hypokinesis 3. No significant valvular heart disease is identified on this study. 4. Borderline right ventricular enlargement with low normal right ventricular systolic performance. 5. There is mild left atrial enlargement. 6. Right ventricular systolic pressure relative to right atrial pressure is mildly increased. The pulmonary artery pressure is estimated to  be 45-50mmHg plus right atrial pressure (the IVC is of normal caliber).  When compared to the prior real-time echocardiogram dated 2 February 2022, there has been interval diminution in left ventricular systolic performance in the aforementioned regional wall motion abnormalities appear to be new. ______________________________________________________________________________ Left ventricle: The left ventricle is normal in size. Left ventricular systolic performance is moderately reduced. The ejection fraction is estimated to be 40%. There is severe inferior hypokinesis. There is moderate anteroseptal hypokinesis. In addition, there is moderate mid to distal posterior hypokinesis and severe distal lateral hypokinesis. Left ventricular wall thickness is normal.  Assessment of left atrial pressure (LAP): The cumulative findings suggest moderately elevated left atrial pressure (the E/A is > 0.8 and <2.0 plus 2 or 3 of 3 of the following present: Average E/e' > 14, TRvel > 2.8 m/s, and/or LA vol. index > 34 ml/mÂ  ).  Right ventricle: Borderline right ventricular enlargement with low normal right ventricular systolic performance.  Left atrium: There is mild left atrial enlargement.  Right atrium: There is borderline right atrial enlargement.  IVC: The IVC is of normal caliber.  Aortic valve: The aortic valve is comprised of three cusps. No significant aortic stenosis or aortic insufficiency is detected on this study.  Mitral valve: The mitral valve appears morphologically normal. There is trace mitral insufficiency.  Tricuspid valve: The tricuspid valve is grossly morphologically normal. There is mild tricuspid insufficiency.  Pulmonic valve: The pulmonic valve is grossly morphologically normal. There is trace pulmonic insufficiency.  Thoracic aorta: The aortic root and proximal ascending aorta are of normal dimension.  Pericardium: There is no significant pericardial effusion.  ______________________________________________________________________________ ______________________________________________________________________________ MMode/2D Measurements & Calculations IVSd: 1.1 cm LVIDd: 5.4 cm LVIDs: 4.1 cm LVPWd: 0.88 cm FS: 23.9 % LV mass(C)d: 196.8 grams LV mass(C)dI: 99.3 grams/m2 Ao root diam: 3.1 cm LA dimension: 4.6 cm asc Aorta Diam: 3.0 cm LA/Ao: 1.5 LVOT diam: 2.0 cm LVOT area: 3.1 cm2  LA Volume Indexed (AL/bp): 46.9 ml/m2 RWT: 0.33  Doppler Measurements & Calculations MV E max joe: 89.2 cm/sec MV A max joe: 108.7 cm/sec MV E/A: 0.82 MV max P.2 mmHg MV mean P.3 mmHg MV V2 VTI: 26.9 cm MVA(VTI): 2.9 cm2 MV dec slope: 517.3 cm/sec2 MV dec time: 0.17 sec Ao V2 max: 177.1 cm/sec Ao max P.0 mmHg Ao V2 mean: 122.4 cm/sec Ao mean P.0 mmHg Ao V2 VTI: 34.9 cm JAVIER(I,D): 2.2 cm2 JAVIER(V,D): 2.3 cm2 LV V1 max P.9 mmHg LV V1 max: 131.0 cm/sec LV V1 VTI: 25.1 cm SV(LVOT): 77.7 ml SI(LVOT): 39.2 ml/m2 PA acc time: 0.08 sec TR max joe: 344.2 cm/sec TR max P.4 mmHg AV Joe Ratio (DI): 0.74 JAVIER Index (cm2/m2): 1.1 E/E' av.7  Lateral E/e': 8.6 Medial E/e': 16.7  ______________________________________________________________________________ Report approved by: Rayn Turcios 2022 11:34 AM       Echocardiogram Complete    Result Date: 2022  [id number]^CHRIS^INES^[place] [address]  Name: HELENA FENTON MRN: [id number] : 1945 Study Date: 2022 08:05 AM Age: 76 yrs Gender: Male Patient Location: [facility] Reason For Study: Pulmonary Emboli Ordering Physician: IENS PEREZ Performed By: JEANINE  BSA: 2.0 m2 Height: 66 in Weight: 198 lb HR: 84 ______________________________________________________________________________ Procedure Complete Echo Adult. Definity (NDC #06527-655) given intravenously.  ______________________________________________________________________________ Interpretation Summary  Left ventricular size, wall motion and function are normal. The ejection fraction is 60-65%. Normal right ventricle size and systolic function. The right ventricular systolic pressure is approximated at 50mmHg plus the right atrial pressure. IVC diameter and respiratory changes fall into an intermediate range suggesting an RA pressure of 8 mmHg. ______________________________________________________________________________ Left Ventricle Left ventricular size, wall motion and function are normal. The ejection fraction is 60-65%. There is normal left ventricular wall thickness. Left ventricular diastolic function is indeterminate. No regional wall motion abnormalities noted.  Right Ventricle Normal right ventricle size and systolic function.  Atria The left atrium is mildly dilated. Right atrial size is normal. There is no color Doppler evidence of an atrial shunt.  Mitral Valve Mitral valve leaflets appear normal. There is no evidence of mitral stenosis or clinically significant mitral regurgitation.  Tricuspid Valve Tricuspid valve leaflets appear normal. There is mild (1+) tricuspid regurgitation. The right ventricular systolic pressure is approximated at 50mmHg plus the right atrial pressure. IVC diameter and respiratory changes fall into an intermediate range suggesting an RA pressure of 8 mmHg.  Aortic Valve Aortic valve leaflets appear normal. There is no evidence of aortic stenosis or clinically significant aortic regurgitation.  Pulmonic Valve The pulmonic valve is not well seen, but is grossly normal. This degree of valvular regurgitation is within normal limits.  Vessels The aorta root is normal. Normal size ascending aorta. IVC diameter <2.1 cm collapsing >50% with sniff suggests a normal RA pressure of 3 mmHg.  Pericardium There is no pericardial effusion.   ______________________________________________________________________________ MMode/2D Measurements & Calculations Ao root diam: 3.2 cm asc Aorta Diam: 2.9 cm  LVOT diam: 2.0 cm LVOT area: 3.2 cm2 LA Volume Indexed (AL/bp): 34.6 ml/m2  Time Measurements MM HR: 84.0 BPM  Doppler Measurements & Calculations MV E max joe: 122.0 cm/sec MV A max joe: 129.6 cm/sec MV E/A: 0.94 MV max P.9 mmHg MV mean PG: 3.4 mmHg MV V2 VTI: 28.4 cm MVA(VTI): 2.7 cm2 MV dec slope: 844.9 cm/sec2 MV dec time: 0.14 sec  Ao V2 max: 181.3 cm/sec Ao max P.0 mmHg Ao V2 mean: 119.4 cm/sec Ao mean P.5 mmHg Ao V2 VTI: 34.9 cm JAVIER(I,D): 2.2 cm2 JAVIER(V,D): 2.2 cm2 LV V1 max P.1 mmHg LV V1 max: 123.8 cm/sec LV V1 VTI: 23.8 cm SV(LVOT): 77.3 ml SI(LVOT): 38.8 ml/m2 PA acc time: 0.08 sec PI end-d joe: 108.6 cm/sec TR max joe: 354.2 cm/sec TR max P.2 mmHg AV Joe Ratio (DI): 0.68 JAVIER Index (cm2/m2): 1.1 E/E' avg: 15.7 Lateral E/e': 10.0 Medial E/e': 21.4  ______________________________________________________________________________ Report approved by: Ryan Gonzalez 2022 11:35 AM       US Lower Extremity Venous Duplex Bilateral    Result Date: 2022  EXAM: US LOWER EXTREMITY VENOUS DUPLEX BILATERAL LOCATION: [facility] DATE/TIME: 2022 10:09 PM INDICATION: New PE, more swelling in right lower leg. Please scan bilaterally. COMPARISON: Left leg ultrasound examination 10/31/2019 TECHNIQUE: Venous Duplex ultrasound of bilateral lower extremities with and without compression, augmentation and duplex. Color flow and spectral Doppler with waveform analysis performed. FINDINGS: Exam includes the common femoral, femoral, popliteal veins as well as segmentally visualized deep calf veins and greater saphenous vein. RIGHT: There is partially occlusive deep vein thrombosis seen involving the popliteal vein with some extension into the gastrocnemius veins. There is occlusive deep vein thrombosis seen in  the posterior tibial vein from its origin to the ankle. No superficial thrombophlebitis. No popliteal cyst. LEFT: No deep vein thrombosis. No superficial thrombophlebitis. No popliteal cyst.     IMPRESSION: [Critical Result: DVT right leg] Finding was identified on 4/1/2022 11:29 PM. Glenny HIDALGO RN was contacted by me on 4/1/2022 11:45PM and verbalized understanding of the critical result. 1.  Nonocclusive deep vein thrombosis in the right popliteal vein with some extension into the gastrocnemius veins. Occlusive deep vein thrombosis in the posterior tibial vein from its origin to the ankle.     US Thoracentesis    Result Date: 4/19/2022  EXAM: 1. LEFT THORACENTESIS 2. ULTRASOUND GUIDANCE LOCATION: [facility] DATE/TIME: 4/19/2022 4:17 PM INDICATION: Loculated left pleural effusion on CT chest.. PROCEDURE: Informed consent obtained. Time out performed. A limited ultrasound of the left thorax demonstrated a complex left pleural effusion with extensive septations/loculations. The largest collection of fluid was localized and the overlying skin was  prepped and draped in sterile fashion. 10 mL of 1 % lidocaine was infused into the local soft tissues. Under direct ultrasound guidance, a 5 [place] catheter system was placed into the pleural effusion. 100 mL of sterile saline is fluid was removed and sent for specified labs. Patient tolerated procedure well. Ultrasound imaging was obtained and placed in the patient's permanent medical record.     IMPRESSION: Status post left ultrasound-guided diagnostic thoracentesis. The left loculated effusion and demonstrates extensive internal septations/loculations. Reference CPT Code: 38916    XR Chest Port 1 View    Result Date: 4/23/2022  EXAM: XR CHEST PORT 1 VIEW LOCATION: [facility] DATE/TIME: 4/23/2022 7:50 AM INDICATION: Follow up chest tube COMPARISON: 4/22/2022     IMPRESSION: Left chest tube has been slightly retracted.  Minimal change in a loculated left pleural effusion. Unchanged left basilar opacities. Normal heart size. No pneumothorax.    XR Chest Port 1 View    Result Date: 4/22/2022  EXAM: XR CHEST PORT 1 VIEW LOCATION: [facility] DATE/TIME: 4/22/2022 7:50 AM INDICATION: Follow up left pleural effusion COMPARISON: CT 04/19/2022, chest x-ray 04/01/2022     IMPRESSION: There is a new left pigtail chest tube. Loculated left-sided pleural effusion appears to have slightly decreased since the CT topogram 04/19/2022. Retrocardiac opacities are little changed. No pneumothorax. Minimal subpleural opacities in the  right lateral costophrenic angle are unchanged. No new findings. Heart is of normal size. Severe degenerative changes in both shoulders.    XR Chest Port 1 View    Result Date: 4/1/2022  EXAM: XR CHEST PORT 1 VIEW LOCATION: [facility] DATE/TIME: 4/1/2022 12:30 PM INDICATION: Shortness of breath COMPARISON: 06/14/2021     IMPRESSION: Lungs are clear. Heart and pulmonary vascularity are normal. No signs of acute disease. Periarticular loose bodies again seen in the region of the left subacromial bursa.    CT Chest Pulmonary Embolism w Contrast    Result Date: 4/1/2022  EXAM: CT CHEST PULMONARY EMBOLISM W CONTRAST LOCATION: [facility] DATE/TIME: 4/1/2022 1:01 PM INDICATION: PE suspected, low/intermediate probability, positive D-dimer, shortness of breath. COMPARISON: None. TECHNIQUE: CT chest pulmonary angiogram during arterial phase injection of IV contrast. Multiplanar reformats and MIP reconstructions were performed. Dose reduction techniques were used. CONTRAST: Isovue 370 100 mL FINDINGS: ANGIOGRAM CHEST: Small volume bilateral nonocclusive segmental and subsegmental pulmonary artery filling defects. No evidence of right heart strain. The aorta is normal in caliber. LUNGS AND PLEURA: Small left effusion and associated atelectasis/consolidation.  There are a few patchy areas of groundglass, tree-in-bud, consolidative opacities seen throughout the bilateral lungs. Small volume airway secretions and mild bronchial wall thickening. No pneumothorax. MEDIASTINUM/AXILLAE: Heart size is normal. There are a few borderline enlarged mediastinal lymph nodes with the largest in the left lower paratracheal chain measuring 15 x 11 mm (series 5/32). These are likely reactive. Small hiatal hernia with findings suggestive of reflux esophagitis. CORONARY ARTERY CALCIFICATION: Moderate. UPPER ABDOMEN: No significant finding. MUSCULOSKELETAL: Mild degenerative changes of the spine and bilateral shoulders.     IMPRESSION: 1.  Small volume bilateral pulmonary emboli. No evidence of right heart strain. 2.  Small left effusion and associated atelectasis. 3.  Patchy areas of infectious/inflammatory opacities seen throughout the bilateral lungs with likely a component of superimposed aspiration. 4.  Small hiatal hernia with findings suggestive of reflux esophagitis. [Critical Result: New diagnosis of pulmonary embolism] Finding was identified on 4/1/2022 1:34 PM. Armida Cancino MD, was contacted by me on 4/1/2022 1:39 PM and verbalized understanding of the critical result.     CT Chest w/o Contrast    Result Date: 4/24/2022  EXAM: CT CHEST WITHOUT CONTRAST LOCATION: [facility] DATE/TIME: 04/24/2022, 8:09 AM INDICATION: Follow-up loculated left pleural effusion. COMPARISON: 04/19/2022. TECHNIQUE: CT chest without IV contrast. Multiplanar reformats were obtained. Dose reduction techniques were used. CONTRAST: None. FINDINGS: LUNGS AND PLEURA: The left pigtail catheter has pulled out of the pleural space and is in the left chest wall. This can be removed. There is a small residual loculated left hydropneumothorax, significantly decreased in size when compared to previous. Improved aeration in the left lung with some residual atelectasis and likely pulmonary  edema in the left lung base. A few peripheral and peribronchial opacities in the right lung have not appreciably changed. No right pleural effusion. MEDIASTINUM/AXILLAE: No lymphadenopathy. No thoracic aortic aneurysm. CORONARY ARTERY CALCIFICATION: Severe. UPPER ABDOMEN: No significant finding. MUSCULOSKELETAL: Unremarkable.     IMPRESSION: 1.  Small residual loculated left hydropneumothorax, status post chest tube placement, with significantly improved aeration in the left lung. 2.  The left chest tube has pulled out of the pleural space and is in the left chest wall. This can be removed.     Chest CT w/o contrast    Result Date: 4/19/2022  EXAM: CT CHEST W/O CONTRAST LOCATION: [facility] DATE/TIME: 4/19/2022 3:04 PM INDICATION: Shortness of breath. Dyspnea. Recent pulmonary embolus. COMPARISON: Chest CTA 04/01/2022 TECHNIQUE: CT chest without IV contrast. Multiplanar reformats were obtained. Dose reduction techniques were used. CONTRAST: None. FINDINGS: LUNGS AND PLEURA: There is a large, complex loculated left pleural effusion with loculated components of septated are inferiorly air  and increase in the amount of fluid since study done 18 days ago. Fluid has a density of 10 Hounsfield units. There is associated compressive atelectasis of most of the left lower lobe. Few small rounded or ovoid groundglass opacities have developed in the right lower lobe (images 148, 160 171).  MEDIASTINUM/AXILLAE: Blood pool is much less dense than the heart muscle. There is mild mediastinal and subcarinal adenopathy with subcarinal nodes measuring 1.2 cm in short axis. CORONARY ARTERY CALCIFICATION: Severe. UPPER ABDOMEN: Incidental splenule. MUSCULOSKELETAL: No suspicious lesions. Severe degenerative changes in both shoulders.     IMPRESSION: 1.  Notable increase in the complex, loculated left-sided pleural effusion with now loculated bubbles of air in the left base. This fluid has a density of 10  Hounsfield units. Given the prior significant pulmonary emboli, need to  consider pulmonary infarct with necrosis and either a sterile or infected pulmonary abscess/empyema. 2.  Patient's quite anemic. [Critical Result: Pulmonary abscess and/or empyema on the left as noted above along with severe anemia.] Finding was identified on 4/19/2022 3:39 PM. Dr. Alonso was contacted by me on 4/19/2022 3:48 PM and verbalized understanding of the critical result.     CT Chest Tube with Cath Placement    Result Date: 4/21/2022  EXAM: 1. PERCUTANEOUS CHEST TUBE PLACEMENT LEFT PLEURAL SPACE 2. CT GUIDANCE 3. CONSCIOUS SEDATION LOCATION: [facility] DATE/TIME: 4/21/2022 11:54 AM INDICATION: hx myeloproliferative disorder, recnetly treated for PE, loculated left effusion with air pockets. thoracentesis, minimal fluid, exudate effusion. TECHNIQUE: Dose reduction techniques were used. PROCEDURE: Informed consent obtained. Site marked. Prior images reviewed. Required items made available. Patient identity confirmed verbally and with arm band. Patient reevaluated immediately before administering sedation. Universal protocol was followed. Time out performed. The site was prepped and draped in sterile fashion. 10 mL of 1% lidocaine was infused into the local soft tissues. Using standard technique and under direct CT guidance, a 10 Malay chest tube catheter was inserted into the pleural space. The catheter was fixed in place with sutures and adhesive device, and the tubing was banded. Chest tube placed to Pleur-evac suction. SPECIMEN: 10 mL of dark serous fluid was aspirated and sent to lab for cultures and Gram stain if requested. BLOOD LOSS: Minimal. The patient tolerated the procedure well. No immediate complications. SEDATION: Versed 1 mg. Fentanyl 50 mcg. The procedure was performed with administration intravenous conscious sedation with appropriate preoperative, intraoperative, and postoperative evaluation.  15 minutes of supervised face to face conscious sedation time was provided by a radiology nurse under my direct supervision.     IMPRESSION: 1.  Successful CT-guided chest tube placement into the left pleural space. Reference CPT Codes: 46017, 45084

## 2022-04-26 NOTE — PHARMACY-ADMISSION MEDICATION HISTORY
PTA medications were updated upon initial admission at [facility]. See note from that encounter dated 4/19/22.    Jesenia Pelletier, [facility], 4/26/2022, 3:34 PM

## 2022-04-26 NOTE — DISCHARGE INSTRUCTIONS

## 2022-04-26 NOTE — PROGRESS NOTES
Assessment/Plan:  1.  Acute systolic congestive heart failure, ischemic cardiomyopathy, LVEF of 40%: The patient responded to diuresis.  He lost more than 10 pounds.  His shortness of breath is significantly improved.  Continue metoprolol succinate, lisinopril, Lasix.    2.  Ischemic cardiomyopathy: Most likely, the patient has significant coronary artery disease due to severe hypokinesis in the inferior wall per echo report, LVEF decreased to 40%.  Coronary angiogram with possible PCI today.  Continue metoprolol XL and lisinopril.  Continue Lasix.    3.  Pulmonary hypertension: His pulmonary hypertension most likely secondary to pulmonary embolism and CHF.  His pulmonary artery systolic pressure is improved with the treatment of pulmonary embolism and diuresis.  Reevaluate pulmonary artery pressure when his CHF and pulmonary embolism are well controlled.    4.  Recent acute bilateral pulmonary embolism: His INR was 1.8 yesterday.  Primary team did not start heparin infusion due to MDS, anemia, risk of anticoagulation.  Since patient has acute PE, the patient needs heparin infusion.  Started today.    5.  Benign essential hypertension, dyslipidemia: Treated.  His blood pressure is controlled.    6.  COPD, MDS, anemia, thrombocytopenia, left pleural effusion: Deferred to primary team management.    Subjective:  Interval History:  Has no complaints of chest pain.  Improved shortness of breath.  No palpitations.  No leg edema.    Current Medications:  Scheduled Meds:    amoxicillin-clavulanate  1 tablet Oral Q12H KATELYN     atorvastatin  40 mg Oral QAM     folic acid  1 mg Oral Daily     furosemide  40 mg Oral Daily     hydroxyurea  500 mg Oral Daily     lisinopril  5 mg Oral Daily     metoprolol succinate ER  25 mg Oral Daily     sodium chloride (PF)  3 mL Intracatheter Q8H     umeclidinium  1 puff Inhalation Daily     Continuous Infusions:    heparin 1,600 Units/hr (04/26/22 0400)     sodium chloride       Warfarin  Therapy Reminder       PRN Meds:.acetaminophen, albuterol, albuterol, albuterol, hydrALAZINE, lidocaine 4%, lidocaine (buffered or not buffered), lidocaine (buffered or not buffered), melatonin, naloxone **OR** naloxone **OR** naloxone **OR** naloxone, ondansetron **OR** ondansetron, oxyCODONE, polyethylene glycol, prochlorperazine **OR** prochlorperazine **OR** prochlorperazine, sodium chloride (PF), Warfarin Therapy Reminder    Objective:   Vital signs in last 24 hours:  Temp:  [97.6  F (36.4  C)-98.9  F (37.2  C)] 98.5  F (36.9  C)  Pulse:  [65-81] 73  Resp:  [16-21] 16  BP: ()/(53-65) 132/62  SpO2:  [86 %-98 %] 98 %  Weight:   [unfilled]     Physical Exam:  General Appearance:   Awake, Alert, No acute distress.   HEENT:  No scleral icterus; the mucous membranes were moist.   Neck: No cervical bruits. No jugular venous distention   Lungs:   Diminished breathing sounds. No wheezing.   Cardiovascular:   RRR, normal first and second heart sounds with no murmurs. No rubs or gallops.     Abdomen:  Obese. Soft. No tenderness. Bowels sounds are present   Extremities: No leg edema. Equal posterior tibial pulsese.   Skin: Warm, Dry. No rashes.   Neurologic: Mood and affect are appropriate. No focal deficits.         Cardiographics:   Report: personally reviewed. .      Tele monitoring -  Sinus rhythm, no cardiac arrhythmia    Echocardiogram 4-:  1. The left ventricle is normal in size. Left ventricular systolic performance  is moderately reduced. The ejection fraction is estimated to be 40%.  2. There is severe inferior hypokinesis. There is moderate anteroseptal  hypokinesis. In addition, there is moderate mid to distal posterior  hypokinesis and severe distal lateral hypokinesis  3. No significant valvular heart disease is identified on this study.  4. Borderline right ventricular enlargement with low normal right ventricular  systolic performance.  5. There is mild left atrial enlargement.  6. Right  ventricular systolic pressure relative to right atrial pressure is  mildly increased. The pulmonary artery pressure is estimated to be 45-50mmHg  plus right atrial pressure (the IVC is of normal caliber).     When compared to the prior real-time echocardiogram dated 2 February 2022,  there has been interval diminution in left ventricular systolic performance in  the aforementioned regional wall motion abnormalities appear to be new.      Lab Results:   personally reviewed.     Lab Results   Component Value Date     04/24/2022    CO2 29 04/24/2022    BUN 23 04/24/2022     Lab Results   Component Value Date    TROPONINI 0.09 04/20/2022     Lab Results   Component Value Date    WBC 8.8 04/24/2022    HGB 7.2 04/24/2022    HCT 23.1 04/24/2022     04/24/2022     04/24/2022     Lab Results   Component Value Date    CHOL 110 05/03/2021    TRIG 115 05/03/2021    HDL 35 05/03/2021    LDL 52 05/03/2021

## 2022-04-26 NOTE — PHARMACY-ANTICOAGULATION SERVICE
Clinical Pharmacy - Warfarin Dosing Consult     Pharmacy has been consulted to manage this patient s warfarin therapy.  Indication: DVT/ PE Treatment  Therapy Goal: INR 2-3  Provider/Team: [facility] [place]  Warfarin Prior to Admission: Yes  Warfarin PTA Regimen: 2 mg daily    INR   Date Value Ref Range Status   04/25/2022 1.49 (H) 0.85 - 1.15 Final   04/24/2022 1.80 (H) 0.85 - 1.15 Final       Recommend warfarin 2.5 mg today.  Pharmacy will monitor Shaji Pompa daily and order warfarin doses to achieve specified goal.      Please contact pharmacy as soon as possible if the warfarin needs to be held for a procedure or if the warfarin goals change.

## 2022-04-27 ENCOUNTER — APPOINTMENT (OUTPATIENT)
Dept: OCCUPATIONAL THERAPY | Facility: HOSPITAL | Age: 77
DRG: 286 | End: 2022-04-27
Attending: INTERNAL MEDICINE
Payer: COMMERCIAL

## 2022-04-27 LAB
ANION GAP SERPL CALCULATED.3IONS-SCNC: 7 MMOL/L (ref 5–18)
BUN SERPL-MCNC: 14 MG/DL (ref 8–28)
CALCIUM SERPL-MCNC: 7.8 MG/DL (ref 8.5–10.5)
CHLORIDE BLD-SCNC: 103 MMOL/L (ref 98–107)
CO2 SERPL-SCNC: 28 MMOL/L (ref 22–31)
CREAT SERPL-MCNC: 0.81 MG/DL (ref 0.7–1.3)
ERYTHROCYTE [DISTWIDTH] IN BLOOD BY AUTOMATED COUNT: ABNORMAL %
GFR SERPL CREATININE-BSD FRML MDRD: >90 ML/MIN/1.73M2
GLUCOSE BLD-MCNC: 98 MG/DL (ref 70–125)
HCT VFR BLD AUTO: 26.3 % (ref 40–53)
HGB BLD-MCNC: 8.3 G/DL (ref 13.3–17.7)
INR PPP: 1.34 (ref 0.9–1.15)
MCH RBC QN AUTO: 37.6 PG (ref 26.5–33)
MCHC RBC AUTO-ENTMCNC: 31.6 G/DL (ref 31.5–36.5)
MCV RBC AUTO: 119 FL (ref 78–100)
PLATELET # BLD AUTO: 151 10E3/UL (ref 150–450)
POTASSIUM BLD-SCNC: 3.6 MMOL/L (ref 3.5–5)
RBC # BLD AUTO: 2.21 10E6/UL (ref 4.4–5.9)
SODIUM SERPL-SCNC: 138 MMOL/L (ref 136–145)
UFH PPP CHRO-ACNC: 0.28 IU/ML
UFH PPP CHRO-ACNC: 0.55 IU/ML
UFH PPP CHRO-ACNC: 0.64 IU/ML
WBC # BLD AUTO: 6.4 10E3/UL (ref 4–11)

## 2022-04-27 PROCEDURE — 85610 PROTHROMBIN TIME: CPT | Performed by: INTERNAL MEDICINE

## 2022-04-27 PROCEDURE — 80048 BASIC METABOLIC PNL TOTAL CA: CPT | Performed by: INTERNAL MEDICINE

## 2022-04-27 PROCEDURE — 85520 HEPARIN ASSAY: CPT | Performed by: INTERNAL MEDICINE

## 2022-04-27 PROCEDURE — 36415 COLL VENOUS BLD VENIPUNCTURE: CPT | Performed by: INTERNAL MEDICINE

## 2022-04-27 PROCEDURE — 97165 OT EVAL LOW COMPLEX 30 MIN: CPT | Mod: GO

## 2022-04-27 PROCEDURE — 97110 THERAPEUTIC EXERCISES: CPT | Mod: GO

## 2022-04-27 PROCEDURE — 250N000013 HC RX MED GY IP 250 OP 250 PS 637

## 2022-04-27 PROCEDURE — 85027 COMPLETE CBC AUTOMATED: CPT | Performed by: INTERNAL MEDICINE

## 2022-04-27 PROCEDURE — 97535 SELF CARE MNGMENT TRAINING: CPT | Mod: GO

## 2022-04-27 PROCEDURE — 99233 SBSQ HOSP IP/OBS HIGH 50: CPT | Performed by: INTERNAL MEDICINE

## 2022-04-27 PROCEDURE — 99223 1ST HOSP IP/OBS HIGH 75: CPT | Performed by: INTERNAL MEDICINE

## 2022-04-27 PROCEDURE — 250N000013 HC RX MED GY IP 250 OP 250 PS 637: Performed by: INTERNAL MEDICINE

## 2022-04-27 PROCEDURE — 250N000011 HC RX IP 250 OP 636

## 2022-04-27 PROCEDURE — 210N000001 HC R&B IMCU HEART CARE

## 2022-04-27 RX ORDER — ATORVASTATIN CALCIUM 40 MG/1
80 TABLET, FILM COATED ORAL EVERY MORNING
Status: DISCONTINUED | OUTPATIENT
Start: 2022-04-27 | End: 2022-05-01 | Stop reason: HOSPADM

## 2022-04-27 RX ORDER — WARFARIN SODIUM 2 MG/1
4 TABLET ORAL
Status: COMPLETED | OUTPATIENT
Start: 2022-04-27 | End: 2022-04-27

## 2022-04-27 RX ADMIN — HEPARIN SODIUM AND DEXTROSE 1750 UNITS/HR: 10000; 5 INJECTION INTRAVENOUS at 23:53

## 2022-04-27 RX ADMIN — AMOXICILLIN AND CLAVULANATE POTASSIUM 1 TABLET: 875; 125 TABLET, FILM COATED ORAL at 08:19

## 2022-04-27 RX ADMIN — UMECLIDINIUM 1 PUFF: 62.5 AEROSOL, POWDER ORAL at 09:43

## 2022-04-27 RX ADMIN — HYDROXYUREA 500 MG: 500 CAPSULE ORAL at 08:19

## 2022-04-27 RX ADMIN — WARFARIN SODIUM 4 MG: 2 TABLET ORAL at 17:41

## 2022-04-27 RX ADMIN — FUROSEMIDE 40 MG: 40 TABLET ORAL at 08:18

## 2022-04-27 RX ADMIN — LISINOPRIL 5 MG: 5 TABLET ORAL at 08:18

## 2022-04-27 RX ADMIN — ASPIRIN 81 MG: 81 TABLET, COATED ORAL at 08:18

## 2022-04-27 RX ADMIN — HEPARIN SODIUM AND DEXTROSE 1750 UNITS/HR: 10000; 5 INJECTION INTRAVENOUS at 09:24

## 2022-04-27 RX ADMIN — ATORVASTATIN CALCIUM 80 MG: 40 TABLET, FILM COATED ORAL at 08:18

## 2022-04-27 RX ADMIN — METOPROLOL SUCCINATE 25 MG: 25 TABLET, EXTENDED RELEASE ORAL at 08:18

## 2022-04-27 RX ADMIN — FOLIC ACID 1 MG: 1 TABLET ORAL at 08:18

## 2022-04-27 RX ADMIN — AMOXICILLIN AND CLAVULANATE POTASSIUM 1 TABLET: 875; 125 TABLET, FILM COATED ORAL at 19:36

## 2022-04-27 ASSESSMENT — ACTIVITIES OF DAILY LIVING (ADL)
ADLS_ACUITY_SCORE: 8

## 2022-04-27 NOTE — PROGRESS NOTES
Md notified of borderline BP 95-99 systolic. No symptoms at this time. Family concerned. Meds have appropriate parameters at this time.

## 2022-04-27 NOTE — PROGRESS NOTES
"[facility]    Medicine Progress Note - Hospitalist Service    Date of Admission:  4/26/2022    Assessment & Plan          Shaji Pompa is a 76 year old male admitted on 4/26/2022. He as a hx of recent bilateral PE's weeks ago, MDS, COPD on home O2, here with acute systolic heart failure, concern for ischemic cardiomyopathy, s/p chest tube for pleural effusion, sent from  here to [facility] for angiogram     1.BELL  -he denied cp, but had new onset BELL and depressed EF  -?if lower EF related to recent PE or ischemic issues  -sent here for cath on 4/26 from   -on hep gtt due to recent PE and holding warfarin--likely to restart after cath if ok with cards    -angio showed--no stents placed    Acute systolic heart failure in the setting of a loculated pleural effusion, pulmonary emboli and COPD. Coronary calcification seen on CT scan. He is also anemic and thrombocytopenic due to myloproliferative syndrome.    Diffuse, severe coronary calcification.    Mild left coronary system stenoses.    The RCA is chronically occluded in its mid-distal segments. The distal RCA branches fill by left sided collaterals.    LV EDP low/normal. AV gradient 5 mmHg by pullback.    -medical management per cards now     2.Acute systolic HF  -new onset EF 40%  -improved with iv lasix, now po  -cards to follow  -continue--metoprolol, lisinopril, and lasix     3.Pulm htn  -suspect due to recent PE and systolic HF     4.Acute bilateral PE  -weeks ago, at risk with malignancy  -was on warfarin, now hep gtt for cath  - restarted warfarin after cath and bridge with hep gtt     5.hx of COPD  -on home O2  -continue home meds  -watch close needs here     6.hx of MDS  -follows with heme/onc, [facility]     7.Left pleural effusion-loculated  -s/p chest tube at -out now  -per pulm-- -->\"US guided thoracentesis showed complex effusion, extensive septations, 100 ml yellowish fluid obtained , exudative, " "predominantly neutrophilic, gram stain negative. S/p chest tube placement 4/21.   Started on intrapleural lytics x 3 days. Gram stain and cultures are negative, cytology pending.\"  Follow up chest CT scan showed significant improvement of loculated effusion.  -pulm did see there, now on Augmentin to complete 2 weeks     8.chronic anemia, with pancytopenia --from MDS  -at [facility] had 2 units pRBC --check labs today--need to check him daily while on hep gtt as well     9.hx of HTN  -on meds, metoprolol and lisinopril     10.Obesity  -bmi 32     11.Dvt prevent- hep gtt now     12.Code-full                 Diet: Low Saturated Fat Na <2400 mg  Room Service    DVT Prophylaxis:warfarin and hep gtt  Garrett Catheter: Not present  Central Lines: None  Cardiac Monitoring: ACTIVE order. Indication: Post- PCI/ percutaneous cardiac intervention (24 hours)  Code Status: Full Code      Disposition Plan   Expected Discharge:few days  Anticipated discharge location:  Awaiting care coordination huddle  Delays:  few days , til inr >2        The patient's care was discussed with the Care Coordinator/ and Patient.  I called wife to update at 1340    Valerie Kim MD  Hospitalist Service  [facility]  Securely message with the [employer] Web Console (learn more here)  Text page via [employer] Paging/Directory           ______________________________________________________________________    Interval History   He feels ok  No cp  No sob at rest  Eating ok    Data reviewed today: I reviewed all medications, new labs and imaging results over the last 24 hours. I personally reviewed no images or EKG's today.    Physical Exam   Vital Signs: Temp: 97.4  F (36.3  C) Temp src: Oral BP: 105/53 Pulse: 76   Resp: 18 SpO2: 97 % O2 Device: Nasal cannula Oxygen Delivery: 2 LPM  Weight: 185 lbs 11.2 oz  Constitutional: awake, fatigued, alert, cooperative and no apparent distress  Eyes: sclera clear  Respiratory: no increased " work of breathing, moderate air exchange, no retractions and diminished breath sounds right base and left base  Cardiovascular: Normal apical impulse, regular rate and rhythm, normal S1 and S2, no S3 or S4, and no murmur noted  GI: normal bowel sounds, soft, non-distended and non-tender  Skin: no bruising or bleeding  Musculoskeletal: no lower extremity pitting edema present  Neurologic: Mental Status Exam:  Level of Alertness:   awake  Neuropsychiatric: General: normal, calm and normal eye contact    Data   Recent Labs   Lab 04/27/22  0305 04/26/22  1601 04/26/22  1548 04/25/22  0906   WBC 6.4  --  7.0 6.8   HGB 8.3*  --  8.4* 8.6*   *  --  120* 119*     --  144* 131*   INR 1.34* 1.43*  --  1.49*     --  138 140   POTASSIUM 3.6  --  3.7 4.1   CHLORIDE 103  --  103 104   CO2 28  --  30 26   BUN 14  --  13 18   CR 0.81  --  0.71 0.84   ANIONGAP 7  --  5 10   GLENDY 7.8*  --  8.0* 8.2*   GLC 98  --  81 125     Recent Results (from the past 24 hour(s))   Cardiac Catheterization    Narrative      Acute systolic heart failure in the setting of a loculated pleural   effusion, pulmonary emboli and COPD. Coronary calcification seen on CT   scan. He is also anemic and thrombocytopenic due to myloproliferative   syndrome.    Diffuse, severe coronary calcification.    Mild left coronary system stenoses.    The RCA is chronically occluded in its mid-distal segments. The distal   RCA branches fill by left sided collaterals.    LV EDP low/normal. AV gradient 5 mmHg by pullback.

## 2022-04-27 NOTE — CONSULTS
Care Management Initial Consult    General Information  Assessment completed with: VM-chart review, Care Team Member,  (Previous CM)  Type of CM/SW Visit: Initial Assessment    Primary Care Provider verified and updated as needed: Yes   Readmission within the last 30 days:        Reason for Consult: discharge planning  Advance Care Planning:            Communication Assessment  Patient's communication style: spoken language (English or Bilingual)    Hearing Difficulty or Deaf: no   Wear Glasses or Blind: yes    Cognitive  Cognitive/Neuro/Behavioral: WDL  Level of Consciousness: alert  Arousal Level: opens eyes spontaneously  Orientation: oriented x 4  Mood/Behavior: calm, cooperative  Best Language: 0 - No aphasia  Speech: clear    Living Environment:   People in home: spouse     Current living Arrangements: house      Able to return to prior arrangements: yes       Family/Social Support:  Care provided by:    Provides care for:    Marital Status:   Wife          Description of Support System:           Current Resources:   Patient receiving home care services:       Community Resources:    Equipment currently used at home: none  Supplies currently used at home: Oxygen Tubing/Supplies    Employment/Financial:  Employment Status:          Financial Concerns:             Lifestyle & Psychosocial Needs:  Social Determinants of Health     Tobacco Use: Medium Risk     Smoking Tobacco Use: Former Smoker     Smokeless Tobacco Use: Never Used   Alcohol Use: Not on file   Financial Resource Strain: Not on file   Food Insecurity: Not on file   Transportation Needs: Not on file   Physical Activity: Not on file   Stress: Not on file   Social Connections: Not on file   Intimate Partner Violence: Not on file   Depression: Not on file   Housing Stability: Not on file       Functional Status:  Prior to admission patient needed assistance:              Mental Health Status:          Chemical Dependency Status:             "    Values/Beliefs:  Spiritual, Cultural Beliefs, Muslim Practices, Values that affect care:                 Additional Information:    Trx from [facility]. 4/19-4/26.    Initial s assessment note: \"CM met with patient and wife (Nidhi- bedside). Patient and wife live in a private home. Patient is independent at baselines including driving. CM updated PCP per patient request. Patient states he has a health care directive (scanned into [employer]). He had no home care services prior to this hospital admission. No community services prior to admission.  Patient is a readmit. Patient discharged home from the last admission with  home 02. Patient is retired. He worked as a  at the [employer]. Patient anticipates discharge home no needs. Wife will transport at time of discharge.\"    PATSY Mahajan        "

## 2022-04-27 NOTE — PLAN OF CARE
Problem: Respiratory Compromise (Heart Failure)  Goal: Effective Oxygenation and Ventilation  Outcome: Ongoing, Progressing  Intervention: Promote Airway Secretion Clearance  Recent Flowsheet Documentation  Taken 4/27/2022 0359 by Nory Vega RN  Cough And Deep Breathing: done independently per patient  Taken 4/27/2022 0011 by Nory Vega RN  Cough And Deep Breathing: done independently per patient     Problem: Pain (Cardiac Catheterization)  Goal: Acceptable Pain Control  Outcome: Ongoing, Progressing     Problem: Vascular Access Protection (Cardiac Catheterization)  Goal: Absence of Vascular Access Complication  Outcome: Ongoing, Progressing  Intervention: Prevent Access Site Complications  Recent Flowsheet Documentation  Taken 4/27/2022 0359 by Nory Vega RN  Activity Management: activity adjusted per tolerance  Taken 4/27/2022 0100 by Nory Vega RN  Activity Management: activity adjusted per tolerance  Taken 4/27/2022 0011 by Nory Vega RN  Activity Management: activity adjusted per tolerance     Goal Outcome Evaluation:  VSS this shift. No reports of pain.  Pt. Rhythm NSR with PVCs.  Pt on 2 L oxy mask.  Pt. Alert and oriented and calling appropriately.  No signs of bleeding or hematoma a radial access site and CMS intact.

## 2022-04-27 NOTE — CONSULTS
HEART CARE NOTE          Assessment/Recommendations     1. HFmrEF c/b ADHF  Assessment / Plan    New onset HF in the setting dyspnea on exertion - coronary angiogram significant for severe diffuse disease and chronically occluded RCA - no intervention at that time    Near euvolemia and diuresing well on current regimen - no changes at this time    GDMT as detailed below    Current Pharmacotherapy AHA Guideline-Directed Medical Therapy   Lisinopril 5 mg daily Lisinopril 20 mg twice daily   Metoprolol succinate 25 mg daily Carvedilol 25 mg twice daily   Spironolactone NA Spironolactone 25 mg once daily   Hydralazine NA Hydralazine 100 mg three times daily   Isosorbide dinitrate NA Isosorbide dinitrate 40 mg three times daily   SGLT2 inhibitor: not started Dapagliflozin or Empagliflozin 10 mg daily       2. CAD  Assessment / Plan    S/p coronary angiogram significant for diffuse severe CAD, notable for  of RCA    Continue medical management    Continue ASA, high intensity atorvastatin, metoprolol and lisinopril    3. Pulmonary Embolism  Assessment / Plan    Resume heparin to coumadin bridge - No cardiac contraindications at this time    Ok for discharge from a cardiology standpoint. Cardiology team will sign-off for now. Please do not hesitate toconsult us again if new questions or concerns arise. Follow-up appointment will be arranged by CORE/HF clinic.       History of Present Illness/Subjective    Mr. Shaji Pompa is a 76 year old male with a PMHx significant for recent bilateral PE's weeks ago, MDS, COPD on home O2, here with acute systolic heart failure, concern for ischemic cardiomyopathy, s/p chest tube for pleural effusion, sent from  here to [facility] for angiogram.    Today, Mr. Pompa denies any acute events or complaints; denies chest pain, palpitations, orthopnea, PND, fluid retention/edema. Management plan as detailed below.    ECG: Personally reviewed. normal sinus rhythm, PAC's  "noted.    ECHO (personnaly Reviewed):   1. The left ventricle is normal in size. Left ventricular systolic performance  is moderately reduced. The ejection fraction is estimated to be 40%.  2. There is severe inferior hypokinesis. There is moderate anteroseptal  hypokinesis. In addition, there is moderate mid to distal posterior  hypokinesis and severe distal lateral hypokinesis  3. No significant valvular heart disease is identified on this study.  4. Borderline right ventricular enlargement with low normal right ventricular  systolic performance.  5. There is mild left atrial enlargement.  6. Right ventricular systolic pressure relative to right atrial pressure is  mildly increased. The pulmonary artery pressure is estimated to be 45-50mmHg  plus right atrial pressure (the IVC is of normal caliber).     When compared to the prior real-time echocardiogram dated 2 February 2022,  there has been interval diminution in left ventricular systolic performance in  the aforementioned regional wall motion abnormalities appear to be new.    Coronary angiogram:     Acute systolic heart failure in the setting of a loculated pleural effusion, pulmonary emboli and COPD. Coronary calcification seen on CT scan. He is also anemic and thrombocytopenic due to myloproliferative syndrome.    Diffuse, severe coronary calcification.    Mild left coronary system stenoses.    The RCA is chronically occluded in its mid-distal segments. The distal RCA branches fill by left sided collaterals.    LV EDP low/normal. AV gradient 5 mmHg by pullback.            Physical Examination Review of Systems   /56 (BP Location: Left arm, Patient Position: Left side)   Pulse 72   Temp 98  F (36.7  C) (Oral)   Resp 20   Ht 1.676 m (5' 6\")   Wt 84.2 kg (185 lb 11.2 oz)   SpO2 94%   BMI 29.97 kg/m    Body mass index is 29.97 kg/m .  Wt Readings from Last 3 Encounters:   04/27/22 84.2 kg (185 lb 11.2 oz)   04/26/22 89 kg (196 lb 3.2 oz)   04/07/22 " 92.9 kg (204 lb 11.2 oz)     General Appearance:   no distress, normal body habitus   ENT/Mouth: membranes moist, no oral lesions or bleeding gums.      EYES:  no scleral icterus, normal conjunctivae   Neck: no carotid bruits or thyromegaly   Chest/Lungs:   lungs are clear to auscultation, no rales or wheezing, equal chest wall expansion    Cardiovascular:   Regular. Normal first and second heart sounds with no murmurs, rubs, or gallops; the carotid, radial and posterior tibial pulses are intact, no JVD or LE edema bilaterally    Abdomen:  no organomegaly, masses, bruits, or tenderness; bowel sounds are present   Extremities: no cyanosis or clubbing   Skin: no xanthelasma, warm.    Neurologic: alert and oriented x3, calm      Psychiatric: alert and oriented x3, calm     A complete 10 systems ROS was reviewed  And is negative except what is listed in the HPI.          Medical History  Surgical History Family History Social History   Past Medical History:   Diagnosis Date     Cerebral infarction (H)      COPD (chronic obstructive pulmonary disease) (H)      HLD (hyperlipidemia)      Hypertension      Myeloproliferative disease (H)     Past Surgical History:   Procedure Laterality Date     OPEN REDUCTION INTERNAL FIXATION FOOT Right 2010     OTHER SURGICAL HISTORY  2001    Lip lesion removal     TONSILLECTOMY & ADENOIDECTOMY      no family history of premature coronary artery disease Social History     Socioeconomic History     Marital status:      Spouse name: Not on file     Number of children: Not on file     Years of education: Not on file     Highest education level: Not on file   Occupational History     Not on file   Tobacco Use     Smoking status: Former Smoker     Packs/day: 1.00     Start date: 6/8/1965     Quit date: 1/1/2012     Years since quitting: 10.3     Smokeless tobacco: Never Used   Substance and Sexual Activity     Alcohol use: Yes     Alcohol/week: 19.0 standard drinks     Drug use: Never      Sexual activity: Not on file   Other Topics Concern     Not on file   Social History Narrative     Not on file     Social Determinants of Health     Financial Resource Strain: Not on file   Food Insecurity: Not on file   Transportation Needs: Not on file   Physical Activity: Not on file   Stress: Not on file   Social Connections: Not on file   Intimate Partner Violence: Not on file   Housing Stability: Not on file           Lab Results    Chemistry/lipid CBC Cardiac Enzymes/BNP/TSH/INR   Lab Results   Component Value Date    CHOL 110 05/03/2021    HDL 35 (L) 05/03/2021    TRIG 115 05/03/2021    BUN 14 04/27/2022     04/27/2022    CO2 28 04/27/2022    Lab Results   Component Value Date    WBC 6.4 04/27/2022    HGB 8.3 (L) 04/27/2022    HCT 26.3 (L) 04/27/2022     (H) 04/27/2022     04/27/2022    Lab Results   Component Value Date    TROPONINI 0.09 04/20/2022    BNP 1,221 (H) 04/19/2022    INR 1.34 (H) 04/27/2022     Lab Results   Component Value Date    TROPONINI 0.09 04/20/2022          Weight:    Wt Readings from Last 3 Encounters:   04/27/22 84.2 kg (185 lb 11.2 oz)   04/26/22 89 kg (196 lb 3.2 oz)   04/07/22 92.9 kg (204 lb 11.2 oz)       Allergies  No Known Allergies      Surgical History  Past Surgical History:   Procedure Laterality Date     OPEN REDUCTION INTERNAL FIXATION FOOT Right 2010     OTHER SURGICAL HISTORY  2001    Lip lesion removal     TONSILLECTOMY & ADENOIDECTOMY         Social History  Tobacco:   History   Smoking Status     Former Smoker     Packs/day: 1.00     Start date: 6/8/1965     Quit date: 1/1/2012   Smokeless Tobacco     Never Used    Alcohol:   Social History    Substance and Sexual Activity      Alcohol use: Yes        Alcohol/week: 19.0 standard drinks   Illicit Drugs:   History   Drug Use Unknown       Family History  Family History   Problem Relation Age of Onset     Alcoholism Mother           Karsten Castellon MD on 4/27/2022      cc: Aj Aponte

## 2022-04-27 NOTE — PLAN OF CARE
Problem: Bleeding (Thrombolytic Therapy)  Goal: Absence of Bleeding  Outcome: Ongoing, Progressing   Goal Outcome Evaluation:     Patient remains on Heparin gtt @ 1750 unit(s)/hr. Anti Xa 0.64, recheck @ 1630. INR 1.34 Warfarin 4 mg today.    Dressing from old CT site removed today. Appears to be closed, bandaid applied incase of drainage per MD request.    Remains on 2 LPM NC (home rate).

## 2022-04-27 NOTE — PLAN OF CARE
Problem: Bleeding (Cardiac Catheterization)  Goal: Absence of Bleeding  Outcome: Ongoing, Progressing     Problem: Respiratory Compromise (Heart Failure)  Goal: Effective Oxygenation and Ventilation  Outcome: Ongoing, Progressing     Problem: Pain (Cardiac Catheterization)  Goal: Acceptable Pain Control  Outcome: Ongoing, Progressing    Patient denies pain. TR band removed; no bleeding at puncture site, vitals remain stable, no numbness or tingling in right hand. Patient switched from NC to oxymask which he wears at home, at 2L oxygen. O2 sat 92%. Patient states he is frustrated because he doesn't get alone time. Heparin drip restarted.

## 2022-04-27 NOTE — PROGRESS NOTES
Heart Failure Care Map  GOALS TO BE MET BEFORE DISCHARGE:    1. Decrease congestion and/or edema with diuretic therapy to achieve near optimal volume status.     Dyspnea improved: Yes, satisfactory for discharge.   Edema improved: Yes, satisfactory for discharge.        Net I/O and Weights since admission:   03/28 1500 - 04/27 1459  In: 753 [P.O.:480; I.V.:273]  Out: 1150 [Urine:1150]  Net: -397     Vitals:    04/26/22 1102 04/27/22 0613   Weight: 90.3 kg (199 lb) 84.2 kg (185 lb 11.2 oz)       2.  O2 sats > 90% on room air, or at prior home O2 therapy level.      Able to wean O2 this shift to keep sats above 90%?: Yes, satisfactory for discharge.   Does patient use Home O2? Yes-  2 LPM          Current oxygenation status:   SpO2: 96 %     O2 Device: Nasal cannula, Oxygen Delivery: 2 LPM    3.  Tolerates ambulation and mobility near baseline.     Ambulation: Yes, satisfactory for discharge.   Times patient ambulated with staff this shift: 2    Please review the Heart Failure Care Map for additional HF goal outcomes.    Rozina Girard RN  4/27/2022

## 2022-04-28 ENCOUNTER — APPOINTMENT (OUTPATIENT)
Dept: OCCUPATIONAL THERAPY | Facility: HOSPITAL | Age: 77
DRG: 286 | End: 2022-04-28
Payer: COMMERCIAL

## 2022-04-28 LAB
ANION GAP SERPL CALCULATED.3IONS-SCNC: 10 MMOL/L (ref 5–18)
BUN SERPL-MCNC: 12 MG/DL (ref 8–28)
CALCIUM SERPL-MCNC: 8.1 MG/DL (ref 8.5–10.5)
CHLORIDE BLD-SCNC: 103 MMOL/L (ref 98–107)
CO2 SERPL-SCNC: 25 MMOL/L (ref 22–31)
CREAT SERPL-MCNC: 0.81 MG/DL (ref 0.7–1.3)
ERYTHROCYTE [DISTWIDTH] IN BLOOD BY AUTOMATED COUNT: ABNORMAL %
GFR SERPL CREATININE-BSD FRML MDRD: >90 ML/MIN/1.73M2
GLUCOSE BLD-MCNC: 98 MG/DL (ref 70–125)
HCT VFR BLD AUTO: 26.6 % (ref 40–53)
HGB BLD-MCNC: 8.4 G/DL (ref 13.3–17.7)
INR PPP: 1.4 (ref 0.85–1.15)
MAGNESIUM SERPL-MCNC: 1.9 MG/DL (ref 1.8–2.6)
MCH RBC QN AUTO: 37.3 PG (ref 26.5–33)
MCHC RBC AUTO-ENTMCNC: 31.6 G/DL (ref 31.5–36.5)
MCV RBC AUTO: 118 FL (ref 78–100)
PLATELET # BLD AUTO: 215 10E3/UL (ref 150–450)
POTASSIUM BLD-SCNC: 3.3 MMOL/L (ref 3.5–5)
POTASSIUM BLD-SCNC: 3.6 MMOL/L (ref 3.5–5)
POTASSIUM BLD-SCNC: 3.7 MMOL/L (ref 3.5–5)
RBC # BLD AUTO: 2.25 10E6/UL (ref 4.4–5.9)
SODIUM SERPL-SCNC: 138 MMOL/L (ref 136–145)
UFH PPP CHRO-ACNC: 0.43 IU/ML
WBC # BLD AUTO: 8.8 10E3/UL (ref 4–11)

## 2022-04-28 PROCEDURE — 250N000013 HC RX MED GY IP 250 OP 250 PS 637: Performed by: INTERNAL MEDICINE

## 2022-04-28 PROCEDURE — 80048 BASIC METABOLIC PNL TOTAL CA: CPT | Performed by: INTERNAL MEDICINE

## 2022-04-28 PROCEDURE — 84132 ASSAY OF SERUM POTASSIUM: CPT | Performed by: INTERNAL MEDICINE

## 2022-04-28 PROCEDURE — 85027 COMPLETE CBC AUTOMATED: CPT | Performed by: INTERNAL MEDICINE

## 2022-04-28 PROCEDURE — 85610 PROTHROMBIN TIME: CPT | Performed by: INTERNAL MEDICINE

## 2022-04-28 PROCEDURE — 99233 SBSQ HOSP IP/OBS HIGH 50: CPT | Performed by: INTERNAL MEDICINE

## 2022-04-28 PROCEDURE — 250N000011 HC RX IP 250 OP 636

## 2022-04-28 PROCEDURE — 85520 HEPARIN ASSAY: CPT | Performed by: INTERNAL MEDICINE

## 2022-04-28 PROCEDURE — 36415 COLL VENOUS BLD VENIPUNCTURE: CPT | Performed by: INTERNAL MEDICINE

## 2022-04-28 PROCEDURE — 210N000001 HC R&B IMCU HEART CARE

## 2022-04-28 PROCEDURE — 258N000003 HC RX IP 258 OP 636: Performed by: STUDENT IN AN ORGANIZED HEALTH CARE EDUCATION/TRAINING PROGRAM

## 2022-04-28 PROCEDURE — 83735 ASSAY OF MAGNESIUM: CPT | Performed by: INTERNAL MEDICINE

## 2022-04-28 PROCEDURE — 250N000013 HC RX MED GY IP 250 OP 250 PS 637

## 2022-04-28 RX ORDER — LISINOPRIL 2.5 MG/1
2.5 TABLET ORAL DAILY
Status: DISCONTINUED | OUTPATIENT
Start: 2022-04-28 | End: 2022-05-01 | Stop reason: HOSPADM

## 2022-04-28 RX ORDER — WARFARIN SODIUM 5 MG/1
5 TABLET ORAL
Status: COMPLETED | OUTPATIENT
Start: 2022-04-28 | End: 2022-04-28

## 2022-04-28 RX ORDER — POTASSIUM CHLORIDE 1500 MG/1
40 TABLET, EXTENDED RELEASE ORAL ONCE
Status: COMPLETED | OUTPATIENT
Start: 2022-04-28 | End: 2022-04-28

## 2022-04-28 RX ADMIN — HYDROXYUREA 500 MG: 500 CAPSULE ORAL at 08:01

## 2022-04-28 RX ADMIN — UMECLIDINIUM 1 PUFF: 62.5 AEROSOL, POWDER ORAL at 08:00

## 2022-04-28 RX ADMIN — AMOXICILLIN AND CLAVULANATE POTASSIUM 1 TABLET: 875; 125 TABLET, FILM COATED ORAL at 19:32

## 2022-04-28 RX ADMIN — WARFARIN SODIUM 5 MG: 5 TABLET ORAL at 18:20

## 2022-04-28 RX ADMIN — ASPIRIN 81 MG: 81 TABLET, COATED ORAL at 08:00

## 2022-04-28 RX ADMIN — POTASSIUM CHLORIDE 40 MEQ: 20 TABLET, EXTENDED RELEASE ORAL at 07:55

## 2022-04-28 RX ADMIN — FOLIC ACID 1 MG: 1 TABLET ORAL at 07:56

## 2022-04-28 RX ADMIN — AMOXICILLIN AND CLAVULANATE POTASSIUM 1 TABLET: 875; 125 TABLET, FILM COATED ORAL at 08:00

## 2022-04-28 RX ADMIN — SODIUM CHLORIDE 500 ML: 9 INJECTION, SOLUTION INTRAVENOUS at 04:43

## 2022-04-28 RX ADMIN — HEPARIN SODIUM AND DEXTROSE 1750 UNITS/HR: 10000; 5 INJECTION INTRAVENOUS at 14:23

## 2022-04-28 RX ADMIN — ATORVASTATIN CALCIUM 80 MG: 40 TABLET, FILM COATED ORAL at 07:56

## 2022-04-28 ASSESSMENT — ACTIVITIES OF DAILY LIVING (ADL)
ADLS_ACUITY_SCORE: 8

## 2022-04-28 NOTE — PLAN OF CARE
Heart Failure Care Map  GOALS TO BE MET BEFORE DISCHARGE:    1. Decrease congestion and/or edema with diuretic therapy to achieve near optimal volume status.     Dyspnea improved: Dyspnea on exertion.   Edema improved: Trace edema on bilateral lower extremity and his right upper arm also swollen, not new per patient.        Net I/O and Weights since admission:   03/29 0700 - 04/28 0659  In: 1697 [P.O.:820; I.V.:377]  Out: 1750 [Urine:1750]  Net: -53     Vitals:    04/26/22 1102 04/27/22 0613 04/28/22 0551   Weight: 90.3 kg (199 lb) 84.2 kg (185 lb 11.2 oz) 87.5 kg (193 lb)       2.  O2 sats > 90% on room air, or at prior home O2 therapy level.      Able to wean O2 this shift to keep sats above 90%?: On 2L of oxygen.   Does patient use Home O2? Yes-            Current oxygenation status:   SpO2: 98 %     O2 Device: Oxymask, Oxygen Delivery: 2 LPM    3.  Tolerates ambulation and mobility near baseline.     Ambulation: Not applicable   Times patient ambulated with staff this shift: 0       Hypotensive tonight, BP 82/49 MAP of 61. Asymptomatic. Gave 500 ml bolus. BP now 96/51 MAP of 71. MD holding oral lasix and decreasing lisinopril dose. Hypokalemia, started on protocol. On heparin drip, got coumadin last night. INR today is 1.40. Hemoglobin stable at 8.4.    Please review the Heart Failure Care Map for additional HF goal outcomes.    Jessica James RN  4/28/2022

## 2022-04-28 NOTE — PLAN OF CARE
Problem: Adjustment to Illness (Heart Failure)  Goal: Optimal Coping  Outcome: Ongoing, Progressing     Problem: Fluid Imbalance (Heart Failure)  Goal: Fluid Balance  Outcome: Ongoing, Progressing    The pt c/o not being able to sleep and would like to sleep-so group cares as much as possible. NSR with BBB.

## 2022-04-28 NOTE — PLAN OF CARE
Problem: Hypertension Comorbidity  Goal: Blood Pressure in Desired Range  Outcome: Ongoing, Progressing   Patient continued to have low BP throughout the shift. Manual BP taken because B450 was getting very low readings SBP's from 80's to 50's, DBP from 40's to 30's.  Patient asymptomatic.   Metoprolol and Lisinopril were held this AM.    Problem: Bleeding (Thrombolytic Therapy)  Goal: Absence of Bleeding  Outcome: Ongoing, Progressing   Patient had Anti Xa of 0.43 this AM 2nd consecutive within range reading. Heparin remains running @ 1750 U/hr, recheck in AM.    INR 1.40 recheck in AM

## 2022-04-28 NOTE — SIGNIFICANT EVENT
"Significant Event Note    Time of event: 4:36 AM April 28, 2022    Description of event:  House officer notified of hypotension, BP 82/49 (MAP 61). Pt is here with BELL, with acte HF exacerbation, pulmonary emboli. He had an angio today without intervention. Pt feels well, currently on 2LPM NC. He denies any worsening shortness of breath or dizziness.    BP (!) 82/49 (BP Location: Left arm, Patient Position: Supine, Cuff Size: Adult Regular)   Pulse 83   Temp 97.3  F (36.3  C) (Oral)   Resp 20   Ht 1.676 m (5' 6\")   Wt 84.2 kg (185 lb 11.2 oz)   SpO2 98%   BMI 29.97 kg/m      Exam:   GEN: No acute distress  CV: RRR no murmur  RESP:  No increased work of breathing, CTAB, no wheezes or crackles  EXT: mild LE edema, right upper and left upper extremity edema with bruising.     Plan:  Hypotension  Given low map, will order 500cc bolus NS.   - Consider decreasing diuresis pending response, pt nearing euvolemia per cards  note.   - Hold antihypertensives per parameters.   - Continue to monitor BP.     Discussed with: bedside nurse, text page sent to hospitalist Dr. Ray.    Hudson Wadsworth, DO    "

## 2022-04-28 NOTE — PROGRESS NOTES
Care Management Follow Up    Length of Stay (days): 2    Expected Discharge Date: 04/30/2022     Concerns to be Addressed:       Patient plan of care discussed at interdisciplinary rounds: Yes    Anticipated Discharge Disposition:       Anticipated Discharge Services:    Anticipated Discharge DME:      Patient/family educated on Medicare website which has current facility and service quality ratings:    Education Provided on the Discharge Plan:    Patient/Family in Agreement with the Plan:      Referrals Placed by CM/SW:    Private pay costs discussed: Not applicable    Additional Information:  CM following for needs at discharge . OT rec home with assist.       Noemi Cruz RN

## 2022-04-28 NOTE — PROGRESS NOTES
"[facility]    Medicine Progress Note - Hospitalist Service    Date of Admission:  4/26/2022    Assessment & Plan          Shaji Pompa is a 76 year old male admitted on 4/26/2022. He as a hx of recent bilateral PE's weeks ago, MDS, COPD on home O2, here with acute systolic heart failure, concern for ischemic cardiomyopathy, s/p chest tube for pleural effusion, sent from  here to [facility] for angiogram     1.BELL  -he denied cp, but had new onset BELL and depressed EF  -?if lower EF related to recent PE or ischemic issues  -sent here for cath on 4/26 from   -on hep gtt due to recent PE --now post cath back on warfarin      -angio showed--no stents placed    Acute systolic heart failure in the setting of a loculated pleural effusion, pulmonary emboli and COPD. Coronary calcification seen on CT scan. He is also anemic and thrombocytopenic due to myloproliferative syndrome.    Diffuse, severe coronary calcification.    Mild left coronary system stenoses.    The RCA is chronically occluded in its mid-distal segments. The distal RCA branches fill by left sided collaterals.    LV EDP low/normal. AV gradient 5 mmHg by pullback.     -medical management per cards now     2.Acute systolic HF  -new onset EF 40%  -improved with iv lasix, now po--hold today with lower bp overnight and got flush  -cards did see here  -continue--metoprolol, lisinopril--just decreased dose today, 4/28 due to hypotension, and lasix     3.Pulm htn  -suspect due to recent PE and systolic HF     4.Acute bilateral PE  -weeks ago, at risk with malignancy  - restarted warfarin after cath and bridge with hep gtt     5.hx of COPD  -on home O2  -continue home meds  -watch close needs here     6.hx of MDS  -follows with heme/onc, [facility]  -at  needed 2 units pRBC     7.Left pleural effusion-loculated  -s/p chest tube at -out now  -per pulm-- -->\"US guided thoracentesis showed complex effusion, extensive " "septations, 100 ml yellowish fluid obtained , exudative, predominantly neutrophilic, gram stain negative. S/p chest tube placement 4/21.   Started on intrapleural lytics x 3 days. Gram stain and cultures are negative, cytology pending.\"  -now cytology back-Negative for malignancy- Acute inflammatory debris  - GMS-P stain negative for fungal organisms  - Auramine rhodamine stain negative for acid fast bacilli    Follow up chest CT scan showed significant improvement of loculated effusion.  -pulm did see there, now on Augmentin to complete 2 weeks     8.chronic anemia, with pancytopenia --from MDS  -at [facility] had 2 units pRBC      9.hx of HTN  -on meds, metoprolol and lisinopril     10.Obesity  -bmi 32     11.Dvt prevent- hep gtt now     12.Code-full              Diet: Low Saturated Fat Na <2400 mg  Room Service    DVT Prophylaxis: Warfarin and hep gtt  Garrett Catheter: Not present  Central Lines: None  Cardiac Monitoring: ACTIVE order. Indication: Acute decompensated heart failure (48 hours)  Code Status: Full Code      Disposition Plan   Expected Discharge: 04/29/2022     Anticipated discharge location:  Awaiting care coordination huddle  Delays:   days        The patient's care was discussed with the Care Coordinator/, Patient and Patient's Family.  Wife was here this am and  updated    Valerie Kim MD  Hospitalist Service  [facility]  Securely message with the Vocera Web Console (learn more here)  Text page via [employer] Paging/Directory           ______________________________________________________________________    Interval History   He feels ok  I did review overnight issues with lower bp  He denied cp or sob with me this am  Not dizzy  Walked to RN station yesterday  Wife was here      Data reviewed today: I reviewed all medications, new labs and imaging results over the last 24 hours. I personally reviewed no images or EKG's today.    Physical Exam   Vital Signs: Temp: " 98.7  F (37.1  C) Temp src: Oral BP: 96/51 Pulse: 75   Resp: 18 SpO2: 98 % O2 Device: Nasal cannula Oxygen Delivery: 2 LPM  Weight: 193 lbs 0 oz  Constitutional: awake, alert, cooperative and no apparent distress  Respiratory: no increased work of breathing, good air exchange, no retractions and diminished breath sounds right base and left base  Cardiovascular: Normal apical impulse, regular rate and rhythm, normal S1 and S2, no S3 or S4, and no murmur noted  GI: normal bowel sounds, soft, non-distended and non-tender  Skin: no bruising or bleeding  Musculoskeletal: no lower extremity pitting edema present  Neurologic: Mental Status Exam:  Level of Alertness:   awake  Neuropsychiatric: General: normal, calm and normal eye contact    Data   Recent Labs   Lab 04/28/22  0656 04/28/22  0456 04/27/22  0305 04/26/22  1601 04/26/22  1548   WBC  --  8.8 6.4  --  7.0   HGB  --  8.4* 8.3*  --  8.4*   MCV  --  118* 119*  --  120*   PLT  --  215 151  --  144*   INR  --  1.40* 1.34* 1.43*  --    NA  --  138 138  --  138   POTASSIUM 3.6 3.3* 3.6  --  3.7   CHLORIDE  --  103 103  --  103   CO2  --  25 28  --  30   BUN  --  12 14  --  13   CR  --  0.81 0.81  --  0.71   ANIONGAP  --  10 7  --  5   GLENDY  --  8.1* 7.8*  --  8.0*   GLC  --  98 98  --  81     No results found for this or any previous visit (from the past 24 hour(s)).

## 2022-04-29 ENCOUNTER — APPOINTMENT (OUTPATIENT)
Dept: ULTRASOUND IMAGING | Facility: HOSPITAL | Age: 77
DRG: 286 | End: 2022-04-29
Attending: FAMILY MEDICINE
Payer: COMMERCIAL

## 2022-04-29 LAB
ABO/RH(D): NORMAL
ANION GAP SERPL CALCULATED.3IONS-SCNC: 8 MMOL/L (ref 5–18)
ANTIBODY SCREEN: NEGATIVE
BLD PROD TYP BPU: NORMAL
BLOOD COMPONENT TYPE: NORMAL
BUN SERPL-MCNC: 15 MG/DL (ref 8–28)
CALCIUM SERPL-MCNC: 7.9 MG/DL (ref 8.5–10.5)
CHLORIDE BLD-SCNC: 106 MMOL/L (ref 98–107)
CO2 SERPL-SCNC: 25 MMOL/L (ref 22–31)
CODING SYSTEM: NORMAL
CREAT SERPL-MCNC: 0.87 MG/DL (ref 0.7–1.3)
CROSSMATCH: NORMAL
ERYTHROCYTE [DISTWIDTH] IN BLOOD BY AUTOMATED COUNT: ABNORMAL %
GFR SERPL CREATININE-BSD FRML MDRD: 89 ML/MIN/1.73M2
GLUCOSE BLD-MCNC: 90 MG/DL (ref 70–125)
HCT VFR BLD AUTO: 25.5 % (ref 40–53)
HGB BLD-MCNC: 7.8 G/DL (ref 13.3–17.7)
INR PPP: 1.5 (ref 0.85–1.15)
ISSUE DATE AND TIME: NORMAL
MCH RBC QN AUTO: 36.6 PG (ref 26.5–33)
MCHC RBC AUTO-ENTMCNC: 30.6 G/DL (ref 31.5–36.5)
MCV RBC AUTO: 120 FL (ref 78–100)
PLATELET # BLD AUTO: 239 10E3/UL (ref 150–450)
POTASSIUM BLD-SCNC: 3.7 MMOL/L (ref 3.5–5)
RBC # BLD AUTO: 2.13 10E6/UL (ref 4.4–5.9)
SODIUM SERPL-SCNC: 139 MMOL/L (ref 136–145)
SPECIMEN EXPIRATION DATE: NORMAL
UFH PPP CHRO-ACNC: 0.24 IU/ML
UFH PPP CHRO-ACNC: 0.45 IU/ML
UFH PPP CHRO-ACNC: 0.47 IU/ML
UNIT ABO/RH: NORMAL
UNIT NUMBER: NORMAL
UNIT STATUS: NORMAL
UNIT TYPE ISBT: 5100
WBC # BLD AUTO: 7.1 10E3/UL (ref 4–11)

## 2022-04-29 PROCEDURE — 93971 EXTREMITY STUDY: CPT | Mod: RT

## 2022-04-29 PROCEDURE — 36415 COLL VENOUS BLD VENIPUNCTURE: CPT | Performed by: FAMILY MEDICINE

## 2022-04-29 PROCEDURE — 85520 HEPARIN ASSAY: CPT | Performed by: FAMILY MEDICINE

## 2022-04-29 PROCEDURE — 250N000013 HC RX MED GY IP 250 OP 250 PS 637: Performed by: FAMILY MEDICINE

## 2022-04-29 PROCEDURE — 85610 PROTHROMBIN TIME: CPT | Performed by: INTERNAL MEDICINE

## 2022-04-29 PROCEDURE — P9016 RBC LEUKOCYTES REDUCED: HCPCS | Performed by: FAMILY MEDICINE

## 2022-04-29 PROCEDURE — 86850 RBC ANTIBODY SCREEN: CPT | Performed by: FAMILY MEDICINE

## 2022-04-29 PROCEDURE — 80048 BASIC METABOLIC PNL TOTAL CA: CPT | Performed by: INTERNAL MEDICINE

## 2022-04-29 PROCEDURE — 86923 COMPATIBILITY TEST ELECTRIC: CPT | Performed by: FAMILY MEDICINE

## 2022-04-29 PROCEDURE — 85027 COMPLETE CBC AUTOMATED: CPT | Performed by: INTERNAL MEDICINE

## 2022-04-29 PROCEDURE — 250N000011 HC RX IP 250 OP 636

## 2022-04-29 PROCEDURE — 250N000013 HC RX MED GY IP 250 OP 250 PS 637

## 2022-04-29 PROCEDURE — 99233 SBSQ HOSP IP/OBS HIGH 50: CPT | Performed by: FAMILY MEDICINE

## 2022-04-29 PROCEDURE — 250N000013 HC RX MED GY IP 250 OP 250 PS 637: Performed by: INTERNAL MEDICINE

## 2022-04-29 PROCEDURE — 85520 HEPARIN ASSAY: CPT | Performed by: INTERNAL MEDICINE

## 2022-04-29 PROCEDURE — 210N000001 HC R&B IMCU HEART CARE

## 2022-04-29 PROCEDURE — 36415 COLL VENOUS BLD VENIPUNCTURE: CPT | Performed by: INTERNAL MEDICINE

## 2022-04-29 PROCEDURE — 999N000127 HC STATISTIC PERIPHERAL IV START W US GUIDANCE

## 2022-04-29 RX ORDER — WARFARIN SODIUM 5 MG/1
5 TABLET ORAL
Status: COMPLETED | OUTPATIENT
Start: 2022-04-29 | End: 2022-04-29

## 2022-04-29 RX ORDER — FUROSEMIDE 40 MG
40 TABLET ORAL DAILY
Status: DISCONTINUED | OUTPATIENT
Start: 2022-04-29 | End: 2022-04-30

## 2022-04-29 RX ADMIN — METOPROLOL SUCCINATE 25 MG: 25 TABLET, EXTENDED RELEASE ORAL at 08:49

## 2022-04-29 RX ADMIN — FUROSEMIDE 40 MG: 40 TABLET ORAL at 11:37

## 2022-04-29 RX ADMIN — HYDROXYUREA 500 MG: 500 CAPSULE ORAL at 08:52

## 2022-04-29 RX ADMIN — HEPARIN SODIUM AND DEXTROSE 1750 UNITS/HR: 10000; 5 INJECTION INTRAVENOUS at 04:18

## 2022-04-29 RX ADMIN — AMOXICILLIN AND CLAVULANATE POTASSIUM 1 TABLET: 875; 125 TABLET, FILM COATED ORAL at 08:49

## 2022-04-29 RX ADMIN — LISINOPRIL 2.5 MG: 2.5 TABLET ORAL at 08:51

## 2022-04-29 RX ADMIN — WARFARIN SODIUM 5 MG: 5 TABLET ORAL at 17:52

## 2022-04-29 RX ADMIN — ATORVASTATIN CALCIUM 80 MG: 40 TABLET, FILM COATED ORAL at 08:49

## 2022-04-29 RX ADMIN — AMOXICILLIN AND CLAVULANATE POTASSIUM 1 TABLET: 875; 125 TABLET, FILM COATED ORAL at 19:57

## 2022-04-29 RX ADMIN — UMECLIDINIUM 1 PUFF: 62.5 AEROSOL, POWDER ORAL at 08:49

## 2022-04-29 RX ADMIN — FOLIC ACID 1 MG: 1 TABLET ORAL at 08:49

## 2022-04-29 RX ADMIN — ASPIRIN 81 MG: 81 TABLET, COATED ORAL at 08:50

## 2022-04-29 RX ADMIN — HEPARIN SODIUM AND DEXTROSE 1900 UNITS/HR: 10000; 5 INJECTION INTRAVENOUS at 16:46

## 2022-04-29 ASSESSMENT — ACTIVITIES OF DAILY LIVING (ADL)
ADLS_ACUITY_SCORE: 8

## 2022-04-29 NOTE — PLAN OF CARE
Problem: Adjustment to Illness (Heart Failure)  Goal: Optimal Coping  Outcome: Ongoing, Progressing     Problem: Hypertension Comorbidity  Goal: Blood Pressure in Desired Range   Outcome: Ongoing, Progressing      The pt Bp's have been running in the  low 100's this evening. Heparin running at 1750units/hr in the left PIV. His Right PIV infiltrated and was removed earlier. No complaints of pain.

## 2022-04-29 NOTE — PROGRESS NOTES
[facility]    Medicine Progress Note - Hospitalist Service       Date of Admission:  4/26/2022  Active Problems:    Status post coronary angiogram    Coronary artery disease involving native coronary artery of native heart with angina pectoris (H)    Ischemic cardiomyopathy     Assessment & Plan            Shaji Pompa is a 76 year old male admitted on 4/26/2022. He as a hx of recent bilateral PE's weeks ago, MDS, COPD on home O2, here with acute systolic heart failure, concern for ischemic cardiomyopathy, s/p chest tube for pleural effusion, sent from  here to [facility] for angiogram     1.BELL  -he denied cp, but had new onset BELL and depressed EF  -?if lower EF related to recent PE or ischemic issues  -sent here for cath on 4/26 from   -on hep gtt due to recent PE --now post cath back on warfarin      Coronary artery disease-  Status post urinary angiogram    Acute systolic heart failure in the setting of a loculated pleural effusion, pulmonary emboli and COPD. Coronary calcification seen on CT scan. He is also anemic and thrombocytopenic due to myloproliferative syndrome.    Diffuse, severe coronary calcification.    Mild left coronary system stenoses.    The RCA is chronically occluded in its mid-distal segments. The distal RCA branches fill by left sided collaterals.    LV EDP low/normal. AV gradient 5 mmHg by pullback.    No stents placed, medical management per cards now     2.Acute systolic HF  -new onset EF 40%  -improved with iv lasix, now po--held on 4/28 due to borderline blood pressure.  Received IV fluid flush.    Blood pressure improved this morning, restart furosemide.  Continue to hold lisinopril, continue metoprolol with parameters.     3.Pulm htn  -suspect due to recent PE and systolic HF     4.Acute bilateral PE  -weeks ago, at risk with malignancy  - restarted warfarin after cath and bridge with hep gtt.  Holding on Lovenox given decreasing  "hemoglobin     5.hx of COPD  -on home O2, only at night  -continue home meds  -watch close needs here  Wean O2     6.hx of MDS  -follows with heme/onc, [facility]  -at [facility] needed 2 units pRBC  Hemoglobin 7.8 today, will transfuse 1 unit of packed red blood cells, no signs of GI loss.  Watch for fluid overload.     7.Left pleural effusion-loculated  -s/p chest tube at [facility]-out now  -per pulm-- -->\"US guided thoracentesis showed complex effusion, extensive septations, 100 ml yellowish fluid obtained , exudative, predominantly neutrophilic, gram stain negative. S/p chest tube placement 4/21.   Started on intrapleural lytics x 3 days. Gram stain and cultures are negative, cytology pending.\"  -now cytology back-Negative for malignancy- Acute inflammatory debris  - GMS-P stain negative for fungal organisms  - Auramine rhodamine stain negative for acid fast bacill  Follow up chest CT scan showed significant improvement of loculated effusion.  -pulm did see there, now on Augmentin to complete 2 weeks     8.chronic anemia, with pancytopenia --from MDS  -at [facility] had 2 units pRBC   Transfusing here to keep hemoglobin greater than 8 per cardiology recommendation     9.hx of HTN-intermittent borderline blood pressure  -on meds, metoprolol and furosemide, lisinopril held due to borderline blood pressure.    Right arm swelling-  Occurred after angiogram, will get ultrasound, already on heparin, doubt cellulitis, question infiltrated versus mild ecchymosis.  Cardiology to reevaluate.    10.Obesity  -bmi 32     11.Dvt prevent- hep gtt now     12.Code-full        Diet: Low Saturated Fat Na <2400 mg  Room Service    DVT Prophylaxis: Heparin GTT  Garrett Catheter: Not present  Central Lines: None  Code Status: Full Code      Disposition Plan   Expected Discharge:  2 to 4 days-stable hemoglobin, INR, heparin, blood pressure     The patient's care was discussed with the Bedside Nurse, Care Coordinator/, Patient, Patient's " "Family and Cardiology Consultant. for total time 40 minutes with greater than 50% of total time spent in counseling and coordination of care.    DORI ALDRIDGE MD  Hospitalist Service  [facility]  Securely message with the Vocera Web Console (learn more here)  Text page via [employer] Paging/Directory        Clinically Significant Risk Factors Present on Admission             # Obesity: Estimated body mass index is 31.43 kg/m  as calculated from the following:    Height as of this encounter: 1.676 m (5' 6\").    Weight as of this encounter: 88.3 kg (194 lb 11.2 oz).      ______________________________________________________________________    Interval History   Remainder of 12 point review of systems negative except as noted below    Subjective:  Patient overall doing well.  Started to have right forearm swelling with mild redness and mild pain yesterday.  No chest pain, shortness of breath, dizziness.  No hematuria, no melena or hematochezia.    Data reviewed today: I reviewed all medications, new labs and imaging results over the last 24 hours.     Physical Exam   Vital Signs: Temp: 99  F (37.2  C) Temp src: Axillary BP: 125/58 Pulse: 81   Resp: 20 SpO2: 97 % O2 Device: None (Room air) Oxygen Delivery: 2 LPM  Weight: 194 lbs 11.2 oz  Physical Exam:  Temp:  [98.1  F (36.7  C)-99.1  F (37.3  C)] 99  F (37.2  C)  Pulse:  [69-81] 81  Resp:  [18-24] 20  BP: ()/(47-58) 125/58  SpO2:  [92 %-100 %] 97 %    /58 (BP Location: Left arm, Patient Position: Semi-Saucedo's, Cuff Size: Adult Regular)   Pulse 81   Temp 99  F (37.2  C) (Axillary)   Resp 20   Ht 1.676 m (5' 6\")   Wt 88.3 kg (194 lb 11.2 oz)   SpO2 97%   BMI 31.43 kg/m    General appearance: alert, appears stated age and cooperative  Head: Normocephalic, without obvious abnormality, atraumatic  Eyes: Clear conjuctiva  Neck: no JVD and supple, symmetrical, trachea midline  Lungs: diminished breath sounds bilaterally  Heart: " regular rate and rhythm, S1, S2 normal, no murmur, click, rub or gallop  Abdomen: soft, non-tender; bowel sounds normal; no masses,  no organomegaly  Extremities: Negative homans,   Skin: Mild ecchymosis and tenderness on right forearm  Neurologic: Mental status: Alert, oriented, thought content appropriate  Cranial nerves: normal  Sensory: normal  Motor:grossly normal          Data   Recent Labs   Lab 04/29/22  0458 04/28/22  1157 04/28/22  0656 04/28/22  0456 04/27/22  0305   WBC 7.1  --   --  8.8 6.4   HGB 7.8*  --   --  8.4* 8.3*   *  --   --  118* 119*     --   --  215 151   INR 1.50*  --   --  1.40* 1.34*     --   --  138 138   POTASSIUM 3.7 3.7 3.6 3.3* 3.6   CHLORIDE 106  --   --  103 103   CO2 25  --   --  25 28   BUN 15  --   --  12 14   CR 0.87  --   --  0.81 0.81   ANIONGAP 8  --   --  10 7   GLENDY 7.9*  --   --  8.1* 7.8*   GLC 90  --   --  98 98     Recent Results (from the past 24 hour(s))   US Upper Extremity Venous Duplex Right    Narrative    EXAM: US UPPER EXTREMITY VENOUS DUPLEX RIGHT  LOCATION: [facility]  DATE/TIME: 4/29/2022 1:39 PM    INDICATION: Right arm swelling  COMPARISON: None.  TECHNIQUE: Venous Duplex ultrasound of the right upper extremity with (when possible) and without compression, augmentation, and duplex. Color flow and spectral Doppler with waveform analysis performed.    FINDINGS: Ultrasound includes evaluation of the internal jugular vein, innominate vein, subclavian vein, axillary vein, and brachial vein. The superficial cephalic and basilic veins were also evaluated where seen.     RIGHT: No deep venous thrombosis. Occlusive superficial venous thrombosis extending from the right antecubital fossa to the right mid forearm. There is also a partially occlusive segment of thrombus within the right forearm cephalic vein.      Impression    IMPRESSION:  1.  No deep venous thrombosis in the right upper extremity.  2.  Acute mostly  occlusive right arm superficial venous thrombosis within the cephalic vein. This extends from the antecubital fossa to the forearm.

## 2022-04-29 NOTE — PLAN OF CARE
1. HFmrEF c/b ADHF  Assessment / Plan    New onset HF in the setting dyspnea on exertion - coronary angiogram significant for severe diffuse disease and chronically occluded RCA - no intervention at that time    Near euvolemia and diuresing well on current regimen - no changes at this time    GDMT as detailed below     Current Pharmacotherapy AHA Guideline-Directed Medical Therapy   Lisinopril 2.5 mg daily - on hold 2/2 HTN Lisinopril 20 mg twice daily   Metoprolol succinate 25 mg daily Carvedilol 25 mg twice daily   Spironolactone NA Spironolactone 25 mg once daily   Hydralazine NA Hydralazine 100 mg three times daily   Isosorbide dinitrate NA Isosorbide dinitrate 40 mg three times daily   SGLT2 inhibitor: not started Dapagliflozin or Empagliflozin 10 mg daily         2. CAD  Assessment / Plan    S/p coronary angiogram significant for diffuse severe CAD, notable for  of RCA    Continue medical management    Continue ASA, high intensity atorvastatin, metoprolol and lisinopril    Right UE edematous consistent with IV infiltration - agree with US and warm compress    Karsten Castellon MD., MHS.  4/29/22

## 2022-04-29 NOTE — PLAN OF CARE
Problem: Bleeding (Thrombolytic Therapy)  Goal: Absence of Bleeding  Outcome: Ongoing, Progressing     Pt continues on Heparin drip with a change this AM to 1900 units/hr with next Anti Xa check at 1145 today.  No bleeding noted.    Problem: Respiratory Compromise (Heart Failure)  Goal: Effective Oxygenation and Ventilation  Outcome: Ongoing, Progressing  Intervention: Promote Airway Secretion Clearance  Recent Flowsheet Documentation  Taken 4/29/2022 0400 by Madalyn Gramajo RN  Cough And Deep Breathing: done independently per patient  Taken 4/29/2022 0000 by Madalyn Gramajo RN  Cough And Deep Breathing: done independently per patient     No acute breathing issues  noted overnight.  VSS, maintaining oxygen saturation on 2L.  Lasix on hold due to low BP previous night.  Pt is up in weight.

## 2022-04-29 NOTE — PLAN OF CARE
Problem: Functional Ability Impaired (Heart Failure)  Goal: Optimal Functional Ability  Outcome: Ongoing, Progressing  Intervention: Optimize Functional Ability  Recent Flowsheet Documentation  Taken 4/29/2022 1200 by Pepper Amezquita RN  Activity Management: bedrest  Taken 4/29/2022 0815 by Pepper Amezquita, RN  Activity Management: up in chair     Problem: Fluid Imbalance (Heart Failure)  Goal: Fluid Balance  Outcome: Ongoing, Progressing   Lungs sound diminished but has been weaned to room air today.  IS instructions given and Pt able to self-administrate effectively.  UOP remains low.  Restarted po Lasix as ordered.  Blood transfusion in progress.  Will adjust rate per Pt's tolerance.

## 2022-04-30 ENCOUNTER — APPOINTMENT (OUTPATIENT)
Dept: OCCUPATIONAL THERAPY | Facility: HOSPITAL | Age: 77
DRG: 286 | End: 2022-04-30
Payer: COMMERCIAL

## 2022-04-30 LAB
ANION GAP SERPL CALCULATED.3IONS-SCNC: 10 MMOL/L (ref 5–18)
BUN SERPL-MCNC: 12 MG/DL (ref 8–28)
CALCIUM SERPL-MCNC: 7.9 MG/DL (ref 8.5–10.5)
CHLORIDE BLD-SCNC: 106 MMOL/L (ref 98–107)
CO2 SERPL-SCNC: 22 MMOL/L (ref 22–31)
CREAT SERPL-MCNC: 0.84 MG/DL (ref 0.7–1.3)
ERYTHROCYTE [DISTWIDTH] IN BLOOD BY AUTOMATED COUNT: ABNORMAL %
FERRITIN SERPL-MCNC: 1747 NG/ML (ref 27–300)
FOLATE SERPL-MCNC: 10.2 NG/ML
GFR SERPL CREATININE-BSD FRML MDRD: 90 ML/MIN/1.73M2
GLUCOSE BLD-MCNC: 106 MG/DL (ref 70–125)
HCT VFR BLD AUTO: 28.2 % (ref 40–53)
HGB BLD-MCNC: 8.7 G/DL (ref 13.3–17.7)
HOLD SPECIMEN: NORMAL
INR PPP: 1.89 (ref 0.85–1.15)
IRON SATN MFR SERPL: 26 % (ref 20–50)
IRON SERPL-MCNC: 31 UG/DL (ref 42–175)
MAGNESIUM SERPL-MCNC: 1.8 MG/DL (ref 1.8–2.6)
MCH RBC QN AUTO: 36 PG (ref 26.5–33)
MCHC RBC AUTO-ENTMCNC: 30.9 G/DL (ref 31.5–36.5)
MCV RBC AUTO: 117 FL (ref 78–100)
PLATELET # BLD AUTO: 278 10E3/UL (ref 150–450)
POTASSIUM BLD-SCNC: 3.8 MMOL/L (ref 3.5–5)
RBC # BLD AUTO: 2.42 10E6/UL (ref 4.4–5.9)
RETICS # AUTO: 0.12 10E6/UL (ref 0.01–0.11)
RETICS/RBC NFR AUTO: 4.8 % (ref 0.8–2.7)
SODIUM SERPL-SCNC: 138 MMOL/L (ref 136–145)
TIBC SERPL-MCNC: 121 UG/DL (ref 313–563)
TRANSFERRIN SERPL-MCNC: 97 MG/DL (ref 212–360)
UFH PPP CHRO-ACNC: 0.37 IU/ML
VIT B12 SERPL-MCNC: 804 PG/ML (ref 213–816)
WBC # BLD AUTO: 6.4 10E3/UL (ref 4–11)

## 2022-04-30 PROCEDURE — 85045 AUTOMATED RETICULOCYTE COUNT: CPT | Performed by: INTERNAL MEDICINE

## 2022-04-30 PROCEDURE — 80048 BASIC METABOLIC PNL TOTAL CA: CPT | Performed by: INTERNAL MEDICINE

## 2022-04-30 PROCEDURE — 250N000013 HC RX MED GY IP 250 OP 250 PS 637: Performed by: INTERNAL MEDICINE

## 2022-04-30 PROCEDURE — 99233 SBSQ HOSP IP/OBS HIGH 50: CPT | Performed by: INTERNAL MEDICINE

## 2022-04-30 PROCEDURE — 210N000001 HC R&B IMCU HEART CARE

## 2022-04-30 PROCEDURE — 82607 VITAMIN B-12: CPT | Performed by: INTERNAL MEDICINE

## 2022-04-30 PROCEDURE — 85520 HEPARIN ASSAY: CPT | Performed by: FAMILY MEDICINE

## 2022-04-30 PROCEDURE — 36415 COLL VENOUS BLD VENIPUNCTURE: CPT | Performed by: INTERNAL MEDICINE

## 2022-04-30 PROCEDURE — 97110 THERAPEUTIC EXERCISES: CPT | Mod: GO

## 2022-04-30 PROCEDURE — 85027 COMPLETE CBC AUTOMATED: CPT | Performed by: INTERNAL MEDICINE

## 2022-04-30 PROCEDURE — 83735 ASSAY OF MAGNESIUM: CPT | Performed by: FAMILY MEDICINE

## 2022-04-30 PROCEDURE — 83540 ASSAY OF IRON: CPT | Performed by: INTERNAL MEDICINE

## 2022-04-30 PROCEDURE — 82728 ASSAY OF FERRITIN: CPT | Performed by: INTERNAL MEDICINE

## 2022-04-30 PROCEDURE — 250N000013 HC RX MED GY IP 250 OP 250 PS 637: Performed by: FAMILY MEDICINE

## 2022-04-30 PROCEDURE — 85610 PROTHROMBIN TIME: CPT | Performed by: INTERNAL MEDICINE

## 2022-04-30 PROCEDURE — 82668 ASSAY OF ERYTHROPOIETIN: CPT | Performed by: INTERNAL MEDICINE

## 2022-04-30 PROCEDURE — 250N000013 HC RX MED GY IP 250 OP 250 PS 637

## 2022-04-30 PROCEDURE — 99233 SBSQ HOSP IP/OBS HIGH 50: CPT | Performed by: FAMILY MEDICINE

## 2022-04-30 PROCEDURE — 82746 ASSAY OF FOLIC ACID SERUM: CPT | Performed by: INTERNAL MEDICINE

## 2022-04-30 PROCEDURE — 250N000011 HC RX IP 250 OP 636

## 2022-04-30 RX ORDER — FUROSEMIDE 20 MG/1
10 TABLET ORAL DAILY
Status: DISCONTINUED | OUTPATIENT
Start: 2022-05-01 | End: 2022-05-01 | Stop reason: HOSPADM

## 2022-04-30 RX ORDER — WARFARIN SODIUM 3 MG/1
6 TABLET ORAL
Status: COMPLETED | OUTPATIENT
Start: 2022-04-30 | End: 2022-04-30

## 2022-04-30 RX ADMIN — HYDROXYUREA 500 MG: 500 CAPSULE ORAL at 09:44

## 2022-04-30 RX ADMIN — AMOXICILLIN AND CLAVULANATE POTASSIUM 1 TABLET: 875; 125 TABLET, FILM COATED ORAL at 19:40

## 2022-04-30 RX ADMIN — ASPIRIN 81 MG: 81 TABLET, COATED ORAL at 09:41

## 2022-04-30 RX ADMIN — UMECLIDINIUM 1 PUFF: 62.5 AEROSOL, POWDER ORAL at 09:47

## 2022-04-30 RX ADMIN — FOLIC ACID 1 MG: 1 TABLET ORAL at 09:40

## 2022-04-30 RX ADMIN — HEPARIN SODIUM AND DEXTROSE 1900 UNITS/HR: 10000; 5 INJECTION INTRAVENOUS at 18:57

## 2022-04-30 RX ADMIN — AMOXICILLIN AND CLAVULANATE POTASSIUM 1 TABLET: 875; 125 TABLET, FILM COATED ORAL at 09:43

## 2022-04-30 RX ADMIN — WARFARIN SODIUM 6 MG: 3 TABLET ORAL at 17:08

## 2022-04-30 RX ADMIN — METOPROLOL SUCCINATE 25 MG: 25 TABLET, EXTENDED RELEASE ORAL at 09:42

## 2022-04-30 RX ADMIN — ATORVASTATIN CALCIUM 80 MG: 40 TABLET, FILM COATED ORAL at 09:41

## 2022-04-30 RX ADMIN — FUROSEMIDE 40 MG: 40 TABLET ORAL at 09:42

## 2022-04-30 RX ADMIN — HEPARIN SODIUM AND DEXTROSE 1900 UNITS/HR: 10000; 5 INJECTION INTRAVENOUS at 06:21

## 2022-04-30 ASSESSMENT — ACTIVITIES OF DAILY LIVING (ADL)
ADLS_ACUITY_SCORE: 10
ADLS_ACUITY_SCORE: 8
ADLS_ACUITY_SCORE: 10
ADLS_ACUITY_SCORE: 8
ADLS_ACUITY_SCORE: 10
ADLS_ACUITY_SCORE: 8
ADLS_ACUITY_SCORE: 10
ADLS_ACUITY_SCORE: 8
ADLS_ACUITY_SCORE: 10

## 2022-04-30 NOTE — PLAN OF CARE
Problem: Plan of Care - These are the overarching goals to be used throughout the patient stay.    Goal: Plan of Care Review/Shift Note  Description: The Plan of Care Review/Shift note should be completed every shift.  The Outcome Evaluation is a brief statement about your assessment that the patient is improving, declining, or no change.  This information will be displayed automatically on your shift note.  Outcome: Ongoing, Progressing  Flowsheets (Taken 4/29/2022 2123)  Plan of Care Reviewed With:   patient   spouse  Overall Patient Progress: improving     Problem: Bleeding (Cardiac Catheterization)  Goal: Absence of Bleeding  Outcome: Ongoing, Progressing   oal Outcome Evaluation:    Plan of Care Reviewed With: patient, spouse     Overall Patient Progress: improving    Pt alert and oriented times 4. Pt was receiving blood when the writer took over. Pt's BP was slightly low when NA checked. Writer rechecked BP, which was much better. He denied any pain, SOB and lightheaded. Pt finished his blood. No blood reaction noted so far. Pt has heparin running at 1900 units/hr. Anti-XA was done and came back at 0.47. No rate change needed. Recheck Anti-XA in am per protocol.

## 2022-04-30 NOTE — PROGRESS NOTES
[facility]c Note    Mr. Shelton is a 75 yo who sees Dr. Cee from our group for MDS/Myelpproliferative disorder. Presented initially with elevated platelets.  Has been on Hydrea for over a year.  Recently had worsening anemia over last 2-3 months. No transfusions as an outpatient but recently back from [place]. Has been on hydroxyurea to keep platelets under control     Admit with BELL with depressed EF and work up with cath at .   On heparin due to recent PE  Angiogram results reviewed    Labs today show a hgb of 8.7 and macrocytic indices  WBC is normal and plat 278    Meds reviewed  On Hydrea 500mg daily     Exam Alert and oriented Temp 99 pulse 81 weight 194ibs   General appearance looks well and cooperative  CV regular rate and rhythm  Chest overall diminished bilaterally  Abdomen soft and no organomegaly    IMP:  1. History of Myeloproliferative/MDS with history of high platelets on hydrea..  Now with worsening anemia requiring transfusions in the recent weeks.Our EMR is down this weekend so unable to review his clinic notes at this time     No evidence by history of GI bleeding    This could be progression of his normal MDS. Could consider lowering dose of hydrea further. At this time will order labs and consider a Bone marrow biopsy.  Transfuse if hgb is less than 8.0 or follow cardiology recs.    Will order iron numbers, retic and epo level. Check for blood in stool  Discuss with Dr. Cee on Monday.

## 2022-04-30 NOTE — PROGRESS NOTES
[facility]    Medicine Progress Note - Hospitalist Service       Date of Admission:  4/26/2022  Active Problems:    Status post coronary angiogram    Coronary artery disease involving native coronary artery of native heart with angina pectoris (H)    Ischemic cardiomyopathy     Assessment & Plan            Shaji Pompa is a 76 year old male admitted on 4/26/2022. He as a hx of recent bilateral PE's weeks ago, MDS, COPD on home O2, here with acute systolic heart failure, concern for ischemic cardiomyopathy, s/p chest tube for pleural effusion, sent from  here to [facility] for angiogram     1.BELL  -he denied cp, but had new onset BELL and depressed EF  -?if lower EF related to recent PE or ischemic issues  -sent here for cath on 4/26 from   -on hep gtt due to recent PE --now post cath back on warfarin   Much improved     Coronary artery disease-  Status post coronary angiogram    Acute systolic heart failure in the setting of a loculated pleural effusion, pulmonary emboli and COPD. Coronary calcification seen on CT scan. He is also anemic and thrombocytopenic due to myloproliferative syndrome.    Diffuse, severe coronary calcification.    Mild left coronary system stenoses.    The RCA is chronically occluded in its mid-distal segments. The distal RCA branches fill by left sided collaterals.    LV EDP low/normal. AV gradient 5 mmHg by pullback.  No stents placed, medical management per cards now.  Continue aspirin, metoprolol.     2.Acute systolic HF  -new onset EF 40%  -improved with iv lasix, now po--held on 4/28 due to borderline blood pressure.  Received IV fluid flush.    Restarted furosemide at 40 mg, however blood pressure still borderline.  Holding lisinopril.  Cardiology to reassess to assist with dosing given borderline blood pressure.    3.Pulm htn  -suspect due to recent PE and systolic HF     4.Acute bilateral PE  -weeks ago, at risk with malignancy  - restarted warfarin  "after cath and bridge with hep gtt.  Holding on Lovenox given decreasing hemoglobin.  INR increasing.     5.hx of COPD  -on home O2, only at night  -continue home meds  -watch close needs here  Not needing O2     Acute on chronic anemia with hx of MDS  -follows with heme/onc, [facility]  -at  needed 2 units pRBC  Hemoglobin 7.8 on 4/29, transfused 1 unit, no signs of GI loss.  We will consult oncology as he has now needed 3 units of blood over the past few weeks.   Cardiology wants hemoglobin greater than 8.  Watch for fluid overload.     7.Left pleural effusion-loculated  -s/p chest tube at -seen by pulmonology.  -per pulm-- -->\"US guided thoracentesis showed complex effusion, extensive septations, 100 ml yellowish fluid obtained , exudative, predominantly neutrophilic, gram stain negative. S/p chest tube placement 4/21.   Started on intrapleural lytics x 3 days. Gram stain and cultures are negative, cytology pending.\"  -now cytology back-Negative for malignancy- Acute inflammatory debris  - GMS-P stain negative for fungal organisms  - Auramine rhodamine stain negative for acid fast bacill  Follow up chest CT scan showed significant improvement of loculated effusion.  -pulm did see there, now on Augmentin to complete 2 weeks.  No hypoxia.    9.hx of HTN-intermittent borderline blood pressure  -on meds, metoprolol and furosemide, lisinopril held due to borderline blood pressure.    Right arm swelling-  Secondary to occlusive right arm superficial clot in the cephalic vein.  Symptoms improved today, already on anticoagulation.  No signs of cellulitis.  10.Obesity  -bmi 32     11.Dvt prevent- hep gtt now     12.Code-full        Diet: Low Saturated Fat Na <2400 mg  Room Service    DVT Prophylaxis: Heparin GTT  Garrett Catheter: Not present  Central Lines: None  Code Status: Full Code      Disposition Plan   Expected Discharge:  1-2 days-stable hemoglobin, INR, heparin, blood pressure     The patient's care was " "discussed with the Bedside Nurse, Care Coordinator/, Patient, Patient's Family and Cardiology Consultant. for total time 40 minutes with greater than 50% of total time spent in counseling and coordination of care.    DORI ALDRIDGE MD  Hospitalist Service  [facility]  Securely message with the Vocera Web Console (learn more here)  Text page via [employer] Paging/Directory        Clinically Significant Risk Factors Present on Admission                    ______________________________________________________________________    Interval History   Remainder of 12 point review of systems negative except as noted below    Subjective:  Patient doing well, anxious to go home.  Right arm swelling improved.  Not needing oxygen.  No chest pain or shortness of breath or orthopnea or dizziness.  No hematuria, melena or hematochezia, no abdominal pain..    Data reviewed today: I reviewed all medications, new labs and imaging results over the last 24 hours.     Physical Exam   Vital Signs: Temp: 98.8  F (37.1  C) Temp src: Oral BP: 104/55 Pulse: 74   Resp: 18 SpO2: 95 % O2 Device: None (Room air)    Weight: 193 lbs 4.8 oz  Physical Exam:  Temp:  [98.4  F (36.9  C)-99  F (37.2  C)] 98.8  F (37.1  C)  Pulse:  [71-98] 74  Resp:  [18-20] 18  BP: ()/(49-58) 104/55  SpO2:  [89 %-98 %] 95 %    /55 (BP Location: Right arm)   Pulse 74   Temp 98.8  F (37.1  C) (Oral)   Resp 18   Ht 1.676 m (5' 6\")   Wt 87.7 kg (193 lb 4.8 oz)   SpO2 95%   BMI 31.20 kg/m    General appearance: alert, appears stated age and cooperative  Head: Normocephalic, without obvious abnormality, atraumatic  Eyes: Clear conjuctiva  Neck: no JVD and supple, symmetrical, trachea midline  Lungs: diminished breath sounds bilaterally  Heart: regular rate and rhythm, S1, S2 normal, no murmur, click, rub or gallop  Abdomen: soft, non-tender; bowel sounds normal; no masses,  no organomegaly  Extremities: Negative homans, "   Skin: Mild ecchymosis and tenderness on right forearm  Neurologic: Mental status: Alert, oriented, thought content appropriate  Cranial nerves: normal  Sensory: normal  Motor:grossly normal          Data   Recent Labs   Lab 04/30/22  0456 04/29/22  0458 04/28/22  1157 04/28/22  0656 04/28/22  0456   WBC 6.4 7.1  --   --  8.8   HGB 8.7* 7.8*  --   --  8.4*   * 120*  --   --  118*    239  --   --  215   INR 1.89* 1.50*  --   --  1.40*    139  --   --  138   POTASSIUM 3.8 3.7 3.7   < > 3.3*   CHLORIDE 106 106  --   --  103   CO2 22 25  --   --  25   BUN 12 15  --   --  12   CR 0.84 0.87  --   --  0.81   ANIONGAP 10 8  --   --  10   GLENDY 7.9* 7.9*  --   --  8.1*    90  --   --  98    < > = values in this interval not displayed.     No results found for this or any previous visit (from the past 24 hour(s)).

## 2022-04-30 NOTE — PLAN OF CARE
Problem: Respiratory Compromise (Heart Failure)  Goal: Effective Oxygenation and Ventilation  Outcome: Ongoing, Progressing  Intervention: Promote Airway Secretion Clearance  Recent Flowsheet Documentation  Taken 4/30/2022 0330 by Madalyn Gramajo RN  Cough And Deep Breathing: done independently per patient  Taken 4/29/2022 2330 by Madalyn Gramajo RN  Cough And Deep Breathing: done independently per patient    Pt does get short of breath with activity.  Pt maintaining oxygen saturation at rest 90% on RA.   Lungs diminished.    Problem: Bleeding (Thrombolytic Therapy)  Goal: Absence of Bleeding  Outcome: Ongoing, Progressing     Pt continues on Heparin drip at 1900 units/hr with next Anti Xa check tomorrow AM.  No bleeding noted.    Problem: Anemia  Goal: Anemia Symptom Improvement  Outcome: Ongoing, Progressing     Pt's Hgb this AM 8.7; orders to keep Hgb >8.   Pt did receive one unit of PRBC yesterday.

## 2022-04-30 NOTE — PROGRESS NOTES
"[facility]  Cardiac Electrophysiology  [address]  [place] [address]   Office: [phone]  Fax: [phone]     Cardiology Progress Note    Patient: Shaji Pompa   : 1945       Assessment/Recommendations   Mr. Shaji Pompa is a 76 year old male with CAD, HTN, MDS, history of PE, COPD, history of CVA, obesity admitted with acute systolic dysfunction and note of occluded RCA, loculated pleural effusions.    Acute systolic heart failure - LVEF 40% with RCA territory wall motion abnormality by 2022 TTE, LVEF 60-65% without wall motion abnormalities by 2022 TTE.  He was not maintained on diuretic therapy or lisinopril prior to admission.  2022 BNP 1221.  LVEDP 5mmHg on 2022 coronary angiography  - reduce lasix to 10mg orally daily  - lisinopril 2.5mg daily held due to hypotension - will assess for initiation low dose ACEi/ARB if BP increases  - continue metoprolol XL 25mg daily  - we will continue to follow during this admission, with plan for continued outpatient cardiology follow-up for further medication titration (he would prefer follow-up at [facility])    Coronary artery disease - RCA occlusion with collateralization by 2022 coronary angiography, serially negative troponin -.  - continue medical management with aspirin 81mg daily, metoprolol XL, atorvastatin 80mg daily    History of pulmonary embolism         Interval Events   He received 1 unit of PRBCs yesterday for Hgb 7.8 - Hgb today 8.4.  BP 80-100s/50s  Cr 0.84 (0.81).  We are asked to see again by hospital medicine regarding heart failure medications.       Physical Examination  Review of Systems   VITALS: /54   Pulse 71   Temp 98.5  F (36.9  C) (Oral)   Resp 18   Ht 1.676 m (5' 6\")   Wt 87.7 kg (193 lb 4.8 oz)   SpO2 95%   BMI 31.20 kg/m    Wt Readings from Last 3 Encounters:   22 87.7 kg (193 lb 4.8 oz)   22 89 kg (196 lb 3.2 oz) "   04/07/22 92.9 kg (204 lb 11.2 oz)       Intake/Output Summary (Last 24 hours) at 4/30/2022 1236  Last data filed at 4/30/2022 0800  Gross per 24 hour   Intake 2017 ml   Output 1200 ml   Net 817 ml     CONSTITUTIONAL: no distress  EYES:  Conjunctivae pink, sclerae clear.    E/N/T:  Oral mucosa pink, moist mucous membranes  RESPIRATORY:  Respiratory effort is normal  CARDIOVASCULAR:  normal S1 and S2  GASTROINTESTINAL:  Abdomen without masses or tenderness  EXTREMITIES:  No clubbing or cyanosis.    MUSCULOSKELETAL:  Overall grossly normal muscle strength  SKIN:  Overall, skin warm and dry, no lesions.  NEURO/PSYCH:  Oriented x 3 with normal affect.   Constitutional:  No weight loss or loss of appetite    Cardiovascular: As detailed above  Respiratory: No cough  Genitourinary: No trouble urinating           Medical History  Surgical History   Past Medical History:   Diagnosis Date     Cerebral infarction (H)      COPD (chronic obstructive pulmonary disease) (H)      HLD (hyperlipidemia)      Hypertension      Myeloproliferative disease (H)     Past Surgical History:   Procedure Laterality Date     CV CORONARY ANGIOGRAM N/A 4/26/2022    Procedure: CV CORONARY ANGIOGRAM;  Surgeon: Audrey Holder MD;  Location: [facility] CV     CV LEFT HEART CATH N/A 4/26/2022    Procedure: Left Heart Catheterization;  Surgeon: Audrey Holder MD;  Location: [facility] CATH LAB CV     OPEN REDUCTION INTERNAL FIXATION FOOT Right 2010     OTHER SURGICAL HISTORY  2001    Lip lesion removal     TONSILLECTOMY & ADENOIDECTOMY           Family History Social History   Family History   Problem Relation Age of Onset     Alcoholism Mother         Social History     Tobacco Use     Smoking status: Former Smoker     Packs/day: 1.00     Start date: 6/8/1965     Quit date: 1/1/2012     Years since quitting: 10.3     Smokeless tobacco: Never Used   Substance Use Topics     Alcohol use: Yes     Alcohol/week: 19.0 standard drinks     Drug use:  Never         Medications  Allergies     Current Facility-Administered Medications:      acetaminophen (TYLENOL) tablet 650 mg, 650 mg, Oral, Q4H PRN, Audrey Holder MD     albuterol (PROVENTIL HFA/VENTOLIN HFA) inhaler, 2 puff, Inhalation, Q6H PRN, Shanel Rodarte MD     albuterol (PROVENTIL HFA/VENTOLIN HFA) inhaler, 2 puff, Inhalation, Q6H PRN, Audrey Holder MD     albuterol (PROVENTIL) neb solution 2.5 mg, 2.5 mg, Nebulization, Q6H PRN, Shanel Rodarte MD     albuterol (PROVENTIL) neb solution 2.5 mg, 2.5 mg, Nebulization, Q4H PRN, Shanel Rodarte MD     amoxicillin-clavulanate (AUGMENTIN) 875-125 MG per tablet 1 tablet, 1 tablet, Oral, Q12H AKTELYN, Shanel Rodarte MD, 1 tablet at 04/30/22 0943     aspirin EC tablet 81 mg, 81 mg, Oral, Daily, Audrey Holder MD, 81 mg at 04/30/22 0941     atorvastatin (LIPITOR) tablet 80 mg, 80 mg, Oral, Ravinder CHRISTENSEN Terrie-Ann, MD, 80 mg at 04/30/22 0941     folic acid (FOLVITE) tablet 1 mg, 1 mg, Oral, Daily, Shanel Rodarte MD, 1 mg at 04/30/22 0940     furosemide (LASIX) tablet 40 mg, 40 mg, Oral, Daily, Crispin Michelle MD, 40 mg at 04/30/22 0942     heparin infusion 25,000 units in D5W 250 mL ANTICOAGULANT, 0-5,000 Units/hr, Intravenous, Continuous, Shanel Rodarte MD, Last Rate: 19 mL/hr at 04/30/22 0621, 1,900 Units/hr at 04/30/22 0621     HOLD:  Metformin and metformin containing medications if patient received IV contrast with acute kidney injury or severe chronic kidney disease (stage IV or stage V; i.e., eGFR less than 30), , Does not apply, HOLD, Audrey Holder MD     hydrALAZINE (APRESOLINE) injection 10 mg, 10 mg, Intravenous, Q6H PRN, Audrey Holder MD     hydroxyurea (HYDREA) capsule 500 mg, 500 mg, Oral, Daily, Shanel Rodarte MD, 500 mg at 04/30/22 0944     lidocaine (LMX4) cream, , Topical, Q1H PRN, Shanel Rodaret MD     lidocaine 1 % 0.1-1 mL, 0.1-1 mL, Other, Q1H PRN, Shanel Rodarte MD     lidocaine 1 % 1-30 mL, 1-30 mL, Intradermal, Once PRN,  Shanel Rodarte MD     [Held by provider] lisinopril (ZESTRIL) tablet 2.5 mg, 2.5 mg, Oral, Daily, Karsten Castellon MD, 2.5 mg at 04/29/22 0851     melatonin tablet 1 mg, 1 mg, Oral, At Bedtime PRN, Shanel Rodarte MD     metoprolol succinate ER (TOPROL-XL) 24 hr tablet 25 mg, 25 mg, Oral, Daily, Valerie Kim MD, 25 mg at 04/30/22 0942     naloxone (NARCAN) injection 0.2 mg, 0.2 mg, Intravenous, Q2 Min PRN **OR** naloxone (NARCAN) injection 0.4 mg, 0.4 mg, Intravenous, Q2 Min PRN **OR** naloxone (NARCAN) injection 0.2 mg, 0.2 mg, Intramuscular, Q2 Min PRN **OR** naloxone (NARCAN) injection 0.4 mg, 0.4 mg, Intramuscular, Q2 Min PRN, Shanel Rodarte MD     ondansetron (ZOFRAN-ODT) ODT tab 4 mg, 4 mg, Oral, Q6H PRN **OR** ondansetron (ZOFRAN) injection 4 mg, 4 mg, Intravenous, Q6H PRN, Shanel Rodarte MD     oxyCODONE (ROXICODONE) tablet 5 mg, 5 mg, Oral, Q4H PRN **OR** oxyCODONE (ROXICODONE) tablet 10 mg, 10 mg, Oral, Q4H PRN, Audrey Holder MD     polyethylene glycol (MIRALAX) Packet 17 g, 17 g, Oral, Daily PRN, Shanel Rodarte MD     prochlorperazine (COMPAZINE) injection 5 mg, 5 mg, Intravenous, Q6H PRN **OR** prochlorperazine (COMPAZINE) tablet 5 mg, 5 mg, Oral, Q6H PRN **OR** prochlorperazine (COMPAZINE) suppository 12.5 mg, 12.5 mg, Rectal, Q12H PRN, Shanel Rodarte MD     sodium chloride (PF) 0.9% PF flush 3 mL, 3 mL, Intracatheter, q1 min prn, Shanel Rodarte MD     umeclidinium (INCRUSE ELLIPTA) 62.5 MCG/INH inhaler 1 puff, 1 puff, Inhalation, Daily, Shanel Rodarte MD, 1 puff at 04/30/22 0947     warfarin ANTICOAGULANT (COUMADIN) tablet 6 mg, 6 mg, Oral, ONCE at 18:00, Crispin Michelle MD     Warfarin Therapy Reminder (Check START DATE - warfarin may be starting in the FUTURE), 1 each, Does not apply, Continuous PRN, Shanel Rodarte MD   No Known Allergies       Lab Results    Chemistry CBC Cardiac Enzymes/BNP/TSH/INR   Recent Labs   Lab Test 04/30/22  0456      POTASSIUM 3.8   CHLORIDE  106   CO2 22      BUN 12   CR 0.84   GFRESTIMATED 90   GLENDY 7.9*     Recent Labs   Lab Test 04/30/22  0456 04/29/22  0458 04/28/22  0456   CR 0.84 0.87 0.81          Recent Labs   Lab Test 04/30/22  0456   WBC 6.4   HGB 8.7*   HCT 28.2*   *        Recent Labs   Lab Test 04/30/22  0456 04/29/22  0458 04/28/22  0456   HGB 8.7* 7.8* 8.4*    Recent Labs   Lab Test 04/20/22  0405 04/19/22  2143 04/19/22  1441   TROPONINI 0.09 0.11 0.10     Recent Labs   Lab Test 04/19/22  1441 04/01/22  1208   BNP 1,221* 379*     No results for input(s): TSH in the last 79290 hours.  Recent Labs   Lab Test 04/30/22  0456 04/29/22  0458 04/28/22  0456   INR 1.89* 1.50* 1.40*            Alejandro Velasquez MD  4/30/2022  12:36 PM

## 2022-05-01 ENCOUNTER — APPOINTMENT (OUTPATIENT)
Dept: OCCUPATIONAL THERAPY | Facility: HOSPITAL | Age: 77
DRG: 286 | End: 2022-05-01
Payer: COMMERCIAL

## 2022-05-01 VITALS
DIASTOLIC BLOOD PRESSURE: 52 MMHG | BODY MASS INDEX: 31.52 KG/M2 | HEIGHT: 66 IN | HEART RATE: 75 BPM | SYSTOLIC BLOOD PRESSURE: 109 MMHG | WEIGHT: 196.1 LBS | OXYGEN SATURATION: 94 % | RESPIRATION RATE: 18 BRPM | TEMPERATURE: 99.4 F

## 2022-05-01 DIAGNOSIS — I50.21 ACUTE SYSTOLIC CONGESTIVE HEART FAILURE (H): Primary | ICD-10-CM

## 2022-05-01 PROBLEM — D64.89 OTHER SPECIFIED ANEMIAS: Status: ACTIVE | Noted: 2022-05-01

## 2022-05-01 LAB
ANION GAP SERPL CALCULATED.3IONS-SCNC: 7 MMOL/L (ref 5–18)
BUN SERPL-MCNC: 14 MG/DL (ref 8–28)
CALCIUM SERPL-MCNC: 7.8 MG/DL (ref 8.5–10.5)
CHLORIDE BLD-SCNC: 109 MMOL/L (ref 98–107)
CO2 SERPL-SCNC: 23 MMOL/L (ref 22–31)
CREAT SERPL-MCNC: 0.79 MG/DL (ref 0.7–1.3)
ERYTHROCYTE [DISTWIDTH] IN BLOOD BY AUTOMATED COUNT: ABNORMAL %
GFR SERPL CREATININE-BSD FRML MDRD: >90 ML/MIN/1.73M2
GLUCOSE BLD-MCNC: 92 MG/DL (ref 70–125)
HCT VFR BLD AUTO: 27.9 % (ref 40–53)
HGB BLD-MCNC: 8.6 G/DL (ref 13.3–17.7)
INR PPP: 2.34 (ref 0.85–1.15)
MAGNESIUM SERPL-MCNC: 1.9 MG/DL (ref 1.8–2.6)
MCH RBC QN AUTO: 35.8 PG (ref 26.5–33)
MCHC RBC AUTO-ENTMCNC: 30.8 G/DL (ref 31.5–36.5)
MCV RBC AUTO: 116 FL (ref 78–100)
PLATELET # BLD AUTO: 351 10E3/UL (ref 150–450)
POTASSIUM BLD-SCNC: 3.9 MMOL/L (ref 3.5–5)
RBC # BLD AUTO: 2.4 10E6/UL (ref 4.4–5.9)
SODIUM SERPL-SCNC: 139 MMOL/L (ref 136–145)
UFH PPP CHRO-ACNC: 0.51 IU/ML
WBC # BLD AUTO: 5.9 10E3/UL (ref 4–11)

## 2022-05-01 PROCEDURE — 250N000013 HC RX MED GY IP 250 OP 250 PS 637: Performed by: INTERNAL MEDICINE

## 2022-05-01 PROCEDURE — 85520 HEPARIN ASSAY: CPT | Performed by: FAMILY MEDICINE

## 2022-05-01 PROCEDURE — 250N000011 HC RX IP 250 OP 636

## 2022-05-01 PROCEDURE — 250N000013 HC RX MED GY IP 250 OP 250 PS 637

## 2022-05-01 PROCEDURE — 99239 HOSP IP/OBS DSCHRG MGMT >30: CPT | Performed by: FAMILY MEDICINE

## 2022-05-01 PROCEDURE — 97110 THERAPEUTIC EXERCISES: CPT | Mod: GO

## 2022-05-01 PROCEDURE — 85610 PROTHROMBIN TIME: CPT | Performed by: INTERNAL MEDICINE

## 2022-05-01 PROCEDURE — 83735 ASSAY OF MAGNESIUM: CPT | Performed by: FAMILY MEDICINE

## 2022-05-01 PROCEDURE — 80048 BASIC METABOLIC PNL TOTAL CA: CPT | Performed by: INTERNAL MEDICINE

## 2022-05-01 PROCEDURE — 36415 COLL VENOUS BLD VENIPUNCTURE: CPT | Performed by: INTERNAL MEDICINE

## 2022-05-01 PROCEDURE — 250N000011 HC RX IP 250 OP 636: Performed by: FAMILY MEDICINE

## 2022-05-01 PROCEDURE — 85027 COMPLETE CBC AUTOMATED: CPT | Performed by: INTERNAL MEDICINE

## 2022-05-01 PROCEDURE — 99232 SBSQ HOSP IP/OBS MODERATE 35: CPT | Performed by: INTERNAL MEDICINE

## 2022-05-01 RX ORDER — HEPARIN SODIUM 10000 [USP'U]/100ML
0-5000 INJECTION, SOLUTION INTRAVENOUS CONTINUOUS
Status: ACTIVE | OUTPATIENT
Start: 2022-05-01 | End: 2022-05-01

## 2022-05-01 RX ORDER — WARFARIN SODIUM 5 MG/1
5 TABLET ORAL
Status: COMPLETED | OUTPATIENT
Start: 2022-05-01 | End: 2022-05-01

## 2022-05-01 RX ORDER — WARFARIN SODIUM 1 MG/1
TABLET ORAL
Qty: 60 TABLET | Refills: 0 | Status: SHIPPED | OUTPATIENT
Start: 2022-05-01 | End: 2022-08-25

## 2022-05-01 RX ORDER — HYDROXYUREA 500 MG/1
500 CAPSULE ORAL DAILY
Qty: 30 CAPSULE | Refills: 0 | Status: SHIPPED | OUTPATIENT
Start: 2022-05-02 | End: 2022-08-25

## 2022-05-01 RX ORDER — FOLIC ACID 1 MG/1
1 TABLET ORAL DAILY
Qty: 30 TABLET | Refills: 0 | Status: SHIPPED | OUTPATIENT
Start: 2022-05-02 | End: 2022-08-25

## 2022-05-01 RX ORDER — ENOXAPARIN SODIUM 100 MG/ML
1 INJECTION SUBCUTANEOUS ONCE
Status: COMPLETED | OUTPATIENT
Start: 2022-05-01 | End: 2022-05-01

## 2022-05-01 RX ORDER — LISINOPRIL 2.5 MG/1
2.5 TABLET ORAL DAILY
Qty: 30 TABLET | Refills: 0 | Status: SHIPPED | OUTPATIENT
Start: 2022-05-02 | End: 2022-05-31

## 2022-05-01 RX ORDER — FUROSEMIDE 20 MG
10 TABLET ORAL DAILY
Qty: 30 TABLET | Refills: 0 | Status: SHIPPED | OUTPATIENT
Start: 2022-05-02 | End: 2022-06-02

## 2022-05-01 RX ORDER — ATORVASTATIN CALCIUM 80 MG/1
80 TABLET, FILM COATED ORAL EVERY MORNING
Qty: 30 TABLET | Refills: 0 | Status: SHIPPED | OUTPATIENT
Start: 2022-05-02 | End: 2022-05-31

## 2022-05-01 RX ADMIN — HEPARIN SODIUM AND DEXTROSE 1900 UNITS/HR: 10000; 5 INJECTION INTRAVENOUS at 07:51

## 2022-05-01 RX ADMIN — ENOXAPARIN SODIUM 90 MG: 100 INJECTION SUBCUTANEOUS at 18:04

## 2022-05-01 RX ADMIN — ASPIRIN 81 MG: 81 TABLET, COATED ORAL at 08:00

## 2022-05-01 RX ADMIN — HYDROXYUREA 500 MG: 500 CAPSULE ORAL at 08:02

## 2022-05-01 RX ADMIN — ATORVASTATIN CALCIUM 80 MG: 40 TABLET, FILM COATED ORAL at 07:59

## 2022-05-01 RX ADMIN — WARFARIN SODIUM 5 MG: 5 TABLET ORAL at 18:04

## 2022-05-01 RX ADMIN — FOLIC ACID 1 MG: 1 TABLET ORAL at 07:59

## 2022-05-01 RX ADMIN — LISINOPRIL 2.5 MG: 2.5 TABLET ORAL at 07:58

## 2022-05-01 RX ADMIN — METOPROLOL SUCCINATE 25 MG: 25 TABLET, EXTENDED RELEASE ORAL at 08:00

## 2022-05-01 RX ADMIN — FUROSEMIDE 10 MG: 20 TABLET ORAL at 08:00

## 2022-05-01 RX ADMIN — UMECLIDINIUM 1 PUFF: 62.5 AEROSOL, POWDER ORAL at 08:01

## 2022-05-01 RX ADMIN — AMOXICILLIN AND CLAVULANATE POTASSIUM 1 TABLET: 875; 125 TABLET, FILM COATED ORAL at 07:59

## 2022-05-01 RX ADMIN — HEPARIN SODIUM AND DEXTROSE 1600 UNITS/HR: 10000; 5 INJECTION INTRAVENOUS at 16:33

## 2022-05-01 ASSESSMENT — ACTIVITIES OF DAILY LIVING (ADL)
ADLS_ACUITY_SCORE: 10

## 2022-05-01 NOTE — PROGRESS NOTES
[facility]     PROGRESS NOTE:    Mr Girard is a 75 yo patient with CAD and HTN and history of PE on anticoagulation.  Follow with Dr. Cee for MDS and is on hydrea for history of thrombocytosis  Admit with acute heart failure and anemia     Admit with BELL   Cardiac workup completed   Labs WBC is 5 and hgb is 8.7 and plt are 351   B12 normal and ferritin elevated and iron saturation 26%    Overall feeling better   No new complaints today     Exam :  Weight 167, P72 /61  Looks comfortable at rest. No distress  Respiratory effort normal  CV RRR  Abdomen - soft and nontender no organomegaly  Ext no cyanosis or edema    IMP/PLAN:  1. History of MDS - new or worsening anemia. HGB is 8.6  Would consider repeat bone marrow biopsy as outpatient to work up anemia  Continue hydrea at 500mg daily  Follow up with Dr. Cee next week    2. Coagulation:  Discussed with rounding MD   INR is up to 2.34   OK to discharge on previous coumadin dose if dose of Lovenox given this afternoon. Check INR at [facility] as previously done next wek

## 2022-05-01 NOTE — PROGRESS NOTES
"[facility]  Cardiac Electrophysiology  [address]  [place] [address]   Office: [phone]  Fax: [phone]     Cardiology Progress Note    Patient: Shaji Pompa   : 1945       Assessment/Recommendations   Mr. Shaji Pompa is a 76 year old male with CAD, HTN, MDS, history of PE, COPD, history of CVA, obesity admitted with acute systolic dysfunction and note of occluded RCA, loculated pleural effusions.     Acute systolic heart failure - LVEF 40% with RCA territory wall motion abnormality by 2022 TTE, LVEF 60-65% without wall motion abnormalities by 2022 TTE.  He was not maintained on diuretic therapy or lisinopril prior to admission.  2022 BNP 1221.  LVEDP 5mmHg on 2022 coronary angiography  - continue lasix to 10mg orally daily  - restart lisinopril 2.5mg daily - watch for hypotension  - continue metoprolol XL 25mg daily  - okay to discharge on the above medical regimen, with plan for continued outpatient cardiology follow-up for further medication titration - 2-3 week cardiology follow-up at [facility] requested  - please contact us with further questions     Coronary artery disease - RCA occlusion with collateralization by 2022 coronary angiography, serially negative troponin -.  Suspect recent RCA territory infarction  - continue medical management with aspirin 81mg daily, metoprolol XL, atorvastatin 80mg daily  - risk/benefit of DAPT/triple therapy indeterminate     History of pulmonary embolism  - continue warfarin         Interval Events   BP 80-120s/50-60s  Cr 0.79 (0.84, 0.81).  INR 2.34  He feels well, anxious for discharge       Physical Examination  Review of Systems   VITALS: /61 (BP Location: Right arm)   Pulse 72   Temp 98.4  F (36.9  C) (Oral)   Resp 20   Ht 1.676 m (5' 6\")   Wt 89 kg (196 lb 1.6 oz)   SpO2 92%   BMI 31.65 kg/m    Wt Readings from Last 3 Encounters:   22 89 kg " (196 lb 1.6 oz)   04/26/22 89 kg (196 lb 3.2 oz)   04/07/22 92.9 kg (204 lb 11.2 oz)       Intake/Output Summary (Last 24 hours) at 5/1/2022 0709  Last data filed at 5/1/2022 0541  Gross per 24 hour   Intake 1747.35 ml   Output 1700 ml   Net 47.35 ml     CONSTITUTIONAL: no distress  EYES:  Conjunctivae pink, sclerae clear.    E/N/T:  Oral mucosa pink, moist mucous membranes  RESPIRATORY:  Respiratory effort is normal  CARDIOVASCULAR:  normal S1 and S2  GASTROINTESTINAL:  Abdomen without masses or tenderness  EXTREMITIES:  No clubbing or cyanosis.    MUSCULOSKELETAL:  Overall grossly normal muscle strength  SKIN:  Overall, skin warm and dry, no lesions.  NEURO/PSYCH:  Oriented x 3 with normal affect.  Constitutional:  No weight loss or loss of appetite    Cardiovascular: As detailed above  Respiratory: No cough  Genitourinary: No trouble urinating           Medical History  Surgical History   Past Medical History:   Diagnosis Date     Cerebral infarction (H)      COPD (chronic obstructive pulmonary disease) (H)      HLD (hyperlipidemia)      Hypertension      Myeloproliferative disease (H)     Past Surgical History:   Procedure Laterality Date     CV CORONARY ANGIOGRAM N/A 4/26/2022    Procedure: CV CORONARY ANGIOGRAM;  Surgeon: Audrey Holder MD;  Location: [facility] CATH [facility] CV     CV LEFT HEART CATH N/A 4/26/2022    Procedure: Left Heart Catheterization;  Surgeon: Audrey Holder MD;  Location: [facility] CATH LAB CV     OPEN REDUCTION INTERNAL FIXATION FOOT Right 2010     OTHER SURGICAL HISTORY  2001    Lip lesion removal     TONSILLECTOMY & ADENOIDECTOMY           Family History Social History   Family History   Problem Relation Age of Onset     Alcoholism Mother         Social History     Tobacco Use     Smoking status: Former Smoker     Packs/day: 1.00     Start date: 6/8/1965     Quit date: 1/1/2012     Years since quitting: 10.3     Smokeless tobacco: Never Used   Substance Use Topics     Alcohol use: Yes      Alcohol/week: 19.0 standard drinks     Drug use: Never         Medications  Allergies     Current Facility-Administered Medications:      acetaminophen (TYLENOL) tablet 650 mg, 650 mg, Oral, Q4H PRN, Audrey Holder MD     albuterol (PROVENTIL HFA/VENTOLIN HFA) inhaler, 2 puff, Inhalation, Q6H PRN, Shanel Rodarte MD     albuterol (PROVENTIL HFA/VENTOLIN HFA) inhaler, 2 puff, Inhalation, Q6H PRN, Audrey Holder MD     albuterol (PROVENTIL) neb solution 2.5 mg, 2.5 mg, Nebulization, Q6H PRN, Shanel Rodarte MD     albuterol (PROVENTIL) neb solution 2.5 mg, 2.5 mg, Nebulization, Q4H PRN, Shanel Rodarte MD     amoxicillin-clavulanate (AUGMENTIN) 875-125 MG per tablet 1 tablet, 1 tablet, Oral, Q12H KATELYN, Shanel Rodarte MD, 1 tablet at 04/30/22 1940     aspirin EC tablet 81 mg, 81 mg, Oral, Daily, Audrey Holder MD, 81 mg at 04/30/22 0941     atorvastatin (LIPITOR) tablet 80 mg, 80 mg, Oral, Ravinder CHRISTENSEN Terrie-Ann, MD, 80 mg at 04/30/22 0941     folic acid (FOLVITE) tablet 1 mg, 1 mg, Oral, Daily, Shanel Rodarte MD, 1 mg at 04/30/22 0940     furosemide (LASIX) half-tab 10 mg, 10 mg, Oral, Daily, Alejandro Velasquez MD     heparin infusion 25,000 units in D5W 250 mL ANTICOAGULANT, 0-5,000 Units/hr, Intravenous, Continuous, Shanel Rodarte MD, Last Rate: 19 mL/hr at 04/30/22 1857, 1,900 Units/hr at 04/30/22 1857     HOLD:  Metformin and metformin containing medications if patient received IV contrast with acute kidney injury or severe chronic kidney disease (stage IV or stage V; i.e., eGFR less than 30), , Does not apply, HOLD, Audrey Holder MD     hydrALAZINE (APRESOLINE) injection 10 mg, 10 mg, Intravenous, Q6H PRN, Audrey Holder MD     hydroxyurea (HYDREA) capsule 500 mg, 500 mg, Oral, Daily, Shanel Rodarte MD, 500 mg at 04/30/22 0944     lidocaine (LMX4) cream, , Topical, Q1H PRN, Shanel Rodarte MD     lidocaine 1 % 0.1-1 mL, 0.1-1 mL, Other, Q1H PRN, Shanel Rodarte MD     lidocaine 1 % 1-30 mL,  1-30 mL, Intradermal, Once PRN, Shanel Rodarte MD     [Held by provider] lisinopril (ZESTRIL) tablet 2.5 mg, 2.5 mg, Oral, Daily, Karsten Castellon MD, 2.5 mg at 04/29/22 0851     melatonin tablet 1 mg, 1 mg, Oral, At Bedtime PRN, Shanel Rodarte MD     metoprolol succinate ER (TOPROL-XL) 24 hr tablet 25 mg, 25 mg, Oral, Daily, Valerie Kim MD, 25 mg at 04/30/22 0942     naloxone (NARCAN) injection 0.2 mg, 0.2 mg, Intravenous, Q2 Min PRN **OR** naloxone (NARCAN) injection 0.4 mg, 0.4 mg, Intravenous, Q2 Min PRN **OR** naloxone (NARCAN) injection 0.2 mg, 0.2 mg, Intramuscular, Q2 Min PRN **OR** naloxone (NARCAN) injection 0.4 mg, 0.4 mg, Intramuscular, Q2 Min PRN, Shanel Rodarte MD     ondansetron (ZOFRAN-ODT) ODT tab 4 mg, 4 mg, Oral, Q6H PRN **OR** ondansetron (ZOFRAN) injection 4 mg, 4 mg, Intravenous, Q6H PRN, Shanel Rodarte MD     oxyCODONE (ROXICODONE) tablet 5 mg, 5 mg, Oral, Q4H PRN **OR** oxyCODONE (ROXICODONE) tablet 10 mg, 10 mg, Oral, Q4H PRN, Audrey Holder MD     polyethylene glycol (MIRALAX) Packet 17 g, 17 g, Oral, Daily PRN, Shanel Rodarte MD     prochlorperazine (COMPAZINE) injection 5 mg, 5 mg, Intravenous, Q6H PRN **OR** prochlorperazine (COMPAZINE) tablet 5 mg, 5 mg, Oral, Q6H PRN **OR** prochlorperazine (COMPAZINE) suppository 12.5 mg, 12.5 mg, Rectal, Q12H PRN, Shanel Rodarte MD     sodium chloride (PF) 0.9% PF flush 3 mL, 3 mL, Intracatheter, q1 min prn, Shanel Rodarte MD     umeclidinium (INCRUSE ELLIPTA) 62.5 MCG/INH inhaler 1 puff, 1 puff, Inhalation, Daily, Shanel Rodarte MD, 1 puff at 04/30/22 0947     Warfarin Therapy Reminder (Check START DATE - warfarin may be starting in the FUTURE), 1 each, Does not apply, Continuous PRN, Shanel Rodarte MD   No Known Allergies       Lab Results    Chemistry CBC Cardiac Enzymes/BNP/TSH/INR   Recent Labs   Lab Test 05/01/22  0535      POTASSIUM 3.9   CHLORIDE 109*   CO2 23   GLC 92   BUN 14   CR 0.79   GFRESTIMATED >90   GLENDY  7.8*     Recent Labs   Lab Test 05/01/22  0535 04/30/22  0456 04/29/22  0458   CR 0.79 0.84 0.87          Recent Labs   Lab Test 05/01/22  0535   WBC 5.9   HGB 8.6*   HCT 27.9*   *        Recent Labs   Lab Test 05/01/22  0535 04/30/22  0456 04/29/22  0458   HGB 8.6* 8.7* 7.8*    Recent Labs   Lab Test 04/20/22  0405 04/19/22  2143 04/19/22  1441   TROPONINI 0.09 0.11 0.10     Recent Labs   Lab Test 04/19/22  1441 04/01/22  1208   BNP 1,221* 379*     No results for input(s): TSH in the last 25977 hours.  Recent Labs   Lab Test 05/01/22  0535 04/30/22  0456 04/29/22  0458   INR 2.34* 1.89* 1.50*            Alejandro Velasquez MD  5/1/2022  7:09 AM

## 2022-05-01 NOTE — DISCHARGE SUMMARY
[facility]  Hospitalist Discharge Summary      Date of Admission:  4/26/2022  Date of Discharge:  5/1/2022  6:21 PM  Discharging Provider: DORI ALDRIDGE MD      Discharge Diagnoses   Principal Problem:    Acute systolic congestive heart failure (H)  Active Problems:    Essential hypertension    Chronic myeloproliferative disease (H)    Pleural effusion on left    History of pulmonary embolism    Status post coronary angiogram    Coronary artery disease involving native coronary artery of native heart with angina pectoris (H)    Ischemic cardiomyopathy    Other specified anemias       Discharge Procedure Orders   Follow-Up with Cardiology KATTY   Standing Status: Future   Referral Priority: Routine: Next available opening Referral Type: Consultation   Requested Specialty: Cardiovascular Disease   Number of Visits Requested: 1     Follow-Up with Cardiology   Standing Status: Future   Referral Priority: Routine: Next available opening Referral Type: Consultation   Requested Specialty: Cardiovascular Disease   Number of Visits Requested: 1     Reason for your hospital stay   Order Comments: CHF     Activity   Order Comments: Your activity upon discharge: activity as tolerated     Order Specific Question Answer Comments   Is discharge order? Yes      Monitor and record   Order Comments: Weigh yourself every morning     When to contact your care team   Order Comments: Contact your care team If symptoms get worse, If increased shortness of breath, If waking up at night with difficulty breathing, If unable to lie down for sleep due to symptoms, If weight gain of 2 pounds a day for 2 days in a row OR 5 pounds in 1 week., Increased swelling in your ankles or legs, and Dizziness or lightheadedness     Add follow up information to the AVS prior to printing     Discharge Follow Up - with Primary Care clinic within 3-5 days (RN to schedule prior to d/c for HE/[facility] primary care   Order Comments: Check  basic metabolic panel, CBC, magnesium in 3 to 5 days     Discharge Instructions   Order Comments: Follow-up with oncology on 5/3 for INR and CBC   Redose warfarin per oncologist based on INR.  Schedule follow-up with oncologist to discuss recurrent anemia bone marrow biopsy     Diet   Order Comments: Follow this diet upon discharge: Orders Placed This Encounter      Room Service      Low Saturated Fat Na <2400 mg     Order Specific Question Answer Comments   Is discharge order? Yes           Hospital Course   Shaji Pompa is a 76 year old male admitted on 4/26/2022. He as a hx of recent bilateral PE's/right lower extremity DVT, MDS, COPD on home O2, admitted to [facility] for dyspnea secondary to acute systolic heart failure, loculated left pleural effusion s/p chest tube , sent from  here to [facility] for coronary angiogram.  Patient has been followed by cardiology and oncology.  Other complications include anemia requiring transfusions likely secondary to MDS     BELL secondary to acute systolic CHF exacerbation, complex loculated left pleural effusion, found to have abnormal coronary angiogram medically managed  Admitted with dyspnea with exertion, found to have acute systolic CHF as well as a complex loculated left pleural effusion.  Also has history of recent PE, was on warfarin  Seen by cardiology, treated with IV furosemide, now on low-dose oral diuretic  Shortness of breath resolved.    Coronary artery disease   Due to new onset systolic CHF exacerbation there was concern for underlying coronary artery disease   Transferred from [facility] for coronary angiogram which showed:    Diffuse, severe coronary calcification.    Mild left coronary system stenoses.    The RCA is chronically occluded in its mid-distal segments. The distal RCA branches fill by left sided collaterals.    LV EDP low/normal. AV gradient 5 mmHg by pullback.  No stents placed, medical management was recommended.   "Patient was continued on aspirin, metoprolol and high-dose statin.  Follow-up with cardiology as outpatient    .Acute systolic HF  New onset with cardiac echocardiogram showing EF of 40%.  Treated initially with IV furosemide, transition to oral furosemide.  This was held for the past few days due to borderline blood pressure.  Seen by cardiology, discharging on furosemide 10 mg and low-dose lisinopril.  Will need to follow-up with the CHF clinic as outpatient.  Dyspnea essentially resolved.    Acute on chronic anemia with hx of MDS  Patient is on hydroxyurea chronically.  Dose was decreased by oncology  At  needed 2 units pRBC  Hemoglobin 7.8 on 4/29, transfused 1 unit, no signs of GI loss.  Hemoglobin stable at 8.6 today  Oncology consulted here as he is needed 3 units of transfusion over the past 2 weeks  Labs include a normal WBC, platelets 351, normal B12, ferritin was elevated, iron saturation 26%  Patient will follow up with his primary oncologist next week to consider bone marrow biopsy    Left pleural effusion-loculated  Was seen by pulmonology at [facility]   s/p chest tube at   -per pulm-- -->\"US guided thoracentesis showed complex effusion, extensive septations, 100 ml yellowish fluid obtained , exudative, predominantly neutrophilic, gram stain negative. S/p chest tube placement 4/21.   Started on intrapleural lytics x 3 days. Gram stain and cultures are negative, cytology negative.\"  -now cytology back-Negative for malignancy- Acute inflammatory debris  - GMS-P stain negative for fungal organisms  - Auramine rhodamine stain negative for acid fast bacill  Follow up chest CT scan showed significant improvement of loculated effusion.  Chest tube removed at [facility], patient will complete his Augmentin course on 5/2 for total 2-week course of antibiotics as previously recommended by pulmonology at [facility]..     Pulmonary hypertension  -suspect due to recent PE and systolic HF.  " Follow-up with cardiology as outpatient     History of recent bilateral PE/right lower extremity DVT  Diagnosed on 4/1 after trip to [place], also at risk with malignancy,  Patient's warfarin was held for his coronary angiogram, started on heparin GTT.  Warfarin restarted after angiogram, INR therapeutic today.  He will get a single dose of Lovenox this evening prior to discharge with discontinuation of heparin infusion to ensure 24-hour overlap of heparin/Lovenox in therapeutic INR..  pharmacy is recommending 5 mg of warfarin today, and tomorrow, to be redosed on Tuesday after checking his INR at his oncologist office.    hx of COPD  -on home O2, only at night  -continue home meds  Not needing O2 here.  Not in exacerbation.    Essential hypertension-  Patient has had intermittently borderline blood pressure with no signs of sepsis.  He was continued on his metoprolol, furosemide dose decreased, lisinopril dose decreased.  Normotensive today.      Right arm swelling-  Secondary to occlusive right arm superficial clot in the cephalic vein.  Symptoms vaginally improving improved today, already on anticoagulation.  No signs of cellulitis.       Full code       Consultations This Hospital Stay   CORE CLINIC EVALUATION IP CONSULT  PHARMACY TO DOSE WARFARIN  PHARMACY IP CONSULT  PHARMACY IP CONSULT  CARDIOLOGY IP CONSULT  PHARMACY IP CONSULT  PHARMACY IP CONSULT  HOSPITALIST IP CONSULT  CARDIAC REHAB IP CONSULT  PHARMACY IP CONSULT  CARE MANAGEMENT / SOCIAL WORK IP CONSULT  HEMATOLOGY & ONCOLOGY IP CONSULT    Code Status   Full Code         Greater than 35 minutes spent on coordinating discharge, evaluating labs, studies, evaluating patient, evaluating specialist notes    DORI ALDRIDGE MD  [facility]  [address] [place] [id number]  Phone: [phone]  Fax: [phone]  ______________________________________________________________________         Primary Care Physician    RAGINI IRVING    Discharge Orders      Follow-Up with Cardiology KATTY      Follow-Up with Cardiology      Reason for your hospital stay    CHF     Activity    Your activity upon discharge: activity as tolerated     Monitor and record    Weigh yourself every morning     When to contact your care team    Contact your care team If symptoms get worse, If increased shortness of breath, If waking up at night with difficulty breathing, If unable to lie down for sleep due to symptoms, If weight gain of 2 pounds a day for 2 days in a row OR 5 pounds in 1 week., Increased swelling in your ankles or legs, and Dizziness or lightheadedness     Add follow up information to the AVS prior to printing     Discharge Follow Up - with Primary Care clinic within 3-5 days (RN to schedule prior to d/c for HE/[facility] primary care    Check basic metabolic panel, CBC, magnesium in 3 to 5 days     Discharge Instructions    Follow-up with oncology on 5/3 for INR and CBC   Redose warfarin per oncologist based on INR.  Schedule follow-up with oncologist to discuss recurrent anemia bone marrow biopsy     Diet    Follow this diet upon discharge: Orders Placed This Encounter      Room Service      Low Saturated Fat Na <2400 mg       Significant Results and Procedures   Most Recent 3 CBC's:  Recent Labs   Lab Test 05/01/22  0535 04/30/22  0456 04/29/22  0458   WBC 5.9 6.4 7.1   HGB 8.6* 8.7* 7.8*   * 117* 120*    278 239     Most Recent 3 BMP's:  Recent Labs   Lab Test 05/01/22  0535 04/30/22  0456 04/29/22  0458    138 139   POTASSIUM 3.9 3.8 3.7   CHLORIDE 109* 106 106   CO2 23 22 25   BUN 14 12 15   CR 0.79 0.84 0.87   ANIONGAP 7 10 8   GLENDY 7.8* 7.9* 7.9*   GLC 92 106 90     Most Recent 2 LFT's:  Recent Labs   Lab Test 04/20/22  0405 04/19/22  1441   AST 17 18   ALT 22 22   ALKPHOS 78 88   BILITOTAL 1.3* 1.0     Most Recent INR's and Anticoagulation Dosing History:  Anticoagulation Dose History     Recent Dosing and Labs Latest  Ref Rng & Units 4/25/2022 4/26/2022 4/27/2022 4/28/2022 4/29/2022 4/30/2022 5/1/2022    Warfarin 2 mg - - - 4 mg - - - -    Warfarin 2.5 mg - - 2.5 mg - - - - -    Warfarin 3 mg - - - - - - 6 mg -    Warfarin 5 mg - - - - 5 mg 5 mg - 5 mg    INR 0.85 - 1.15 1.49(H) 1.43(H) 1.34(H) 1.40(H) 1.50(H) 1.89(H) 2.34(H)        Most Recent 6 Bacteria Isolates From Any Culture (See EPIC Reports for Culture Details):No lab results found.,   Results for orders placed or performed during the hospital encounter of 04/26/22   US Upper Extremity Venous Duplex Right    Narrative    EXAM: US UPPER EXTREMITY VENOUS DUPLEX RIGHT  LOCATION: [facility]  DATE/TIME: 4/29/2022 1:39 PM    INDICATION: Right arm swelling  COMPARISON: None.  TECHNIQUE: Venous Duplex ultrasound of the right upper extremity with (when possible) and without compression, augmentation, and duplex. Color flow and spectral Doppler with waveform analysis performed.    FINDINGS: Ultrasound includes evaluation of the internal jugular vein, innominate vein, subclavian vein, axillary vein, and brachial vein. The superficial cephalic and basilic veins were also evaluated where seen.     RIGHT: No deep venous thrombosis. Occlusive superficial venous thrombosis extending from the right antecubital fossa to the right mid forearm. There is also a partially occlusive segment of thrombus within the right forearm cephalic vein.      Impression    IMPRESSION:  1.  No deep venous thrombosis in the right upper extremity.  2.  Acute mostly occlusive right arm superficial venous thrombosis within the cephalic vein. This extends from the antecubital fossa to the forearm.   Cardiac Catheterization    Narrative      Acute systolic heart failure in the setting of a loculated pleural   effusion, pulmonary emboli and COPD. Coronary calcification seen on CT   scan. He is also anemic and thrombocytopenic due to myloproliferative   syndrome.    Diffuse, severe coronary  "calcification.    Mild left coronary system stenoses.    The RCA is chronically occluded in its mid-distal segments. The distal   RCA branches fill by left sided collaterals.    LV EDP low/normal. AV gradient 5 mmHg by pullback.            Discharge Medications   Discharge Medication List as of 5/1/2022  5:55 PM      START taking these medications    Details   amoxicillin-clavulanate (AUGMENTIN) 875-125 MG tablet Take 1 tablet by mouth every 12 hours for 2 days, Disp-4 tablet, R-0, E-Prescribe      aspirin (ASA) 81 MG EC tablet Take 1 tablet (81 mg) by mouth daily Start tomorrow morning., Disp-90 tablet, R-3, E-Prescribe      folic acid (FOLVITE) 1 MG tablet Take 1 tablet (1 mg) by mouth daily, Disp-30 tablet, R-0, E-Prescribe      furosemide (LASIX) 20 MG tablet Take 0.5 tablets (10 mg) by mouth daily, Disp-30 tablet, R-0, E-Prescribe      lisinopril (ZESTRIL) 2.5 MG tablet Take 1 tablet (2.5 mg) by mouth daily, Disp-30 tablet, R-0, E-Prescribe      nitroGLYcerin (NITROSTAT) 0.4 MG sublingual tablet One tablet under the tongue every 5 minutes if needed for chest pain. May repeat every 5 minutes for a maximum of 3 doses in 15 minutes\", Disp-25 tablet, R-3, Local Print      warfarin ANTICOAGULANT (COUMADIN) 1 MG tablet Take 5 mg on 5/2, re dose warfarin on 5/3 per oncology based on INR, Disp-60 tablet, R-0, ASHLEY, E-Prescribe         CONTINUE these medications which have CHANGED    Details   atorvastatin (LIPITOR) 80 MG tablet Take 1 tablet (80 mg) by mouth every morning, Disp-30 tablet, R-0, E-Prescribe      hydroxyurea (HYDREA) 500 MG capsule Take 1 capsule (500 mg) by mouth daily, Disp-30 capsule, R-0, E-Prescribe         CONTINUE these medications which have NOT CHANGED    Details   albuterol (PROAIR HFA;PROVENTIL HFA;VENTOLIN HFA) 90 mcg/actuation inhaler [ALBUTEROL (PROAIR HFA;PROVENTIL HFA;VENTOLIN HFA) 90 MCG/ACTUATION INHALER] Inhale 2 puffs every 6 (six) hours as needed for wheezing., HistoricalMay substitute " "the equivalent medication per insurance preference.      metoprolol succinate ER (TOPROL-XL) 25 MG 24 hr tablet Take 25 mg by mouth daily, Historical      tiotropium (SPIRIVA) 18 mcg inhalation capsule [TIOTROPIUM (SPIRIVA) 18 MCG INHALATION CAPSULE] Place 18 mcg into inhaler and inhale daily., Historical           Allergies   No Known Allergies    Physical Exam:  Temp:  [98.1  F (36.7  C)-99.4  F (37.4  C)] 99.4  F (37.4  C)  Pulse:  [72-92] 75  Resp:  [18-20] 18  BP: (101-124)/(52-61) 109/52  SpO2:  [91 %-95 %] 94 %    /52 (BP Location: Right arm)   Pulse 75   Temp 99.4  F (37.4  C) (Oral)   Resp 18   Ht 1.676 m (5' 6\")   Wt 89 kg (196 lb 1.6 oz)   SpO2 94%   BMI 31.65 kg/m    General appearance: alert, appears stated age and cooperative  Head: Normocephalic, without obvious abnormality, atraumatic  Eyes: Clear conjuctiva  Neck: no JVD and supple, symmetrical, trachea midline  Lungs: Few rales at bases, no tachypnea, no wheeze  Heart: regular rate and rhythm, S1, S2 normal, no murmur, click, rub or gallop  Abdomen: soft, non-tender; bowel sounds normal; no masses,  no organomegaly  Extremities: Shady's sign is negative, no sign of DVT  Skin: Improving swelling and slight ecchymosis/erythema right forearm  Neurologic: Mental status: Alert, oriented, thought content appropriate  Cranial nerves: normal  Sensory: normal  Motor:grossly normal        "

## 2022-05-01 NOTE — PLAN OF CARE
Problem: Plan of Care - These are the overarching goals to be used throughout the patient stay.    Goal: Plan of Care Review/Shift Note  Description: The Plan of Care Review/Shift note should be completed every shift.  The Outcome Evaluation is a brief statement about your assessment that the patient is improving, declining, or no change.  This information will be displayed automatically on your shift note.  Outcome: Ongoing, Progressing  Flowsheets (Taken 4/30/2022 2007)  Plan of Care Reviewed With: patient  Overall Patient Progress: improving     Problem: Embolism (Cardiac Catheterization)  Goal: Absence of Embolism Signs and Symptoms  Outcome: Ongoing, Progressing     Problem: Anemia  Goal: Anemia Symptom Improvement  Outcome: Ongoing, Progressing  Intervention: Monitor and Manage Anemia  Recent Flowsheet Documentation  Taken 4/30/2022 1944 by Maryjo Parada RN  Safety Promotion/Fall Prevention: increased rounding and observation  Taken 4/30/2022 1602 by Maryjo Parada RN  Safety Promotion/Fall Prevention: increased rounding and observation   Goal Outcome Evaluation:    Plan of Care Reviewed With: patient     Overall Patient Progress: improving     Pt had uneventful shift so far. Vitals sable. Pt denied pain through out the shift. Heparin running at 1900 units/hr. Next Anti-XA in am. Pt on potassium protocol. K was 3.8. Recheck lab in am per protocol. Pt's hgb today was 8.7.

## 2022-05-01 NOTE — PROVIDER NOTIFICATION
Text page to Dr. Michelle:  looks like the discharge orders are not yet reconciled and signed. Can you do this before you leave? I can then discharge him after giving him the 6PM medications you ordered.

## 2022-05-01 NOTE — PLAN OF CARE
Problem: Fluid Imbalance (Heart Failure)  Goal: Fluid Balance  Outcome: Ongoing, Progressing    Pt is up 2lbs from previous day.  Slight shortness of breath still noted with exertion.  Pt  maintaining oxygen saturation on RA at 90% at rest.    Problem: Anemia  Goal: Anemia Symptom Improvement  Outcome: Ongoing, Progressing  Intervention: Monitor and Manage Anemia  Recent Flowsheet Documentation  Taken 4/30/2022 2340 by Madalyn Gramajo RN  Safety Promotion/Fall Prevention:   activity supervised   clutter free environment maintained   fall prevention program maintained   lighting adjusted   nonskid shoes/slippers when out of bed   room organization consistent   safety round/check completed     Hgb 8.6 this AM.  No bleeding noted.    Problem: Bleeding (Thrombolytic Therapy)  Goal: Absence of Bleeding  Outcome: Ongoing, Progressing     Pt continues on Heparin drip at 1900 units/hr with next Anti Xa check tomorrow AM. Pt is being bridged with Coumadin. INR this AM 2.34.

## 2022-05-02 DIAGNOSIS — Z71.89 OTHER SPECIFIED COUNSELING: ICD-10-CM

## 2022-05-02 NOTE — PLAN OF CARE
Cardiac Rehab Discharge Summary    Reason for therapy discharge:    Discharged to home.    Progress towards therapy goal(s). See goals on Care Plan in [facility] electronic health record for goal details.  Goals partially met.  Barriers to achieving goals:   discharge from facility.    Therapy recommendation(s):    Continued therapy is recommended.  Rationale/Recommendations:  Home therapy recommended but pt. declined, encouraged walking at home to pt. and wife.

## 2022-05-03 ENCOUNTER — LAB REQUISITION (OUTPATIENT)
Dept: LAB | Facility: CLINIC | Age: 77
End: 2022-05-03

## 2022-05-03 DIAGNOSIS — D64.9 ANEMIA, UNSPECIFIED: ICD-10-CM

## 2022-05-03 DIAGNOSIS — Z86.711 PERSONAL HISTORY OF PULMONARY EMBOLISM: ICD-10-CM

## 2022-05-03 LAB
ANION GAP SERPL CALCULATED.3IONS-SCNC: 9 MMOL/L (ref 5–18)
BUN SERPL-MCNC: 20 MG/DL (ref 8–28)
CALCIUM SERPL-MCNC: 8.6 MG/DL (ref 8.5–10.5)
CHLORIDE BLD-SCNC: 106 MMOL/L (ref 98–107)
CO2 SERPL-SCNC: 25 MMOL/L (ref 22–31)
CREAT SERPL-MCNC: 0.76 MG/DL (ref 0.7–1.3)
EPO SERPL-ACNC: 55 MU/ML
GFR SERPL CREATININE-BSD FRML MDRD: >90 ML/MIN/1.73M2
GLUCOSE BLD-MCNC: 95 MG/DL (ref 70–125)
MAGNESIUM SERPL-MCNC: 1.9 MG/DL (ref 1.8–2.6)
POTASSIUM BLD-SCNC: 4.6 MMOL/L (ref 3.5–5)
SODIUM SERPL-SCNC: 140 MMOL/L (ref 136–145)

## 2022-05-03 PROCEDURE — 83735 ASSAY OF MAGNESIUM: CPT | Performed by: FAMILY MEDICINE

## 2022-05-03 PROCEDURE — 80048 BASIC METABOLIC PNL TOTAL CA: CPT | Performed by: FAMILY MEDICINE

## 2022-05-04 ENCOUNTER — PATIENT OUTREACH (OUTPATIENT)
Dept: CARE COORDINATION | Facility: CLINIC | Age: 77
End: 2022-05-04
Payer: COMMERCIAL

## 2022-05-04 NOTE — PROGRESS NOTES
Clinic Care Coordination Contact  Care Team Conversations    Patient identified for care management outreach, however [facility] RN staff has already followed up with patient to ensure they are following up with PCP and have needs and resources met. Clinic RN will refer back to [facility] if needed/appropriate.    TATO Li  Social Work Care Coordinator - [facility]  Care Coordination  [email or web address]  Cell Phone: [phone]  Gender pronouns: she/her  Employed by [employer]

## 2022-05-12 ENCOUNTER — OFFICE VISIT (OUTPATIENT)
Dept: CARDIOLOGY | Facility: CLINIC | Age: 77
End: 2022-05-12
Payer: COMMERCIAL

## 2022-05-12 VITALS
WEIGHT: 196 LBS | RESPIRATION RATE: 16 BRPM | HEIGHT: 66 IN | SYSTOLIC BLOOD PRESSURE: 102 MMHG | HEART RATE: 72 BPM | DIASTOLIC BLOOD PRESSURE: 56 MMHG | BODY MASS INDEX: 31.5 KG/M2

## 2022-05-12 DIAGNOSIS — I50.9 ACUTE CONGESTIVE HEART FAILURE, UNSPECIFIED HEART FAILURE TYPE (H): ICD-10-CM

## 2022-05-12 DIAGNOSIS — I50.42 CHRONIC COMBINED SYSTOLIC AND DIASTOLIC CONGESTIVE HEART FAILURE (H): Primary | ICD-10-CM

## 2022-05-12 PROCEDURE — 99214 OFFICE O/P EST MOD 30 MIN: CPT | Performed by: INTERNAL MEDICINE

## 2022-05-12 NOTE — LETTER
5/12/2022    RAGINI IRVING MD  [facility] [address]  [address] [facility] [address]    RE: Shaji Pompa       Dear Colleague,     I had the pleasure of seeing Shaji Pompa in the [facility].    HEART CARE NOTE          Assessment/Recommendations     1. HFmrEF c/b ADHF  Assessment / Plan    NICM s/p coronary angiogram significant for severe diffuse disease and chronically occluded RCA - no intervention at that time    Near euvolemia and doing well on current regimen - changes at this time, but weight labile; patient will pay closer attention to HF dietary restrictions and continue weight log; [facility] clinic will call on Monday to follow-up     GDMT as detailed below     Current Pharmacotherapy AHA Guideline-Directed Medical Therapy   Lisinopril 5 mg daily Lisinopril 20 mg twice daily   Metoprolol succinate 25 mg daily Carvedilol 25 mg twice daily   Spironolactone NA Spironolactone 25 mg once daily   Hydralazine NA Hydralazine 100 mg three times daily   Isosorbide dinitrate NA Isosorbide dinitrate 40 mg three times daily   SGLT2 inhibitor: not started Dapagliflozin or Empagliflozin 10 mg daily         2. CAD  Assessment / Plan    S/p coronary angiogram significant for diffuse severe CAD, notable for  of RCA    Continue medical management    Continue ASA, high intensity atorvastatin, metoprolol and lisinopril     3. Pulmonary Embolism  Assessment / Plan    Currently on coumadin     History of Present Illness/Subjective    Mr. Shaji Pompa is a 76 year old male with a PMHx significant for recent bilateral PE's, MDS, COPD, ICM/HFmrEF, s/p chest tube for pleural effusion who presents to [facility] clinic to establish care.    Today, Mr. Pompa denies any acute cardiac events or concerns. Management plan as detailed above.    ECG: Personally reviewed. normal sinus rhythm, PAC's noted.    ECHO (personnaly Reviewed):   1. The left ventricle is normal in size. Left ventricular  "systolic performance  is moderately reduced. The ejection fraction is estimated to be 40%.  2. There is severe inferior hypokinesis. There is moderate anteroseptal  hypokinesis. In addition, there is moderate mid to distal posterior  hypokinesis and severe distal lateral hypokinesis  3. No significant valvular heart disease is identified on this study.  4. Borderline right ventricular enlargement with low normal right ventricular  systolic performance.  5. There is mild left atrial enlargement.  6. Right ventricular systolic pressure relative to right atrial pressure is  mildly increased. The pulmonary artery pressure is estimated to be 45-50mmHg  plus right atrial pressure (the IVC is of normal caliber).     When compared to the prior real-time echocardiogram dated 2 February 2022,  there has been interval diminution in left ventricular systolic performance in  the aforementioned regional wall motion abnormalities appear to be new.        Physical Examination Review of Systems   /56 (BP Location: Left arm, Patient Position: Sitting, Cuff Size: Adult Regular)   Pulse 72   Resp 16   Ht 1.676 m (5' 6\")   Wt 88.9 kg (196 lb)   BMI 31.64 kg/m    Body mass index is 31.64 kg/m .  Wt Readings from Last 3 Encounters:   05/12/22 88.9 kg (196 lb)   05/01/22 89 kg (196 lb 1.6 oz)   04/26/22 89 kg (196 lb 3.2 oz)     General Appearance:   no distress, normal body habitus   ENT/Mouth: membranes moist, no oral lesions or bleeding gums.      EYES:  no scleral icterus, normal conjunctivae   Neck: no carotid bruits or thyromegaly   Chest/Lungs:   lungs are clear to auscultation, no rales or wheezing, equal chest wall expansion    Cardiovascular:   Regular. Normal first and second heart sounds with no murmurs, rubs, or gallops; the carotid, radial and posterior tibial pulses are intact, no JVD and trace LE edema bilaterally    Abdomen:  no organomegaly, masses, bruits, or tenderness; bowel sounds are present   Extremities: " no cyanosis or clubbing   Skin: no xanthelasma, warm.    Neurologic: alert and oriented x3, calm     Psychiatric: alert and oriented x3, calm     A complete 10 systems ROS was reviewed  And is negative except what is listed in the HPI.          Medical History  Surgical History Family History Social History   Past Medical History:   Diagnosis Date     Cerebral infarction (H)      COPD (chronic obstructive pulmonary disease) (H)      HLD (hyperlipidemia)      Hypertension      Myeloproliferative disease (H)     Past Surgical History:   Procedure Laterality Date     CV CORONARY ANGIOGRAM N/A 4/26/2022    Procedure: CV CORONARY ANGIOGRAM;  Surgeon: Audrey Holder MD;  Location: [facility] CATH LAB CV     CV LEFT HEART CATH N/A 4/26/2022    Procedure: Left Heart Catheterization;  Surgeon: Audrey Holder MD;  Location: [facility] CV     OPEN REDUCTION INTERNAL FIXATION FOOT Right 2010     OTHER SURGICAL HISTORY  2001    Lip lesion removal     TONSILLECTOMY & ADENOIDECTOMY      no family history of premature coronary artery disease Social History     Socioeconomic History     Marital status:      Spouse name: Not on file     Number of children: Not on file     Years of education: Not on file     Highest education level: Not on file   Occupational History     Not on file   Tobacco Use     Smoking status: Former Smoker     Packs/day: 1.00     Start date: 6/8/1965     Quit date: 1/1/2012     Years since quitting: 10.3     Smokeless tobacco: Never Used   Substance and Sexual Activity     Alcohol use: Yes     Alcohol/week: 19.0 standard drinks     Drug use: Never     Sexual activity: Not on file   Other Topics Concern     Not on file   Social History Narrative     Not on file     Social Determinants of Health     Financial Resource Strain: Not on file   Food Insecurity: Not on file   Transportation Needs: Not on file   Physical Activity: Not on file   Stress: Not on file   Social Connections: Not on file    Intimate Partner Violence: Not on file   Housing Stability: Not on file           Lab Results    Chemistry/lipid CBC Cardiac Enzymes/BNP/TSH/INR   Lab Results   Component Value Date    CHOL 110 05/03/2021    HDL 35 (L) 05/03/2021    TRIG 115 05/03/2021    BUN 20 05/03/2022     05/03/2022    CO2 25 05/03/2022    Lab Results   Component Value Date    WBC 5.9 05/01/2022    HGB 8.6 (L) 05/01/2022    HCT 27.9 (L) 05/01/2022     (H) 05/01/2022     05/01/2022    Lab Results   Component Value Date    TROPONINI 0.09 04/20/2022    BNP 1,221 (H) 04/19/2022    INR 2.34 (H) 05/01/2022     Lab Results   Component Value Date    TROPONINI 0.09 04/20/2022          Weight:    Wt Readings from Last 3 Encounters:   05/01/22 89 kg (196 lb 1.6 oz)   04/26/22 89 kg (196 lb 3.2 oz)   04/07/22 92.9 kg (204 lb 11.2 oz)       Allergies  No Known Allergies      Surgical History  Past Surgical History:   Procedure Laterality Date     CV CORONARY ANGIOGRAM N/A 4/26/2022    Procedure: CV CORONARY ANGIOGRAM;  Surgeon: Audrey Holder MD;  Location: [facility] CATH LAB CV     CV LEFT HEART CATH N/A 4/26/2022    Procedure: Left Heart Catheterization;  Surgeon: Audrey Holder MD;  Location: [facility] CATH LAB CV     OPEN REDUCTION INTERNAL FIXATION FOOT Right 2010     OTHER SURGICAL HISTORY  2001    Lip lesion removal     TONSILLECTOMY & ADENOIDECTOMY         Social History  Tobacco:   History   Smoking Status     Former Smoker     Packs/day: 1.00     Start date: 6/8/1965     Quit date: 1/1/2012   Smokeless Tobacco     Never Used    Alcohol:   Social History    Substance and Sexual Activity      Alcohol use: Yes        Alcohol/week: 19.0 standard drinks   Illicit Drugs:   History   Drug Use Unknown       Family History  Family History   Problem Relation Age of Onset     Alcoholism Mother           Karsten Castellon MD on 5/12/2022      cc: Aj Aponte    Thank you for allowing me to participate in the care of your  patient.      Sincerely,     Karsten Castellon MD     [facility]  cc:   No referring provider defined for this encounter.

## 2022-05-12 NOTE — PROGRESS NOTES
HEART CARE NOTE          Assessment/Recommendations     1. HFmrEF c/b ADHF  Assessment / Plan    NICM s/p coronary angiogram significant for severe diffuse disease and chronically occluded RCA - no intervention at that time    Near euvolemia and doing well on current regimen - changes at this time, but weight labile; patient will pay closer attention to HF dietary restrictions and continue weight log; [facility] clinic will call on Monday to follow-up     GDMT as detailed below     Current Pharmacotherapy AHA Guideline-Directed Medical Therapy   Lisinopril 5 mg daily Lisinopril 20 mg twice daily   Metoprolol succinate 25 mg daily Carvedilol 25 mg twice daily   Spironolactone NA Spironolactone 25 mg once daily   Hydralazine NA Hydralazine 100 mg three times daily   Isosorbide dinitrate NA Isosorbide dinitrate 40 mg three times daily   SGLT2 inhibitor: not started Dapagliflozin or Empagliflozin 10 mg daily         2. CAD  Assessment / Plan    S/p coronary angiogram significant for diffuse severe CAD, notable for  of RCA    Continue medical management    Continue ASA, high intensity atorvastatin, metoprolol and lisinopril     3. Pulmonary Embolism  Assessment / Plan    Currently on coumadin     History of Present Illness/Subjective    Mr. Shaji Pompa is a 76 year old male with a PMHx significant for recent bilateral PE's, MDS, COPD, ICM/HFmrEF, s/p chest tube for pleural effusion who presents to [facility] clinic to establish care.    Today, Mr. Pompa denies any acute cardiac events or concerns. Management plan as detailed above.    ECG: Personally reviewed. normal sinus rhythm, PAC's noted.    ECHO (personnaly Reviewed):   1. The left ventricle is normal in size. Left ventricular systolic performance  is moderately reduced. The ejection fraction is estimated to be 40%.  2. There is severe inferior hypokinesis. There is moderate anteroseptal  hypokinesis. In addition, there is moderate mid to distal  "posterior  hypokinesis and severe distal lateral hypokinesis  3. No significant valvular heart disease is identified on this study.  4. Borderline right ventricular enlargement with low normal right ventricular  systolic performance.  5. There is mild left atrial enlargement.  6. Right ventricular systolic pressure relative to right atrial pressure is  mildly increased. The pulmonary artery pressure is estimated to be 45-50mmHg  plus right atrial pressure (the IVC is of normal caliber).     When compared to the prior real-time echocardiogram dated 2 February 2022,  there has been interval diminution in left ventricular systolic performance in  the aforementioned regional wall motion abnormalities appear to be new.        Physical Examination Review of Systems   /56 (BP Location: Left arm, Patient Position: Sitting, Cuff Size: Adult Regular)   Pulse 72   Resp 16   Ht 1.676 m (5' 6\")   Wt 88.9 kg (196 lb)   BMI 31.64 kg/m    Body mass index is 31.64 kg/m .  Wt Readings from Last 3 Encounters:   05/12/22 88.9 kg (196 lb)   05/01/22 89 kg (196 lb 1.6 oz)   04/26/22 89 kg (196 lb 3.2 oz)     General Appearance:   no distress, normal body habitus   ENT/Mouth: membranes moist, no oral lesions or bleeding gums.      EYES:  no scleral icterus, normal conjunctivae   Neck: no carotid bruits or thyromegaly   Chest/Lungs:   lungs are clear to auscultation, no rales or wheezing, equal chest wall expansion    Cardiovascular:   Regular. Normal first and second heart sounds with no murmurs, rubs, or gallops; the carotid, radial and posterior tibial pulses are intact, no JVD and trace LE edema bilaterally    Abdomen:  no organomegaly, masses, bruits, or tenderness; bowel sounds are present   Extremities: no cyanosis or clubbing   Skin: no xanthelasma, warm.    Neurologic: alert and oriented x3, calm     Psychiatric: alert and oriented x3, calm     A complete 10 systems ROS was reviewed  And is negative except what is " listed in the HPI.          Medical History  Surgical History Family History Social History   Past Medical History:   Diagnosis Date     Cerebral infarction (H)      COPD (chronic obstructive pulmonary disease) (H)      HLD (hyperlipidemia)      Hypertension      Myeloproliferative disease (H)     Past Surgical History:   Procedure Laterality Date     CV CORONARY ANGIOGRAM N/A 4/26/2022    Procedure: CV CORONARY ANGIOGRAM;  Surgeon: Audrey Holder MD;  Location: [facility] LAB CV     CV LEFT HEART CATH N/A 4/26/2022    Procedure: Left Heart Catheterization;  Surgeon: Audrey Holder MD;  Location: [facility] CV     OPEN REDUCTION INTERNAL FIXATION FOOT Right 2010     OTHER SURGICAL HISTORY  2001    Lip lesion removal     TONSILLECTOMY & ADENOIDECTOMY      no family history of premature coronary artery disease Social History     Socioeconomic History     Marital status:      Spouse name: Not on file     Number of children: Not on file     Years of education: Not on file     Highest education level: Not on file   Occupational History     Not on file   Tobacco Use     Smoking status: Former Smoker     Packs/day: 1.00     Start date: 6/8/1965     Quit date: 1/1/2012     Years since quitting: 10.3     Smokeless tobacco: Never Used   Substance and Sexual Activity     Alcohol use: Yes     Alcohol/week: 19.0 standard drinks     Drug use: Never     Sexual activity: Not on file   Other Topics Concern     Not on file   Social History Narrative     Not on file     Social Determinants of Health     Financial Resource Strain: Not on file   Food Insecurity: Not on file   Transportation Needs: Not on file   Physical Activity: Not on file   Stress: Not on file   Social Connections: Not on file   Intimate Partner Violence: Not on file   Housing Stability: Not on file           Lab Results    Chemistry/lipid CBC Cardiac Enzymes/BNP/TSH/INR   Lab Results   Component Value Date    CHOL 110 05/03/2021    HDL 35 (L)  05/03/2021    TRIG 115 05/03/2021    BUN 20 05/03/2022     05/03/2022    CO2 25 05/03/2022    Lab Results   Component Value Date    WBC 5.9 05/01/2022    HGB 8.6 (L) 05/01/2022    HCT 27.9 (L) 05/01/2022     (H) 05/01/2022     05/01/2022    Lab Results   Component Value Date    TROPONINI 0.09 04/20/2022    BNP 1,221 (H) 04/19/2022    INR 2.34 (H) 05/01/2022     Lab Results   Component Value Date    TROPONINI 0.09 04/20/2022          Weight:    Wt Readings from Last 3 Encounters:   05/01/22 89 kg (196 lb 1.6 oz)   04/26/22 89 kg (196 lb 3.2 oz)   04/07/22 92.9 kg (204 lb 11.2 oz)       Allergies  No Known Allergies      Surgical History  Past Surgical History:   Procedure Laterality Date     CV CORONARY ANGIOGRAM N/A 4/26/2022    Procedure: CV CORONARY ANGIOGRAM;  Surgeon: Audrey Holder MD;  Location: [facility] CATH LAB CV     CV LEFT HEART CATH N/A 4/26/2022    Procedure: Left Heart Catheterization;  Surgeon: Audrey Holder MD;  Location: [facility] CATH LAB CV     OPEN REDUCTION INTERNAL FIXATION FOOT Right 2010     OTHER SURGICAL HISTORY  2001    Lip lesion removal     TONSILLECTOMY & ADENOIDECTOMY         Social History  Tobacco:   History   Smoking Status     Former Smoker     Packs/day: 1.00     Start date: 6/8/1965     Quit date: 1/1/2012   Smokeless Tobacco     Never Used    Alcohol:   Social History    Substance and Sexual Activity      Alcohol use: Yes        Alcohol/week: 19.0 standard drinks   Illicit Drugs:   History   Drug Use Unknown       Family History  Family History   Problem Relation Age of Onset     Alcoholism Mother           Karsten Castellon MD on 5/12/2022      cc: Aj Aponte

## 2022-05-18 ENCOUNTER — TELEPHONE (OUTPATIENT)
Dept: CARDIOLOGY | Facility: CLINIC | Age: 77
End: 2022-05-18
Payer: COMMERCIAL

## 2022-05-18 NOTE — TELEPHONE ENCOUNTER
----- Message from Karsten Castellon MD sent at 5/13/2022 11:04 AM CDT -----  Regarding: Follow-up  Hi Karla;  Can you contact Hector on Monday (5/16/22) to follow-up on his volume status and Hf symptoms now that he's adhering to HF dietary restrictions?     Thanks;  ~ Jodi      ==  Contacted Hector to assess symptoms. He reports that he is doing well. No new or worse HF symptoms. Weight has been stable from 194-195 lbs. He and his wife have been reading food labels and stocking up on low sodium options.   Wife requests refills on Lisinopril if he needs to continue. -ejb

## 2022-05-31 DIAGNOSIS — I50.21 ACUTE SYSTOLIC CONGESTIVE HEART FAILURE (H): ICD-10-CM

## 2022-05-31 DIAGNOSIS — I25.119 CORONARY ARTERY DISEASE INVOLVING NATIVE CORONARY ARTERY OF NATIVE HEART WITH ANGINA PECTORIS (H): ICD-10-CM

## 2022-05-31 DIAGNOSIS — E78.2 MIXED HYPERLIPIDEMIA: ICD-10-CM

## 2022-05-31 RX ORDER — LISINOPRIL 2.5 MG/1
2.5 TABLET ORAL DAILY
Qty: 90 TABLET | Refills: 1 | Status: SHIPPED | OUTPATIENT
Start: 2022-05-31 | End: 2022-06-02

## 2022-05-31 RX ORDER — ATORVASTATIN CALCIUM 80 MG/1
80 TABLET, FILM COATED ORAL EVERY MORNING
Qty: 90 TABLET | Refills: 1 | Status: SHIPPED | OUTPATIENT
Start: 2022-05-31 | End: 2022-10-27

## 2022-06-02 ENCOUNTER — OFFICE VISIT (OUTPATIENT)
Dept: CARDIOLOGY | Facility: CLINIC | Age: 77
End: 2022-06-02
Payer: COMMERCIAL

## 2022-06-02 VITALS
SYSTOLIC BLOOD PRESSURE: 126 MMHG | HEART RATE: 64 BPM | DIASTOLIC BLOOD PRESSURE: 60 MMHG | RESPIRATION RATE: 16 BRPM | WEIGHT: 197.1 LBS | BODY MASS INDEX: 31.81 KG/M2

## 2022-06-02 DIAGNOSIS — E78.2 MIXED HYPERLIPIDEMIA: ICD-10-CM

## 2022-06-02 DIAGNOSIS — I10 ESSENTIAL HYPERTENSION: ICD-10-CM

## 2022-06-02 DIAGNOSIS — I25.118 CORONARY ARTERY DISEASE OF NATIVE ARTERY OF NATIVE HEART WITH STABLE ANGINA PECTORIS (H): Primary | ICD-10-CM

## 2022-06-02 DIAGNOSIS — I50.22 CHRONIC SYSTOLIC CONGESTIVE HEART FAILURE (H): ICD-10-CM

## 2022-06-02 PROBLEM — J81.0 ACUTE PULMONARY EDEMA (H): Status: RESOLVED | Noted: 2022-04-19 | Resolved: 2022-06-02

## 2022-06-02 PROBLEM — I24.89 DEMAND ISCHEMIA (H): Status: RESOLVED | Noted: 2022-04-01 | Resolved: 2022-06-02

## 2022-06-02 PROBLEM — J96.01 ACUTE RESPIRATORY FAILURE WITH HYPOXIA (H): Status: RESOLVED | Noted: 2022-04-01 | Resolved: 2022-06-02

## 2022-06-02 PROBLEM — J44.1 COPD EXACERBATION (H): Status: RESOLVED | Noted: 2022-04-19 | Resolved: 2022-06-02

## 2022-06-02 PROCEDURE — 99214 OFFICE O/P EST MOD 30 MIN: CPT | Performed by: INTERNAL MEDICINE

## 2022-06-02 RX ORDER — FUROSEMIDE 20 MG
10 TABLET ORAL DAILY
Qty: 45 TABLET | Refills: 3 | Status: SHIPPED | OUTPATIENT
Start: 2022-06-02 | End: 2023-04-17

## 2022-06-02 RX ORDER — LOSARTAN POTASSIUM 25 MG/1
12.5 TABLET ORAL DAILY
Qty: 45 TABLET | Refills: 3 | Status: ON HOLD | OUTPATIENT
Start: 2022-06-02 | End: 2022-08-19

## 2022-06-02 NOTE — LETTER
6/2/2022    RAGINI IRVING MD  [facility] [address]  [facility] [address]    RE: Shaji Pompa       Dear Colleague,     I had the pleasure of seeing Shaji Pompa in the [facility].    HEART CARE CONSULTATON NOTE        Assessment/Recommendations   Assessment:   1.  Chronic congestive heart failure with reduced ejection fraction.  Ischemic cardiomyopathy. LVEF: 40-45%. 2.  Coronary Artery disease with occluded mid right coronary artery.  Collaterals from the left and proximal RCA.   2.  Severe coronary calcification on CT, coronary artery disease.   3.  Ischemic cardiomyopathy  4.  Pulmonary embolism, bilateral  5.  DVT right leg   6.  Severe anemia  Hemoglobin   Date Value Ref Range Status   05/01/2022 8.6 (L) 13.3 - 17.7 g/dL Final     7.  Thrombocytopenia,  Platelet Count   Date Value Ref Range Status   05/01/2022 351 150 - 450 10e3/uL Final     8.  Myeloproliferative disorder      Plan:   1.  Lasix 10 mg daily  2.  Stop lisinopril, change losartan 12.5 mg daily given diarrhea concern (if no improvement will reduce atorvastatin or switch to Crestor)   3.  Continue atorvastatin to 80 mg daily  4.  Warfarin for DVT  5.  Aspirin for coronary disease    Today's clinic visit entailed:  Review of prior external note(s) from - [facility] information from [facility] reviewed  Review of the result(s) of each unique test - labs, echo, cath  The following tests were independently interpreted by me as noted in my documentation: ECG  Prescription drug management    The level of medical decision making during this visit was of moderate complexity.       History of Present Illness/Subjective    Patient presents today after discharge from the hospital for congestive heart failure.  Something discharged in the hospital he has had a stable course.  Currently notes mild dyspnea with strenuous activity such as carrying heavy object.  Otherwise no active chest pain.  He has mild  lower extremity edema.  Checking his weight daily and's been very stable.  Weight has not changed more than 1 pound in the past month.  Blood pressures controlled today.  Recent labs demonstrate normal renal function.    ECG: April 2022.  Sinus rhythm with premature atrial contractions.  Inferior infarct pattern    The most recent outpatient note by Dr. Castellon.      Long conversation regarding this coronary artery disease.    Regarding medications he is worried that there is a side effect with his lisinopril causing diarrhea.  We will switch to losartan.  If no improvement would consider switching his atorvastatin to Crestor.    Reviewed hospital discharge summary.  Reviewed outpatient note from [facility].  Reviewed outpatient note from [facility].    ECHO:   1. The left ventricle is normal in size. Left ventricular systolic performance  is moderately reduced. The ejection fraction is estimated to be 40%.  2. There is severe inferior hypokinesis. There is moderate anteroseptal  hypokinesis. In addition, there is moderate mid to distal posterior  hypokinesis and severe distal lateral hypokinesis  3. No significant valvular heart disease is identified on this study.  4. Borderline right ventricular enlargement with low normal right ventricular  systolic performance.  5. There is mild left atrial enlargement.  6. Right ventricular systolic pressure relative to right atrial pressure is  mildly increased. The pulmonary artery pressure is estimated to be 45-50mmHg  plus right atrial pressure (the IVC is of normal caliber).    Coronary Angiogram:   Left Main   Mid LM to Dist LM lesion is 25% stenosed. The lesion is calcified.   Left Anterior Descending   Prox LAD to Mid LAD lesion is 20% stenosed. The lesion is calcified.   Ramus Intermedius   The vessel is large.   Left Circumflex   Mid Cx lesion is 30% stenosed. The lesion is calcified.   First Obtuse Marginal Branch   The vessel is small.   Second Obtuse Marginal  Branch   The vessel is small.   Right Coronary Artery   Prox RCA lesion is 90% stenosed.   Prox RCA to Dist RCA lesion is 100% stenosed.   Acute Marginal Branch   Collaterals   Acute Mrg filled by collaterals from RV Branch.      Right Posterior Descending Artery   Collaterals   RPDA filled by collaterals from 2nd Sept.      Right Posterior Atrioventricular Artery   Collaterals   RPAV filled by collaterals from Dist Cx          Physical Examination  Review of Systems   VITALS: /60 (BP Location: Right arm, Patient Position: Sitting, Cuff Size: Adult Regular)   Pulse 64   Resp 16   Wt 89.4 kg (197 lb 1.6 oz)   BMI 31.81 kg/m    BMI: Body mass index is 31.81 kg/m .  Wt Readings from Last 3 Encounters:   06/02/22 89.4 kg (197 lb 1.6 oz)   05/12/22 88.9 kg (196 lb)   05/01/22 89 kg (196 lb 1.6 oz)       Intake/Output Summary (Last 24 hours) at 4/23/2022 0875  Last data filed at 4/23/2022 0629  Gross per 24 hour   Intake 740 ml   Output 571 ml   Net 169 ml     General Appearance:   no distress, normal body habitus, in bed.    ENT/Mouth: membranes moist, no oral lesions or bleeding gums.      EYES:  no scleral icterus, normal conjunctivae   Neck: no carotid bruits or thyromegaly   Chest/Lungs:   Clear bilateral    Cardiovascular:   Regular. Normal first and second heart sounds with no murmurs, rubs, or gallops; the carotid, radial and posterior tibial pulses are intact, Jugular venous pressure 6, mild edema.    Abdomen:  no  tenderness; bowel sounds are present   Extremities: no cyanosis or clubbing   Skin: no xanthelasma, warm.    Neurologic: normal  bilateral, no tremors     Psychiatric: alert and oriented x3, calm     Review Of Systems  Skin: negative  Eyes: negative  Ears/Nose/Throat: negative  Respiratory: Mild dyspnea.   Cardiovascular: No chest pain.  + BELL.   Gastrointestinal: negative  Genitourinary: negative  Musculoskeletal: negative  Neurologic: negative  Psychiatric:  negative  Hematologic/Lymphatic/Immunologic: negative  Endocrine: negative          Lab Results    Chemistry/lipid CBC Cardiac Enzymes/BNP/TSH/INR   Recent Labs   Lab Test 05/03/21  1557   CHOL 110   HDL 35*   LDL 52   TRIG 115     Recent Labs   Lab Test 05/03/21  1557 08/13/19  0926   LDL 52 111     Recent Labs   Lab Test 04/23/22  0441      POTASSIUM 3.8   CHLORIDE 106   CO2 27      BUN 22   CR 0.76   GFRESTIMATED >90   GLENDY 7.7*     Lab Results   Component Value Date    WBC 5.9 05/01/2022     Lab Results   Component Value Date    RBC 2.40 05/01/2022     Lab Results   Component Value Date    HGB 8.6 05/01/2022     Lab Results   Component Value Date    HCT 27.9 05/01/2022     No components found for: MCT  Lab Results   Component Value Date     05/01/2022     Lab Results   Component Value Date    MCH 35.8 05/01/2022     Lab Results   Component Value Date    MCHC 30.8 05/01/2022     Lab Results   Component Value Date    RDW  05/01/2022      Comment:      Dimorphic Population - Unable to Calculate     Lab Results   Component Value Date     05/01/2022          Recent Labs     Recent Labs   Lab Test 04/23/22  0441 04/22/22  0558 04/21/22  0502   HGB 7.0* 7.5* 7.3*    Recent Labs   Lab Test 04/20/22  0405 04/19/22  2143 04/19/22  1441   TROPONINI 0.09 0.11 0.10     Recent Labs   Lab Test 04/19/22  1441 04/01/22  1208   BNP 1,221* 379*     No results for input(s): TSH in the last 85047 hours.  Recent Labs   Lab Test 04/23/22  0441 04/21/22  0502 04/20/22  0405   INR 1.97* 2.82* 2.67*        Medical History  Surgical History Family History Social History   Past Medical History:   Diagnosis Date     Cerebral infarction (H)      COPD (chronic obstructive pulmonary disease) (H)      HLD (hyperlipidemia)      Hypertension      Myeloproliferative disease (H)      Past Surgical History:   Procedure Laterality Date     CV CORONARY ANGIOGRAM N/A 4/26/2022    Procedure: CV CORONARY ANGIOGRAM;  Surgeon:  Audrey Holder MD;  Location: [facility]     CV LEFT HEART CATH N/A 4/26/2022    Procedure: Left Heart Catheterization;  Surgeon: Audrey Holder MD;  Location: [facility]     OPEN REDUCTION INTERNAL FIXATION FOOT Right 2010     OTHER SURGICAL HISTORY  2001    Lip lesion removal     TONSILLECTOMY & ADENOIDECTOMY       Family History   Problem Relation Age of Onset     Alcoholism Mother         Social History     Socioeconomic History     Marital status:      Spouse name: Not on file     Number of children: Not on file     Years of education: Not on file     Highest education level: Not on file   Occupational History     Not on file   Tobacco Use     Smoking status: Former Smoker     Packs/day: 1.00     Start date: 6/8/1965     Quit date: 1/1/2012     Years since quitting: 10.4     Smokeless tobacco: Never Used   Substance and Sexual Activity     Alcohol use: Yes     Alcohol/week: 19.0 standard drinks     Drug use: Never     Sexual activity: Not on file   Other Topics Concern     Not on file   Social History Narrative     Not on file     Social Determinants of Health     Financial Resource Strain: Not on file   Food Insecurity: Not on file   Transportation Needs: Not on file   Physical Activity: Not on file   Stress: Not on file   Social Connections: Not on file   Intimate Partner Violence: Not on file   Housing Stability: Not on file         Medications  Allergies   Current Outpatient Medications   Medication Sig Dispense Refill     albuterol (PROAIR HFA;PROVENTIL HFA;VENTOLIN HFA) 90 mcg/actuation inhaler [ALBUTEROL (PROAIR HFA;PROVENTIL HFA;VENTOLIN HFA) 90 MCG/ACTUATION INHALER] Inhale 2 puffs every 6 (six) hours as needed for wheezing.       aspirin (ASA) 81 MG EC tablet Take 1 tablet (81 mg) by mouth daily Start tomorrow morning. 90 tablet 3     atorvastatin (LIPITOR) 80 MG tablet Take 1 tablet (80 mg) by mouth every morning 90 tablet 1     folic acid (FOLVITE) 1 MG tablet Take 1  "tablet (1 mg) by mouth daily 30 tablet 0     furosemide (LASIX) 20 MG tablet Take 0.5 tablets (10 mg) by mouth daily 30 tablet 0     hydroxyurea (HYDREA) 500 MG capsule Take 1 capsule (500 mg) by mouth daily (Patient taking differently: Take 500 mg by mouth daily 1500 mg - Mon, Wed, & Fri.  1000 mg - Tue, Thurs, Sat, & Sun) 30 capsule 0     lisinopril (ZESTRIL) 2.5 MG tablet Take 1 tablet (2.5 mg) by mouth daily 90 tablet 1     metoprolol succinate ER (TOPROL-XL) 25 MG 24 hr tablet Take 25 mg by mouth daily       nitroGLYcerin (NITROSTAT) 0.4 MG sublingual tablet One tablet under the tongue every 5 minutes if needed for chest pain. May repeat every 5 minutes for a maximum of 3 doses in 15 minutes\" 25 tablet 3     tiotropium (SPIRIVA) 18 mcg inhalation capsule [TIOTROPIUM (SPIRIVA) 18 MCG INHALATION CAPSULE] Place 18 mcg into inhaler and inhale daily.       warfarin ANTICOAGULANT (COUMADIN) 1 MG tablet Take 5 mg on 5/2, re dose warfarin on 5/3 per oncology based on INR (Patient taking differently: Take 5 mg on 5/2, re dose warfarin on 5/3 per oncology based on INR.    Taking 2 mg tablets) 60 tablet 0      No Known Allergies      Sp Larson DO      Thank you for allowing me to participate in the care of your patient.      Sincerely,     Sp Larosn DO     [facility]  cc:   Alejandro Velasquez MD  [address] [place] [address]        "

## 2022-06-02 NOTE — PATIENT INSTRUCTIONS
Please contact direct nurses line Monday through Friday 8 AM to 5 PM @ [phone]    After-hours contact cardiology office at [phone].

## 2022-08-17 ENCOUNTER — HOSPITAL ENCOUNTER (INPATIENT)
Facility: CLINIC | Age: 77
LOS: 2 days | Discharge: HOME | DRG: 177 | End: 2022-08-19
Attending: EMERGENCY MEDICINE | Admitting: FAMILY MEDICINE
Payer: COMMERCIAL

## 2022-08-17 ENCOUNTER — APPOINTMENT (OUTPATIENT)
Dept: RADIOLOGY | Facility: CLINIC | Age: 77
DRG: 177 | End: 2022-08-17
Attending: EMERGENCY MEDICINE
Payer: COMMERCIAL

## 2022-08-17 ENCOUNTER — APPOINTMENT (OUTPATIENT)
Dept: CT IMAGING | Facility: CLINIC | Age: 77
DRG: 177 | End: 2022-08-17
Attending: EMERGENCY MEDICINE
Payer: COMMERCIAL

## 2022-08-17 ENCOUNTER — APPOINTMENT (OUTPATIENT)
Dept: CARDIOLOGY | Facility: CLINIC | Age: 77
DRG: 177 | End: 2022-08-17
Attending: EMERGENCY MEDICINE
Payer: COMMERCIAL

## 2022-08-17 DIAGNOSIS — D64.9 ANEMIA, UNSPECIFIED TYPE: ICD-10-CM

## 2022-08-17 DIAGNOSIS — I48.91 NEW ONSET A-FIB (H): ICD-10-CM

## 2022-08-17 DIAGNOSIS — I21.4 NSTEMI (NON-ST ELEVATED MYOCARDIAL INFARCTION) (H): ICD-10-CM

## 2022-08-17 DIAGNOSIS — Z86.2 HISTORY OF MYELODYSPLASTIC SYNDROME: ICD-10-CM

## 2022-08-17 DIAGNOSIS — I82.431 ACUTE DEEP VEIN THROMBOSIS (DVT) OF POPLITEAL VEIN OF RIGHT LOWER EXTREMITY (H): ICD-10-CM

## 2022-08-17 DIAGNOSIS — Z86.711 HISTORY OF PULMONARY EMBOLISM: Primary | ICD-10-CM

## 2022-08-17 DIAGNOSIS — U07.1 INFECTION DUE TO 2019 NOVEL CORONAVIRUS: ICD-10-CM

## 2022-08-17 LAB
ABO/RH(D): NORMAL
ALBUMIN SERPL-MCNC: 2.6 G/DL (ref 3.5–5)
ALP SERPL-CCNC: 52 U/L (ref 45–120)
ALT SERPL W P-5'-P-CCNC: 21 U/L (ref 0–45)
ANION GAP SERPL CALCULATED.3IONS-SCNC: 9 MMOL/L (ref 5–18)
ANTIBODY SCREEN: NEGATIVE
AST SERPL W P-5'-P-CCNC: 38 U/L (ref 0–40)
ATRIAL RATE - MUSE: 138 BPM
ATRIAL RATE - MUSE: 159 BPM
ATRIAL RATE - MUSE: 98 BPM
BASOPHILS # BLD AUTO: 0 10E3/UL (ref 0–0.2)
BASOPHILS NFR BLD AUTO: 1 %
BILIRUB DIRECT SERPL-MCNC: 0.3 MG/DL
BILIRUB SERPL-MCNC: 0.5 MG/DL (ref 0–1)
BLD PROD TYP BPU: NORMAL
BLOOD COMPONENT TYPE: NORMAL
BNP SERPL-MCNC: 200 PG/ML (ref 0–80)
BUN SERPL-MCNC: 27 MG/DL (ref 8–28)
C REACTIVE PROTEIN LHE: 2 MG/DL (ref 0–?)
CALCIUM SERPL-MCNC: 8.3 MG/DL (ref 8.5–10.5)
CHLORIDE BLD-SCNC: 106 MMOL/L (ref 98–107)
CO2 SERPL-SCNC: 22 MMOL/L (ref 22–31)
CODING SYSTEM: NORMAL
CREAT SERPL-MCNC: 1.08 MG/DL (ref 0.7–1.3)
CROSSMATCH: NORMAL
D DIMER PPP FEU-MCNC: <=0.27 UG/ML FEU (ref 0–0.5)
DIASTOLIC BLOOD PRESSURE - MUSE: 53 MMHG
DIASTOLIC BLOOD PRESSURE - MUSE: NORMAL MMHG
DIASTOLIC BLOOD PRESSURE - MUSE: NORMAL MMHG
EOSINOPHIL # BLD AUTO: 0 10E3/UL (ref 0–0.7)
EOSINOPHIL NFR BLD AUTO: 0 %
ERYTHROCYTE [DISTWIDTH] IN BLOOD BY AUTOMATED COUNT: 21.4 % (ref 10–15)
GFR SERPL CREATININE-BSD FRML MDRD: 71 ML/MIN/1.73M2
GLUCOSE BLD-MCNC: 100 MG/DL (ref 70–125)
HCT VFR BLD AUTO: 24.5 % (ref 40–53)
HGB BLD-MCNC: 8.2 G/DL (ref 13.3–17.7)
HOLD SPECIMEN: NORMAL
IMM GRANULOCYTES # BLD: 0 10E3/UL
IMM GRANULOCYTES NFR BLD: 1 %
INR PPP: 1.71 (ref 0.85–1.15)
INTERPRETATION ECG - MUSE: NORMAL
LACTATE SERPL-SCNC: 1.1 MMOL/L (ref 0.7–2)
LVEF ECHO: NORMAL
LYMPHOCYTES # BLD AUTO: 1 10E3/UL (ref 0.8–5.3)
LYMPHOCYTES NFR BLD AUTO: 23 %
MAGNESIUM SERPL-MCNC: 1.8 MG/DL (ref 1.8–2.6)
MCH RBC QN AUTO: 45.8 PG (ref 26.5–33)
MCHC RBC AUTO-ENTMCNC: 33.5 G/DL (ref 31.5–36.5)
MCV RBC AUTO: 137 FL (ref 78–100)
MONOCYTES # BLD AUTO: 0.6 10E3/UL (ref 0–1.3)
MONOCYTES NFR BLD AUTO: 13 %
NEUTROPHILS # BLD AUTO: 2.6 10E3/UL (ref 1.6–8.3)
NEUTROPHILS NFR BLD AUTO: 62 %
NRBC # BLD AUTO: 0.1 10E3/UL
NRBC BLD AUTO-RTO: 1 /100
P AXIS - MUSE: 55 DEGREES
P AXIS - MUSE: NORMAL DEGREES
P AXIS - MUSE: NORMAL DEGREES
PLATELET # BLD AUTO: 323 10E3/UL (ref 150–450)
POTASSIUM BLD-SCNC: 3.5 MMOL/L (ref 3.5–5)
POTASSIUM BLD-SCNC: 4.3 MMOL/L (ref 3.5–5)
PR INTERVAL - MUSE: 126 MS
PR INTERVAL - MUSE: NORMAL MS
PR INTERVAL - MUSE: NORMAL MS
PROCALCITONIN SERPL-MCNC: 0.05 NG/ML (ref 0–0.49)
PROT SERPL-MCNC: 5 G/DL (ref 6–8)
QRS DURATION - MUSE: 102 MS
QRS DURATION - MUSE: 110 MS
QRS DURATION - MUSE: 98 MS
QT - MUSE: 316 MS
QT - MUSE: 336 MS
QT - MUSE: 350 MS
QTC - MUSE: 446 MS
QTC - MUSE: 478 MS
QTC - MUSE: 484 MS
R AXIS - MUSE: 28 DEGREES
R AXIS - MUSE: 59 DEGREES
R AXIS - MUSE: 61 DEGREES
RBC # BLD AUTO: 1.79 10E6/UL (ref 4.4–5.9)
SARS-COV-2 RNA RESP QL NAA+PROBE: POSITIVE
SODIUM SERPL-SCNC: 137 MMOL/L (ref 136–145)
SPECIMEN EXPIRATION DATE: NORMAL
SYSTOLIC BLOOD PRESSURE - MUSE: 72 MMHG
SYSTOLIC BLOOD PRESSURE - MUSE: NORMAL MMHG
SYSTOLIC BLOOD PRESSURE - MUSE: NORMAL MMHG
T AXIS - MUSE: -60 DEGREES
T AXIS - MUSE: -68 DEGREES
T AXIS - MUSE: 223 DEGREES
TROPONIN I SERPL-MCNC: 2.47 NG/ML (ref 0–0.29)
TROPONIN I SERPL-MCNC: 3.25 NG/ML (ref 0–0.29)
UFH PPP CHRO-ACNC: 0.47 IU/ML
UNIT ABO/RH: NORMAL
UNIT NUMBER: NORMAL
UNIT STATUS: NORMAL
UNIT TYPE ISBT: 5100
VENTRICULAR RATE- MUSE: 122 BPM
VENTRICULAR RATE- MUSE: 141 BPM
VENTRICULAR RATE- MUSE: 98 BPM
WBC # BLD AUTO: 4.2 10E3/UL (ref 4–11)

## 2022-08-17 PROCEDURE — 99292 CRITICAL CARE ADDL 30 MIN: CPT

## 2022-08-17 PROCEDURE — 84484 ASSAY OF TROPONIN QUANT: CPT | Performed by: EMERGENCY MEDICINE

## 2022-08-17 PROCEDURE — 99291 CRITICAL CARE FIRST HOUR: CPT | Mod: 25

## 2022-08-17 PROCEDURE — 999N000065 XR CHEST PORT 1 VIEW

## 2022-08-17 PROCEDURE — 99223 1ST HOSP IP/OBS HIGH 75: CPT | Performed by: FAMILY MEDICINE

## 2022-08-17 PROCEDURE — 86923 COMPATIBILITY TEST ELECTRIC: CPT | Performed by: EMERGENCY MEDICINE

## 2022-08-17 PROCEDURE — 93306 TTE W/DOPPLER COMPLETE: CPT | Mod: 26 | Performed by: INTERNAL MEDICINE

## 2022-08-17 PROCEDURE — 84132 ASSAY OF SERUM POTASSIUM: CPT | Performed by: INTERNAL MEDICINE

## 2022-08-17 PROCEDURE — XW033E5 INTRODUCTION OF REMDESIVIR ANTI-INFECTIVE INTO PERIPHERAL VEIN, PERCUTANEOUS APPROACH, NEW TECHNOLOGY GROUP 5: ICD-10-PCS | Performed by: FAMILY MEDICINE

## 2022-08-17 PROCEDURE — 36415 COLL VENOUS BLD VENIPUNCTURE: CPT | Performed by: EMERGENCY MEDICINE

## 2022-08-17 PROCEDURE — 250N000011 HC RX IP 250 OP 636: Performed by: EMERGENCY MEDICINE

## 2022-08-17 PROCEDURE — 71275 CT ANGIOGRAPHY CHEST: CPT

## 2022-08-17 PROCEDURE — C9803 HOPD COVID-19 SPEC COLLECT: HCPCS

## 2022-08-17 PROCEDURE — 85025 COMPLETE CBC W/AUTO DIFF WBC: CPT | Performed by: EMERGENCY MEDICINE

## 2022-08-17 PROCEDURE — 83735 ASSAY OF MAGNESIUM: CPT | Performed by: EMERGENCY MEDICINE

## 2022-08-17 PROCEDURE — 258N000003 HC RX IP 258 OP 636: Performed by: FAMILY MEDICINE

## 2022-08-17 PROCEDURE — 250N000009 HC RX 250: Performed by: EMERGENCY MEDICINE

## 2022-08-17 PROCEDURE — 3E033XZ INTRODUCTION OF VASOPRESSOR INTO PERIPHERAL VEIN, PERCUTANEOUS APPROACH: ICD-10-PCS | Performed by: FAMILY MEDICINE

## 2022-08-17 PROCEDURE — 86850 RBC ANTIBODY SCREEN: CPT | Performed by: EMERGENCY MEDICINE

## 2022-08-17 PROCEDURE — 5A2204Z RESTORATION OF CARDIAC RHYTHM, SINGLE: ICD-10-PCS | Performed by: EMERGENCY MEDICINE

## 2022-08-17 PROCEDURE — 85520 HEPARIN ASSAY: CPT | Performed by: EMERGENCY MEDICINE

## 2022-08-17 PROCEDURE — 36569 INSJ PICC 5 YR+ W/O IMAGING: CPT

## 2022-08-17 PROCEDURE — 272N000452 HC KIT SHRLOCK 5FR POWER PICC TRIPLE LUMEN

## 2022-08-17 PROCEDURE — 999N000127 HC STATISTIC PERIPHERAL IV START W US GUIDANCE

## 2022-08-17 PROCEDURE — 86140 C-REACTIVE PROTEIN: CPT | Performed by: FAMILY MEDICINE

## 2022-08-17 PROCEDURE — 999N000285 HC STATISTIC VASC ACCESS LAB DRAW WITH PIV START

## 2022-08-17 PROCEDURE — 999N000208 ECHOCARDIOGRAM COMPLETE

## 2022-08-17 PROCEDURE — 99222 1ST HOSP IP/OBS MODERATE 55: CPT | Performed by: INTERNAL MEDICINE

## 2022-08-17 PROCEDURE — 250N000013 HC RX MED GY IP 250 OP 250 PS 637: Performed by: EMERGENCY MEDICINE

## 2022-08-17 PROCEDURE — 200N000001 HC R&B ICU

## 2022-08-17 PROCEDURE — 96375 TX/PRO/DX INJ NEW DRUG ADDON: CPT

## 2022-08-17 PROCEDURE — 96376 TX/PRO/DX INJ SAME DRUG ADON: CPT

## 2022-08-17 PROCEDURE — 83605 ASSAY OF LACTIC ACID: CPT | Performed by: EMERGENCY MEDICINE

## 2022-08-17 PROCEDURE — 96366 THER/PROPH/DIAG IV INF ADDON: CPT

## 2022-08-17 PROCEDURE — 93005 ELECTROCARDIOGRAM TRACING: CPT | Performed by: EMERGENCY MEDICINE

## 2022-08-17 PROCEDURE — 84145 PROCALCITONIN (PCT): CPT | Performed by: EMERGENCY MEDICINE

## 2022-08-17 PROCEDURE — 82310 ASSAY OF CALCIUM: CPT | Performed by: EMERGENCY MEDICINE

## 2022-08-17 PROCEDURE — 96365 THER/PROPH/DIAG IV INF INIT: CPT

## 2022-08-17 PROCEDURE — 92960 CARDIOVERSION ELECTRIC EXT: CPT

## 2022-08-17 PROCEDURE — 85610 PROTHROMBIN TIME: CPT | Performed by: EMERGENCY MEDICINE

## 2022-08-17 PROCEDURE — 85379 FIBRIN DEGRADATION QUANT: CPT | Performed by: FAMILY MEDICINE

## 2022-08-17 PROCEDURE — 71045 X-RAY EXAM CHEST 1 VIEW: CPT

## 2022-08-17 PROCEDURE — 258N000003 HC RX IP 258 OP 636: Performed by: EMERGENCY MEDICINE

## 2022-08-17 PROCEDURE — 999N000206 HC STATISTICAL VASC ACCESS NURSE TIME, 61+ MINUTES

## 2022-08-17 PROCEDURE — 82248 BILIRUBIN DIRECT: CPT | Performed by: FAMILY MEDICINE

## 2022-08-17 PROCEDURE — U0005 INFEC AGEN DETEC AMPLI PROBE: HCPCS | Performed by: EMERGENCY MEDICINE

## 2022-08-17 PROCEDURE — 86923 COMPATIBILITY TEST ELECTRIC: CPT | Performed by: INTERNAL MEDICINE

## 2022-08-17 PROCEDURE — 83880 ASSAY OF NATRIURETIC PEPTIDE: CPT | Performed by: EMERGENCY MEDICINE

## 2022-08-17 PROCEDURE — 99153 MOD SED SAME PHYS/QHP EA: CPT

## 2022-08-17 PROCEDURE — 250N000013 HC RX MED GY IP 250 OP 250 PS 637: Performed by: FAMILY MEDICINE

## 2022-08-17 PROCEDURE — 250N000011 HC RX IP 250 OP 636: Performed by: FAMILY MEDICINE

## 2022-08-17 PROCEDURE — 250N000013 HC RX MED GY IP 250 OP 250 PS 637: Performed by: INTERNAL MEDICINE

## 2022-08-17 RX ORDER — DIGOXIN 0.25 MG/ML
250 INJECTION INTRAMUSCULAR; INTRAVENOUS ONCE
Status: COMPLETED | OUTPATIENT
Start: 2022-08-17 | End: 2022-08-17

## 2022-08-17 RX ORDER — LIDOCAINE 40 MG/G
CREAM TOPICAL
Status: DISCONTINUED | OUTPATIENT
Start: 2022-08-17 | End: 2022-08-19 | Stop reason: HOSPADM

## 2022-08-17 RX ORDER — POTASSIUM CHLORIDE 1500 MG/1
20 TABLET, EXTENDED RELEASE ORAL ONCE
Status: COMPLETED | OUTPATIENT
Start: 2022-08-17 | End: 2022-08-17

## 2022-08-17 RX ORDER — ONDANSETRON 2 MG/ML
4 INJECTION INTRAMUSCULAR; INTRAVENOUS EVERY 6 HOURS PRN
Status: DISCONTINUED | OUTPATIENT
Start: 2022-08-17 | End: 2022-08-19 | Stop reason: HOSPADM

## 2022-08-17 RX ORDER — ROPIVACAINE IN 0.9% SOD CHL/PF 0.1 %
.03-.125 PLASTIC BAG, INJECTION (ML) EPIDURAL CONTINUOUS
Status: DISCONTINUED | OUTPATIENT
Start: 2022-08-17 | End: 2022-08-17

## 2022-08-17 RX ORDER — METOPROLOL SUCCINATE 25 MG/1
25 TABLET, EXTENDED RELEASE ORAL DAILY
Status: DISCONTINUED | OUTPATIENT
Start: 2022-08-17 | End: 2022-08-18

## 2022-08-17 RX ORDER — MAGNESIUM OXIDE 400 MG/1
400 TABLET ORAL EVERY 4 HOURS
Status: COMPLETED | OUTPATIENT
Start: 2022-08-17 | End: 2022-08-17

## 2022-08-17 RX ORDER — SODIUM CHLORIDE 9 MG/ML
INJECTION, SOLUTION INTRAVENOUS CONTINUOUS PRN
Status: COMPLETED | OUTPATIENT
Start: 2022-08-17 | End: 2022-08-17

## 2022-08-17 RX ORDER — ASPIRIN 81 MG/1
81 TABLET ORAL DAILY
Status: DISCONTINUED | OUTPATIENT
Start: 2022-08-18 | End: 2022-08-19 | Stop reason: HOSPADM

## 2022-08-17 RX ORDER — ATORVASTATIN CALCIUM 40 MG/1
80 TABLET, FILM COATED ORAL EVERY MORNING
Status: DISCONTINUED | OUTPATIENT
Start: 2022-08-18 | End: 2022-08-19 | Stop reason: HOSPADM

## 2022-08-17 RX ORDER — ETOMIDATE 2 MG/ML
10 INJECTION INTRAVENOUS ONCE
Status: COMPLETED | OUTPATIENT
Start: 2022-08-17 | End: 2022-08-17

## 2022-08-17 RX ORDER — PROCHLORPERAZINE 25 MG
12.5 SUPPOSITORY, RECTAL RECTAL EVERY 12 HOURS PRN
Status: DISCONTINUED | OUTPATIENT
Start: 2022-08-17 | End: 2022-08-19 | Stop reason: HOSPADM

## 2022-08-17 RX ORDER — AMOXICILLIN 250 MG
1 CAPSULE ORAL 2 TIMES DAILY PRN
Status: DISCONTINUED | OUTPATIENT
Start: 2022-08-17 | End: 2022-08-19 | Stop reason: HOSPADM

## 2022-08-17 RX ORDER — PROCHLORPERAZINE MALEATE 5 MG
5 TABLET ORAL EVERY 6 HOURS PRN
Status: DISCONTINUED | OUTPATIENT
Start: 2022-08-17 | End: 2022-08-19 | Stop reason: HOSPADM

## 2022-08-17 RX ORDER — ASPIRIN 325 MG
325 TABLET ORAL ONCE
Status: COMPLETED | OUTPATIENT
Start: 2022-08-17 | End: 2022-08-17

## 2022-08-17 RX ORDER — ALBUTEROL SULFATE 90 UG/1
2 AEROSOL, METERED RESPIRATORY (INHALATION) 4 TIMES DAILY
Status: DISCONTINUED | OUTPATIENT
Start: 2022-08-17 | End: 2022-08-18

## 2022-08-17 RX ORDER — NITROGLYCERIN 0.4 MG/1
0.4 TABLET SUBLINGUAL EVERY 5 MIN PRN
Status: DISCONTINUED | OUTPATIENT
Start: 2022-08-17 | End: 2022-08-19 | Stop reason: HOSPADM

## 2022-08-17 RX ORDER — HYDROXYUREA 500 MG/1
1000 CAPSULE ORAL
Status: DISCONTINUED | OUTPATIENT
Start: 2022-08-18 | End: 2022-08-19 | Stop reason: HOSPADM

## 2022-08-17 RX ORDER — FUROSEMIDE 20 MG/1
10 TABLET ORAL DAILY
Status: DISCONTINUED | OUTPATIENT
Start: 2022-08-17 | End: 2022-08-19 | Stop reason: HOSPADM

## 2022-08-17 RX ORDER — WARFARIN SODIUM 2 MG/1
2 TABLET ORAL
Status: DISCONTINUED | OUTPATIENT
Start: 2022-08-17 | End: 2022-08-18

## 2022-08-17 RX ORDER — METOPROLOL TARTRATE 1 MG/ML
5 INJECTION, SOLUTION INTRAVENOUS
Status: DISPENSED | OUTPATIENT
Start: 2022-08-17 | End: 2022-08-17

## 2022-08-17 RX ORDER — HEPARIN SODIUM 10000 [USP'U]/100ML
0-5000 INJECTION, SOLUTION INTRAVENOUS CONTINUOUS
Status: DISCONTINUED | OUTPATIENT
Start: 2022-08-17 | End: 2022-08-18

## 2022-08-17 RX ORDER — AMOXICILLIN 250 MG
2 CAPSULE ORAL 2 TIMES DAILY PRN
Status: DISCONTINUED | OUTPATIENT
Start: 2022-08-17 | End: 2022-08-19 | Stop reason: HOSPADM

## 2022-08-17 RX ORDER — ONDANSETRON 4 MG/1
4 TABLET, ORALLY DISINTEGRATING ORAL EVERY 6 HOURS PRN
Status: DISCONTINUED | OUTPATIENT
Start: 2022-08-17 | End: 2022-08-19 | Stop reason: HOSPADM

## 2022-08-17 RX ORDER — ACETAMINOPHEN 650 MG/1
650 SUPPOSITORY RECTAL EVERY 6 HOURS PRN
Status: DISCONTINUED | OUTPATIENT
Start: 2022-08-17 | End: 2022-08-19 | Stop reason: HOSPADM

## 2022-08-17 RX ORDER — ACETAMINOPHEN 325 MG/1
650 TABLET ORAL EVERY 6 HOURS PRN
Status: DISCONTINUED | OUTPATIENT
Start: 2022-08-17 | End: 2022-08-19 | Stop reason: HOSPADM

## 2022-08-17 RX ORDER — HYDROXYUREA 500 MG/1
1500 CAPSULE ORAL
Status: DISCONTINUED | OUTPATIENT
Start: 2022-08-19 | End: 2022-08-19 | Stop reason: HOSPADM

## 2022-08-17 RX ORDER — FOLIC ACID 1 MG/1
1 TABLET ORAL DAILY
Status: DISCONTINUED | OUTPATIENT
Start: 2022-08-18 | End: 2022-08-19 | Stop reason: HOSPADM

## 2022-08-17 RX ORDER — IOPAMIDOL 755 MG/ML
100 INJECTION, SOLUTION INTRAVASCULAR ONCE
Status: COMPLETED | OUTPATIENT
Start: 2022-08-17 | End: 2022-08-17

## 2022-08-17 RX ORDER — ALBUTEROL SULFATE 90 UG/1
2 AEROSOL, METERED RESPIRATORY (INHALATION) EVERY 6 HOURS PRN
Status: DISCONTINUED | OUTPATIENT
Start: 2022-08-17 | End: 2022-08-19 | Stop reason: HOSPADM

## 2022-08-17 RX ORDER — PHENYLEPHRINE HCL IN 0.9% NACL 50MG/250ML
.1-6 PLASTIC BAG, INJECTION (ML) INTRAVENOUS CONTINUOUS
Status: DISCONTINUED | OUTPATIENT
Start: 2022-08-17 | End: 2022-08-18

## 2022-08-17 RX ORDER — DEXTROSE MONOHYDRATE 25 G/50ML
25-50 INJECTION, SOLUTION INTRAVENOUS
Status: DISCONTINUED | OUTPATIENT
Start: 2022-08-17 | End: 2022-08-19 | Stop reason: HOSPADM

## 2022-08-17 RX ORDER — FENTANYL CITRATE-0.9 % NACL/PF 10 MCG/ML
PLASTIC BAG, INJECTION (ML) INTRAVENOUS
Status: DISCONTINUED
Start: 2022-08-17 | End: 2022-08-17 | Stop reason: WASHOUT

## 2022-08-17 RX ORDER — NICOTINE POLACRILEX 4 MG
15-30 LOZENGE BUCCAL
Status: DISCONTINUED | OUTPATIENT
Start: 2022-08-17 | End: 2022-08-19 | Stop reason: HOSPADM

## 2022-08-17 RX ADMIN — AMIODARONE HYDROCHLORIDE 150 MG: 1.5 INJECTION, SOLUTION INTRAVENOUS at 14:25

## 2022-08-17 RX ADMIN — REMDESIVIR 200 MG: 100 INJECTION, POWDER, LYOPHILIZED, FOR SOLUTION INTRAVENOUS at 23:19

## 2022-08-17 RX ADMIN — AMIODARONE HYDROCHLORIDE 1 MG/MIN: 1.8 INJECTION, SOLUTION INTRAVENOUS at 14:50

## 2022-08-17 RX ADMIN — Medication 400 MG: at 19:40

## 2022-08-17 RX ADMIN — Medication 400 MG: at 22:09

## 2022-08-17 RX ADMIN — ETOMIDATE 10 MG: 20 INJECTION, SOLUTION INTRAVENOUS at 13:24

## 2022-08-17 RX ADMIN — DIGOXIN 250 MCG: 250 INJECTION, SOLUTION INTRAMUSCULAR; INTRAVENOUS at 16:30

## 2022-08-17 RX ADMIN — SODIUM CHLORIDE 100 ML/HR: 9 INJECTION, SOLUTION INTRAVENOUS at 13:21

## 2022-08-17 RX ADMIN — Medication 0.5 MCG/KG/MIN: at 16:04

## 2022-08-17 RX ADMIN — ETOMIDATE 10 MG: 20 INJECTION, SOLUTION INTRAVENOUS at 13:50

## 2022-08-17 RX ADMIN — POTASSIUM CHLORIDE 20 MEQ: 1500 TABLET, EXTENDED RELEASE ORAL at 22:09

## 2022-08-17 RX ADMIN — DIGOXIN 250 MCG: 250 INJECTION, SOLUTION INTRAMUSCULAR; INTRAVENOUS at 15:54

## 2022-08-17 RX ADMIN — ASPIRIN 325 MG ORAL TABLET 325 MG: 325 PILL ORAL at 12:55

## 2022-08-17 RX ADMIN — METOPROLOL TARTRATE 5 MG: 5 INJECTION INTRAVENOUS at 13:01

## 2022-08-17 RX ADMIN — METOPROLOL TARTRATE 5 MG: 5 INJECTION INTRAVENOUS at 13:33

## 2022-08-17 RX ADMIN — HEPARIN SODIUM 1050 UNITS/HR: 10000 INJECTION, SOLUTION INTRAVENOUS at 15:47

## 2022-08-17 RX ADMIN — IOPAMIDOL 100 ML: 755 INJECTION, SOLUTION INTRAVENOUS at 15:14

## 2022-08-17 RX ADMIN — SODIUM CHLORIDE 500 ML: 9 INJECTION, SOLUTION INTRAVENOUS at 13:04

## 2022-08-17 ASSESSMENT — ACTIVITIES OF DAILY LIVING (ADL)
CHANGE_IN_FUNCTIONAL_STATUS_SINCE_ONSET_OF_CURRENT_ILLNESS/INJURY: NO
WEAR_GLASSES_OR_BLIND: NO
ADLS_ACUITY_SCORE: 18
DIFFICULTY_EATING/SWALLOWING: NO
DRESSING/BATHING_DIFFICULTY: NO
ADLS_ACUITY_SCORE: 35
FALL_HISTORY_WITHIN_LAST_SIX_MONTHS: NO
WALKING_OR_CLIMBING_STAIRS_DIFFICULTY: NO
ADLS_ACUITY_SCORE: 35
CONCENTRATING,_REMEMBERING_OR_MAKING_DECISIONS_DIFFICULTY: NO
ADLS_ACUITY_SCORE: 18
TOILETING_ISSUES: NO
ADLS_ACUITY_SCORE: 18
DOING_ERRANDS_INDEPENDENTLY_DIFFICULTY: NO
ADLS_ACUITY_SCORE: 35

## 2022-08-17 ASSESSMENT — ENCOUNTER SYMPTOMS
DIARRHEA: 0
COUGH: 0
VOMITING: 0
BRUISES/BLEEDS EASILY: 1
NUMBNESS: 0
LIGHT-HEADEDNESS: 0
NAUSEA: 0
HEMATURIA: 0
ARTHRALGIAS: 1
ABDOMINAL PAIN: 0
SHORTNESS OF BREATH: 1
DIZZINESS: 0

## 2022-08-17 NOTE — PROCEDURES
"PICC Line Insertion Procedure Note  Pt. Name: Hector Pompa  MRN:   [id number]       Procedure: Insertion of a  triple Lumen  5 fr  Bard SOLO (valved) Power PICC, Lot number [id number]    Indications: Access    Contraindications : n/a    Procedure Details   Patient identified with 2 identifiers and \"Time Out\" conducted.  .     Central line insertion bundle followed: hand hygeine performed prior to procedure, site cleansed with cholraprep, hat, mask, sterile gloves,sterile gown worn, patient draped with maximum barrier head to toe drape, sterile field maintained.    The vein was assessed and found to be compressible and of adequate size. 4 ml 1% Lidocaine administered sq to the insertion site. A 5 Fr PICC was inserted into the basilic vein of the right arm with ultrasound guidance. 1 attempt(s) required to access vein.   Catheter threaded without difficulty. Good blood return noted.    Modified Seldinger Technique used for insertion.    The 8 sharps that are included in the PICC insertion kit were accounted for and disposed of in the sharps container prior to breakdown of the sterile field.    Catheter secured with Statlock, biopatch and Tegaderm dressing applied.    Findings:  Total catheter length  43 cm, with 2 cm exposed. Mid upper arm circumference is 31 cm. Catheter was flushed with 30 cc NS. Patient  tolerated procedure well.    Tip placement verified by xray. Xray read by KAILA Malagon . Tip placement in the PICC tip in good position within low SVC.    CLABSI prevention brochure left at bedside.    Patient's primary RN notified PICC is ready for use.    Comments:        SLIM Villagomez, RN  [facility]  "

## 2022-08-17 NOTE — PROGRESS NOTES
Pt arrived on unit at 1800. Pt A&Ox4, able to make needs known. RA, denies SOB. LS diminished. NSR. MAP goal met, Pranav infusion stopped around 1830. Pt reports no problems swallowing medication. Pt was able to use urinal when standing next to bed. Denies pain. Triple lumen PICC in R arm. PIV in L AC and R AC.     Amiodarone infusing at 1mg/min  Heparin infusing at 1050, next AntiXA draw due 2145.    Pt resting comfortably in bed, VSS.    Stormy Elias RN on 8/17/2022 at 6:49 PM    MD paged about transfuse order. Oncoming nurse given report to follow up.

## 2022-08-17 NOTE — ED NOTES
Patient transferred from surg bed. alert and oriented x4 denies chest pain has shortness of breath with activity.  HR in the 150s.repeat EKG showing Afib with RVR.

## 2022-08-17 NOTE — ED PROVIDER NOTES
EMERGENCY DEPARTMENT ENCOUNTER      NAME: Shaji Pompa  AGE: 77 year old male  YOB: 1945  MRN: [id number]  EVALUATION DATE & TIME: No admission date for patient encounter.    PCP: Aj Aponte    ED PROVIDER: Medina Hook M.D.      CHIEF COMPLAINT     Chief Complaint   Patient presents with     Shortness of Breath     Foot Pain     Bilateral         FINAL IMPRESSION:     1. NSTEMI (non-ST elevated myocardial infarction) (H)    2. New onset a-fib (H)    3. Anemia, unspecified type    4. History of myelodysplastic syndrome    5. Infection due to 2019 novel coronavirus          MEDICAL DECISION MAKING:       Pertinent Labs & Imaging studies reviewed. (See chart for details)    77 year old male presents to the Emergency Department for evaluation of shortness of breath.    Clinical Impression and Decision Making    78 yo male with exertion only sob for at least 1 weak  No chest pain  No syncope  Anticoagulated on warfarin for previous history of thromboembolism  No trauma  Has noticed B plantar foot pain    Monica at triage initially  Sitting on chair  No distress  No crakles  Regular rhythm on auscultation  No clinical signs of DVT    Initial EKG no acute sinus but inverted T waves inferiorly seen before and lateral inverted T waves are new  Troponin elevated  Requested patient be brought back to room  Discussed with Dr. Elias recommended aspirin    At arrival to the ED room patient in rapid a.fib with episode of hypotension  Patient asymptomatic  Given 1L NS with no improvement  Given metoprol but with very little improvement and decrease in SBP  Per cardiology recommendation cardiovert given new onset  And NSTEMI  Patient and wife consented  Sedated and sync cardioverted with episode of sinus but return to rapid a. Fib  Given metoprolol again given temporary improvement of BP after defibrillation  Hypotensive again asymptomatic  Consented again for sedation and cardioversion with  success    Consented to PICC  Remains asymptomatic    Dr. Elias contacted again  Recommends Amiodarone bolus and drip  Given anemia which is chronic and history of recent blood transfusions (per wife no history of upper or lower gi bleeding) has been transfused twice this year  Patient consented for PRBC  History of myelodysplastic syndrome which may account for anemia    Dr. Elias recommended heparin and CT chest to exclude PE  No PE on CT    Discusses with Dr. Layton intensivist recommends Digoxin and Neosynephrine for hypotension    No clinical evidence of sepsis  Ground glass opacities on cxr    Patient awaiting inpatient bed  Signed out to Dr. Rodriguez pending ICU admission    Addendum  covid test positive     ED Course as of 08/17/22 1807   Wed Aug 17, 2022   1755 WBC: 4.2   1755 % Eosinophils: 0     Vital Signs: reviewed  EKG: nsr a.fib with rvr  Imaging: ct chest no pe  Home Meds: reviewed  ED meds/abx: etomidate amiodarone heparin PRBC  Fluids: 1.5 L NS    Labs  K 4.3  Cr 1.08  Wbc 4.2  Hgb 8.2  platelets  INR 1.7  bnp 200 troponin 3.25    Review of Previous Records  Cardiology Office Visit from 6/2/22 - Dr. Larson  Assessment:   1.  Chronic congestive heart failure with reduced ejection fraction.  Ischemic cardiomyopathy. LVEF: 40-45%. 2.  Coronary Artery disease with occluded mid right coronary artery.  Collaterals from the left and proximal RCA.   2.  Severe coronary calcification on CT, coronary artery disease.   3.  Ischemic cardiomyopathy  4.  Pulmonary embolism, bilateral  5.  DVT right leg   6.  Severe anemia - Hemoglobin 8.6 on 5/1/22  7.  Thrombocytopenia-   8.  Myeloproliferative disorder        Plan:   1.  Lasix 10 mg daily  2.  Stop lisinopril, change losartan 12.5 mg daily given diarrhea concern (if no improvement will reduce atorvastatin or switch to Crestor)   3.  Continue atorvastatin to 80 mg daily  4.  Warfarin for DVT  5.  Aspirin for coronary  disease      Consults  Cardiology - Dr. Rolle  Intensivist. Dr. Celis    ED COURSE   11:33 AM I met with the patient to gather history and to perform my initial exam. We discussed plans for the ED course, including diagnostic testing and treatment. PPE worn: n95 mask.   12:19 PM Lab called to report a critical troponin of 3.25.  12:20 PM Told charge nurse to place the patient in a room.    12:21 PM Spoke with cardiology. Dr. Elias recommends transfer to [facility]. Per charge nurse there are no beds available at [facility].   12:32 I rechecked and updated the patient with results.  12:36 PM I paged cardiology, Dr. Elias.  12:41 PM Patient placed in a ED room.   12:46 PM I rechecked on the patient. Patient is tachycardic with irregular rhythm. Patient currently has no symptoms at this time. He is slightly hypotensive.  12:50 PM I rechecked on the patient.     12:57 PM I talked to cardiology.   12:58 PM I rechecked on the patient.   1:01 PM I rechecked on the patient.    1:09 PM I rechecked on the patient. Patient reports no chest pain and no shortness of breath.   1:18 PM I rechecked on the patient.    1:32 PM Cardioversion was unsuccessful. Will repage cardiology.   1:36 PM blood pressure dropped to 79 systolic. Went to recheck the patient.   2:06 PM I spoke with cardiology - Dr. Elias.   2:24 PM Paged the intensivist.   2:29 PM I spoke with cardiology - Dr. Elias.   2:54 PM I discussed the patient with Dr. Celis, the intensivist.   3:12 PM I spoke to the pharmacist.   3:31 PM I rechecked and updated the patient.     At the conclusion of the encounter I discussed the results of all of the tests and the disposition. The questions were answered. The patient and wife acknowledged understanding and was agreeable with the care plan.         Critical Care     Performed by: Dr Medina Hook  Authorized by: Dr Medina Hook  Total critical care time: 140 minutes  Critical care was necessary to treat or  prevent imminent or life-threatening deterioration of the following conditions: NSTEMI, hypotension a. Fib with RVR  Critical care was time spent personally by me on the following activities: development of treatment plan with patient or surrogate, discussions with consultants, examination of patient, evaluation of patient's response to treatment, obtaining history from patient or surrogate, ordering and performing treatments and interventions, ordering and review of laboratory studies, ordering and review of radiographic studies, re-evaluation of patient's condition and monitoring for potential decompensation.  Critical care time was exclusive of separately billable procedures and treating other patients.      MEDICATIONS GIVEN IN THE EMERGENCY:     Medications   metoprolol (LOPRESSOR) injection 5 mg ( Intravenous Canceled Entry 8/17/22 1433)   lidocaine 1 % 0.1-5 mL (has no administration in time range)   lidocaine (LMX4) cream (has no administration in time range)   sodium chloride (PF) 0.9% PF flush 10-40 mL (has no administration in time range)   amiodarone (NEXTERONE) 360 mg in D5W 200 mL 1.8 mg/mL infusion (1 mg/min Intravenous Rate/Dose Verify 8/17/22 1754)   amiodarone (NEXTERONE) 360 mg in D5W 200 mL 1.8 mg/mL infusion (has no administration in time range)   heparin 25,000 units in 0.45% NaCl 250 mL ANTICOAGULANT infusion (1,050 Units/hr Intravenous Rate/Dose Change 8/17/22 1754)   phenylephrine (DANGELO-SYNEPHRINE) 50 mg in NaCl 0.9 % 250 mL infusion (0.5 mcg/kg/min × 88 kg Intravenous Rate/Dose Verify 8/17/22 1754)   perflutren lipid microsphere (DEFINITY) injection SUSP 2 mL (has no administration in time range)   aspirin (ASA) tablet 325 mg (325 mg Oral Given 8/17/22 1255)   0.9% sodium chloride BOLUS (500 mLs Intravenous New Bag 8/17/22 1304)   etomidate (AMIDATE) injection 10 mg (10 mg Intravenous Given 8/17/22 1324)   etomidate (AMIDATE) injection 10 mg (10 mg Intravenous Given 8/17/22 1350)    amiodarone (NEXTERONE) bolus 150 mg (150 mg Intravenous New Bag 8/17/22 1425)   sodium chloride 0.9% infusion (100 mL/hr Intravenous New Bag 8/17/22 1321)   iopamidol (ISOVUE-370) solution 100 mL (100 mLs Intravenous Given 8/17/22 1514)   heparin loading dose for LOW INTENSITY TREATMENT * Give BEFORE starting heparin infusion (5,300 Units Intravenous Given 8/17/22 1544)   digoxin (LANOXIN) injection 250 mcg (250 mcg Intravenous Given 8/17/22 1554)   digoxin (LANOXIN) injection 250 mcg (250 mcg Intravenous Given 8/17/22 1630)       NEW PRESCRIPTIONS STARTED AT TODAY'S ER VISIT     Current Discharge Medication List             =================================================================    HPI     Patient information was obtained from: patient     Use of : N/A    Shaji Pompa is a 77 year old male who presents by walking for evaluation of shortness of breath.     The patient reports experiencing shortness of breath with exertion that started around a week ago. States he feels short of breath even with walking. His breathing improves to normal after sitting down to rest. He has no associating chest pain or lightheadedness. He is currently feeling fine and does not have any respiratory complaints.     Patient has a history of congestive heart failure and leukemia.  Currently taking Warfarin, aspirin, Lasix, and hydroxyurea. States he is compliant with his medications. He reports no recent change in medication. He follows with Dr. Larson from cardiology and Dr. Woodard from oncology. He is vaccinated for COVID-19.    Patient also complains of sore feet. He has not experienced this before and is occurring on the bottom of his feet. Denies any known bug bites or rash to his feet. Is able to move his toes without pain.     Patient denies chest pain, fainting, dizziness, cough, belly pain, nausea, vomiting, diarrhea, blood in urine, recent falls, swollen limbs, leg pain, calf pain, numbness, and  tinglying. No history of diabetes. Former smoker (quit 15 years ago). Occasional alcohol. No other medical concerns or complaints at this time.        REVIEW OF SYSTEMS   Review of Systems   Respiratory: Positive for shortness of breath (with exertion). Negative for cough.    Cardiovascular: Negative for chest pain and leg swelling.   Gastrointestinal: Negative for abdominal pain, diarrhea, nausea and vomiting.   Genitourinary: Negative for hematuria.   Musculoskeletal: Positive for arthralgias (bilateral feet).        Negative for calf pain.   Neurological: Negative for dizziness, syncope, light-headedness and numbness.        Negative for tingling.    Hematological: Bruises/bleeds easily.   All other systems reviewed and are negative.      PAST MEDICAL HISTORY:     Past Medical History:   Diagnosis Date     Cerebral infarction (H)      COPD (chronic obstructive pulmonary disease) (H)      HLD (hyperlipidemia)      Hypertension      Myeloproliferative disease (H)        PAST SURGICAL HISTORY:     Past Surgical History:   Procedure Laterality Date     CV CORONARY ANGIOGRAM N/A 4/26/2022    Procedure: CV CORONARY ANGIOGRAM;  Surgeon: Audrey Holder MD;  Location: [facility] CATH LAB CV     CV LEFT HEART CATH N/A 4/26/2022    Procedure: Left Heart Catheterization;  Surgeon: Audrey Holder MD;  Location: [facility] CATH LAB CV     OPEN REDUCTION INTERNAL FIXATION FOOT Right 2010     OTHER SURGICAL HISTORY  2001    Lip lesion removal     PICC TRIPLE LUMEN PLACEMENT  8/17/2022          TONSILLECTOMY & ADENOIDECTOMY           CURRENT MEDICATIONS:   No current outpatient medications on file.       ALLERGIES:   No Known Allergies    FAMILY HISTORY:     Family History   Problem Relation Age of Onset     Alcoholism Mother        SOCIAL HISTORY:     Social History     Socioeconomic History     Marital status:    Tobacco Use     Smoking status: Former Smoker     Packs/day: 1.00     Start date: 6/8/1965     Quit date:  "1/1/2012     Years since quitting: 10.6     Smokeless tobacco: Never Used   Substance and Sexual Activity     Alcohol use: Yes     Alcohol/week: 19.0 standard drinks     Drug use: Never       VITALS:   /59   Pulse 59   Temp 97.9  F (36.6  C) (Oral)   Resp (!) 34   Ht 1.676 m (5' 6\")   Wt 88.2 kg (194 lb 8 oz)   SpO2 95%   BMI 31.39 kg/m      PHYSICAL EXAM     Physical Exam  Vitals and nursing note reviewed.   Constitutional:       Appearance: He is well-developed. He is obese.   Musculoskeletal:      Right lower leg: No edema.      Left lower leg: No edema.   Skin:     General: Skin is warm.   Neurological:      General: No focal deficit present.      Mental Status: He is alert and oriented to person, place, and time.         Physical Exam   Constitutional: Cooperative, pleasant. Elderly, appears stated age.     Head: Atraumatic.     Nose: Nose normal.     Mouth/Throat: Oropharynx is clear and moist.     Eyes: EOM are normal. Pupils are equal, round, and reactive to light.     Ears: External ears normal.    Neck: Normal range of motion. Neck supple.     Cardiovascular: Normal rate, regular rhythm and normal heart sounds. Intact DP pulses.       Pulmonary/Chest: Normal effort  and breath sounds normal.     Abdominal: Soft, nontender    Musculoskeletal: Normal range of motion.     Neurological: No deficits.    Lymphatics: No edema.    : N/A     Skin: Skin is warm and dry. Socks removed. No erythema or edema to bilateral feet.     Psychiatric: Normal mood and affect. Behavior is normal.       LAB:     All pertinent labs reviewed and interpreted.  Labs Ordered and Resulted from Time of ED Arrival to Time of ED Departure   BASIC METABOLIC PANEL - Abnormal       Result Value    Sodium 137      Potassium 4.3      Chloride 106      Carbon Dioxide (CO2) 22      Anion Gap 9      Urea Nitrogen 27      Creatinine 1.08      Calcium 8.3 (*)     Glucose 100      GFR Estimate 71     B-TYPE NATRIURETIC PEPTIDE (MH " EAST ONLY) - Abnormal     (*)    TROPONIN I - Abnormal    Troponin I 3.25 (*)    CBC WITH PLATELETS AND DIFFERENTIAL - Abnormal    WBC Count 4.2      RBC Count 1.79 (*)     Hemoglobin 8.2 (*)     Hematocrit 24.5 (*)      (*)     MCH 45.8 (*)     MCHC 33.5      RDW 21.4 (*)     Platelet Count 323      % Neutrophils 62      % Lymphocytes 23      % Monocytes 13      % Eosinophils 0      % Basophils 1      % Immature Granulocytes 1      NRBCs per 100 WBC 1 (*)     Absolute Neutrophils 2.6      Absolute Lymphocytes 1.0      Absolute Monocytes 0.6      Absolute Eosinophils 0.0      Absolute Basophils 0.0      Absolute Immature Granulocytes 0.0      Absolute NRBCs 0.1     INR - Abnormal    INR 1.71 (*)    COVID-19 VIRUS (CORONAVIRUS) BY PCR - Abnormal    SARS CoV2 PCR Positive (*)    TROPONIN I - Abnormal    Troponin I 2.47 (*)    MAGNESIUM - Normal    Magnesium 1.8     LACTIC ACID WHOLE BLOOD - Normal    Lactic Acid 1.1     HEPARIN UNFRACTIONATED ANTI XA LEVEL   TYPE AND SCREEN, ADULT    ABO/RH(D) O POS      Antibody Screen Negative      SPECIMEN EXPIRATION DATE [id number]     PREPARE RED BLOOD CELLS (UNIT)    CROSSMATCH Compatible      UNIT ABO/RH O Pos      Unit Number [id number]      Unit Status Ready      Blood Component Type Red Blood Cells      Product Code [id number]      CODING SYSTEM OSWC931      UNIT TYPE ISBT 5100     PREPARE RED BLOOD CELLS (UNIT)   TRANSFUSE RED BLOOD CELLS (UNIT)   ABO/RH TYPE AND SCREEN        RADIOLOGY:     Reviewed all pertinent imaging. Please see official radiology report.  Echocardiogram Complete   Final Result      XR Chest Port 1 View   Final Result   IMPRESSION: Right-sided PICC tip in good position within low SVC. Heart size and vascularity are normal. Bilateral bronchial wall thickening suggest airway inflammation. Bibasilar subsegmental atelectasis. No pneumothorax or pleural effusion.      CT Chest Pulmonary Embolism w Contrast   Final Result   IMPRESSION:    1.  No evidence of pulmonary embolism or secondary signs of right heart strain.      2.  Scattered bilateral groundglass and nodular opacities, likely infectious or inflammatory. Mild interlobular septal thickening suggests mild pulmonary edema as well.      3.  Small hiatal hernia.      XR Chest Port 1 View   Final Result   IMPRESSION: Heart size and vascularity are normal. Previous loculated hydropneumothorax posterior medial left chest has resolved. No focal consolidation, pneumothorax nor pleural effusion.           EKG:     EKG #1  NRS PARWP  Inverted T wave 2, 3, avf, v5 v6    Time:713411    Ventricular rate 98bmp  Axis normal  MA interval 126 ms  QRS duration 102 ms  QT//446 ms    Compared to previous EKG on 4/2022 inverted T wave laterally are new    I have independently reviewed and interpreted the EKG(s) documented above.      EKG #2  A fi with rvr  Inverted T waves inferior laterally    Time:198137    Ventricular rate 141 bmp  Axis normal  MA interval ms  QRS duration 110 ms  QT//484 ms    Compared to previous EKG on a.fib is new compared to previous    EKG #3  A. Fib with rvr    Time:654018    Ventricular rate 122 bmp  Axis normal  MA interval ms  QRS duration 98 ms  QT//478 ms    Compared to previous EKG on no change    PROCEDURES:     Procedures      PROCEDURE: Electrical Cardioversion with Procedural Sedation   INDICATIONS: Sedation is required to allow for Cardioversion for Atrial Fibrilation    CARDIOVERSION TYPE: Biphasic, External   SEDATION PROVIDER: Dr eMdina Hook   CARDIOVERSION PROVIDER: Dr Medina Hook   LEVEL OF SEDATION: Deep Sedation    Defined as:  Minimal = Normal response to verbal  Moderate = Responds to verbal and light tactile stimulation  Deep = Responds after repeated painful stimulation   CONSENT: Risks, benefits and alternatives were discussed with and Written consent was obtained from Patient.   PROCEDURE SPECIFIC CHECKLIST COMPLETED: Yes   LAST ORAL  INTAKE: Clear Liquids >2 hours   ASA CLASS: 4 - Severe systemic disease that is a constant threat to life   MALLAMPATI:  II - Faucial pillars and soft palate may be seen, but uvula is masked by the base of the tongue   TIME OUT: Universal protocol was followed. TIME OUT conducted just prior to starting procedure confirmed patient identity, site/side, procedure, patient position, and availability of correct equipment. Yes    Immediately prior to initiation of sedation, reassessment of clinical condition was performed which was unchanged.   MEDICATIONS GIVEN: Etomidate, 10 mg, IV    MONITORING: heart rate, cardiac monitor, continuous pulse oximeter, continuous capnometry (end tidal CO2), frequent blood pressure checks, level of consciousness checks, IV access, constant attendance by RN until patient is recovered and constant attendance by MD until patient is stable   RESPONSE: vital signs stable, airway patent and O2 saturations remained >92%   POST-SEDATION ASSESSMENT/PROCEDURE NOTE: CARDIOVERSION: Trial 1: Synchronized shock at 200 joules was Unsuccessful    SEDATION:  Lowest level oxygen saturation reached was 100%.    Post procedure patient was alert and responds to verbal stimuli    Patient was monitored during recovery and returned to pre-procedure baseline.   TOTAL MD DRUG ADMINISTRATION / MONITORING TIME: 30 minutes.    Synchronized cardioversion attempted x 3   COMPLICATIONS: Patient tolerated procedure well, without complication           I, Etienne Han, am serving as a scribe to document services personally performed by Dr. Hook based on my observation and the provider's statements to me. I, Medina Hook MD attest that Etienne Han is acting in a scribe capacity, has observed my performance of the services and has documented them in accordance with my direction.    Medina Hook M.D.  Emergency Medicine  [facility] EMERGENCY ROOM  [address]  [facility] [id number]  [phone]  Dept: [phone]     Medina Hook MD  08/17/22 1804       Medina Hook MD  08/17/22 180

## 2022-08-17 NOTE — CONSULTS
" [facility] HEART [facility]   [address], [place] [address]   [email or web address]   OFFICE: [phone]        Impression and Plan     1.  Coronary artery disease.  Hector has known coronary artery disease.  Specifically, he underwent coronary angiography 26 April 2022 which revealed complete chronic occlusion of the right coronary artery, but otherwise mild disease.  Distal right coronary artery fills via collaterals from left circulation (see Cardiac Diagnostic section below).  Hector presents with shortness of breath, but minimizes actual chest pain.  He was noted to have an elevation in troponin to 3.25 ng/mL. This may represent \"demand ischemia\" in setting of collaterally dependent right coronary artery ( i.e. type 2 MI secondary to ischemia due to either an increased oxygen demand or a decreased supply in the absence of an acute primary coronary thrombotic event).  Elevated troponin can also be seen with pulmonary embolism (see problem #3).  Despite development of atrial fibrillation Emergency Department, no prominent ST segment depression/elevation and he has not been reporting anginal type chest pain which is reassuring.  Plan:    Aspirin.    Initiate heparin.    Echocardiogram.    ?-blocker therapyr therapy if blood pressure will allow.  Currently with some tendency toward lower blood pressure.    Consider possible transfusion to increase oxygen carrying capacity given anemia though anemia appears chronic in nature.    2.  Atrial fibrillation.  Hector in the Emergency Department developed atrial fibrillation.  Confirmed to be in sinus on initial presentation.  Hector is fairly asymptomatic with the rhythm disturbance.  Somewhat limited options vis-à-vis lower blood pressure.  He historically has been on warfarin for history of DVT/pulmonary embolism though INR therapeutic.  He underwent attempt at direct-current cardioversion in the Emergency Department by Dr." Monster although this was unsuccessful and restoration of sinus rhythm.  Plan:    Heparin as per problem #1.    Initiate amiodarone intravenously with initial bolus and subsequent infusion.    ?-blocker therapyr therapy if blood pressure will allow.     Consider digoxin if necessary to aid in control of reticular response which would not adversely affect blood pressure.    3.  History of DVT and pulmonary embolism.  As noted below, Hector has a history of prior DVT and pulmonary embolism for which she has been on warfarin therapy though INR subtherapeutic on presentation.  Given new development of atrial fibrillation, elevated troponin, hypotension; consideration given to possible pulmonary embolism.    Plan for evaluation for pulmonary embolism as discussed with Dr. Medina Hook.    Heparin initiation as per problems #1 and #2.    Further recommendations pending clinical course.    Primary Cardiologist: Dr. Sp Larson    History of Present Illness    Shaji Pompa is a 77 year old male with chief complaint of worsening shortness of breath and dyspnea.  Symptoms have been gradually progressive over the last couple of weeks.  Minimizes actual chest pain.  Also with some bilateral discomfort at the lower parts of his feet, but no calf pain.  He denies subjective palpitations.  No fevers, chills, or other constitutional symptoms.    Further review of systems is otherwise negative/noncontributory (medical record and 13 point review of systems reviewed as well and pertinent positives noted).    Cardiac Diagnostics   Telemetry (personally reviewed): Initially sinus rhythm with subsequent development of atrial fibrillation.    Echocardiogram (personally reviewed) 20 April 2022:  1. The left ventricle is normal in size. Left ventricular systolic performance is moderately reduced. The ejection fraction is estimated to be 40%.  2. There is severe inferior hypokinesis. There is moderate anteroseptal  hypokinesis. In addition, there is moderate mid to distal posterior hypokinesis and severe distal lateral hypokinesis  3. No significant valvular heart disease is identified on this study.  4. Borderline right ventricular enlargement with low normal right ventricular systolic performance.  5. There is mild left atrial enlargement.  6. Right ventricular systolic pressure relative to right atrial pressure is mildly increased. The pulmonary artery pressure is estimated to be 45-50mmHg plus right atrial pressure (the IVC is of normal caliber).    Coronary angiography 26 April 2022:  1. Left main coronary artery: 25% stenosis.  2. Left anterior descending coronary artery: 20% proximal stenosis.  3. Ramus intermedius: Large vessel.  4. Circumflex coronary artery: 30% stenosis.  5. Right coronary artery: Proximal 90% stenosis followed by proximal-distal 100% stenosis.  Distal vessel filling via collaterals from left-sided circulation.    Recommendations:    Medical therapy and risk factor modification.    Holter monitor 15 November 2019:  1. Normal Holter monitor.    Twelve-lead ECG (personally reviewed) 17 August 2022 at 1251: Atrial fibrillation with heart rate of 141 bpm.  T wave inversion and anterolateral leads.    Twelve-lead ECG (personally reviewed) 17 August 2022 at 1016: Sinus rhythm.  Inferior infarct.  Nonspecific T wave inversion in anterolateral leads.    Twelve-lead ECG (personally reviewed) 19 April 2022: Sinus rhythm.  Inferior infarct.    Medical History  Surgical History   Past Medical History:   Diagnosis Date     Cerebral infarction (H)      COPD (chronic obstructive pulmonary disease) (H)      HLD (hyperlipidemia)      Hypertension      Myeloproliferative disease (H)       Past Surgical History:   Procedure Laterality Date     CV CORONARY ANGIOGRAM N/A 4/26/2022    Procedure: CV CORONARY ANGIOGRAM;  Surgeon: Audrey Holder MD;  Location: [facility] CATH LAB CV     CV LEFT HEART CATH N/A 4/26/2022     "Procedure: Left Heart Catheterization;  Surgeon: Audrey Holder MD;  Location: Health system LAB CV     OPEN REDUCTION INTERNAL FIXATION FOOT Right 2010     OTHER SURGICAL HISTORY  2001    Lip lesion removal     TONSILLECTOMY & ADENOIDECTOMY            Family History/Social History/Risk Factors   Patient does not smoke.  Family history reviewed, and   Family History   Problem Relation Age of Onset     Alcoholism Mother           Physical Examination   BP (!) 79/52   Pulse (!) 121   Temp 97.8  F (36.6  C) (Oral)   Resp 9   Ht 1.676 m (5' 6\")   Wt 88 kg (194 lb)   SpO2 95%   BMI 31.31 kg/m    Wt Readings from Last 5 Encounters:   08/17/22 88 kg (194 lb)   06/02/22 89.4 kg (197 lb 1.6 oz)   05/12/22 88.9 kg (196 lb)   05/01/22 89 kg (196 lb 1.6 oz)   04/26/22 89 kg (196 lb 3.2 oz)     Wt Readings from Last 3 Encounters:   08/17/22 88 kg (194 lb)   06/02/22 89.4 kg (197 lb 1.6 oz)   05/12/22 88.9 kg (196 lb)       Intake/Output Summary (Last 24 hours) at 8/17/2022 1527  Last data filed at 8/17/2022 1340  Gross per 24 hour   Intake 100 ml   Output --   Net 100 ml         The patient is alert and oriented times three. Sclerae are anicteric. Mucosal membranes are moist. Jugular venous pressure is grossly normal. No significant adenopathy/thyromegally appreciated. Lungs are really clear anteriorly. On cardiovascular exam, the patient has an irregular S1 and S2.  Heart sounds are somewhat distant.  Abdomen is soft and non-tender. Extremities reveal no clubbing, cyanosis,         Imaging      Chest radiograph 17 August 2022:  1. Heart size and vascularity are normal.   2. Previous loculated hydropneumothorax posterior medial left chest has resolved.   3. No focal consolidation, pneumothorax nor pleural effusion.    Lab Results     Recent Labs   Lab 08/17/22  1238 08/17/22  1011   WBC  --  4.2   HGB  --  8.2*   MCV  --  137*   PLT  --  323   INR  --  1.71*     --    POTASSIUM 4.3  --    CHLORIDE 106  --    CO2 " 22  --    BUN 27  --    CR 1.08  --    ANIONGAP 9  --    GLENDY 8.3*  --      --      Recent Labs   Lab Test 08/17/22  1011 04/19/22  1441 04/01/22  1208   * 1,221* 379*     No lab results found in last 7 days.    Invalid input(s): LDLCALC  Lab Results   Component Value Date     08/17/2022    CO2 22 08/17/2022    BUN 27 08/17/2022     Lab Results   Component Value Date    WBC 4.2 08/17/2022    HGB 8.2 08/17/2022    HCT 24.5 08/17/2022     08/17/2022     08/17/2022     Lab Results   Component Value Date    CHOL 110 05/03/2021    TRIG 115 05/03/2021    HDL 35 05/03/2021     Recent Labs   Lab Test 05/03/21  1557   CHOL 110   HDL 35*   LDL 52   TRIG 115     Lab Results   Component Value Date    INR 1.71 08/17/2022     Lab Results   Component Value Date    TROPONINI 3.25 08/17/2022    TROPONINI 0.09 04/20/2022    TROPONINI 0.11 04/19/2022     No results found for: TSH      Current Inpatient Scheduled Medications   Scheduled Meds:    digoxin  250 mcg Intravenous Once     heparin ANTICOAGULANT Loading dose  60 Units/kg Intravenous Once     Continuous Infusions:    amiodarone 1 mg/min (08/17/22 1450)     amiodarone       heparin       phenylephrine            Medications Prior to Admission   Prior to Admission medications    Medication Sig Start Date End Date Taking? Authorizing Provider   albuterol (PROAIR HFA;PROVENTIL HFA;VENTOLIN HFA) 90 mcg/actuation inhaler Inhale 2 puffs into the lungs every 6 hours as needed for shortness of breath / dyspnea 10/31/19  Yes Provider, Historical   aspirin (ASA) 81 MG EC tablet Take 1 tablet (81 mg) by mouth daily Start tomorrow morning. 4/27/22  Yes Audrey Holder MD   atorvastatin (LIPITOR) 80 MG tablet Take 1 tablet (80 mg) by mouth every morning 5/31/22  Yes Karsten Castellon MD   folic acid (FOLVITE) 1 MG tablet Take 1 tablet (1 mg) by mouth daily 5/2/22  Yes Crispin Michelle MD   furosemide (LASIX) 20 MG tablet Take 0.5 tablets (10 mg) by  "mouth daily 6/2/22  Yes Sp Larson, DO   hydroxyurea (HYDREA) 500 MG capsule Take 1 capsule (500 mg) by mouth daily  Patient taking differently: Take 1,000-1,500 mg by mouth See Admin Instructions 1500 mg daily- Mon, Wed, & Fri.  1000 mg daily - Tue, Thurs, Sat, & Sun 5/2/22  Yes Crispin Michelle MD   losartan (COZAAR) 25 MG tablet Take 0.5 tablets (12.5 mg) by mouth daily 6/2/22  Yes Sp Larson, DO   metoprolol succinate ER (TOPROL-XL) 25 MG 24 hr tablet Take 25 mg by mouth daily   Yes Unknown, Entered By History   nitroGLYcerin (NITROSTAT) 0.4 MG sublingual tablet One tablet under the tongue every 5 minutes if needed for chest pain. May repeat every 5 minutes for a maximum of 3 doses in 15 minutes\" 4/26/22  Yes Audrey Holder MD   tiotropium (SPIRIVA) 18 mcg inhalation capsule [TIOTROPIUM (SPIRIVA) 18 MCG INHALATION CAPSULE] Place 18 mcg into inhaler and inhale daily. 10/31/19  Yes Provider, Historical   warfarin ANTICOAGULANT (COUMADIN) 1 MG tablet Take 5 mg on 5/2, re dose warfarin on 5/3 per oncology based on INR  Patient taking differently: Take 2 mg by mouth daily 5/1/22  Yes Crispin Michelle MD                 "

## 2022-08-17 NOTE — ED NOTES
EMERGENCY DEPARTMENT SIGN OUT NOTE        ED COURSE AND MEDICAL DECISION MAKING  2:37 PM Patient was signed out to me by Dr Medina Hook.    In brief, Shaji Pompa is a 77 year old male who initially presented shortness of breath with exertion about a week ago. The patient reports experiencing shortness of breath with exertion that started around a week ago. States he feels short of breath even with walking. His breathing improves to normal after sitting down to rest. Patient also complains of sore feet. He has not experienced this before and is occurring on the bottom of his feet.      At time of sign out, disposition was pending CT chest.    I, Ivan Moser, am serving as a scribe to document services personally performed by Mark Berumen MD, based on my observations and the provider's statements to me.  I, Mark Berumen MD, attest that Ivan Moser is acting in a scribe capacity, has observed my performance of the services and has documented them in accordance with my direction.     FINAL IMPRESSION    No diagnosis found.    ED MEDS  Medications   metoprolol (LOPRESSOR) injection 5 mg ( Intravenous Canceled Entry 8/17/22 1433)   norepinephrine (LEVOPHED) 4 mg in  mL PERIPHERAL infusion (has no administration in time range)   lidocaine 1 % 0.1-5 mL (has no administration in time range)   lidocaine (LMX4) cream (has no administration in time range)   sodium chloride (PF) 0.9% PF flush 10-40 mL (has no administration in time range)   amiodarone (NEXTERONE) 360 mg in D5W 200 mL 1.8 mg/mL infusion (0 mg/min Intravenous Paused 8/17/22 1419)   amiodarone (NEXTERONE) 360 mg in D5W 200 mL 1.8 mg/mL infusion (has no administration in time range)   iopamidol (ISOVUE-370) solution 100 mL (has no administration in time range)   aspirin (ASA) tablet 325 mg (325 mg Oral Given 8/17/22 1255)   0.9% sodium chloride BOLUS (500 mLs Intravenous New Bag 8/17/22 1304)   etomidate (AMIDATE) injection 10 mg (10 mg  Intravenous Given 8/17/22 1324)   etomidate (AMIDATE) injection 10 mg (10 mg Intravenous Given 8/17/22 1350)   amiodarone (NEXTERONE) bolus 150 mg (150 mg Intravenous New Bag 8/17/22 1425)   sodium chloride 0.9% infusion (100 mL/hr Intravenous New Bag 8/17/22 1321)       LAB  Labs Ordered and Resulted from Time of ED Arrival to Time of ED Departure   BASIC METABOLIC PANEL - Abnormal       Result Value    Sodium 137      Potassium 4.3      Chloride 106      Carbon Dioxide (CO2) 22      Anion Gap 9      Urea Nitrogen 27      Creatinine 1.08      Calcium 8.3 (*)     Glucose 100      GFR Estimate 71     B-TYPE NATRIURETIC PEPTIDE ([facility] ONLY) - Abnormal     (*)    TROPONIN I - Abnormal    Troponin I 3.25 (*)    CBC WITH PLATELETS AND DIFFERENTIAL - Abnormal    WBC Count 4.2      RBC Count 1.79 (*)     Hemoglobin 8.2 (*)     Hematocrit 24.5 (*)      (*)     MCH 45.8 (*)     MCHC 33.5      RDW 21.4 (*)     Platelet Count 323      % Neutrophils 62      % Lymphocytes 23      % Monocytes 13      % Eosinophils 0      % Basophils 1      % Immature Granulocytes 1      NRBCs per 100 WBC 1 (*)     Absolute Neutrophils 2.6      Absolute Lymphocytes 1.0      Absolute Monocytes 0.6      Absolute Eosinophils 0.0      Absolute Basophils 0.0      Absolute Immature Granulocytes 0.0      Absolute NRBCs 0.1     INR - Abnormal    INR 1.71 (*)    MAGNESIUM - Normal    Magnesium 1.8     PREPARE RED BLOOD CELLS (UNIT)       EKG  ***    RADIOLOGY    XR Chest Port 1 View   Final Result   IMPRESSION: Heart size and vascularity are normal. Previous loculated hydropneumothorax posterior medial left chest has resolved. No focal consolidation, pneumothorax nor pleural effusion.      CT Chest Pulmonary Embolism w Contrast    (Results Pending)       DISCHARGE MEDS  New Prescriptions    No medications on file       Mark Berumen MD  [facility] EMERGENCY ROOM  [address]  [address]  [phone]

## 2022-08-17 NOTE — PHARMACY-ADMISSION MEDICATION HISTORY
"Pharmacy Note - Admission Medication History    Pertinent Provider Information: N/A     ______________________________________________________________________    Prior To Admission (PTA) med list completed and updated in EMR.       PTA Med List   Medication Sig Last Dose     albuterol (PROAIR HFA;PROVENTIL HFA;VENTOLIN HFA) 90 mcg/actuation inhaler Inhale 2 puffs into the lungs every 6 hours as needed for shortness of breath / dyspnea Unknown at didn't bring     aspirin (ASA) 81 MG EC tablet Take 1 tablet (81 mg) by mouth daily Start tomorrow morning. 8/17/2022 at AM     atorvastatin (LIPITOR) 80 MG tablet Take 1 tablet (80 mg) by mouth every morning 8/17/2022 at AM     folic acid (FOLVITE) 1 MG tablet Take 1 tablet (1 mg) by mouth daily 8/17/2022 at AM     furosemide (LASIX) 20 MG tablet Take 0.5 tablets (10 mg) by mouth daily 8/17/2022 at AM     hydroxyurea (HYDREA) 500 MG capsule Take 1 capsule (500 mg) by mouth daily (Patient taking differently: Take 1,000-1,500 mg by mouth See Admin Instructions 1500 mg daily- Mon, Wed, & Fri.  1000 mg daily - Tue, Thurs, Sat, & Sun) 8/17/2022 at AM, 1500 mg     losartan (COZAAR) 25 MG tablet Take 0.5 tablets (12.5 mg) by mouth daily 8/17/2022 at AM     metoprolol succinate ER (TOPROL-XL) 25 MG 24 hr tablet Take 25 mg by mouth daily 8/17/2022 at AM     nitroGLYcerin (NITROSTAT) 0.4 MG sublingual tablet One tablet under the tongue every 5 minutes if needed for chest pain. May repeat every 5 minutes for a maximum of 3 doses in 15 minutes\" Unknown at Unknown time     tiotropium (SPIRIVA) 18 mcg inhalation capsule [TIOTROPIUM (SPIRIVA) 18 MCG INHALATION CAPSULE] Place 18 mcg into inhaler and inhale daily. 8/17/2022 at AM, didn't bring     warfarin ANTICOAGULANT (COUMADIN) 1 MG tablet Take 5 mg on 5/2, re dose warfarin on 5/3 per oncology based on INR (Patient taking differently: Take 2 mg by mouth daily) 8/17/2022 at AM       Information source(s): Patient and " [facility]  Method of interview communication: in-person    Summary of Changes to PTA Med List  New: N/A  Discontinued: N/A  Changed: warfarin dose    Patient was asked about OTC/herbal products specifically.  PTA med list reflects this.    In the past week, patient estimated taking medication this percent of the time:  greater than 90%.    Allergies were reviewed, assessed, and updated with the patient.      Patient does not use any multi-dose medications prior to admission.    The information provided in this note is only as accurate as the sources available at the time of the update(s).    Thank you for the opportunity to participate in the care of this patient.    Sandip Reed, [facility]  8/17/2022 1:03 PM

## 2022-08-17 NOTE — PROGRESS NOTES
ICU Note:     Discussed patient with ER providers - here at [facility] RVR and resultant hypotension. DCCV x3 in ER without response. Currently on amiodarone drip, required low dose neosynephrine for BP support. Receiving digoxin load. Cardiology has been consulted from ER as well.     HR are looking improved to 1-teens, and suspect will be short lived on pressor as those get better. He is otherwise stable on 2 L O2 which can likely wean.     He will be seen in full consultation by our team tomorrow am.     Heidi Celis MD  Pulmonary and Critical Care Medicine  [facility]  Office: [phone]

## 2022-08-17 NOTE — PROGRESS NOTES
Pt was put on a ETCO2 NC before procedure. Pt was also put on a oxymask at 10lpm. No issues during procedure. Pt was awake before I left the room.

## 2022-08-17 NOTE — ED TRIAGE NOTES
The patient presents to the ED with c/o shortness of breath and bilateral feet pain worsening over 2 weeks. Hx of CHF. Shortness of breath is worse with exertion. SpO2 88-91%.     Triage Assessment     Row Name 08/17/22 1010       Triage Assessment (Adult)    Airway WDL WDL       Respiratory WDL    Respiratory WDL X       Skin Circulation/Temperature WDL    Skin Circulation/Temperature WDL WDL       Cardiac WDL    Cardiac WDL WDL       Peripheral/Neurovascular WDL    Peripheral Neurovascular WDL WDL       Cognitive/Neuro/Behavioral WDL    Cognitive/Neuro/Behavioral WDL WDL

## 2022-08-18 LAB
ANION GAP SERPL CALCULATED.3IONS-SCNC: 1 MMOL/L (ref 5–18)
BLD PROD TYP BPU: NORMAL
BLOOD COMPONENT TYPE: NORMAL
BUN SERPL-MCNC: 21 MG/DL (ref 8–28)
C REACTIVE PROTEIN LHE: 2.5 MG/DL (ref 0–?)
CALCIUM SERPL-MCNC: 7.6 MG/DL (ref 8.5–10.5)
CHLORIDE BLD-SCNC: 115 MMOL/L (ref 98–107)
CO2 SERPL-SCNC: 23 MMOL/L (ref 22–31)
CODING SYSTEM: NORMAL
CREAT SERPL-MCNC: 0.76 MG/DL (ref 0.7–1.3)
CROSSMATCH: NORMAL
D DIMER PPP FEU-MCNC: 0.38 UG/ML FEU (ref 0–0.5)
ERYTHROCYTE [DISTWIDTH] IN BLOOD BY AUTOMATED COUNT: 20.8 % (ref 10–15)
GFR SERPL CREATININE-BSD FRML MDRD: >90 ML/MIN/1.73M2
GLUCOSE BLD-MCNC: 93 MG/DL (ref 70–125)
HCT VFR BLD AUTO: 21.3 % (ref 40–53)
HGB BLD-MCNC: 6.9 G/DL (ref 13.3–17.7)
HGB BLD-MCNC: 8.7 G/DL (ref 13.3–17.7)
INR PPP: 2.12 (ref 0.85–1.15)
ISSUE DATE AND TIME: NORMAL
MAGNESIUM SERPL-MCNC: 1.8 MG/DL (ref 1.8–2.6)
MCH RBC QN AUTO: 45.1 PG (ref 26.5–33)
MCHC RBC AUTO-ENTMCNC: 32.4 G/DL (ref 31.5–36.5)
MCV RBC AUTO: 139 FL (ref 78–100)
PLATELET # BLD AUTO: 199 10E3/UL (ref 150–450)
POTASSIUM BLD-SCNC: 3.8 MMOL/L (ref 3.5–5)
RBC # BLD AUTO: 1.53 10E6/UL (ref 4.4–5.9)
SODIUM SERPL-SCNC: 139 MMOL/L (ref 136–145)
UFH PPP CHRO-ACNC: 0.55 IU/ML
UFH PPP CHRO-ACNC: <=0.1 IU/ML
UNIT ABO/RH: NORMAL
UNIT NUMBER: NORMAL
UNIT STATUS: NORMAL
UNIT TYPE ISBT: 5100
WBC # BLD AUTO: 2.3 10E3/UL (ref 4–11)

## 2022-08-18 PROCEDURE — 85379 FIBRIN DEGRADATION QUANT: CPT | Performed by: FAMILY MEDICINE

## 2022-08-18 PROCEDURE — 83735 ASSAY OF MAGNESIUM: CPT | Performed by: INTERNAL MEDICINE

## 2022-08-18 PROCEDURE — 85520 HEPARIN ASSAY: CPT | Performed by: FAMILY MEDICINE

## 2022-08-18 PROCEDURE — 250N000013 HC RX MED GY IP 250 OP 250 PS 637: Performed by: INTERNAL MEDICINE

## 2022-08-18 PROCEDURE — 99223 1ST HOSP IP/OBS HIGH 75: CPT | Performed by: INTERNAL MEDICINE

## 2022-08-18 PROCEDURE — 250N000011 HC RX IP 250 OP 636: Performed by: INTERNAL MEDICINE

## 2022-08-18 PROCEDURE — 82310 ASSAY OF CALCIUM: CPT | Performed by: FAMILY MEDICINE

## 2022-08-18 PROCEDURE — 250N000013 HC RX MED GY IP 250 OP 250 PS 637: Performed by: FAMILY MEDICINE

## 2022-08-18 PROCEDURE — 120N000013 HC R&B IMCU

## 2022-08-18 PROCEDURE — 86140 C-REACTIVE PROTEIN: CPT | Performed by: FAMILY MEDICINE

## 2022-08-18 PROCEDURE — 99232 SBSQ HOSP IP/OBS MODERATE 35: CPT | Performed by: FAMILY MEDICINE

## 2022-08-18 PROCEDURE — 99232 SBSQ HOSP IP/OBS MODERATE 35: CPT | Performed by: INTERNAL MEDICINE

## 2022-08-18 PROCEDURE — P9016 RBC LEUKOCYTES REDUCED: HCPCS | Performed by: INTERNAL MEDICINE

## 2022-08-18 PROCEDURE — 85610 PROTHROMBIN TIME: CPT | Performed by: FAMILY MEDICINE

## 2022-08-18 PROCEDURE — 258N000003 HC RX IP 258 OP 636: Performed by: INTERNAL MEDICINE

## 2022-08-18 PROCEDURE — 85014 HEMATOCRIT: CPT | Performed by: INTERNAL MEDICINE

## 2022-08-18 PROCEDURE — 85018 HEMOGLOBIN: CPT | Performed by: INTERNAL MEDICINE

## 2022-08-18 PROCEDURE — 258N000003 HC RX IP 258 OP 636: Performed by: FAMILY MEDICINE

## 2022-08-18 PROCEDURE — 82374 ASSAY BLOOD CARBON DIOXIDE: CPT | Performed by: FAMILY MEDICINE

## 2022-08-18 RX ORDER — WARFARIN SODIUM 1 MG/1
1 TABLET ORAL
Status: COMPLETED | OUTPATIENT
Start: 2022-08-18 | End: 2022-08-18

## 2022-08-18 RX ORDER — DEXAMETHASONE SODIUM PHOSPHATE 10 MG/ML
6 INJECTION, SOLUTION INTRAMUSCULAR; INTRAVENOUS EVERY 24 HOURS
Status: DISCONTINUED | OUTPATIENT
Start: 2022-08-18 | End: 2022-08-19 | Stop reason: HOSPADM

## 2022-08-18 RX ORDER — POTASSIUM CHLORIDE 1500 MG/1
20 TABLET, EXTENDED RELEASE ORAL ONCE
Status: COMPLETED | OUTPATIENT
Start: 2022-08-18 | End: 2022-08-18

## 2022-08-18 RX ORDER — ALBUTEROL SULFATE 90 UG/1
2 AEROSOL, METERED RESPIRATORY (INHALATION) EVERY 6 HOURS PRN
Status: DISCONTINUED | OUTPATIENT
Start: 2022-08-18 | End: 2022-08-19 | Stop reason: HOSPADM

## 2022-08-18 RX ORDER — PANTOPRAZOLE SODIUM 20 MG/1
40 TABLET, DELAYED RELEASE ORAL
Status: DISCONTINUED | OUTPATIENT
Start: 2022-08-18 | End: 2022-08-19 | Stop reason: HOSPADM

## 2022-08-18 RX ORDER — MAGNESIUM OXIDE 400 MG/1
400 TABLET ORAL EVERY 4 HOURS
Status: COMPLETED | OUTPATIENT
Start: 2022-08-18 | End: 2022-08-18

## 2022-08-18 RX ADMIN — Medication 400 MG: at 06:04

## 2022-08-18 RX ADMIN — PANTOPRAZOLE SODIUM 40 MG: 20 TABLET, DELAYED RELEASE ORAL at 09:49

## 2022-08-18 RX ADMIN — DEXAMETHASONE SODIUM PHOSPHATE 6 MG: 10 INJECTION, SOLUTION INTRAMUSCULAR; INTRAVENOUS at 09:48

## 2022-08-18 RX ADMIN — METOPROLOL TARTRATE 12.5 MG: 25 TABLET, FILM COATED ORAL at 20:35

## 2022-08-18 RX ADMIN — WARFARIN SODIUM 1 MG: 1 TABLET ORAL at 17:17

## 2022-08-18 RX ADMIN — ATORVASTATIN CALCIUM 80 MG: 40 TABLET, FILM COATED ORAL at 08:36

## 2022-08-18 RX ADMIN — UMECLIDINIUM 1 PUFF: 62.5 AEROSOL, POWDER ORAL at 08:38

## 2022-08-18 RX ADMIN — ASPIRIN 81 MG: 81 TABLET, COATED ORAL at 08:36

## 2022-08-18 RX ADMIN — SODIUM CHLORIDE 50 ML: 9 INJECTION, SOLUTION INTRAVENOUS at 00:33

## 2022-08-18 RX ADMIN — REMDESIVIR 100 MG: 100 INJECTION, POWDER, LYOPHILIZED, FOR SOLUTION INTRAVENOUS at 23:46

## 2022-08-18 RX ADMIN — FOLIC ACID 1 MG: 1 TABLET ORAL at 08:36

## 2022-08-18 RX ADMIN — POTASSIUM CHLORIDE 20 MEQ: 1500 TABLET, EXTENDED RELEASE ORAL at 06:04

## 2022-08-18 RX ADMIN — Medication 400 MG: at 08:36

## 2022-08-18 RX ADMIN — SODIUM CHLORIDE 50 ML: 9 INJECTION, SOLUTION INTRAVENOUS at 23:48

## 2022-08-18 ASSESSMENT — ACTIVITIES OF DAILY LIVING (ADL)
ADLS_ACUITY_SCORE: 18

## 2022-08-18 NOTE — PROGRESS NOTES
ICU Follow Up Note:    MAPs solidly > 65 after transfusion  Repeat Hgb 8.7      Patient stable to downgrade to cardiac telemetry    Heidi Celis MD  Pulmonary and Critical Care Medicine  [facility]  Office: [phone]

## 2022-08-18 NOTE — PROGRESS NOTES
ICU Update:    Hemoglobin 8.2-->6.9.  INR this morning 2.2 and patient on heparin drip.  Troponins trending down.  Will hold heparin drip given INR>2 for now. Can be re-evaluated with cardiology on am rounds.    Little Fish MD

## 2022-08-18 NOTE — CONSULTS
CRITICAL CARE CONSULT:    Assessment/Plan:  Hector is a 78 yo M with a history of myelodysplastic syndrome, on hydroxurea, HTN, HLD, chart history of COPD (no PFT), who presented on 8/17 with shortness of breath. He was found to be in afib with RVR (new for him), with resultant hypotension. Did not respond to DCCV x3, and was started on amiodarone and digoxin load. He was found to be COVID+, with bilateral infiltrates on imaging and endorses cough and weakness for the preceding 1 week. He is currently off pressor since 2 am last night, has converted to NSR, and on 2 liters of oxygen and feels well.     CV:  Afib with RVR, hypotension, initially requiring pressors. Status post amio and digoxin load, and now in NSR and off pressor since 2 am. Bps soft. Tpn leak, likely demand in the setting of RVR, prior recent angiogram per cardiology.    Appreciate cardiology recommendations, starting low dose BB    MAP goal > 65, suspect blood transfusion will level off blood pressures    Lactic acid 1.1    Follow up Hgb after transfusion, currently therapeutically anticoagulated on coumadin with INR > 2, no need for heparin infusion. Will decide on resuming coumadin tonight pending response to transfusion    May need gentle diuresis later on given BNP and septal thickening on imaging    On ASA, statin    Tpn down and no longer need to trend    RESP:  Chart history of COPD, no PFT, prior PE, on coumadin. Found to be COVID+ and reports 1 week of cough and weakness prior to admission. Has some bilateral GGO on CT chest, not severe. Requiring 1-2 liters oxygen. COVID illness may have been precipitating factor for atrial fibrillation.     Continue home LAMA inhaler. Make scheduled albuterol prn, to avoid precipitating anymore tachyarrythmia    Will treat COVID given infiltrates, symptoms, and O2 need - on remdesivir, add decadron    Wean O2    RENAL:  No acute issues, monitor lytes and function    ID:  No evidence of acute bacterial  "process to necessitate abx    GI:  Taking oral diet  Will add PPI while on ASA/coumadin/steroids    NEURO:  No acute issues    HEMATOLOGIC:  - Hx myelodysplastic disease, on hydroxyurea. More leukopenic, anemic, with decline in plts this am    Receiving 1 units PRBC, follow up Hgb this afternoon. No evidence of bleeding, seems like marrow process given other cell lines    Hydroxyurea on hold per [facility] service as this can worsen this    INR >2, follow up Hgb stability to dictate continuation of coumadin this evening    ENDOCRINE:  No hx DM, no glucoses out of range. Monitor prn, may need scheduled monitoring with addition of steroid.      ICU Checklist:  Feeding:  Oral diet    Lines/Tubes:  PICC (8/17)  PIVs    Prophylaxis:    Thromboembolic: therapeutic INR    Stress Ulcer: add PPI    Restraints? no    DISPOSITION: ICU pending pressor need    CODE STATUS: Full Code    FAMILY COMMUNICATION: Patient updated at the bedside    Patient not presently critically ill. Monitor BP this morning and if stable off pressor downgrade to cardiac tele later today.      Heidi Celis MD  Pulmonary and Critical Care Medicine  [facility]  Office: [phone]    Clinically Significant Risk Factors Present on Admission             # Hypoalbuminemia: Albumin = 2.6 g/dL (Ref range: 3.5 - 5.0 g/dL) on admission, will monitor as appropriate   # Coagulation Defect: home medication list includes an anticoagulant medication    # Circulatory Shock: currently requiring pressors for blood pressure support  # Anemia: based on hgb <11   # Obesity: Estimated body mass index is 32.68 kg/m  as calculated from the following:    Height as of this encounter: 1.676 m (5' 6\").    Weight as of this encounter: 91.9 kg (202 lb 8 oz).        --------------------------    CCx: shortness of breath    HPI:   Hector is a 76 yo M with a history of myelodysplastic syndrome, on hydroxurea, HTN, HLD, chart history of COPD (no PFT), who presented on 8/17 with " shortness of breath. He was found to be in afib with RVR (new for him), with resultant hypotension. Did not respond to DCCV x3, and was started on amiodarone and digoxin load. He was found to be COVID+, with bilateral infiltrates on imaging and endorses cough and weakness for the preceding 1 week. He is currently off pressor since 2 am last night, has converted to NSR, and on 2 liters of oxygen and feels well.     Receiving 1 unit PRBC this morning for Hgb 6.9.      ROS:  A 12-system review was obtained and was negative with the exception of the symptoms endorsed in the history of present illness.    Past Medical History:  Past Medical History:   Diagnosis Date     Cerebral infarction (H)      COPD (chronic obstructive pulmonary disease) (H)      HLD (hyperlipidemia)      Hypertension      Myeloproliferative disease (H)      Past Surgical History:  Past Surgical History:   Procedure Laterality Date     CV CORONARY ANGIOGRAM N/A 4/26/2022    Procedure: CV CORONARY ANGIOGRAM;  Surgeon: Audrey Holder MD;  Location: [facility] CATH LAB CV     CV LEFT HEART CATH N/A 4/26/2022    Procedure: Left Heart Catheterization;  Surgeon: Audrey Holder MD;  Location: [facility] CATH LAB CV     OPEN REDUCTION INTERNAL FIXATION FOOT Right 2010     OTHER SURGICAL HISTORY  2001    Lip lesion removal     PICC TRIPLE LUMEN PLACEMENT  8/17/2022          TONSILLECTOMY & ADENOIDECTOMY       Social History:  Social History     Socioeconomic History     Marital status:      Spouse name: Not on file     Number of children: Not on file     Years of education: Not on file     Highest education level: Not on file   Occupational History     Not on file   Tobacco Use     Smoking status: Former Smoker     Packs/day: 1.00     Start date: 6/8/1965     Quit date: 1/1/2012     Years since quitting: 10.6     Smokeless tobacco: Never Used   Substance and Sexual Activity     Alcohol use: Yes     Alcohol/week: 19.0 standard drinks     Drug use:  "Never     Sexual activity: Not on file   Other Topics Concern     Not on file   Social History Narrative     Not on file     Social Determinants of Health     Financial Resource Strain: Not on file   Food Insecurity: Not on file   Transportation Needs: Not on file   Physical Activity: Not on file   Stress: Not on file   Social Connections: Not on file   Intimate Partner Violence: Not on file   Housing Stability: Not on file     Family History:  Family History   Problem Relation Age of Onset     Alcoholism Mother      Allergies:  No Known Allergies    Physical Exam:  Resp: 24    BP (!) 88/47   Pulse 69   Temp 97.5  F (36.4  C) (Oral)   Resp 24   Ht 1.676 m (5' 6\")   Wt 91.9 kg (202 lb 8 oz)   SpO2 93%   BMI 32.68 kg/m      Intake/Output last 3 shifts:  I/O last 3 completed shifts:  In: 1054.73 [P.O.:120; I.V.:934.73]  Out: 850 [Urine:850]  Intake/Output this shift:  No intake/output data recorded.    Physical Exam  Gen: Alert, oriented, no distress  HEENT: NT, no DAWSON  CV: RRR, no m/g/r  Resp: Diminished, clear  Abd: soft, nontender, BS+  Skin: no rashes or lesions  Ext: no edema, warm  Neuro: PERRL, nonfocal exam    LABS:  Reviewed in detail    IMAGING:  Personally reviewed and interpreted    8/17/22 CT chest:  ANGIOGRAM CHEST: Pulmonary arteries are normal caliber and negative for pulmonary emboli. Thoracic aorta is negative for dissection. No CT evidence of right heart strain.     LUNGS AND PLEURA: Increased bilateral groundglass and nodular opacities. Mild interlobular septal thickening. Dependent atelectasis and scarring.     MEDIASTINUM/AXILLAE: Borderline enlarged mediastinal lymph nodes, likely reactive. Normal esophagus. No significant pericardial effusion. Right upper extremity venous catheter terminates in the lower SVC     CORONARY ARTERY CALCIFICATION: Moderate.     UPPER ABDOMEN: Small sliding hiatal hernia.     MUSCULOSKELETAL: No aggressive or destructive lesions.                                 "                               IMPRESSION:  1.  No evidence of pulmonary embolism or secondary signs of right heart strain.     2.  Scattered bilateral groundglass and nodular opacities, likely infectious or inflammatory. Mild interlobular septal thickening suggests mild pulmonary edema as well.     3.  Small hiatal hernia.

## 2022-08-18 NOTE — H&P
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 fib with RVR presents with shortness of breath.  Again patient found to be in A. fib with RVR and to have an elevated troponin.  He was started on a heparin drip and cardiology consultation obtained.  Due to hypotension and tachycardia has been admitted to the ICU.  Currently on some pressor support and IV amiodarone.  Also on heparin drip.    Atrial fibrillation with rapid ventricular response  Currently on amiodarone drip  Back in sinus rhythm on monitor  Defer further management to cardiology  Hold metoprolol currently    Coronary artery disease/NSTEMI  No chest pain  Troponin trending down  Likely demand ischemia associated with A. fib with RVR and known coronary disease    Essential hypertension  Home meds with hold parameters    Hypotension  Currently on some pressors for support  Wean as able  Hopefully improves with rate control    Class 1 obesity    Cardiomyopathy/EF of 30 to 35%  No signs of failure currently    Anemia/thrombocytosis  Continue outpatient regimen    COVID-19 status positive/hypoxia/groundglass opacities  Remdesivir ordered  Already on steroids so monitor  If worsening hypoxia would initiate dexamethasone    VTE prophylaxis  Heparin drip    Full code                   # Coagulation Defect: home medication list includes an anticoagulant medication   # Hypertension: home medication list includes antihypertensive(s)  # Chronic systolic heart failure: echo within the past year with EF <40%   # Circulatory Shock: currently requiring pressors for blood pressure support   # Obesity: Estimated body mass index is 31.39 kg/m  as calculated from the following:    Height as of this encounter: 1.676 m (5' 6\").    Weight as of this encounter: 88.2 kg " (194 lb 8 oz).          DVTP: Heparin drip  Code Status: Full Code  Disposition: Inpatient   Expected LOS: 2 days   Goals for the hospitalization: Resolution of hypoxia and further evaluation of elevated troponin etc.  Disposition Plan      Expected Discharge Date: 08/19/2022        Discharge Comments: off pressors        The patient's care was discussed with the Bedside Nurse and Patient.  Chief Complaint  shortness of breath     HISTORY     Shaji Pompa is a 77 year old old male with h/o coronary disease, hypertension, and history of DVT and PE on chronic anticoagulation presents with shortness of breath.  In the ER is found to be hypotensive and in A. fib with RVR.  Ultimately admitted to the ICU.  Started on amiodarone drip and heparin.  Did have an elevation in troponin as well consistent with NSTEMI.  Patient found incidentally to be COVID-positive.  Hypoxic with low-dose oxygen and needs.  At high risk for decompensation.  No known COVID exposures.  Did have groundglass opacities on CT scan without PE.  Discussed remdesivir and he is in agreement.  Patient currently in the ICU on low-dose pressors, heparin drip, and an amiodarone drip.    Past Medical History     Past Medical History:  No date: Cerebral infarction (H)  No date: COPD (chronic obstructive pulmonary disease) (H)  No date: HLD (hyperlipidemia)  No date: Hypertension  No date: Myeloproliferative disease (H)     Surgical History     Past Surgical History:   Procedure Laterality Date     CV CORONARY ANGIOGRAM N/A 4/26/2022    Procedure: CV CORONARY ANGIOGRAM;  Surgeon: Audrey Holder MD;  Location[facility] CV     CV LEFT HEART CATH N/A 4/26/2022    Procedure: Left Heart Catheterization;  Surgeon: Audrey Holder MD;  Location: [facility] CATH LAB CV     OPEN REDUCTION INTERNAL FIXATION FOOT Right 2010     OTHER SURGICAL HISTORY  2001    Lip lesion removal     PICC TRIPLE LUMEN PLACEMENT  8/17/2022          TONSILLECTOMY &  ADENOIDECTOMY       Family History    Reviewed, and   Family History   Problem Relation Age of Onset     Alcoholism Mother         Social History      Social History     Tobacco Use     Smoking status: Former Smoker     Packs/day: 1.00     Start date: 6/8/1965     Quit date: 1/1/2012     Years since quitting: 10.6     Smokeless tobacco: Never Used   Substance Use Topics     Alcohol use: Yes     Alcohol/week: 19.0 standard drinks     Drug use: Never        Allergies   No Known Allergies  Prior to Admission Medications      Prior to Admission Medications   Prescriptions Last Dose Informant Patient Reported? Taking?   albuterol (PROAIR HFA;PROVENTIL HFA;VENTOLIN HFA) 90 mcg/actuation inhaler Unknown at didn't bring  Yes Yes   Sig: Inhale 2 puffs into the lungs every 6 hours as needed for shortness of breath / dyspnea   aspirin (ASA) 81 MG EC tablet 8/17/2022 at AM  No Yes   Sig: Take 1 tablet (81 mg) by mouth daily Start tomorrow morning.   atorvastatin (LIPITOR) 80 MG tablet 8/17/2022 at AM  No Yes   Sig: Take 1 tablet (80 mg) by mouth every morning   folic acid (FOLVITE) 1 MG tablet 8/17/2022 at AM  No Yes   Sig: Take 1 tablet (1 mg) by mouth daily   furosemide (LASIX) 20 MG tablet 8/17/2022 at AM  No Yes   Sig: Take 0.5 tablets (10 mg) by mouth daily   hydroxyurea (HYDREA) 500 MG capsule 8/17/2022 at AM, 1500 mg  No Yes   Sig: Take 1 capsule (500 mg) by mouth daily   Patient taking differently: Take 1,000-1,500 mg by mouth See Admin Instructions 1500 mg daily- Mon, Wed, & Fri.  1000 mg daily - Tue, Thurs, Sat, & Sun   losartan (COZAAR) 25 MG tablet 8/17/2022 at AM  No Yes   Sig: Take 0.5 tablets (12.5 mg) by mouth daily   metoprolol succinate ER (TOPROL-XL) 25 MG 24 hr tablet 8/17/2022 at AM  Yes Yes   Sig: Take 25 mg by mouth daily   nitroGLYcerin (NITROSTAT) 0.4 MG sublingual tablet Unknown at Unknown time  No Yes   Sig: One tablet under the tongue every 5 minutes if needed for chest pain. May repeat every 5  "minutes for a maximum of 3 doses in 15 minutes\"   tiotropium (SPIRIVA) 18 mcg inhalation capsule 8/17/2022 at AM, didn't bring  Yes Yes   Sig: [TIOTROPIUM (SPIRIVA) 18 MCG INHALATION CAPSULE] Place 18 mcg into inhaler and inhale daily.   warfarin ANTICOAGULANT (COUMADIN) 1 MG tablet 8/17/2022 at AM  No Yes   Sig: Take 5 mg on 5/2, re dose warfarin on 5/3 per oncology based on INR   Patient taking differently: Take 2 mg by mouth daily      Facility-Administered Medications: None      Review of Systems     A 12 point comprehensive review of systems was negative except as noted above in HPI.    PHYSICAL EXAMINATION       Vitals      Temp:  [97.6  F (36.4  C)-98.4  F (36.9  C)] 97.9  F (36.6  C)  Pulse:  [] 59  Resp:  [0-55] 22  BP: ()/(46-92) 94/50  SpO2:  [72 %-100 %] 92 %    Examination     GENERAL:  Alert, appears comfortable, in no acute distress, appears stated age   HEAD:  Normocephalic, without obvious abnormality, atraumatic   EYES:  PERRL, conjunctiva/corneas clear, no scleral icterus, EOM's intact   NOSE: Nares normal, septum midline, mucosa normal, no drainage   NECK: Supple, symmetrical, trachea midline   BACK:   Symmetric, no curvature, ROM normal   LUNGS:   Clear to auscultation bilaterally, no rales, rhonchi, or wheezing, symmetric chest rise on inhalation, respirations unlabored   CHEST WALL:  No tenderness or deformity   HEART:  Regular rate and rhythm, S1 and S2 normal, no murmur, rub, or gallop    ABDOMEN:   Soft, non-tender, bowel sounds active all four quadrants, no masses, no organomegaly, no rebound or guarding   EXTREMITIES: Extremities normal, atraumatic, no cyanosis or edema    SKIN: Dry to touch, no exanthems in the visualized areas   NEURO: Alert, oriented x 4, moves all four extremities freely, non-focal   PSYCH: Cooperative, behavior is appropriate      Pertinent Lab     Most Recent 3 CBC's:Recent Labs   Lab Test 08/17/22  1011 05/01/22  0535 04/30/22  0456   WBC 4.2 5.9 6.4 "   HGB 8.2* 8.6* 8.7*   * 116* 117*    351 278     Most Recent 3 BMP's:Recent Labs   Lab Test 08/17/22  1238 05/03/22  1659 05/01/22  0535    140 139   POTASSIUM 4.3 4.6 3.9   CHLORIDE 106 106 109*   CO2 22 25 23   BUN 27 20 14   CR 1.08 0.76 0.79   ANIONGAP 9 9 7   GLENDY 8.3* 8.6 7.8*    95 92     Most Recent 2 LFT's:Recent Labs   Lab Test 04/20/22  0405 04/19/22  1441   AST 17 18   ALT 22 22   ALKPHOS 78 88   BILITOTAL 1.3* 1.0     Most Recent 3 INR's:Recent Labs   Lab Test 08/17/22  1011 05/01/22  0535 04/30/22  0456   INR 1.71* 2.34* 1.89*         Pertinent Radiology     Radiology Results:   Recent Results (from the past 24 hour(s))   XR Chest Port 1 View    Narrative    EXAM: XR CHEST PORT 1 VIEW  LOCATION: [facility]  DATE/TIME: 8/17/2022 11:16 AM    INDICATION: Dyspnea, history of CHF  COMPARISON: CT chest 04/24/2022, portable chest 04/23/2022.      Impression    IMPRESSION: Heart size and vascularity are normal. Previous loculated hydropneumothorax posterior medial left chest has resolved. No focal consolidation, pneumothorax nor pleural effusion.   CT Chest Pulmonary Embolism w Contrast    Narrative    EXAM: CT CHEST PULMONARY EMBOLISM W CONTRAST  LOCATION: [facility]  DATE/TIME: 8/17/2022 2:56 PM    INDICATION: history of dvt evaluate for pe  COMPARISON: 04/24/2022  TECHNIQUE: CT chest pulmonary angiogram during arterial phase injection of IV contrast. Multiplanar reformats and MIP reconstructions were performed. Dose reduction techniques were used.   CONTRAST: Isovue 370 100mL     FINDINGS:  ANGIOGRAM CHEST: Pulmonary arteries are normal caliber and negative for pulmonary emboli. Thoracic aorta is negative for dissection. No CT evidence of right heart strain.    LUNGS AND PLEURA: Increased bilateral groundglass and nodular opacities. Mild interlobular septal thickening. Dependent atelectasis and scarring.    MEDIASTINUM/AXILLAE:  Borderline enlarged mediastinal lymph nodes, likely reactive. Normal esophagus. No significant pericardial effusion. Right upper extremity venous catheter terminates in the lower SVC    CORONARY ARTERY CALCIFICATION: Moderate.    UPPER ABDOMEN: Small sliding hiatal hernia.    MUSCULOSKELETAL: No aggressive or destructive lesions.      Impression    IMPRESSION:  1.  No evidence of pulmonary embolism or secondary signs of right heart strain.    2.  Scattered bilateral groundglass and nodular opacities, likely infectious or inflammatory. Mild interlobular septal thickening suggests mild pulmonary edema as well.    3.  Small hiatal hernia.   XR Chest Port 1 View    Narrative    EXAM: XR CHEST PORT 1 VIEW  LOCATION: [facility]  DATE/TIME: 2022 3:19 PM    INDICATION: Check PICC placement.  COMPARISON: A 1722 at 1120 hours      Impression    IMPRESSION: Right-sided PICC tip in good position within low SVC. Heart size and vascularity are normal. Bilateral bronchial wall thickening suggest airway inflammation. Bibasilar subsegmental atelectasis. No pneumothorax or pleural effusion.   Echocardiogram Complete   Result Value    LVEF  30-35% (moderately reduced)    Narrative    [id number]  [facility]  [id number]  [id number]^SUNDAR^[place] [address]     Name: HELENA FENTON  MRN: [id number]  : 1945  Study Date: 2022 03:38 PM  Age: 77 yrs  Gender: Male  Patient Location: [facility]  Reason For Study: CAD  Ordering Physician: FERNANDA KWOK  Performed By: JEANINE     BSA: 2.0 m2  Height: 66 in  Weight: 194 lb  HR: 99  BP: 95/60 mmHg  ______________________________________________________________________________  Procedure  Complete Portable Echo Adult. Definity (NDC #65744-897) given intravenously.  ______________________________________________________________________________  Interpretation Summary     The rhythm was rapid atrial  fibrillation.  Left ventricular function is decreased. The ejection fraction is 30-35%  (moderately reduced).  There is moderate global hypokinesia of the left ventricle.  The left atrium is mildly dilated.  The study was technically difficult. No hemodynamically significant valvular  abnormalities on 2D or color flow imaging.  ______________________________________________________________________________  Left Ventricle  The left ventricle is normal in size. Left ventricular function is decreased.  The ejection fraction is 30-35% (moderately reduced). Diastolic function not  assessed due to atrial fibrillation. There is moderate global hypokinesia of  the left ventricle.     Right Ventricle  Right ventrical function, chamber size, wall motion, and thickness are normal.     Atria  The left atrium is mildly dilated. Right atrial size is normal. There is no  color Doppler evidence of an atrial shunt.     Mitral Valve  Mitral valve leaflets appear normal. There is mild (1+) mitral regurgitation.     Tricuspid Valve  Tricuspid valve leaflets appear normal. There is no evidence of tricuspid  stenosis or clinically significant tricuspid regurgitation. Right ventricle  systolic pressure estimate normal.     Aortic Valve  Aortic valve leaflets appear normal. There is no evidence of aortic stenosis  or clinically significant aortic regurgitation.     Pulmonic Valve  The pulmonic valve is not well visualized.     Vessels  Normal size ascending aorta. Inferior vena cava not well visualized for  estimation of right atrial pressure.     Pericardium  There is no pericardial effusion.     Rhythm  The rhythm was rapid atrial fibrillation.  ______________________________________________________________________________  MMode/2D Measurements & Calculations     IVSd: 1.0 cm  LVIDd: 4.8 cm  LVIDs: 3.6 cm  LVPWd: 1.2 cm  FS: 24.4 %  LV mass(C)d: 199.0 grams  LV mass(C)dI: 100.8 grams/m2  LA dimension: 3.9 cm  asc Aorta Diam: 2.9  cm  LVOT diam: 1.9 cm  LVOT area: 2.8 cm2  RWT: 0.49     Time Measurements  MM HR: 120.0 BPM     Doppler Measurements & Calculations  MV E max joe: 98.4 cm/sec  MV dec time: 0.18 sec  Ao V2 max: 128.0 cm/sec  Ao max P.0 mmHg  Ao V2 mean: 84.2 cm/sec  Ao mean PG: 3.5 mmHg  Ao V2 VTI: 21.1 cm  JAVIER(I,D): 2.0 cm2  JAVIER(V,D): 2.2 cm2     LV V1 max P.1 mmHg  LV V1 max: 101.4 cm/sec  LV V1 VTI: 15.1 cm  SV(LVOT): 42.7 ml  SI(LVOT): 21.6 ml/m2  PA acc time: 0.11 sec  TR max joe: 176.5 cm/sec  TR max P.5 mmHg  AV Joe Ratio (DI): 0.79  JAVIER Index (cm2/m2): 1.0  E/E' av.7  Lateral E/e': 6.9  Medial E/e': 12.4     ______________________________________________________________________________  Report approved by: Ryan Gonzalez 2022 04:24 PM           EKG Results: personally reviewed.     Advance Care Planning        Vick Tracey MD  [facility]   Phone: #[phone]

## 2022-08-18 NOTE — PROVIDER NOTIFICATION
Dr. Fish notified of previous ED order to transfuse 1 unit of PRBC for Hgb 8.2. Not completed prior to admission to ICU. VSS on RA and currently off [place] gtt. Dr. Fish stated she would discontinue transfusion order, and patient already has CBC ordered for AM to trend Hgb.    Karoline Hickman RN

## 2022-08-18 NOTE — PROVIDER NOTIFICATION
Notified Intensivist at 0150 AM regarding new orders.      Spoke with: Dr. Fish    Orders : No new orders, since trop trending down.     Comments: Do you want to check another troponin level?  Pt only had two drawn since admission.

## 2022-08-18 NOTE — PHARMACY-ANTICOAGULATION SERVICE
Clinical Pharmacy - Warfarin Dosing Consult     Pharmacy has been consulted to manage this patient s warfarin therapy.  Indication: Atrial Fibrillation;DVT/ PE Treatment  Therapy Goal: INR 2-3  Warfarin Prior to Admission: Yes  Warfarin PTA Regimen: 2 mg daily  Significant drug interactions: NEW = amiodarone, heparin; Home Meds = aspirin,  Dose Comments: INR subtherapeutic, pt on heparin drip and amiodarone drips. Pt took his home dose of warfarin this am. Given low INR, will give another small dose (2 mg) tonight. Recheck INR in am.    INR   Date Value Ref Range Status   08/17/2022 1.71 (H) 0.85 - 1.15 Final   05/01/2022 2.34 (H) 0.85 - 1.15 Final       Recommend warfarin 2 mg today.  Monitor closely while on amiodarone. Pharmacy will monitor Shaji Pompa daily and order warfarin doses to achieve specified goal.      Please contact pharmacy as soon as possible if the warfarin needs to be held for a procedure or if the warfarin goals change.

## 2022-08-18 NOTE — PROVIDER NOTIFICATION
Notified Intensivist at 0511 AM regarding lab results.      Spoke with: Dr. Fish    Orders were obtained.    Comments: AM hgb 6.9

## 2022-08-18 NOTE — PLAN OF CARE
Pt A&Ox4, very pleasant. RA when awake, 1L NC when sleeping. Pt denies any SOB, pain, N/V. NSR. Pt got 1 unit of PRBC this shift, hbg recheck came back 8.7. Low Na low Fat diet, medication whole. SBA. Continent. Pt downgraded from ICU status to Cardiac telle. Pt not experiencing any s/s of COVID. Remains in isolation precautions.     Problem: Plan of Care - These are the overarching goals to be used throughout the patient stay.    Goal: Plan of Care Review/Shift Note  Description: The Plan of Care Review/Shift note should be completed every shift.  The Outcome Evaluation is a brief statement about your assessment that the patient is improving, declining, or no change.  This information will be displayed automatically on your shift note.  Outcome: Ongoing, Progressing     Problem: Dysrhythmia  Goal: Normalized Cardiac Rhythm  Outcome: Ongoing, Progressing  Intervention: Monitor and Manage Cardiac Rhythm Effect  Recent Flowsheet Documentation  Taken 8/18/2022 1200 by Stormy Elias RN  VTE Prevention/Management: patient refused intervention  Taken 8/18/2022 0715 by Stormy Elias, RN  VTE Prevention/Management: patient refused intervention     Stormy Elias RN on 8/18/2022 at 2:47 PM

## 2022-08-18 NOTE — PLAN OF CARE
A/ox4. Denied pain on shift. Pt has been off of amio drip since start of shift due to HR below 60. Tele: SR. Was on chadwick drip for map>65, been paused since around 2am, has maintained map goal. OVSS on RA, 1L O2 overnight, sats dropping to 88% while asleep. Heparin drip placed on hold today per MD- hgb trending down and INR elevated this AM. I unit of RBCs ordered- in the process of transfusing.  Voiding via bedside urinal. Prefers to stand at bedside while voiding. Bed alarm on. Replaced mag/K this AM- recheck tomorrow.     Problem: Plan of Care - These are the overarching goals to be used throughout the patient stay.    Goal: Optimal Comfort and Wellbeing  8/18/2022 0347 by Amada Santos, ADAM  Outcome: Ongoing, Progressing     Problem: Dysrhythmia  Goal: Normalized Cardiac Rhythm  8/18/2022 0347 by Amada Santos, RN  Outcome: Ongoing, Progressing

## 2022-08-18 NOTE — PROGRESS NOTES
" [facility]   [address], [place] [address]   [email or web address]   OFFICE: [phone]            Impression and Plan     1.  Coronary artery disease.  Hector has known coronary artery disease.  Specifically, he underwent coronary angiography 26 April 2022 that revealed complete chronic occlusion of the right coronary artery, but otherwise mild disease.  Distal right coronary artery fills via collaterals from left circulation (see Cardiac Diagnostic section below).      Hector presents with shortness of breath, but minimizes actual chest pain.  He was noted to have an elevation in troponin to 3.25 ng/mL. This may represent \"demand ischemia\" in setting of collaterally dependent right coronary artery ( i.e. type 2 MI secondary to ischemia due to either an increased oxygen demand or a decreased supply in the absence of an acute primary coronary thrombotic event). Despite development of atrial fibrillation in the Emergency Department, no prominent ST segment depression/elevation and he has not been reporting anginal type chest pain which is reassuring.  At this time, no plans for repeat angiography. Plan:    Continue aspirin.    Continue ?-blocker therapy if blood pressure will allow (will decrease to 12.5 mg twice daily with hold parameters).       2.  Atrial fibrillation.  Hector in the Emergency Department developed atrial fibrillation.  Confirmed to be in sinus on initial presentation.  Hector is fairly asymptomatic with the rhythm disturbance.    Hector has been maintained on warfarin for history of DVT/pulmonary embolism.  INR this morning 2.12.  Currently sinus rhythm. Plan:    ?-blocker therapr therapy if blood pressure will allow as per problem #1.      3.    Cardiomyopathy.  Hector has known cardiomyopathy.  Echocardiogram 20 April 2022 revealed ejection fraction of 40%.  Repeat echocardiogram this admission with ejection fraction 30-35%.  Slightly reduced " ejection fraction from previously likely related to atrial fibrillation with rapid ventricular response.  ACE/ARB currently being held vis-à-vis tendency toward low blood pressure.    ?-blocker therapr therapy if blood pressure will allow as per problem #1 and #2.     4.  History of DVT and pulmonary embolism.  As noted below, Hector has a history of prior DVT and pulmonary embolism for which she has been on warfarin therapy though INR subtherapeutic on presentation.  INR this morning 2.12.   CT scan this admission without evidence of pulmonary embolism.     5.  COVID-19 positive.  Management plan as outlined by Dr. Vick Tracey.    6.  Anemia.  Management plan as outlined by the providers.     Primary Cardiologist: Dr. Sp Larson    Subjective     Hector continues to deny any chest pain.  Breathing fairly comfortable this morning.    Cardiac Diagnostics     Telemetry (personally reviewed): Initially sinus rhythm with subsequent development of atrial fibrillation.      Echocardiogram 17 August 2022:  1. Normal left ventricular size with moderate depression left ventricular systolic performance.  Ejection fraction 30-35%.  2. Moderate global reduction left ventricular systolic performance.  3. No significant valvular heart disease.  4. Normal right ventricular size and systolic performance.  5. Mild left atrial enlargement.  Right show normal dimension.    Echocardiogram (personally reviewed) 20 April 2022:  1. The left ventricle is normal in size. Left ventricular systolic performance is moderately reduced. The ejection fraction is estimated to be 40%.  2. There is severe inferior hypokinesis. There is moderate anteroseptal hypokinesis. In addition, there is moderate mid to distal posterior hypokinesis and severe distal lateral hypokinesis  3. No significant valvular heart disease is identified on this study.  4. Borderline right ventricular enlargement with low normal right ventricular systolic  "performance.  5. There is mild left atrial enlargement.  6. Right ventricular systolic pressure relative to right atrial pressure is mildly increased. The pulmonary artery pressure is estimated to be 45-50mmHg plus right atrial pressure (the IVC is of normal caliber).     Coronary angiography 26 April 2022:  1. Left main coronary artery: 25% stenosis.  2. Left anterior descending coronary artery: 20% proximal stenosis.  3. Ramus intermedius: Large vessel.  4. Circumflex coronary artery: 30% stenosis.  5. Right coronary artery: Proximal 90% stenosis followed by proximal-distal 100% stenosis.  Distal vessel filling via collaterals from left-sided circulation.    Recommendations:    Medical therapy and risk factor modification.     Holter monitor 15 November 2019:  1. Normal Holter monitor.     Twelve-lead ECG (personally reviewed) 17 August 2022 at 1251: Atrial fibrillation with heart rate of 141 bpm.  T wave inversion and anterolateral leads.     Twelve-lead ECG (personally reviewed) 17 August 2022 at 1016: Sinus rhythm.  Inferior infarct.  Nonspecific T wave inversion in anterolateral leads.     Twelve-lead ECG (personally reviewed) 19 April 2022: Sinus rhythm.  Inferior infarct.       Physical Examination       /53   Pulse 68   Temp 98  F (36.7  C) (Oral)   Resp 21   Ht 1.676 m (5' 6\")   Wt 91.9 kg (202 lb 8 oz)   SpO2 100%   BMI 32.68 kg/m          Vitals:    08/17/22 1008 08/17/22 1745 08/18/22 0400   Weight: 88 kg (194 lb) 88.2 kg (194 lb 8 oz) 91.9 kg (202 lb 8 oz)              Intake/Output Summary (Last 24 hours) at 8/18/2022 0704  Last data filed at 8/18/2022 0600  Gross per 24 hour   Intake 1054.73 ml   Output 850 ml   Net 204.73 ml       The patient is alert and oriented times three. Sclerae are anicteric. Mucosal membranes are moist. Jugular venous pressure is reasonable no significant adenopathy/thyromegally appreciated. Lungs are fairly clear with reasonable expansion. On cardiovascular " exam, the patient has a regular S1 and S2.  Heart sounds are somewhat distant.  Abdomen is soft and non-tender. Extremities reveal no clubbing, cyanosis.         Imaging     Chest radiograph 17 August 2022:  1. Right-sided PICC tip in good position within low SVC.   2. Heart size and vascularity are normal.   3. Bilateral bronchial wall thickening suggest airway inflammation.   4. Bibasilar subsegmental atelectasis.   5. No pneumothorax or pleural effusion.    CT scan of chest 17 August 2022:  1. No evidence of pulmonary embolism or secondary signs of right heart strain.   2. Scattered bilateral groundglass and nodular opacities, likely infectious or inflammatory. Mild interlobular septal thickening suggests mild pulmonary edema as well.   3. Small hiatal hernia.    Lab Results     Recent Labs   Lab 08/18/22  0459 08/17/22  2104 08/17/22  1238 08/17/22  1011   WBC 2.3*  --   --  4.2   HGB 6.9*  --   --  8.2*   *  --   --  137*     --   --  323   INR 2.12*  --   --  1.71*     --  137  --    POTASSIUM 3.8 3.5 4.3  --    CHLORIDE 115*  --  106  --    CO2 23  --  22  --    BUN 21  --  27  --    CR 0.76  --  1.08  --    ANIONGAP 1*  --  9  --    GLENDY 7.6*  --  8.3*  --    GLC 93  --  100  --    ALBUMIN  --  2.6*  --   --    PROTTOTAL  --  5.0*  --   --    BILITOTAL  --  0.5  --   --    ALKPHOS  --  52  --   --    ALT  --  21  --   --    AST  --  38  --   --      Recent Labs   Lab Test 08/17/22  1011 04/19/22  1441 04/01/22  1208   * 1,221* 379*       Lab Results   Component Value Date     08/18/2022    CO2 23 08/18/2022    BUN 21 08/18/2022     Lab Results   Component Value Date    WBC 2.3 08/18/2022    HGB 6.9 08/18/2022    HCT 21.3 08/18/2022     08/18/2022     08/18/2022     Lab Results   Component Value Date    CHOL 110 05/03/2021    TRIG 115 05/03/2021    HDL 35 05/03/2021     Lab Results   Component Value Date    INR 2.12 08/18/2022     Lab Results   Component Value  Date    TROPONINI 2.47 08/17/2022    TROPONINI 3.25 08/17/2022    TROPONINI 0.09 04/20/2022     No results found for: TSH        Current Inpatient Scheduled Medications   Scheduled Meds:    remdesivir  100 mg Intravenous Q24H    And     sodium chloride 0.9%  50 mL Intravenous Q24H     albuterol  2 puff Inhalation 4x Daily     aspirin  81 mg Oral Daily     atorvastatin  80 mg Oral QAM     folic acid  1 mg Oral Daily     [Held by provider] furosemide  10 mg Oral Daily     hydroxyurea  1,000 mg Oral Once per day on Sun Tue Thu Sat     [START ON 8/19/2022] hydroxyurea  1,500 mg Oral Once per day on Mon Wed Fri     losartan  12.5 mg Oral Daily     magnesium oxide  400 mg Oral Q4H     [Held by provider] metoprolol succinate ER  25 mg Oral Daily     perflutren lipid microsphere  2 mL Intravenous Once     sodium chloride (PF)  3 mL Intracatheter Q8H     umeclidinium  1 puff Inhalation Daily     [Held by provider] warfarin ANTICOAGULANT  2 mg Oral ONCE at 18:00     Continuous Infusions:    amiodarone Stopped (08/17/22 2108)     [Held by provider] heparin Stopped (08/18/22 0522)     - MEDICATION INSTRUCTIONS -       - MEDICATION INSTRUCTIONS -       phenylephrine Stopped (08/18/22 0151)     Warfarin Therapy Reminder              Medications Prior to Admission   Prior to Admission medications    Medication Sig Start Date End Date Taking? Authorizing Provider   albuterol (PROAIR HFA;PROVENTIL HFA;VENTOLIN HFA) 90 mcg/actuation inhaler Inhale 2 puffs into the lungs every 6 hours as needed for shortness of breath / dyspnea 10/31/19  Yes Provider, Historical   aspirin (ASA) 81 MG EC tablet Take 1 tablet (81 mg) by mouth daily Start tomorrow morning. 4/27/22  Yes Audrey Holder MD   atorvastatin (LIPITOR) 80 MG tablet Take 1 tablet (80 mg) by mouth every morning 5/31/22  Yes Karsten Castellon MD   folic acid (FOLVITE) 1 MG tablet Take 1 tablet (1 mg) by mouth daily 5/2/22  Yes Crispin Michelle MD   furosemide (LASIX) 20 MG  "tablet Take 0.5 tablets (10 mg) by mouth daily 6/2/22  Yes Sp Larson, DO   hydroxyurea (HYDREA) 500 MG capsule Take 1 capsule (500 mg) by mouth daily  Patient taking differently: Take 1,000-1,500 mg by mouth See Admin Instructions 1500 mg daily- Mon, Wed, & Fri.  1000 mg daily - Tue, Thurs, Sat, & Sun 5/2/22  Yes Crispin Michelle MD   losartan (COZAAR) 25 MG tablet Take 0.5 tablets (12.5 mg) by mouth daily 6/2/22  Yes Sp Larson, DO   metoprolol succinate ER (TOPROL-XL) 25 MG 24 hr tablet Take 25 mg by mouth daily   Yes Unknown, Entered By History   nitroGLYcerin (NITROSTAT) 0.4 MG sublingual tablet One tablet under the tongue every 5 minutes if needed for chest pain. May repeat every 5 minutes for a maximum of 3 doses in 15 minutes\" 4/26/22  Yes Audrey Holder MD   tiotropium (SPIRIVA) 18 mcg inhalation capsule [TIOTROPIUM (SPIRIVA) 18 MCG INHALATION CAPSULE] Place 18 mcg into inhaler and inhale daily. 10/31/19  Yes Provider, Historical   warfarin ANTICOAGULANT (COUMADIN) 1 MG tablet Take 5 mg on 5/2, re dose warfarin on 5/3 per oncology based on INR  Patient taking differently: Take 2 mg by mouth daily 5/1/22  Yes Crispin Michelle MD               "

## 2022-08-18 NOTE — PROGRESS NOTES
"[facility] MEDICINE PROGRESS NOTE      Identification/Summary: Shaji Pompa is a 77 year old male with a past medical history of cardiomyopathy, essential hypertension, and class I obesity presented to the ER with shortness of breath found to have A. fib with RVR, elevated troponin, and to be COVID-positive.  CT scan did reveal some groundglass opacities but no PE.  Patient has been admitted to the ICU due to hypotension and did require some pressors which now have been weaned off.    Assessment and Plan:  Atrial fibrillation with rapid ventricular response  Back in sinus rhythm  Off amiodarone drip  Failed multiple attempts at electrocardioversion  Resume oral metoprolol if able  Chronically anticoagulated     Coronary artery disease/NSTEMI/type II MI  Off heparin drip  Troponins were trending down  No chest pain     Essential hypertension/Hypotension  Off pressors  Hold ARB  Continue beta-blocker with hold parameters     Class 1 obesity     Cardiomyopathy/EF of 30 to 35%  No signs of failure currently  Low-sodium diet     Anemia/thrombocytosis  Continue outpatient regimen  Holding hydroxyurea 8/18     COVID-19 status positive/hypoxia/groundglass opacities  Remdesivir ordered  Discussed directly with pulmonology 8/18 and will start dexamethasone  Wean oxygen     VTE prophylaxis  Warfarin     Full code             # Hypoalbuminemia: Albumin = 2.6 g/dL (Ref range: 3.5 - 5.0 g/dL) on admission, will monitor as appropriate   # Coagulation Defect: home medication list includes an anticoagulant medication   # Hypertension: home medication list includes antihypertensive(s)  # Chronic systolic heart failure: echo within the past year with EF <40%   # Circulatory Shock: currently requiring pressors for blood pressure support   # Obesity: Estimated body mass index is 32.68 kg/m  as calculated from the following:    Height as of this encounter: 1.676 m (5' 6\").    Weight as of this " encounter: 91.9 kg (202 lb 8 oz).        Diet: Low Saturated Fat Na <2400 mg    Code Status: Full Code    Anticipated possible discharge in 1 to 2 days  Disposition Plan      Expected Discharge Date: 08/19/2022        Discharge Comments: off pressors        The patient's care was discussed with the Bedside Nurse, Care Coordinator/, Patient and ICU MD.    Vick Tracey MD  [facility]  Phone: #[phone]    Interval History/Subjective:  Feels pretty good.  No nausea or vomiting.  No chest pain.  Eating without issues.  He is hopeful to go home soon.    Physical Exam/Objective:  Temp:  [97.5  F (36.4  C)-98.1  F (36.7  C)] 97.6  F (36.4  C)  Pulse:  [] 65  Resp:  [0-55] 24  BP: ()/(43-92) 102/53  SpO2:  [72 %-100 %] 90 %  Body mass index is 32.68 kg/m .    GENERAL:  Alert, appears comfortable, in no acute distress, appears stated age   HEAD:  Normocephalic, without obvious abnormality, atraumatic   EYES:  PERRL, conjunctiva/corneas clear, no scleral icterus, EOM's intact   THROAT: Lips, mucosa, and tongue normal; teeth and gums normal, mouth moist   NECK: Supple, symmetrical, trachea midline   BACK:   Symmetric, no curvature, ROM normal   LUNGS:   Clear to auscultation bilaterally, no rales, rhonchi, or wheezing, symmetric chest rise on inhalation, respirations unlabored   CHEST WALL:  No tenderness or deformity   HEART:  Regular rate and rhythm, S1 and S2 normal, no murmur, rub, or gallop    ABDOMEN:   Soft, non-tender, bowel sounds active all four quadrants, no masses, no organomegaly, no rebound or guarding   EXTREMITIES: Extremities normal, atraumatic, no cyanosis or edema    SKIN: Dry to touch, no exanthems in the visualized areas   NEURO: Alert, oriented x3, moves all four extremities freely   PSYCH: Cooperative, behavior is appropriate      Data reviewed today: I personally reviewed all new medications, labs,  imaging/diagnostics reports over the past 24 hours. Pertinent findings include:    Imaging:   Recent Results (from the past 24 hour(s))   XR Chest Port 1 View    Narrative    EXAM: XR CHEST PORT 1 VIEW  LOCATION: [facility]  DATE/TIME: 8/17/2022 11:16 AM    INDICATION: Dyspnea, history of CHF  COMPARISON: CT chest 04/24/2022, portable chest 04/23/2022.      Impression    IMPRESSION: Heart size and vascularity are normal. Previous loculated hydropneumothorax posterior medial left chest has resolved. No focal consolidation, pneumothorax nor pleural effusion.   CT Chest Pulmonary Embolism w Contrast    Narrative    EXAM: CT CHEST PULMONARY EMBOLISM W CONTRAST  LOCATION: [facility]  DATE/TIME: 8/17/2022 2:56 PM    INDICATION: history of dvt evaluate for pe  COMPARISON: 04/24/2022  TECHNIQUE: CT chest pulmonary angiogram during arterial phase injection of IV contrast. Multiplanar reformats and MIP reconstructions were performed. Dose reduction techniques were used.   CONTRAST: Isovue 370 100mL     FINDINGS:  ANGIOGRAM CHEST: Pulmonary arteries are normal caliber and negative for pulmonary emboli. Thoracic aorta is negative for dissection. No CT evidence of right heart strain.    LUNGS AND PLEURA: Increased bilateral groundglass and nodular opacities. Mild interlobular septal thickening. Dependent atelectasis and scarring.    MEDIASTINUM/AXILLAE: Borderline enlarged mediastinal lymph nodes, likely reactive. Normal esophagus. No significant pericardial effusion. Right upper extremity venous catheter terminates in the lower SVC    CORONARY ARTERY CALCIFICATION: Moderate.    UPPER ABDOMEN: Small sliding hiatal hernia.    MUSCULOSKELETAL: No aggressive or destructive lesions.      Impression    IMPRESSION:  1.  No evidence of pulmonary embolism or secondary signs of right heart strain.    2.  Scattered bilateral groundglass and nodular opacities, likely infectious or  inflammatory. Mild interlobular septal thickening suggests mild pulmonary edema as well.    3.  Small hiatal hernia.   XR Chest Port 1 View    Narrative    EXAM: XR CHEST PORT 1 VIEW  LOCATION: [facility]  DATE/TIME: 2022 3:19 PM    INDICATION: Check PICC placement.  COMPARISON: A 1722 at 1120 hours      Impression    IMPRESSION: Right-sided PICC tip in good position within low SVC. Heart size and vascularity are normal. Bilateral bronchial wall thickening suggest airway inflammation. Bibasilar subsegmental atelectasis. No pneumothorax or pleural effusion.   Echocardiogram Complete   Result Value    LVEF  30-35% (moderately reduced)    Narrative    [id number]  [id number]  [id number]  [id number]^SUNDAR^FERNA[place]DA^[place] [address]     Name: HELENA FENTON  MRN: [id number]  : 1945  Study Date: 2022 03:38 PM  Age: 77 yrs  Gender: Male  Patient Location: [facility]  Reason For Study: CAD  Ordering Physician: FERNANDA KWOK  Performed By: JEANINE     BSA: 2.0 m2  Height: 66 in  Weight: 194 lb  HR: 99  BP: 95/60 mmHg  ______________________________________________________________________________  Procedure  Complete Portable Echo Adult. Definity (NDC #20714-531) given intravenously.  ______________________________________________________________________________  Interpretation Summary     The rhythm was rapid atrial fibrillation.  Left ventricular function is decreased. The ejection fraction is 30-35%  (moderately reduced).  There is moderate global hypokinesia of the left ventricle.  The left atrium is mildly dilated.  The study was technically difficult. No hemodynamically significant valvular  abnormalities on 2D or color flow imaging.  ______________________________________________________________________________  Left Ventricle  The left ventricle is normal in size. Left ventricular function is decreased.  The ejection fraction is 30-35%  (moderately reduced). Diastolic function not  assessed due to atrial fibrillation. There is moderate global hypokinesia of  the left ventricle.     Right Ventricle  Right ventrical function, chamber size, wall motion, and thickness are normal.     Atria  The left atrium is mildly dilated. Right atrial size is normal. There is no  color Doppler evidence of an atrial shunt.     Mitral Valve  Mitral valve leaflets appear normal. There is mild (1+) mitral regurgitation.     Tricuspid Valve  Tricuspid valve leaflets appear normal. There is no evidence of tricuspid  stenosis or clinically significant tricuspid regurgitation. Right ventricle  systolic pressure estimate normal.     Aortic Valve  Aortic valve leaflets appear normal. There is no evidence of aortic stenosis  or clinically significant aortic regurgitation.     Pulmonic Valve  The pulmonic valve is not well visualized.     Vessels  Normal size ascending aorta. Inferior vena cava not well visualized for  estimation of right atrial pressure.     Pericardium  There is no pericardial effusion.     Rhythm  The rhythm was rapid atrial fibrillation.  ______________________________________________________________________________  MMode/2D Measurements & Calculations     IVSd: 1.0 cm  LVIDd: 4.8 cm  LVIDs: 3.6 cm  LVPWd: 1.2 cm  FS: 24.4 %  LV mass(C)d: 199.0 grams  LV mass(C)dI: 100.8 grams/m2  LA dimension: 3.9 cm  asc Aorta Diam: 2.9 cm  LVOT diam: 1.9 cm  LVOT area: 2.8 cm2  RWT: 0.49     Time Measurements  MM HR: 120.0 BPM     Doppler Measurements & Calculations  MV E max joe: 98.4 cm/sec  MV dec time: 0.18 sec  Ao V2 max: 128.0 cm/sec  Ao max P.0 mmHg  Ao V2 mean: 84.2 cm/sec  Ao mean PG: 3.5 mmHg  Ao V2 VTI: 21.1 cm  JAVIER(I,D): 2.0 cm2  JAVIER(V,D): 2.2 cm2     LV V1 max P.1 mmHg  LV V1 max: 101.4 cm/sec  LV V1 VTI: 15.1 cm  SV(LVOT): 42.7 ml  SI(LVOT): 21.6 ml/m2  PA acc time: 0.11 sec  TR max joe: 176.5 cm/sec  TR max P.5 mmHg  AV Joe Ratio (DI):  0.79  JAVIER Index (cm2/m2): 1.0  E/E' av.7  Lateral E/e': 6.9  Medial E/e': 12.4     ______________________________________________________________________________  Report approved by: Ryan Gonzalez 2022 04:24 PM             Labs:  Most Recent 3 CBC's:Recent Labs   Lab Test 22  1226 22  0459 22  1011 22  0535   WBC  --  2.3* 4.2 5.9   HGB 8.7* 6.9* 8.2* 8.6*   MCV  --  139* 137* 116*   PLT  --  199 323 351     Most Recent 3 BMP's:Recent Labs   Lab Test 22  04522  1238 22  1659     --  137 140   POTASSIUM 3.8 3.5 4.3 4.6   CHLORIDE 115*  --  106 106   CO2 23  --  22 25   BUN 21  --  27 20   CR 0.76  --  1.08 0.76   ANIONGAP 1*  --  9 9   GLENDY 7.6*  --  8.3* 8.6   GLC 93  --  100 95     Most Recent 2 LFT's:Recent Labs   Lab Test 22  0405   AST 38 17   ALT 21 22   ALKPHOS 52 78   BILITOTAL 0.5 1.3*     Most Recent 3 INR's:Recent Labs   Lab Test 22  0459 22  1011 22  0535   INR 2.12* 1.71* 2.34*       Medications:   Personally Reviewed.  Medications     amiodarone Stopped (22)     [Held by provider] heparin Stopped (22)     - MEDICATION INSTRUCTIONS -       - MEDICATION INSTRUCTIONS -       phenylephrine Stopped (22)     Warfarin Therapy Reminder         remdesivir  100 mg Intravenous Q24H    And     sodium chloride 0.9%  50 mL Intravenous Q24H     aspirin  81 mg Oral Daily     atorvastatin  80 mg Oral QAM     dexamethasone  6 mg Intravenous Q24H     folic acid  1 mg Oral Daily     [Held by provider] furosemide  10 mg Oral Daily     [Held by provider] hydroxyurea  1,000 mg Oral Once per day on Sun Tue Thu Sat     [Held by provider] hydroxyurea  1,500 mg Oral Once per day on      [Held by provider] losartan  12.5 mg Oral Daily     metoprolol tartrate  12.5 mg Oral BID     pantoprazole  40 mg Oral QAM AC     perflutren lipid microsphere  2 mL Intravenous Once      sodium chloride (PF)  3 mL Intracatheter Q8H     umeclidinium  1 puff Inhalation Daily     [Held by provider] warfarin ANTICOAGULANT  2 mg Oral ONCE at 18:00

## 2022-08-19 ENCOUNTER — APPOINTMENT (OUTPATIENT)
Dept: PHYSICAL THERAPY | Facility: CLINIC | Age: 77
DRG: 177 | End: 2022-08-19
Attending: FAMILY MEDICINE
Payer: COMMERCIAL

## 2022-08-19 VITALS
DIASTOLIC BLOOD PRESSURE: 58 MMHG | SYSTOLIC BLOOD PRESSURE: 109 MMHG | BODY MASS INDEX: 32.93 KG/M2 | WEIGHT: 204.9 LBS | TEMPERATURE: 97.9 F | OXYGEN SATURATION: 95 % | RESPIRATION RATE: 30 BRPM | HEART RATE: 67 BPM | HEIGHT: 66 IN

## 2022-08-19 LAB
ANION GAP SERPL CALCULATED.3IONS-SCNC: 7 MMOL/L (ref 5–18)
BUN SERPL-MCNC: 22 MG/DL (ref 8–28)
C REACTIVE PROTEIN LHE: 2 MG/DL (ref 0–?)
CALCIUM SERPL-MCNC: 8.3 MG/DL (ref 8.5–10.5)
CHLORIDE BLD-SCNC: 113 MMOL/L (ref 98–107)
CO2 SERPL-SCNC: 20 MMOL/L (ref 22–31)
CREAT SERPL-MCNC: 0.8 MG/DL (ref 0.7–1.3)
D DIMER PPP FEU-MCNC: 0.45 UG/ML FEU (ref 0–0.5)
ERYTHROCYTE [DISTWIDTH] IN BLOOD BY AUTOMATED COUNT: ABNORMAL %
GFR SERPL CREATININE-BSD FRML MDRD: >90 ML/MIN/1.73M2
GLUCOSE BLD-MCNC: 112 MG/DL (ref 70–125)
HCT VFR BLD AUTO: 26.8 % (ref 40–53)
HGB BLD-MCNC: 8.8 G/DL (ref 13.3–17.7)
INR PPP: 1.89 (ref 0.85–1.15)
MAGNESIUM SERPL-MCNC: 1.9 MG/DL (ref 1.8–2.6)
MCH RBC QN AUTO: 41.9 PG (ref 26.5–33)
MCHC RBC AUTO-ENTMCNC: 32.8 G/DL (ref 31.5–36.5)
MCV RBC AUTO: 128 FL (ref 78–100)
PLATELET # BLD AUTO: 229 10E3/UL (ref 150–450)
POTASSIUM BLD-SCNC: 3.9 MMOL/L (ref 3.5–5)
RBC # BLD AUTO: 2.1 10E6/UL (ref 4.4–5.9)
SODIUM SERPL-SCNC: 140 MMOL/L (ref 136–145)
WBC # BLD AUTO: 3.8 10E3/UL (ref 4–11)

## 2022-08-19 PROCEDURE — 97116 GAIT TRAINING THERAPY: CPT | Mod: GP

## 2022-08-19 PROCEDURE — 85027 COMPLETE CBC AUTOMATED: CPT | Performed by: INTERNAL MEDICINE

## 2022-08-19 PROCEDURE — 99239 HOSP IP/OBS DSCHRG MGMT >30: CPT | Performed by: FAMILY MEDICINE

## 2022-08-19 PROCEDURE — 86140 C-REACTIVE PROTEIN: CPT | Performed by: INTERNAL MEDICINE

## 2022-08-19 PROCEDURE — 83735 ASSAY OF MAGNESIUM: CPT | Performed by: INTERNAL MEDICINE

## 2022-08-19 PROCEDURE — 250N000011 HC RX IP 250 OP 636: Performed by: INTERNAL MEDICINE

## 2022-08-19 PROCEDURE — 99232 SBSQ HOSP IP/OBS MODERATE 35: CPT | Performed by: INTERNAL MEDICINE

## 2022-08-19 PROCEDURE — 250N000013 HC RX MED GY IP 250 OP 250 PS 637: Performed by: FAMILY MEDICINE

## 2022-08-19 PROCEDURE — 85379 FIBRIN DEGRADATION QUANT: CPT | Performed by: INTERNAL MEDICINE

## 2022-08-19 PROCEDURE — 97162 PT EVAL MOD COMPLEX 30 MIN: CPT | Mod: GP

## 2022-08-19 PROCEDURE — 85610 PROTHROMBIN TIME: CPT | Performed by: FAMILY MEDICINE

## 2022-08-19 PROCEDURE — 250N000013 HC RX MED GY IP 250 OP 250 PS 637: Performed by: INTERNAL MEDICINE

## 2022-08-19 PROCEDURE — 80048 BASIC METABOLIC PNL TOTAL CA: CPT | Performed by: INTERNAL MEDICINE

## 2022-08-19 RX ORDER — WARFARIN SODIUM 2 MG/1
2 TABLET ORAL
Status: DISCONTINUED | OUTPATIENT
Start: 2022-08-19 | End: 2022-08-19 | Stop reason: HOSPADM

## 2022-08-19 RX ORDER — MAGNESIUM OXIDE 400 MG/1
400 TABLET ORAL EVERY 4 HOURS
Status: COMPLETED | OUTPATIENT
Start: 2022-08-19 | End: 2022-08-19

## 2022-08-19 RX ADMIN — Medication 400 MG: at 05:52

## 2022-08-19 RX ADMIN — PANTOPRAZOLE SODIUM 40 MG: 20 TABLET, DELAYED RELEASE ORAL at 09:13

## 2022-08-19 RX ADMIN — METOPROLOL TARTRATE 12.5 MG: 25 TABLET, FILM COATED ORAL at 09:13

## 2022-08-19 RX ADMIN — ASPIRIN 81 MG: 81 TABLET, COATED ORAL at 09:13

## 2022-08-19 RX ADMIN — UMECLIDINIUM 1 PUFF: 62.5 AEROSOL, POWDER ORAL at 09:20

## 2022-08-19 RX ADMIN — DEXAMETHASONE SODIUM PHOSPHATE 6 MG: 10 INJECTION, SOLUTION INTRAMUSCULAR; INTRAVENOUS at 09:14

## 2022-08-19 RX ADMIN — Medication 400 MG: at 09:13

## 2022-08-19 RX ADMIN — FOLIC ACID 1 MG: 1 TABLET ORAL at 09:13

## 2022-08-19 RX ADMIN — ATORVASTATIN CALCIUM 80 MG: 40 TABLET, FILM COATED ORAL at 09:13

## 2022-08-19 ASSESSMENT — ACTIVITIES OF DAILY LIVING (ADL)
ADLS_ACUITY_SCORE: 18

## 2022-08-19 NOTE — PROGRESS NOTES
"[facility] HEART [facility]   [address], [place] [address]   [email or web address]   OFFICE: [phone]            Impression and Plan     1.  Coronary artery disease.  Hector has known coronary artery disease.  Specifically, he underwent coronary angiography 26 April 2022 that revealed complete chronic occlusion of the right coronary artery, but otherwise mild disease.  Distal right coronary artery fills via collaterals from left circulation (see Cardiac Diagnostic section below).      Hector presents with shortness of breath, but minimizes actual chest pain.  He was noted to have an elevation in troponin to 3.25 ng/mL. This may represent \"demand ischemia\" in setting of collaterally dependent right coronary artery ( i.e. type 2 MI secondary to ischemia due to either an increased oxygen demand or a decreased supply in the absence of an acute primary coronary thrombotic event). Despite development of atrial fibrillation in the Emergency Department, no prominent ST segment depression/elevation and he has not been reporting anginal type chest pain which is reassuring.      At this time, no plans for repeat angiography. Plan:    Continue aspirin.    Continue ?-blocker therapy.     2.  Atrial fibrillation.  Hector in the Emergency Department developed atrial fibrillation.  Confirmed to be in sinus on initial presentation.  Underwent attempt at direct-current cardioversion in the Emergency Department, but unsuccessful. Hector is fairly asymptomatic with the rhythm disturbance.    Hector has been maintained on warfarin for history of DVT/pulmonary embolism.  INR yesterday morning 2.12.  Currently sinus rhythm. Plan:    ?-blocker therapr therapy as per problem #1.      3.    Cardiomyopathy.  Hector has known cardiomyopathy.  Echocardiogram 20 April 2022 revealed ejection fraction of 40%.  Repeat echocardiogram this admission with ejection fraction 30-35%.  Slightly reduced ejection " fraction from previously likely related to atrial fibrillation with rapid ventricular response.  He appears volume neutral on exam.  ACE/ARB currently being held vis-à-vis tendency toward low blood pressure though blood pressure over the last 24 hours not quite as low    ?-blocker therapr therapy as per problem #1 and #2.     4.  History of DVT and pulmonary embolism.  As noted below, Hector has a history of prior DVT and pulmonary embolism for which she has been on warfarin therapy though INR subtherapeutic on presentation.  INR yesterday morning 2.12.   CT scan this admission without evidence of pulmonary embolism.     5.  COVID-19 positive.  Management plan as outlined by Dr. Vick Tracey.    6.  Anemia.  Management plan as outlined by the providers.     Primary Cardiologist: Dr. Sp Larson    Subjective     Hector continues to deny any chest pain.  Breathing fairly comfortable this morning.    Cardiac Diagnostics     Telemetry (personally reviewed): Initially sinus rhythm with subsequent development of atrial fibrillation.      Echocardiogram 17 August 2022:  1. Normal left ventricular size with moderate depression left ventricular systolic performance.  Ejection fraction 30-35%.  2. Moderate global reduction left ventricular systolic performance.  3. No significant valvular heart disease.  4. Normal right ventricular size and systolic performance.  5. Mild left atrial enlargement.  Right show normal dimension.    Echocardiogram (personally reviewed) 20 April 2022:  1. The left ventricle is normal in size. Left ventricular systolic performance is moderately reduced. The ejection fraction is estimated to be 40%.  2. There is severe inferior hypokinesis. There is moderate anteroseptal hypokinesis. In addition, there is moderate mid to distal posterior hypokinesis and severe distal lateral hypokinesis  3. No significant valvular heart disease is identified on this study.  4. Borderline right ventricular  "enlargement with low normal right ventricular systolic performance.  5. There is mild left atrial enlargement.  6. Right ventricular systolic pressure relative to right atrial pressure is mildly increased. The pulmonary artery pressure is estimated to be 45-50mmHg plus right atrial pressure (the IVC is of normal caliber).     Coronary angiography 26 April 2022:  1. Left main coronary artery: 25% stenosis.  2. Left anterior descending coronary artery: 20% proximal stenosis.  3. Ramus intermedius: Large vessel.  4. Circumflex coronary artery: 30% stenosis.  5. Right coronary artery: Proximal 90% stenosis followed by proximal-distal 100% stenosis.  Distal vessel filling via collaterals from left-sided circulation.    Recommendations:    Medical therapy and risk factor modification.     Holter monitor 15 November 2019:  1. Normal Holter monitor.     Twelve-lead ECG (personally reviewed) 17 August 2022 at 1251: Atrial fibrillation with heart rate of 141 bpm.  T wave inversion and anterolateral leads.     Twelve-lead ECG (personally reviewed) 17 August 2022 at 1016: Sinus rhythm.  Inferior infarct.  Nonspecific T wave inversion in anterolateral leads.     Twelve-lead ECG (personally reviewed) 19 April 2022: Sinus rhythm.  Inferior infarct.       Physical Examination       /60   Pulse 78   Temp 98  F (36.7  C) (Oral)   Resp 23   Ht 1.676 m (5' 6\")   Wt 92.9 kg (204 lb 14.4 oz)   SpO2 93%   BMI 33.07 kg/m          Vitals:    08/17/22 1008 08/17/22 1745 08/18/22 0400 08/19/22 0400   Weight: 88 kg (194 lb) 88.2 kg (194 lb 8 oz) 91.9 kg (202 lb 8 oz) 92.9 kg (204 lb 14.4 oz)              Intake/Output Summary (Last 24 hours) at 8/18/2022 0704  Last data filed at 8/18/2022 0600  Gross per 24 hour   Intake 1054.73 ml   Output 850 ml   Net 204.73 ml       The patient is alert and oriented times three. Sclerae are anicteric. Mucosal membranes are moist. Jugular venous pressure is reasonable no significant " adenopathy/thyromegally appreciated. Lungs are fairly clear with reasonable expansion. On cardiovascular exam, the patient has a regular S1 and S2.  Heart sounds are somewhat distant.  Abdomen is soft and non-tender. Extremities reveal no clubbing, cyanosis.         Imaging     Chest radiograph 17 August 2022:  1. Right-sided PICC tip in good position within low SVC.   2. Heart size and vascularity are normal.   3. Bilateral bronchial wall thickening suggest airway inflammation.   4. Bibasilar subsegmental atelectasis.   5. No pneumothorax or pleural effusion.    CT scan of chest 17 August 2022:  1. No evidence of pulmonary embolism or secondary signs of right heart strain.   2. Scattered bilateral groundglass and nodular opacities, likely infectious or inflammatory. Mild interlobular septal thickening suggests mild pulmonary edema as well.   3. Small hiatal hernia.    Lab Results     Recent Labs   Lab 08/19/22  0434 08/18/22  1226 08/18/22  0459 08/17/22  2104 08/17/22  1238 08/17/22  1238 08/17/22  1011   WBC 3.8*  --  2.3*  --   --   --  4.2   HGB 8.8* 8.7* 6.9*  --   --   --  8.2*   *  --  139*  --   --   --  137*     --  199  --   --   --  323   INR 1.89*  --  2.12*  --   --   --  1.71*     --  139  --   --  137  --    POTASSIUM 3.9  --  3.8 3.5   < > 4.3  --    CHLORIDE 113*  --  115*  --   --  106  --    CO2 20*  --  23  --   --  22  --    BUN 22  --  21  --   --  27  --    CR 0.80  --  0.76  --   --  1.08  --    ANIONGAP 7  --  1*  --   --  9  --    GLENDY 8.3*  --  7.6*  --   --  8.3*  --      --  93  --   --  100  --    ALBUMIN  --   --   --  2.6*  --   --   --    PROTTOTAL  --   --   --  5.0*  --   --   --    BILITOTAL  --   --   --  0.5  --   --   --    ALKPHOS  --   --   --  52  --   --   --    ALT  --   --   --  21  --   --   --    AST  --   --   --  38  --   --   --     < > = values in this interval not displayed.     Recent Labs   Lab Test 08/17/22  1011 04/19/22  144  04/01/22  1208   * 1,221* 379*       Lab Results   Component Value Date     08/18/2022    CO2 23 08/18/2022    BUN 21 08/18/2022     Lab Results   Component Value Date    WBC 2.3 08/18/2022    HGB 6.9 08/18/2022    HCT 21.3 08/18/2022     08/18/2022     08/18/2022     Lab Results   Component Value Date    CHOL 110 05/03/2021    TRIG 115 05/03/2021    HDL 35 05/03/2021     Lab Results   Component Value Date    INR 2.12 08/18/2022     Lab Results   Component Value Date    TROPONINI 2.47 08/17/2022    TROPONINI 3.25 08/17/2022    TROPONINI 0.09 04/20/2022     No results found for: TSH        Current Inpatient Scheduled Medications   Scheduled Meds:    remdesivir  100 mg Intravenous Q24H    And     sodium chloride 0.9%  50 mL Intravenous Q24H     aspirin  81 mg Oral Daily     atorvastatin  80 mg Oral QAM     dexamethasone  6 mg Intravenous Q24H     folic acid  1 mg Oral Daily     furosemide  10 mg Oral Daily     [Held by provider] hydroxyurea  1,000 mg Oral Once per day on Sun Tue Thu Sat     [Held by provider] hydroxyurea  1,500 mg Oral Once per day on Mon Wed Fri     [Held by provider] losartan  12.5 mg Oral Daily     magnesium oxide  400 mg Oral Q4H     metoprolol tartrate  12.5 mg Oral BID     pantoprazole  40 mg Oral QAM AC     perflutren lipid microsphere  2 mL Intravenous Once     sodium chloride (PF)  3 mL Intracatheter Q8H     umeclidinium  1 puff Inhalation Daily     Warfarin Therapy Reminder  1 each Oral See Admin Instructions     Continuous Infusions:    - MEDICATION INSTRUCTIONS -       - MEDICATION INSTRUCTIONS -       Warfarin Therapy Reminder              Medications Prior to Admission   Prior to Admission medications    Medication Sig Start Date End Date Taking? Authorizing Provider   albuterol (PROAIR HFA;PROVENTIL HFA;VENTOLIN HFA) 90 mcg/actuation inhaler Inhale 2 puffs into the lungs every 6 hours as needed for shortness of breath / dyspnea 10/31/19  Yes Provider,  "Historical   aspirin (ASA) 81 MG EC tablet Take 1 tablet (81 mg) by mouth daily Start tomorrow morning. 4/27/22  Yes Audrey Holder MD   atorvastatin (LIPITOR) 80 MG tablet Take 1 tablet (80 mg) by mouth every morning 5/31/22  Yes Karsten Castellon MD   folic acid (FOLVITE) 1 MG tablet Take 1 tablet (1 mg) by mouth daily 5/2/22  Yes Crispin Michelle MD   furosemide (LASIX) 20 MG tablet Take 0.5 tablets (10 mg) by mouth daily 6/2/22  Yes Sp Larson DO   hydroxyurea (HYDREA) 500 MG capsule Take 1 capsule (500 mg) by mouth daily  Patient taking differently: Take 1,000-1,500 mg by mouth See Admin Instructions 1500 mg daily- Mon, Wed, & Fri.  1000 mg daily - Tue, Thurs, Sat, & Sun 5/2/22  Yes Crispin Michelle MD   losartan (COZAAR) 25 MG tablet Take 0.5 tablets (12.5 mg) by mouth daily 6/2/22  Yes Sp Larson DO   metoprolol succinate ER (TOPROL-XL) 25 MG 24 hr tablet Take 25 mg by mouth daily   Yes Unknown, Entered By History   nitroGLYcerin (NITROSTAT) 0.4 MG sublingual tablet One tablet under the tongue every 5 minutes if needed for chest pain. May repeat every 5 minutes for a maximum of 3 doses in 15 minutes\" 4/26/22  Yes Audrey Holder MD   tiotropium (SPIRIVA) 18 mcg inhalation capsule [TIOTROPIUM (SPIRIVA) 18 MCG INHALATION CAPSULE] Place 18 mcg into inhaler and inhale daily. 10/31/19  Yes Provider, Historical   warfarin ANTICOAGULANT (COUMADIN) 1 MG tablet Take 5 mg on 5/2, re dose warfarin on 5/3 per oncology based on INR  Patient taking differently: Take 2 mg by mouth daily 5/1/22  Yes Crispin Michelle MD               "

## 2022-08-19 NOTE — PROGRESS NOTES
Care Management Discharge Note    Discharge Date: 08/19/2022       Discharge Disposition:  Home with wife    Discharge Services:  Home with wife    Discharge Transportation: family    Education Provided on the Discharge Plan: AVS per bedside RN.       Persons Notified of Discharge Plans: patient    Patient/Family in Agreement with the Plan:  yes        Additional Information:  Chart reviewed. CM attempted to reach patient by phone due to Covid+ status. CM unable to reach patient. Patient will be discharge per bedside RN. Discharge home with family.    [facility] referral sent per protocol.     Disha Lund RN

## 2022-08-19 NOTE — PLAN OF CARE
"Patient discharged to home self care. PICC removed and IV removed. Patient belongings packed. Patient dressed himself and at time of discharge was ambulating independently. Vitally stable. AVS reviewed with patient and new medications reviewed. Patient declines questions at discharge.     Problem: Plan of Care - These are the overarching goals to be used throughout the patient stay.    Goal: Plan of Care Review/Shift Note  Description: The Plan of Care Review/Shift note should be completed every shift.  The Outcome Evaluation is a brief statement about your assessment that the patient is improving, declining, or no change.  This information will be displayed automatically on your shift note.  Outcome: Met  Goal: Patient-Specific Goal (Individualized)  Description: You can add care plan individualizations to a care plan. Examples of Individualization might be:  \"Parent requests to be called daily at 9am for status\", \"I have a hard time hearing out of my right ear\", or \"Do not touch me to wake me up as it startles me\".  Outcome: Met  Goal: Absence of Hospital-Acquired Illness or Injury  Outcome: Met  Intervention: Identify and Manage Fall Risk  Recent Flowsheet Documentation  Taken 8/19/2022 0800 by Romy Tierney RN  Safety Promotion/Fall Prevention:   activity supervised   increase visualization of patient   room near nurse's station   room organization consistent   clutter free environment maintained   increased rounding and observation   lighting adjusted   nonskid shoes/slippers when out of bed   safety round/check completed   supervised activity  Intervention: Prevent Skin Injury  Recent Flowsheet Documentation  Taken 8/19/2022 0800 by Romy Tierney RN  Body Position: position changed independently  Intervention: Prevent and Manage VTE (Venous Thromboembolism) Risk  Recent Flowsheet Documentation  Taken 8/19/2022 0800 by Romy Tierney RN  VTE Prevention/Management: patient refused intervention  Activity " Management: up ad vikki  Intervention: Prevent Infection  Recent Flowsheet Documentation  Taken 8/19/2022 0800 by Romy Tierney RN  Infection Prevention:   hand hygiene promoted   personal protective equipment utilized   rest/sleep promoted   single patient room provided   visitors restricted/screened  Goal: Optimal Comfort and Wellbeing  Outcome: Met  Intervention: Monitor Pain and Promote Comfort  Recent Flowsheet Documentation  Taken 8/19/2022 0800 by Romy Tierney RN  Pain Management Interventions: declines  Goal: Readiness for Transition of Care  Outcome: Met     Problem: Risk for Delirium  Goal: Optimal Coping  Outcome: Met  Goal: Improved Behavioral Control  Outcome: Met  Goal: Improved Attention and Thought Clarity  Outcome: Met  Goal: Improved Sleep  Outcome: Met     Problem: Dysrhythmia  Goal: Normalized Cardiac Rhythm  Outcome: Met  Intervention: Monitor and Manage Cardiac Rhythm Effect  Recent Flowsheet Documentation  Taken 8/19/2022 0800 by Romy Tierney RN  VTE Prevention/Management: patient refused intervention

## 2022-08-19 NOTE — DISCHARGE SUMMARY
[facility] MEDICINE  DISCHARGE SUMMARY     Primary Care Physician: Ragini Aponte  Admission Date: 8/17/2022   Discharge Provider: Vick Tracey MD Discharge Date: 8/19/2022   Diet:   Active Diet and Nourishment Order   Procedures     Low Saturated Fat Na <2400 mg     Diet       Code Status: Full Code   Activity: DCACTIVITY: Activity as tolerated        Condition at Discharge: Stable     REASON FOR PRESENTATION(See Admission Note for Details)   Shortness of breath    PRINCIPAL & ACTIVE DISCHARGE DIAGNOSES     Principal Problem:    New onset a-fib (H)  Active Problems:    Chronic obstructive pulmonary disease (H)    Essential hypertension    History of stroke without residual deficits    Chronic myeloproliferative disease (H)    History of pulmonary embolism    NSTEMI (non-ST elevated myocardial infarction) (H)    History of myelodysplastic syndrome    Anemia, unspecified type      PENDING LABS     Unresulted Labs Ordered in the Past 30 Days of this Admission     Date and Time Order Name Status Description    8/17/2022  2:15 PM Prepare red blood cells (unit) Preliminary             PROCEDURES ( this hospitalization only)          RECOMMENDATIONS TO OUTPATIENT PROVIDER FOR F/U VISIT     Follow-up Appointments     Follow-up and recommended labs and tests       Follow up with primary care provider, RAGINI APONTE, within 3-4 days for   hospital follow- up.  The following labs/tests are recommended: cbc, INR,   bmp blood pressure check.             DISPOSITION     Home    SUMMARY OF HOSPITAL COURSE:    HPI: 70-year-old male with a history of coronary disease, cardiomyopathy, and DVT/PE on chronic anticoagulation presented to ER with shortness of breath and found to be in A. fib with RVR.  Also had an elevated troponin and was started on a heparin drip and admitted.    NSTEMI/type II MI/known coronary artery disease  Patient was admitted and seen by cardiology.  He was on a heparin  drip.  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  Troponin bump felt to be due to demand ischemia associated with A. fib with RVR.  Ultimately heparin drip was discontinued due to some issues with low hemoglobin and patient will be discharged on aspirin and beta-blocker therapy.  No plans for angiography currently.  No chest pain shortness of breath or EKG changes.  Outpatient follow-up with cardiology.    Atrial fibrillation with rapid ventricular response  Initially in sinus rhythm with attempted direct-current cardioversion in the ER which was unsuccessful.  Patient spontaneously converted back to sinus rhythm with beta-blocker therapy.  Patient will continue prehospital regimen of 25 mg of long-acting metoprolol.  Anticoagulated with warfarin.  Continue aspirin.    Cardiomyopathy/chronic systolic congestive heart failure  No acute heart failure here in the hospital.  Repeat echo revealed an EF of 30 to 35% slightly reduced from previous of 40.  Likely due to A. fib with RVR.  Outpatient follow-up.  Of note we did hold the patient's ARB at discharge due to low blood pressure.  This can be resumed by his primary at follow-up pending blood pressures.    History of DVT/PE  Continue warfarin with goal INR of 2-3.  No acute pulmonary embolism here in the hospital.    COVID-19 positive status/pneumonia due to COVID-19  Minimally symptomatic but likely contributing to his shortness of breath on admission.  Patient was treated with remdesivir and received 2 doses while hospitalized.  Also 1 dose of dexamethasone.  He is not hypoxic or short of breath currently.  COVID isolation for 10 days from positive test.  Chronically anticoagulated.    Acute on chronic anemia due to decreased marrow production/pancytopenia  Has known underlying blood disorder and is on hydroxyurea.  Did receive a transfusion of 1 unit of  packed red cells with stable hemoglobin.  Heparin was drip was discontinued due to drop in hemoglobin.  No signs of ongoing losses.  Outpatient follow-up.  CBC recommended at follow-up.    Circulatory shock/cardiogenic shock, resolved  Required pressor support for period of time likely due to A. fib with RVR.  Was weaned off and downgraded from ICU status quite quickly.  ARB will be held due to lower blood pressures at discharge.      Discharge Medications with Med changes:     Current Discharge Medication List      CONTINUE these medications which have NOT CHANGED    Details   albuterol (PROAIR HFA;PROVENTIL HFA;VENTOLIN HFA) 90 mcg/actuation inhaler Inhale 2 puffs into the lungs every 6 hours as needed for shortness of breath / dyspnea    Comments: May substitute the equivalent medication per insurance preference.      aspirin (ASA) 81 MG EC tablet Take 1 tablet (81 mg) by mouth daily Start tomorrow morning.  Qty: 90 tablet, Refills: 3    Associated Diagnoses: Coronary artery disease involving native coronary artery of native heart with angina pectoris (H); Ischemic cardiomyopathy      atorvastatin (LIPITOR) 80 MG tablet Take 1 tablet (80 mg) by mouth every morning  Qty: 90 tablet, Refills: 1    Associated Diagnoses: Coronary artery disease involving native coronary artery of native heart with angina pectoris (H); Mixed hyperlipidemia      folic acid (FOLVITE) 1 MG tablet Take 1 tablet (1 mg) by mouth daily  Qty: 30 tablet, Refills: 0    Associated Diagnoses: Chronic myeloproliferative disease (H)      furosemide (LASIX) 20 MG tablet Take 0.5 tablets (10 mg) by mouth daily  Qty: 45 tablet, Refills: 3    Associated Diagnoses: Chronic systolic congestive heart failure (H)      hydroxyurea (HYDREA) 500 MG capsule Take 1 capsule (500 mg) by mouth daily  Qty: 30 capsule, Refills: 0    Associated Diagnoses: Chronic myeloproliferative disease (H)      metoprolol succinate ER (TOPROL-XL) 25 MG 24 hr tablet Take 25 mg by  "mouth daily      nitroGLYcerin (NITROSTAT) 0.4 MG sublingual tablet One tablet under the tongue every 5 minutes if needed for chest pain. May repeat every 5 minutes for a maximum of 3 doses in 15 minutes\"  Qty: 25 tablet, Refills: 3    Associated Diagnoses: Coronary artery disease involving native coronary artery of native heart with angina pectoris (H); Ischemic cardiomyopathy      tiotropium (SPIRIVA) 18 mcg inhalation capsule [TIOTROPIUM (SPIRIVA) 18 MCG INHALATION CAPSULE] Place 18 mcg into inhaler and inhale daily.      warfarin ANTICOAGULANT (COUMADIN) 1 MG tablet Take 5 mg on 5/2, re dose warfarin on 5/3 per oncology based on INR  Qty: 60 tablet, Refills: 0    Associated Diagnoses: History of pulmonary embolism         STOP taking these medications       losartan (COZAAR) 25 MG tablet Comments:   Reason for Stopping:                     Rationale for medication changes:      ARB held due to hypotension        Consults       VASCULAR ACCESS ADULT IP CONSULT  PHARMACY IP CONSULT  PHARMACY IP CONSULT  HOSPITALIST IP CONSULT  PHARMACY TO DOSE WARFARIN  PHARMACY TO DOSE WARFARIN  PHYSICAL THERAPY ADULT IP CONSULT    Immunizations given this encounter     Most Recent Immunizations   Administered Date(s) Administered     COVID-19,PF,Pfizer (12+ Yrs) 03/18/2021           Anticoagulation Information      Recent INR results:   Recent Labs   Lab 08/19/22  0434 08/18/22  0459 08/17/22  1011   INR 1.89* 2.12* 1.71*     Warfarin doses (if applicable) or name of other anticoagulant:Warfarin 2 mg      SIGNIFICANT IMAGING FINDINGS     Results for orders placed or performed during the hospital encounter of 08/17/22   XR Chest Port 1 View    Impression    IMPRESSION: Heart size and vascularity are normal. Previous loculated hydropneumothorax posterior medial left chest has resolved. No focal consolidation, pneumothorax nor pleural effusion.   CT Chest Pulmonary Embolism w Contrast    Impression    IMPRESSION:  1.  No evidence " of pulmonary embolism or secondary signs of right heart strain.    2.  Scattered bilateral groundglass and nodular opacities, likely infectious or inflammatory. Mild interlobular septal thickening suggests mild pulmonary edema as well.    3.  Small hiatal hernia.   XR Chest Port 1 View    Impression    IMPRESSION: Right-sided PICC tip in good position within low SVC. Heart size and vascularity are normal. Bilateral bronchial wall thickening suggest airway inflammation. Bibasilar subsegmental atelectasis. No pneumothorax or pleural effusion.   Echocardiogram Complete   Result Value Ref Range    LVEF  30-35% (moderately reduced)        SIGNIFICANT LABORATORY FINDINGS     Most Recent 3 CBC's:Recent Labs   Lab Test 08/19/22  0434 08/18/22  1226 08/18/22  0459 08/17/22  1011   WBC 3.8*  --  2.3* 4.2   HGB 8.8* 8.7* 6.9* 8.2*   *  --  139* 137*     --  199 323     Most Recent 3 BMP's:Recent Labs   Lab Test 08/19/22  0434 08/18/22  0459 08/17/22  2104 08/17/22  1238    139  --  137   POTASSIUM 3.9 3.8 3.5 4.3   CHLORIDE 113* 115*  --  106   CO2 20* 23  --  22   BUN 22 21  --  27   CR 0.80 0.76  --  1.08   ANIONGAP 7 1*  --  9   GLENDY 8.3* 7.6*  --  8.3*    93  --  100     Most Recent 2 LFT's:Recent Labs   Lab Test 08/17/22 2104 04/20/22  0405   AST 38 17   ALT 21 22   ALKPHOS 52 78   BILITOTAL 0.5 1.3*     Most Recent 3 INR's:Recent Labs   Lab Test 08/19/22  0434 08/18/22  0459 08/17/22  1011   INR 1.89* 2.12* 1.71*           Discharge Orders        Medication Therapy Management Referral      Reason for your hospital stay    Covid 19 infection, small heart attack (type 2 MI), atrial fibrillation with fast heart rate and low blood counts.     Follow-up and recommended labs and tests     Follow up with primary care provider, RAGINI IVRING, within 3-4 days for hospital follow- up.  The following labs/tests are recommended: cbc, INR, bmp blood pressure check.     Activity    Your activity upon  discharge: activity as tolerated, covid isolation for 10 days from positive test.     Diet    Follow this diet upon discharge: Orders Placed This Encounter      Low Saturated Fat Na <2000 mg       Examination   Physical Exam   Temp:  [97.5  F (36.4  C)-98.1  F (36.7  C)] 97.9  F (36.6  C)  Pulse:  [63-78] 67  Resp:  [17-42] 30  BP: ()/(51-62) 109/58  SpO2:  [75 %-100 %] 95 %  Wt Readings from Last 1 Encounters:   08/19/22 92.9 kg (204 lb 14.4 oz)       General Appearance: No apparent distress resting comfortably in bed  Respiratory: Clear to auscultation without wheezes rhonchi or rales  Cardiovascular: Regular rate and rhythm  GI: Soft and nontender  Skin: Visualized skin is normal  Other: Requesting discharge from the hospital with good eye contact and normal affect      Please see EMR for more detailed significant labs, imaging, consultant notes etc.    I, Vick Tracey MD, personally saw the patient today and spent greater than 30 minutes discharging this patient.    Vick Tracey MD  [facility]    CC:Aj Aponte

## 2022-08-19 NOTE — PROGRESS NOTES
08/19/22 1039   Quick Adds   Type of Visit Initial PT Evaluation   Living Environment   People in Home spouse   Current Living Arrangements house   Home Accessibility stairs within home   Number of Stairs, Within Home, Primary   (14)   Stair Railings, Within Home, Primary railing on left side (ascending)   Transportation Anticipated car, drives self   Self-Care   Regular Exercise Yes   Activity/Exercise Type walking   Exercise Amount/Frequency 2 times/wk  (1 mile)   Equipment Currently Used at Home none   Fall history within last six months no   Activity/Exercise/Self-Care Comment indep. all   General Information   Onset of Illness/Injury or Date of Surgery 08/17/22   Referring Physician christianne bolanos   Patient/Family Therapy Goals Statement (PT) home   Existing Precautions/Restrictions   (tele; covid +)   Cognition   Orientation Status (Cognition) oriented x 4   Pain Assessment   Patient Currently in Pain   (some chronic R ankle pain since fx 10-12 years ago)   Integumentary/Edema   Integumentary/Edema   (mild, R ankle (chronic since fx))   Range of Motion (ROM)   ROM Comment R ankle  no inversion/eversion   Strength (Manual Muscle Testing)   Strength Comments LE's WFL except R ankle   Bed Mobility   Comment, (Bed Mobility) reports easy (NT)   Transfers   Comment, (Transfers) slightly delayed balance on sit>stand   Gait/Stairs (Locomotion)   Distance in Feet (Required for LE Total Joints) 45, 60, 60  (cues after first 20'')   Comment, (Gait/Stairs) see treament sheet   Balance   Balance Comments delayed bal. arising; prefers wide JACK   Sensory Examination   Sensory Perception patient reports no sensory changes   Clinical Impression   Criteria for Skilled Therapeutic Intervention Yes, treatment indicated   PT Diagnosis (PT) impaired functional mobility   Influenced by the following impairments unsteadiness, decr. activity tolerance   Functional limitations due to impairments amb/stairs   Clinical Presentation  (PT Evaluation Complexity) Stable/Uncomplicated   Clinical Presentation Rationale presents as medically diagnosed   Clinical Decision Making (Complexity) low complexity   Planned Therapy Interventions (PT) gait training;stair training   Anticipated Equipment Needs at Discharge (PT)   (discussed possible SEC if persistent unsteadiness as illness resolves)   Risk & Benefits of therapy have been explained evaluation/treatment results reviewed;patient   PT Discharge Planning   PT Discharge Recommendation (DC Rec) home with assist   PT Rationale for DC Rec basic household mobility supervised. wife very attentive, per pt.   Total Evaluation Time   Total Evaluation Time (Minutes) 25   Physical Therapy Goals   PT Frequency One time eval and treatment only   PT Predicted Duration/Target Date for Goal Attainment 08/19/22   PT Goals Gait;Stairs   PT: Gait Supervision/stand-by assist;50 feet;Goal Met;Completed   PT: Stairs Greater than 10 stairs;Rail on left;Supervision/stand-by assist;Completed  (able to do 10)

## 2022-08-19 NOTE — PLAN OF CARE
Goal Outcome Evaluation:    Pt Tele NSR throughout night.  Pt tolerated PO metoprolol. No issues with pt's BP.  See VS.  Magnesium administered per standing order.  Pt was on 2L NC, sats mid to upper 90s. Pt stood at bedside and used urinal.

## 2022-08-19 NOTE — PLAN OF CARE
Physical Therapy Discharge Summary    Reason for therapy discharge:    All goals and outcomes met, no further needs identified. (except 10 vs 14 stairs)    Progress towards therapy goal(s). See goals on Care Plan in [facility] electronic health record for goal details.  Goals met    Therapy recommendation(s):    Home ex. Program; discussed graded amb. according to activity tolerance

## 2022-08-21 ENCOUNTER — PATIENT OUTREACH (OUTPATIENT)
Dept: CARE COORDINATION | Facility: CLINIC | Age: 77
End: 2022-08-21

## 2022-08-21 NOTE — PROGRESS NOTES
[facility]    Background: Transitional Care Management program identified  S discharge report for reason of patient meeting criteria for a TCM outreach call by [facility] team.    Assessment: Upon chart review, CCR Team member will reassign this program to [facility] Care Coordination pool for review.    Plan: Program updated for ongoing review.      Iqra Boothe RN  [facility], [facility]    *Connected Care Resource Team does NOT follow patient ongoing. Referrals are identified based on internal discharge reports and the outreach is to ensure patient has an understanding of their discharge instructions.

## 2022-08-23 NOTE — PROGRESS NOTES
Medication Therapy Management (MTM) Encounter    ASSESSMENT:                            Medication Adherence/Access: No issues identified    Hx TIA/ Hx of PE/Afib/ HFrEF/Hypertension:  Patient is meeting BP goal of < 140/90mmHg.  EF 30% - 34%. Pt is not on appropriate ACE/ARB and  is on maximized beta blocker: Metoprolol  XL (succinate). Patient pulse is > 50. Current weight is stable and diuretic dose is appropriate. Pt would benefit from restarting losartan 12.5 mg for HFrEF since his blood pressure is back to normal.  Could defer to Dr. Larson, has follow up coming up.  Patient is on appropriate anticoagulation and antiplatelet therapy given cardiac history.  He likely would be a candidate for DOAC therapy if they would like fewer office visits/labs.  Not discussed with patient.    Hyperlipidemia: Stable.Patient is on high intensity statin which is indicated based on 2019 ACC/AHA guidelines for lipid management.      COPD: Stable, breathing doing well.    Myeloproliferative neoplasm: Stable, follows with specialist.    PLAN:                            For Dr. Larson to consider at upcoming appointment:  1.  Recommend restarting losartan 12.5 mg daily for HFrEF.    Follow-up: Return in about 1 year (around 8/25/2023) for MTM Follow Up.    SUBJECTIVE/OBJECTIVE:                          Shaji Pompa is a 77 year old male called for a transitions of care visit. He was discharged from [facility] on 8/17-8/19 for shortness of breath, new onset afib.      Reason for visit: Initial medication review following hospital discharge.    Allergies/ADRs: Reviewed in chart  Past Medical History: Reviewed in chart  Tobacco: He reports that he quit smoking about 10 years ago. He started smoking about 57 years ago. He smoked 1.00 pack per day. He has never used smokeless tobacco.  Alcohol: not currently using    Medication Adherence/Access: no issues reported, his wife helps him organize his medications.    Patient presented  to the hospital with shortness of breath.  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  Troponin bump felt to be due to demand ischemia associated with A. fib with RVR.  Ultimately heparin drip was discontinued due to some issues with low hemoglobin and patient will be discharged on aspirin and beta-blocker therapy.  Has follow up with cardiologist outpatient, Dr. Larson.      Hx TIA/ Hx of PE/Afib/ HFrEF/Hypertension: Patient is currently taking warfarin 3 mg daily and aspirin 81 mg daily for anticoagulation and antiplatelet therapy. His INR was high yesterday, Dr. Caraballo is having Hector hold a dose and they are rechecking next week.  Patient reports minor bruising. Patient does not have a history of GI bleed.  His breathing is doing well, is able to walk a lot more now.    Patient is also taking Furosemide 10 mg daily AM, Metoprolol ER 25 mg daily.  His ARB was held at discharge due to low blood pressure, can be resumed if/when blood pressure improves.  Was taking losartan 12.5 mg daily.  He also has nitroglycerin available, reports using rarely.  No recent chest pains. Patient reports no current medication side effects.   Patient does not self-monitor blood pressure.  ECHO:  EF 30-35%  Patient is not complaining of HF symptoms .    Patient is measuring daily weights  and reports decreased a little bit.  It seems like the swelling in legs is down.   Does elevate legs during Weight Yesterday 199 lb, today 197 lb  Patient is following a low sodium diet, is avoiding EtOH.    BP Readings from Last 3 Encounters:   08/24/22 134/60   08/19/22 109/58   06/02/22 126/60       Hyperlipidemia: Current therapy includes Atorvastatin 80 mg daily.  Patient reports no significant myalgias or other side effects.  The ASCVD Risk score ([place] DC Jr., et al., 2013) failed to calculate for the following reasons:    The  patient has a prior MI or stroke diagnosis  Recent Labs   Lab Test 05/03/21  1557 08/13/19  0926   CHOL 110 190   HDL 35* 37*   LDL 52 111   TRIG 115 210*     COPD: Current medications: LAMA- Spiriva Handihaler 1 puff(s) once daily  Short-Acting Bronchodilator: Albuterol MDI and patient reports using rarely.     Patient is not experiencing side effects. Patient said his breathing is back to normal, had COVID but   Patient reports the following symptoms: none.  Patient does have an COPD Action Plan on file.   Has spirometry been completed: Yes   Up to date on immunizations, see in ECW.    Myeloproliferative neoplasm: Taking Hydroxyurea 3 capsules (1500 mg) M/W/F; 2 caps (1000 mg) Su/Tu/Th/Sa orally Once a day.    He does have some pain/tingling in his toes, which he thinks may be a side effect of the hydroxyurea.  Otherwise tolerating well.  His CBC has been stable, last hemoglobin yesterday was within normal limits.     ----------------  Post Discharge Medication Reconciliation Status: discharge medications reconciled and changed, per note/orders.    I spent 35 minutes with this patient today (an extra 15 minutes was spent creating the Medication Action Plan). All changes were made via collaborative practice agreement with Steven Caraballo MD. A copy of the visit note was provided to the patient's provider(s).    The patient was mailed a summary of these recommendations.     Krystyna Morales, PharmD  Medication Therapy Management Pharmacist  Pager: [phone]      Telemedicine Visit Details  Type of service:  Telephone visit  Start Time: 11:00 AM  End Time: 11:35 AM  Originating Location (patient location): Home  Distant Location (provider location):  [facility]     Medication Therapy Recommendations  Acute congestive heart failure (H)    Current Medication: metoprolol succinate ER (TOPROL-XL) 25 MG 24 hr tablet   Rationale: Synergistic therapy - Needs additional medication  therapy - Indication   Recommendation: Start Medication   Status: Contact Provider - Awaiting Response   Note: restart losartan

## 2022-08-24 ENCOUNTER — LAB REQUISITION (OUTPATIENT)
Dept: LAB | Facility: CLINIC | Age: 77
End: 2022-08-24

## 2022-08-24 ENCOUNTER — TRANSFERRED RECORDS (OUTPATIENT)
Dept: HEALTH INFORMATION MANAGEMENT | Facility: CLINIC | Age: 77
End: 2022-08-24

## 2022-08-24 DIAGNOSIS — D64.9 ANEMIA, UNSPECIFIED: ICD-10-CM

## 2022-08-24 DIAGNOSIS — I50.21 ACUTE SYSTOLIC (CONGESTIVE) HEART FAILURE (H): ICD-10-CM

## 2022-08-24 LAB
ANION GAP SERPL CALCULATED.3IONS-SCNC: 11 MMOL/L (ref 7–15)
BASOPHILS # BLD AUTO: 0 10E3/UL (ref 0–0.2)
BASOPHILS NFR BLD AUTO: 1 %
BUN SERPL-MCNC: 16.6 MG/DL (ref 8–23)
CALCIUM SERPL-MCNC: 8.1 MG/DL (ref 8.8–10.2)
CHLORIDE SERPL-SCNC: 107 MMOL/L (ref 98–107)
CREAT SERPL-MCNC: 1.09 MG/DL (ref 0.67–1.17)
DEPRECATED HCO3 PLAS-SCNC: 22 MMOL/L (ref 22–29)
EOSINOPHIL # BLD AUTO: 0 10E3/UL (ref 0–0.7)
EOSINOPHIL NFR BLD AUTO: 0 %
ERYTHROCYTE [DISTWIDTH] IN BLOOD BY AUTOMATED COUNT: 26.9 % (ref 10–15)
GFR SERPL CREATININE-BSD FRML MDRD: 70 ML/MIN/1.73M2
GLUCOSE SERPL-MCNC: 88 MG/DL (ref 70–99)
HCT VFR BLD AUTO: 31.9 % (ref 40–53)
HGB BLD-MCNC: 10.2 G/DL (ref 13.3–17.7)
IMM GRANULOCYTES # BLD: 0.2 10E3/UL
IMM GRANULOCYTES NFR BLD: 4 %
LYMPHOCYTES # BLD AUTO: 0.5 10E3/UL (ref 0.8–5.3)
LYMPHOCYTES NFR BLD AUTO: 12 %
MCH RBC QN AUTO: 41.8 PG (ref 26.5–33)
MCHC RBC AUTO-ENTMCNC: 32 G/DL (ref 31.5–36.5)
MCV RBC AUTO: 131 FL (ref 78–100)
MONOCYTES # BLD AUTO: 0.6 10E3/UL (ref 0–1.3)
MONOCYTES NFR BLD AUTO: 13 %
NEUTROPHILS # BLD AUTO: 3.1 10E3/UL (ref 1.6–8.3)
NEUTROPHILS NFR BLD AUTO: 70 %
NRBC # BLD AUTO: 0 10E3/UL
NRBC BLD AUTO-RTO: 0 /100
PLATELET # BLD AUTO: 281 10E3/UL (ref 150–450)
POTASSIUM SERPL-SCNC: 4.2 MMOL/L (ref 3.4–5.3)
RBC # BLD AUTO: 2.44 10E6/UL (ref 4.4–5.9)
SODIUM SERPL-SCNC: 140 MMOL/L (ref 136–145)
WBC # BLD AUTO: 4.4 10E3/UL (ref 4–11)

## 2022-08-24 PROCEDURE — 85025 COMPLETE CBC W/AUTO DIFF WBC: CPT | Performed by: FAMILY MEDICINE

## 2022-08-24 PROCEDURE — 80048 BASIC METABOLIC PNL TOTAL CA: CPT | Performed by: FAMILY MEDICINE

## 2022-08-25 ENCOUNTER — PATIENT OUTREACH (OUTPATIENT)
Dept: CARE COORDINATION | Facility: CLINIC | Age: 77
End: 2022-08-25

## 2022-08-25 ENCOUNTER — VIRTUAL VISIT (OUTPATIENT)
Dept: PHARMACY | Facility: PHYSICIAN GROUP | Age: 77
End: 2022-08-25
Payer: COMMERCIAL

## 2022-08-25 ENCOUNTER — TRANSFERRED RECORDS (OUTPATIENT)
Dept: HEALTH INFORMATION MANAGEMENT | Facility: CLINIC | Age: 77
End: 2022-08-25

## 2022-08-25 VITALS
BODY MASS INDEX: 31.71 KG/M2 | DIASTOLIC BLOOD PRESSURE: 60 MMHG | SYSTOLIC BLOOD PRESSURE: 134 MMHG | HEIGHT: 67 IN | HEART RATE: 72 BPM | WEIGHT: 202 LBS

## 2022-08-25 DIAGNOSIS — E78.5 HYPERLIPIDEMIA LDL GOAL <70: ICD-10-CM

## 2022-08-25 DIAGNOSIS — D47.1 CHRONIC MYELOPROLIFERATIVE DISEASE (H): ICD-10-CM

## 2022-08-25 DIAGNOSIS — J44.9 CHRONIC OBSTRUCTIVE PULMONARY DISEASE, UNSPECIFIED COPD TYPE (H): ICD-10-CM

## 2022-08-25 DIAGNOSIS — Z86.711 HISTORY OF PULMONARY EMBOLISM: ICD-10-CM

## 2022-08-25 DIAGNOSIS — I10 BENIGN ESSENTIAL HYPERTENSION: ICD-10-CM

## 2022-08-25 DIAGNOSIS — I50.21 ACUTE SYSTOLIC CONGESTIVE HEART FAILURE (H): Primary | ICD-10-CM

## 2022-08-25 DIAGNOSIS — Z86.73 HISTORY OF TIA (TRANSIENT ISCHEMIC ATTACK) AND STROKE: ICD-10-CM

## 2022-08-25 DIAGNOSIS — I48.91 NEW ONSET A-FIB (H): ICD-10-CM

## 2022-08-25 PROCEDURE — 99607 MTMS BY PHARM ADDL 15 MIN: CPT | Performed by: PHARMACIST

## 2022-08-25 PROCEDURE — 99605 MTMS BY PHARM NP 15 MIN: CPT | Performed by: PHARMACIST

## 2022-08-25 RX ORDER — HYDROXYUREA 500 MG/1
1000 CAPSULE ORAL DAILY
COMMUNITY
Start: 2022-08-25

## 2022-08-25 RX ORDER — WARFARIN SODIUM 1 MG/1
3 TABLET ORAL DAILY
COMMUNITY
Start: 2022-08-25

## 2022-08-25 NOTE — LETTER
"_  Medication List         Prepared on: Aug 25, 2022     Bring your Medication List when you go to the doctor, hospital, or   emergency room. And, share it with your family or caregivers.     Note any changes to how you take your medications.  Cross out medications when you no longer use them.    Medication How I take it Why I use it Prescriber   albuterol (PROAIR HFA;PROVENTIL HFA;VENTOLIN HFA) 90 mcg/actuation inhaler Inhale 2 puffs into the lungs every 6 hours as needed for shortness of breath / dyspnea Shortness of Breath Steven Caraballo MD   aspirin (ASA) 81 MG EC tablet Take 1 tablet (81 mg) by mouth daily Start tomorrow morning. Coronary artery disease involving native coronary artery of native heart with angina pectoris (H); Ischemic Cardiomyopathy Audrey Holder MD   atorvastatin (LIPITOR) 80 MG tablet Take 1 tablet (80 mg) by mouth every morning Coronary artery disease involving native coronary artery of native heart with angina pectoris (H); Mixed Hyperlipidemia Karsten Castellon MD   furosemide (LASIX) 20 MG tablet Take 0.5 tablets (10 mg) by mouth daily Chronic systolic congestive heart failure (H) Sp Larson DO   hydroxyurea (HYDREA) 500 MG capsule Take 2-3 capsules (1,000-1,500 mg) by mouth See Admin Instructions 1500 mg daily- Mon, Wed, & Fri.  1000 mg daily - Tue, Thurs, Sat, & Sun Chronic Myeloproliferative Disease (H) Steven Caraballo   metoprolol succinate ER (TOPROL-XL) 25 MG 24 hr tablet Take 25 mg by mouth daily Atrial Fibrillation Steven Caraballo MD   nitroGLYcerin (NITROSTAT) 0.4 MG sublingual tablet One tablet under the tongue every 5 minutes if needed for chest pain. May repeat every 5 minutes for a maximum of 3 doses in 15 minutes\" Coronary artery disease involving native coronary artery of native heart with angina pectoris (H); Ischemic Cardiomyopathy Audrey Holder MD   tiotropium (SPIRIVA) 18 mcg inhalation capsule  Place 18 mcg into inhaler and inhale 1 puff " daily. COPD Steven Caraballo MD   warfarin ANTICOAGULANT (COUMADIN) 1 MG tablet Take 3 tablets (3 mg) by mouth daily History of Pulmonary Embolism Steven Caraballo         Add new medications, over-the-counter drugs, herbals, vitamins, or  minerals in the blank rows below.    Medication How I take it Why I use it Prescriber                          Allergies:      No Known Allergies        Side effects I have had:               Other Information:              My notes and questions:

## 2022-08-25 NOTE — PROGRESS NOTES
Clinic Care Coordination Contact  Care Team Conversations    Patient identified for care management outreach, however [facility] RN staff has already followed up with patient to ensure they are following up with PCP and have needs and resources met. Clinic RN will refer back to WILLIAMS MORSE if needed/appropriate.    TATO Li  Social Work Care Coordinator - [facility]  Care Coordination  [email or web address]  Cell Phone: [phone]  Gender pronouns: she/her  Employed by [employer]

## 2022-08-25 NOTE — Clinical Note
Cj Larson,  I am a pharmacist that works with Dr. Caraballo at [facility].  I had a ABDOUL medication review with Hector.  He was hospitalized with new onset afib with RVR.  They stopped his losartan for hypotension, we didn't resume it yesterday at PCP visit (BP was 134/60).  You see him on 9/19 and if BP still good, could consider restarting it for HFrEF.  Thanks, Krystyna Morales, PharmD Medication Therapy Management Pharmacist Pager: [phone]

## 2022-08-25 NOTE — LETTER
"Recommended To-Do List      Prepared on: Aug 25, 2022       You can get the best results from your medications by completing the items on this \"To-Do List.\"      Bring your To-Do List when you go to your doctor. And, share it with your family or caregivers.    My To-Do List:  What we talked about: What I should do:   A medication that may be of benefit to you    For Dr. Larson to consider at upcoming appointment:  1.  Recommend restarting losartan 12.5 mg daily for heart function          What we talked about: What I should do:                       "

## 2022-08-25 NOTE — LETTER
August 25, 2022  Shaji Pompa  [address] [place] [id number]    Dear Mr. Pompa, [facility]     Thank you for talking with me on Aug 25, 2022 about your health and medications. As a follow-up to our conversation, I have included two documents:      1. Your Recommended To-Do List has steps you should take to get the best results from your medications.  2. Your Medication List will help you keep track of your medications and how to take them.    If you want to talk about these documents, please call Krystyna Morales RPH at phone: [phone], Monday-Friday 8-4:30pm.    I look forward to working with you and your doctors to make sure your medications work well for you.    Sincerely,  Krystyna Morales RPH  [facility] Pharmacist, [facility]

## 2022-09-19 ENCOUNTER — OFFICE VISIT (OUTPATIENT)
Dept: CARDIOLOGY | Facility: CLINIC | Age: 77
End: 2022-09-19
Payer: COMMERCIAL

## 2022-09-19 VITALS
HEIGHT: 66 IN | HEART RATE: 76 BPM | RESPIRATION RATE: 20 BRPM | DIASTOLIC BLOOD PRESSURE: 62 MMHG | BODY MASS INDEX: 31.6 KG/M2 | WEIGHT: 196.6 LBS | SYSTOLIC BLOOD PRESSURE: 142 MMHG

## 2022-09-19 DIAGNOSIS — I25.10 CORONARY ARTERY DISEASE INVOLVING NATIVE CORONARY ARTERY OF NATIVE HEART WITHOUT ANGINA PECTORIS: ICD-10-CM

## 2022-09-19 DIAGNOSIS — I50.22 CHRONIC SYSTOLIC CONGESTIVE HEART FAILURE, NYHA CLASS 2 (H): Primary | ICD-10-CM

## 2022-09-19 DIAGNOSIS — I21.4 NSTEMI (NON-ST ELEVATED MYOCARDIAL INFARCTION) (H): ICD-10-CM

## 2022-09-19 DIAGNOSIS — I48.0 PAROXYSMAL ATRIAL FIBRILLATION (H): ICD-10-CM

## 2022-09-19 PROBLEM — I48.91 NEW ONSET A-FIB (H): Status: RESOLVED | Noted: 2022-08-17 | Resolved: 2022-09-19

## 2022-09-19 PROBLEM — I50.21 ACUTE SYSTOLIC CONGESTIVE HEART FAILURE (H): Status: RESOLVED | Noted: 2022-05-01 | Resolved: 2022-09-19

## 2022-09-19 PROCEDURE — 99214 OFFICE O/P EST MOD 30 MIN: CPT | Performed by: INTERNAL MEDICINE

## 2022-09-19 RX ORDER — LOSARTAN POTASSIUM 25 MG/1
12.5 TABLET ORAL DAILY
Qty: 45 TABLET | Refills: 3 | Status: SHIPPED | OUTPATIENT
Start: 2022-09-19 | End: 2023-09-19

## 2022-09-19 NOTE — LETTER
9/19/2022    Steven Caraballo MD  [facility] [address] [place] [address]    RE: Shaji Pompa       Dear Colleague,     I had the pleasure of seeing Shaji Pompa in the [facility].    HEART CARE CONSULTATON NOTE        Assessment/Recommendations   Assessment:   1.  Chronic congestive heart failure with reduced ejection fraction.  Ischemic cardiomyopathy. LVEF: 35%.  Recent decline was in the setting of A. fib with ventricular response.  Currently doing well from a cardiac symptoms standpoint (NYHA II).  2.  Coronary Artery disease with occluded mid right coronary artery.  Collaterals from the left and proximal RCA.  No anginal symptoms  3.  Severe coronary calcification on CT, coronary artery disease.   4.  Paroxysmal A. fib with rapid ventricular response.  Occurred in August 2022 in the setting of COVID-19 infection.  Suspect acute infection triggered A. fib event.  Most of the patient's symptoms during hospitalization related to A. fib with right ventricular response and acute decompensated heart failure.  5.  Ischemic cardiomyopathy  6.  Pulmonary embolism, bilateral  7.  DVT right leg   8.  Severe anemia, improved, 2/2 MPD.     Hemoglobin   Date Value Ref Range Status   08/24/2022 10.2 (L) 13.3 - 17.7 g/dL Final   9.  Thrombocytopenia,  Platelet Count   Date Value Ref Range Status   08/24/2022 281 150 - 450 10e3/uL Final   10.  Myeloproliferative disorder      Plan:   1.  Lasix 10 mg daily  2.  Resume Losartan 12.5 mg daily  3.  Continue atorvastatin to 80 mg daily  4.  Warfarin for DVT  5.  Aspirin 81 mg for coronary artery disease  6.  Continue metoprolol 25 mg XL daily.    Patient plans to be in [place] over winter starting in February 2023.  We will follow-up with patient in January 2023.      History of Present Illness/Subjective    HPI: Patient is a 77-year-old male with ischemic cardiomyopathy, heart failure with reduced ejection fraction,  coronary artery disease, hypertension, hyperlipidemia presents to [facility] in follow-up after recent hospitalization for new onset A. fib with rapid ventricular response and decompensated heart failure in the setting of acute COVID-19 infection.    Patient was hospitalized in August 2022 for decompensated heart failure, A. fib with rapid ventricular response, hypotension requiring pressors, acute COVID-19 infection and NSTEMI.    Patient had relatively complicated hospitalization.  Reviewed Dr. Elias and ICU notes.  He initially presented with A. fib with rapid ventricular response which was hemodynamically unstable.  This prompted attempted cardioversion which was unsuccessful on multiple attempts.  He was started on pressor therapy.  Treated for decompensated heart failure.  Treated for acute COVID-19 infection.  He was on warfarin therapy for history of DVT and pulmonary embolism prior to admission.  Patient was started on metoprolol therapy prior to discharge.  Losartan was held due to hypotension.  Echocardiogram performed during the hospitalization demonstrated reduced left ventricular ejection fraction without EF of 35% which is slightly lower than previous.    Currently he notes improvement in his symptoms.  Currently denies any significant dyspnea with routine activities.  Denies any anginal chest pain.  Has mild lower extremity edema.  Weight has been downtrending since discharge from the hospital.  His weight has been relatively stable around 190 pounds at home.  Blood pressure is well controlled at home as well.  Home heart rates have been stable and above 60 bpm with a range between 60 to 103 bpm over the last month.    Reviewed most recent labs from [facility].  Hemoglobin is 8.9     Echo reviewed from 8/17/22  The rhythm was rapid atrial fibrillation.  Left ventricular function is decreased. The ejection fraction is 30-35%  (moderately reduced).  There is moderate global hypokinesia  "of the left ventricle.  The left atrium is mildly dilated.  The study was technically difficult. No hemodynamically significant valvular  abnormalities on 2D or color flow imaging.    Coronary Angiogram:   Left Main   Mid LM to Dist LM lesion is 25% stenosed. The lesion is calcified.   Left Anterior Descending   Prox LAD to Mid LAD lesion is 20% stenosed. The lesion is calcified.   Ramus Intermedius   The vessel is large.   Left Circumflex   Mid Cx lesion is 30% stenosed. The lesion is calcified.   First Obtuse Marginal Branch   The vessel is small.   Second Obtuse Marginal Branch   The vessel is small.   Right Coronary Artery   Prox RCA lesion is 90% stenosed.   Prox RCA to Dist RCA lesion is 100% stenosed.   Acute Marginal Branch   Collaterals   Acute Mrg filled by collaterals from RV Branch.      Right Posterior Descending Artery   Collaterals   RPDA filled by collaterals from 2nd Sept.      Right Posterior Atrioventricular Artery   Collaterals   RPAV filled by collaterals from Dist Cx          Physical Examination  Review of Systems   VITALS: BP (!) 142/62 (BP Location: Right arm, Patient Position: Sitting, Cuff Size: Adult Regular)   Pulse 76   Resp 20   Ht 1.676 m (5' 6\")   Wt 89.2 kg (196 lb 9.6 oz)   BMI 31.73 kg/m    BMI: Body mass index is 31.73 kg/m .  Wt Readings from Last 3 Encounters:   09/19/22 89.2 kg (196 lb 9.6 oz)   08/24/22 91.6 kg (202 lb)   08/19/22 92.9 kg (204 lb 14.4 oz)       Intake/Output Summary (Last 24 hours) at 4/23/2022 0853  Last data filed at 4/23/2022 0627  Gross per 24 hour   Intake 740 ml   Output 571 ml   Net 169 ml     General Appearance:   no distress, normal body habitus, in bed.    ENT/Mouth: membranes moist, no oral lesions or bleeding gums.      EYES:  no scleral icterus, normal conjunctivae   Neck: no carotid bruits or thyromegaly   Chest/Lungs:    No wheezing.  Clear bilaterally.   Cardiovascular:   Regular. Normal first and second heart sounds with no murmurs, " rubs, or gallops; the carotid, radial and posterior tibial pulses are intact, Jugular venous pressure normal, mild edema worse in the right lower extremity   Abdomen:  no  tenderness; bowel sounds are present   Extremities: no cyanosis or clubbing   Skin: no xanthelasma, warm.    Neurologic: normal  bilateral, no tremors     Psychiatric: alert and oriented x3, calm     Review Of Systems  Skin: negative  Eyes: negative  Ears/Nose/Throat: negative  Respiratory: Mild dyspnea.   Cardiovascular: No chest pain.  Mild dyspnea with moderate to strenuous activity which is improved.  Nearing baseline  Gastrointestinal: negative  Genitourinary: negative  Musculoskeletal: negative  Neurologic: negative  Psychiatric: negative  Hematologic/Lymphatic/Immunologic: negative  Endocrine: negative          Lab Results    Chemistry/lipid CBC Cardiac Enzymes/BNP/TSH/INR   Recent Labs   Lab Test 05/03/21  1557   CHOL 110   HDL 35*   LDL 52   TRIG 115     Recent Labs   Lab Test 05/03/21  1557 08/13/19  0926   LDL 52 111     Recent Labs   Lab Test 04/23/22  0441      POTASSIUM 3.8   CHLORIDE 106   CO2 27      BUN 22   CR 0.76   GFRESTIMATED >90   GLENDY 7.7*     Lab Results   Component Value Date    WBC 5.9 05/01/2022     Lab Results   Component Value Date    RBC 2.40 05/01/2022     Lab Results   Component Value Date    HGB 8.6 05/01/2022     Lab Results   Component Value Date    HCT 27.9 05/01/2022     No components found for: MCT  Lab Results   Component Value Date     05/01/2022     Lab Results   Component Value Date    MCH 35.8 05/01/2022     Lab Results   Component Value Date    MCHC 30.8 05/01/2022     Lab Results   Component Value Date    RDW  05/01/2022      Comment:      Dimorphic Population - Unable to Calculate     Lab Results   Component Value Date     05/01/2022          Recent Labs     Recent Labs   Lab Test 04/23/22  0441 04/22/22  0558 04/21/22  0502   HGB 7.0* 7.5* 7.3*    Recent Labs   Lab Test  04/20/22  0405 04/19/22  2143 04/19/22  1441   TROPONINI 0.09 0.11 0.10     Recent Labs   Lab Test 04/19/22  1441 04/01/22  1208   BNP 1,221* 379*     No results for input(s): TSH in the last 20655 hours.  Recent Labs   Lab Test 04/23/22  0441 04/21/22  0502 04/20/22  0405   INR 1.97* 2.82* 2.67*        Medical History  Surgical History Family History Social History   Past Medical History:   Diagnosis Date     Cerebral infarction (H)      COPD (chronic obstructive pulmonary disease) (H)      HLD (hyperlipidemia)      Hypertension      Myeloproliferative disease (H)      Past Surgical History:   Procedure Laterality Date     CV CORONARY ANGIOGRAM N/A 4/26/2022    Procedure: CV CORONARY ANGIOGRAM;  Surgeon: Audrey Holder MD;  Location: [facility] CATH LAB CV     CV LEFT HEART CATH N/A 4/26/2022    Procedure: Left Heart Catheterization;  Surgeon: Audrey Holder MD;  Location: [facility] CATH LAB CV     OPEN REDUCTION INTERNAL FIXATION FOOT Right 2010     OTHER SURGICAL HISTORY  2001    Lip lesion removal     PICC TRIPLE LUMEN PLACEMENT  8/17/2022          TONSILLECTOMY & ADENOIDECTOMY       Family History   Problem Relation Age of Onset     Alcoholism Mother         Social History     Socioeconomic History     Marital status:      Spouse name: Not on file     Number of children: Not on file     Years of education: Not on file     Highest education level: Not on file   Occupational History     Not on file   Tobacco Use     Smoking status: Former Smoker     Packs/day: 1.00     Start date: 6/8/1965     Quit date: 1/1/2012     Years since quitting: 10.7     Smokeless tobacco: Never Used   Vaping Use     Vaping Use: Never used   Substance and Sexual Activity     Alcohol use: Yes     Alcohol/week: 19.0 standard drinks     Drug use: Never     Sexual activity: Not on file   Other Topics Concern     Not on file   Social History Narrative     Not on file     Social Determinants of Health     Financial Resource Strain:  "Not on file   Food Insecurity: Not on file   Transportation Needs: Not on file   Physical Activity: Not on file   Stress: Not on file   Social Connections: Not on file   Intimate Partner Violence: Not on file   Housing Stability: Not on file         Medications  Allergies   Current Outpatient Medications   Medication Sig Dispense Refill     albuterol (PROAIR HFA;PROVENTIL HFA;VENTOLIN HFA) 90 mcg/actuation inhaler Inhale 2 puffs into the lungs every 6 hours as needed for shortness of breath / dyspnea       aspirin (ASA) 81 MG EC tablet Take 1 tablet (81 mg) by mouth daily Start tomorrow morning. 90 tablet 3     atorvastatin (LIPITOR) 80 MG tablet Take 1 tablet (80 mg) by mouth every morning 90 tablet 1     furosemide (LASIX) 20 MG tablet Take 0.5 tablets (10 mg) by mouth daily 45 tablet 3     hydroxyurea (HYDREA) 500 MG capsule Take 2-3 capsules (1,000-1,500 mg) by mouth See Admin Instructions 1500 mg daily- Mon, Wed, & Fri.  1000 mg daily - Tue, Thurs, Sat, & Sun       metoprolol succinate ER (TOPROL-XL) 25 MG 24 hr tablet Take 25 mg by mouth daily       nitroGLYcerin (NITROSTAT) 0.4 MG sublingual tablet One tablet under the tongue every 5 minutes if needed for chest pain. May repeat every 5 minutes for a maximum of 3 doses in 15 minutes\" 25 tablet 3     tiotropium (SPIRIVA) 18 mcg inhalation capsule [TIOTROPIUM (SPIRIVA) 18 MCG INHALATION CAPSULE] Place 18 mcg into inhaler and inhale daily.       warfarin ANTICOAGULANT (COUMADIN) 1 MG tablet Take 3 tablets (3 mg) by mouth daily        No Known Allergies      Sp Larson DO      Thank you for allowing me to participate in the care of your patient.      Sincerely,     Sp Larson DO     [facility]  cc:   Sp Larson DO  [address] [place] [address]        "

## 2022-09-19 NOTE — PROGRESS NOTES
HEART CARE CONSULTATON NOTE        Assessment/Recommendations   Assessment:   1.  Chronic congestive heart failure with reduced ejection fraction.  Ischemic cardiomyopathy. LVEF: 35%.  Recent decline was in the setting of A. fib with ventricular response.  Currently doing well from a cardiac symptoms standpoint (NYHA II).  2.  Coronary Artery disease with occluded mid right coronary artery.  Collaterals from the left and proximal RCA.  No anginal symptoms  3.  Severe coronary calcification on CT, coronary artery disease.   4.  Paroxysmal A. fib with rapid ventricular response.  Occurred in August 2022 in the setting of COVID-19 infection.  Suspect acute infection triggered A. fib event.  Most of the patient's symptoms during hospitalization related to A. fib with right ventricular response and acute decompensated heart failure.  5.  Ischemic cardiomyopathy  6.  Pulmonary embolism, bilateral  7.  DVT right leg   8.  Severe anemia, improved, 2/2 MPD.     Hemoglobin   Date Value Ref Range Status   08/24/2022 10.2 (L) 13.3 - 17.7 g/dL Final   9.  Thrombocytopenia,  Platelet Count   Date Value Ref Range Status   08/24/2022 281 150 - 450 10e3/uL Final   10.  Myeloproliferative disorder      Plan:   1.  Lasix 10 mg daily  2.  Resume Losartan 12.5 mg daily  3.  Continue atorvastatin to 80 mg daily  4.  Warfarin for DVT  5.  Aspirin 81 mg for coronary artery disease  6.  Continue metoprolol 25 mg XL daily.    Patient plans to be in [place] over winter starting in February 2023.  We will follow-up with patient in January 2023.      History of Present Illness/Subjective    HPI: Patient is a 77-year-old male with ischemic cardiomyopathy, heart failure with reduced ejection fraction, coronary artery disease, hypertension, hyperlipidemia presents to [facility] in follow-up after recent hospitalization for new onset A. fib with rapid ventricular response and decompensated heart failure in the setting of acute COVID-19  infection.    Patient was hospitalized in August 2022 for decompensated heart failure, A. fib with rapid ventricular response, hypotension requiring pressors, acute COVID-19 infection and NSTEMI.    Patient had relatively complicated hospitalization.  Reviewed Dr. Elias and ICU notes.  He initially presented with A. fib with rapid ventricular response which was hemodynamically unstable.  This prompted attempted cardioversion which was unsuccessful on multiple attempts.  He was started on pressor therapy.  Treated for decompensated heart failure.  Treated for acute COVID-19 infection.  He was on warfarin therapy for history of DVT and pulmonary embolism prior to admission.  Patient was started on metoprolol therapy prior to discharge.  Losartan was held due to hypotension.  Echocardiogram performed during the hospitalization demonstrated reduced left ventricular ejection fraction without EF of 35% which is slightly lower than previous.    Currently he notes improvement in his symptoms.  Currently denies any significant dyspnea with routine activities.  Denies any anginal chest pain.  Has mild lower extremity edema.  Weight has been downtrending since discharge from the hospital.  His weight has been relatively stable around 190 pounds at home.  Blood pressure is well controlled at home as well.  Home heart rates have been stable and above 60 bpm with a range between 60 to 103 bpm over the last month.    Reviewed most recent labs from [facility].  Hemoglobin is 8.9     Echo reviewed from 8/17/22  The rhythm was rapid atrial fibrillation.  Left ventricular function is decreased. The ejection fraction is 30-35%  (moderately reduced).  There is moderate global hypokinesia of the left ventricle.  The left atrium is mildly dilated.  The study was technically difficult. No hemodynamically significant valvular  abnormalities on 2D or color flow imaging.    Coronary Angiogram:   Left Main   Mid LM to Dist LM lesion  "is 25% stenosed. The lesion is calcified.   Left Anterior Descending   Prox LAD to Mid LAD lesion is 20% stenosed. The lesion is calcified.   Ramus Intermedius   The vessel is large.   Left Circumflex   Mid Cx lesion is 30% stenosed. The lesion is calcified.   First Obtuse Marginal Branch   The vessel is small.   Second Obtuse Marginal Branch   The vessel is small.   Right Coronary Artery   Prox RCA lesion is 90% stenosed.   Prox RCA to Dist RCA lesion is 100% stenosed.   Acute Marginal Branch   Collaterals   Acute Mrg filled by collaterals from RV Branch.      Right Posterior Descending Artery   Collaterals   RPDA filled by collaterals from 2nd Sept.      Right Posterior Atrioventricular Artery   Collaterals   RPAV filled by collaterals from Dist Cx          Physical Examination  Review of Systems   VITALS: BP (!) 142/62 (BP Location: Right arm, Patient Position: Sitting, Cuff Size: Adult Regular)   Pulse 76   Resp 20   Ht 1.676 m (5' 6\")   Wt 89.2 kg (196 lb 9.6 oz)   BMI 31.73 kg/m    BMI: Body mass index is 31.73 kg/m .  Wt Readings from Last 3 Encounters:   09/19/22 89.2 kg (196 lb 9.6 oz)   08/24/22 91.6 kg (202 lb)   08/19/22 92.9 kg (204 lb 14.4 oz)       Intake/Output Summary (Last 24 hours) at 4/23/2022 0835  Last data filed at 4/23/2022 0627  Gross per 24 hour   Intake 740 ml   Output 571 ml   Net 169 ml     General Appearance:   no distress, normal body habitus, in bed.    ENT/Mouth: membranes moist, no oral lesions or bleeding gums.      EYES:  no scleral icterus, normal conjunctivae   Neck: no carotid bruits or thyromegaly   Chest/Lungs:    No wheezing.  Clear bilaterally.   Cardiovascular:   Regular. Normal first and second heart sounds with no murmurs, rubs, or gallops; the carotid, radial and posterior tibial pulses are intact, Jugular venous pressure normal, mild edema worse in the right lower extremity   Abdomen:  no  tenderness; bowel sounds are present   Extremities: no cyanosis or clubbing "   Skin: no xanthelasma, warm.    Neurologic: normal  bilateral, no tremors     Psychiatric: alert and oriented x3, calm     Review Of Systems  Skin: negative  Eyes: negative  Ears/Nose/Throat: negative  Respiratory: Mild dyspnea.   Cardiovascular: No chest pain.  Mild dyspnea with moderate to strenuous activity which is improved.  Nearing baseline  Gastrointestinal: negative  Genitourinary: negative  Musculoskeletal: negative  Neurologic: negative  Psychiatric: negative  Hematologic/Lymphatic/Immunologic: negative  Endocrine: negative          Lab Results    Chemistry/lipid CBC Cardiac Enzymes/BNP/TSH/INR   Recent Labs   Lab Test 05/03/21  1557   CHOL 110   HDL 35*   LDL 52   TRIG 115     Recent Labs   Lab Test 05/03/21  1557 08/13/19  0926   LDL 52 111     Recent Labs   Lab Test 04/23/22  0441      POTASSIUM 3.8   CHLORIDE 106   CO2 27      BUN 22   CR 0.76   GFRESTIMATED >90   GLENDY 7.7*     Lab Results   Component Value Date    WBC 5.9 05/01/2022     Lab Results   Component Value Date    RBC 2.40 05/01/2022     Lab Results   Component Value Date    HGB 8.6 05/01/2022     Lab Results   Component Value Date    HCT 27.9 05/01/2022     No components found for: MCT  Lab Results   Component Value Date     05/01/2022     Lab Results   Component Value Date    MCH 35.8 05/01/2022     Lab Results   Component Value Date    MCHC 30.8 05/01/2022     Lab Results   Component Value Date    RDW  05/01/2022      Comment:      Dimorphic Population - Unable to Calculate     Lab Results   Component Value Date     05/01/2022          Recent Labs     Recent Labs   Lab Test 04/23/22  0441 04/22/22  0558 04/21/22  0502   HGB 7.0* 7.5* 7.3*    Recent Labs   Lab Test 04/20/22  0405 04/19/22  2143 04/19/22  1441   TROPONINI 0.09 0.11 0.10     Recent Labs   Lab Test 04/19/22  1441 04/01/22  1208   BNP 1,221* 379*     No results for input(s): TSH in the last 76235 hours.  Recent Labs   Lab Test 04/23/22  0441  04/21/22  0502 04/20/22  0405   INR 1.97* 2.82* 2.67*        Medical History  Surgical History Family History Social History   Past Medical History:   Diagnosis Date     Cerebral infarction (H)      COPD (chronic obstructive pulmonary disease) (H)      HLD (hyperlipidemia)      Hypertension      Myeloproliferative disease (H)      Past Surgical History:   Procedure Laterality Date     CV CORONARY ANGIOGRAM N/A 4/26/2022    Procedure: CV CORONARY ANGIOGRAM;  Surgeon: Audrey Holder MD;  Location: [facility] CATH LAB CV     CV LEFT HEART CATH N/A 4/26/2022    Procedure: Left Heart Catheterization;  Surgeon: Audrey Holder MD;  Location: [facility] CATH LAB CV     OPEN REDUCTION INTERNAL FIXATION FOOT Right 2010     OTHER SURGICAL HISTORY  2001    Lip lesion removal     PICC TRIPLE LUMEN PLACEMENT  8/17/2022          TONSILLECTOMY & ADENOIDECTOMY       Family History   Problem Relation Age of Onset     Alcoholism Mother         Social History     Socioeconomic History     Marital status:      Spouse name: Not on file     Number of children: Not on file     Years of education: Not on file     Highest education level: Not on file   Occupational History     Not on file   Tobacco Use     Smoking status: Former Smoker     Packs/day: 1.00     Start date: 6/8/1965     Quit date: 1/1/2012     Years since quitting: 10.7     Smokeless tobacco: Never Used   Vaping Use     Vaping Use: Never used   Substance and Sexual Activity     Alcohol use: Yes     Alcohol/week: 19.0 standard drinks     Drug use: Never     Sexual activity: Not on file   Other Topics Concern     Not on file   Social History Narrative     Not on file     Social Determinants of Health     Financial Resource Strain: Not on file   Food Insecurity: Not on file   Transportation Needs: Not on file   Physical Activity: Not on file   Stress: Not on file   Social Connections: Not on file   Intimate Partner Violence: Not on file   Housing Stability: Not on file      "    Medications  Allergies   Current Outpatient Medications   Medication Sig Dispense Refill     albuterol (PROAIR HFA;PROVENTIL HFA;VENTOLIN HFA) 90 mcg/actuation inhaler Inhale 2 puffs into the lungs every 6 hours as needed for shortness of breath / dyspnea       aspirin (ASA) 81 MG EC tablet Take 1 tablet (81 mg) by mouth daily Start tomorrow morning. 90 tablet 3     atorvastatin (LIPITOR) 80 MG tablet Take 1 tablet (80 mg) by mouth every morning 90 tablet 1     furosemide (LASIX) 20 MG tablet Take 0.5 tablets (10 mg) by mouth daily 45 tablet 3     hydroxyurea (HYDREA) 500 MG capsule Take 2-3 capsules (1,000-1,500 mg) by mouth See Admin Instructions 1500 mg daily- Mon, Wed, & Fri.  1000 mg daily - Tue, Thurs, Sat, & Sun       metoprolol succinate ER (TOPROL-XL) 25 MG 24 hr tablet Take 25 mg by mouth daily       nitroGLYcerin (NITROSTAT) 0.4 MG sublingual tablet One tablet under the tongue every 5 minutes if needed for chest pain. May repeat every 5 minutes for a maximum of 3 doses in 15 minutes\" 25 tablet 3     tiotropium (SPIRIVA) 18 mcg inhalation capsule [TIOTROPIUM (SPIRIVA) 18 MCG INHALATION CAPSULE] Place 18 mcg into inhaler and inhale daily.       warfarin ANTICOAGULANT (COUMADIN) 1 MG tablet Take 3 tablets (3 mg) by mouth daily        No Known Allergies      Sp Larson DO  "

## 2022-10-01 ENCOUNTER — HEALTH MAINTENANCE LETTER (OUTPATIENT)
Age: 77
End: 2022-10-01

## 2022-10-05 ENCOUNTER — TRANSFERRED RECORDS (OUTPATIENT)
Dept: HEALTH INFORMATION MANAGEMENT | Facility: CLINIC | Age: 77
End: 2022-10-05

## 2022-10-05 LAB — INR (EXTERNAL): 3.1 (ref 0.9–1.1)

## 2022-10-27 DIAGNOSIS — E78.2 MIXED HYPERLIPIDEMIA: ICD-10-CM

## 2022-10-27 DIAGNOSIS — I25.119 CORONARY ARTERY DISEASE INVOLVING NATIVE CORONARY ARTERY OF NATIVE HEART WITH ANGINA PECTORIS (H): ICD-10-CM

## 2022-10-27 RX ORDER — ATORVASTATIN CALCIUM 80 MG/1
80 TABLET, FILM COATED ORAL EVERY MORNING
Qty: 90 TABLET | Refills: 1 | Status: SHIPPED | OUTPATIENT
Start: 2022-10-27 | End: 2023-04-10

## 2022-12-06 ENCOUNTER — TRANSFERRED RECORDS (OUTPATIENT)
Dept: HEALTH INFORMATION MANAGEMENT | Facility: CLINIC | Age: 77
End: 2022-12-06

## 2023-01-09 ENCOUNTER — OFFICE VISIT (OUTPATIENT)
Dept: CARDIOLOGY | Facility: CLINIC | Age: 78
End: 2023-01-09
Attending: INTERNAL MEDICINE
Payer: COMMERCIAL

## 2023-01-09 VITALS
WEIGHT: 199 LBS | HEART RATE: 76 BPM | RESPIRATION RATE: 16 BRPM | BODY MASS INDEX: 32.12 KG/M2 | SYSTOLIC BLOOD PRESSURE: 116 MMHG | DIASTOLIC BLOOD PRESSURE: 62 MMHG | OXYGEN SATURATION: 95 %

## 2023-01-09 DIAGNOSIS — I25.5 ISCHEMIC CARDIOMYOPATHY: Primary | ICD-10-CM

## 2023-01-09 DIAGNOSIS — I10 ESSENTIAL HYPERTENSION: ICD-10-CM

## 2023-01-09 DIAGNOSIS — I25.10 CORONARY ARTERY DISEASE INVOLVING NATIVE CORONARY ARTERY OF NATIVE HEART WITHOUT ANGINA PECTORIS: ICD-10-CM

## 2023-01-09 DIAGNOSIS — I50.22 CHRONIC SYSTOLIC CONGESTIVE HEART FAILURE, NYHA CLASS 2 (H): ICD-10-CM

## 2023-01-09 PROCEDURE — 99214 OFFICE O/P EST MOD 30 MIN: CPT | Performed by: INTERNAL MEDICINE

## 2023-01-09 NOTE — PATIENT INSTRUCTIONS
Please contact direct nurses line Monday through Friday 8 AM to 5 PM @ [phone]  After-hours contact cardiology office at [phone].

## 2023-01-09 NOTE — PROGRESS NOTES
HEART CARE CONSULTATON NOTE        Assessment/Recommendations   Assessment:   1.  Chronic congestive heart failure with reduced ejection fraction.  Ischemic cardiomyopathy. LVEF: 35%. NYHA II (stable exertional dyspnea).  2.  Coronary Artery disease with occluded mid right coronary artery.  Collaterals from the left and proximal RCA.  No anginal symptoms  3.  Severe coronary calcification on CT.   4.  Paroxysmal A. fib with rapid ventricular response (resolved).  Occurred in August 2022 in the setting of COVID-19 infection. Sinus today.   5.  Ischemic cardiomyopathy  6.  Pulmonary embolism, bilateral  7.  DVT right leg   8.  Severe anemia, improved, 2/2 MPD.     Hemoglobin   Date Value Ref Range Status   08/24/2022 10.2 (L) 13.3 - 17.7 g/dL Final   9.  Thrombocytopenia, improved.   Platelet Count   Date Value Ref Range Status   08/24/2022 281 150 - 450 10e3/uL Final   10.  Myeloproliferative disorder, followed at [facility]      Plan:   1.  Lasix 10 mg daily  2.  Losartan 12.5 mg daily, home BP stable (reviewed).   3.  Continue atorvastatin to 80 mg daily  4.  Warfarin for DVT, INR 2-3.    5.  Aspirin 81 mg for coronary artery disease  6.  Continue metoprolol 25 mg XL daily.    Patient plans to be in [place] over winter starting in February 2023-April 2023.      History of Present Illness/Subjective    HPI: Patient is a 77-year-old male with ischemic cardiomyopathy, heart failure with reduced ejection fraction, coronary artery disease, hypertension, hyperlipidemia presents to [facility] clinic in follow-up.     Patient was hospitalized in August 2022,related to decompensated systolic heart failure, A. fib with rapid ventricular response, hypotension requiring pressors, acute COVID-19 infection and acute NSTEMI. Echocardiogram performed during the hospitalization demonstrated reduced left ventricular ejection fraction without EF of 35% which is slightly lower than previous.    Since last clinical evaluation  he has been doing quite well.  He still has mild lower extremity edema which is unchanged.  No significant change in mild dyspnea with exertion.  No recent hospitalizations.  Denies any anginal chest pain.  Had a good holiday season.  Plans to drive to [place] in early February.  No lightheadedness or syncope.  No orthopnea or PND symptoms.  Reviewed medications in detail with the patient.  He has proper refills.    Esophageal symptoms have improved with treatment by Dr. Caraballo.    Last note from Dr. Andrade reviewed from 12/6/2022.  Seen for COPD and esophageal issues.       Echo reviewed from 8/17/22  The rhythm was rapid atrial fibrillation.  Left ventricular function is decreased. The ejection fraction is 30-35%  (moderately reduced).  There is moderate global hypokinesia of the left ventricle.  The left atrium is mildly dilated.  The study was technically difficult. No hemodynamically significant valvular  abnormalities on 2D or color flow imaging.    Coronary Angiogram: 4/26/22  Left Main   Mid LM to Dist LM lesion is 25% stenosed. The lesion is calcified.   Left Anterior Descending   Prox LAD to Mid LAD lesion is 20% stenosed. The lesion is calcified.   Ramus Intermedius   The vessel is large.   Left Circumflex   Mid Cx lesion is 30% stenosed. The lesion is calcified.   First Obtuse Marginal Branch   The vessel is small.   Second Obtuse Marginal Branch   The vessel is small.   Right Coronary Artery   Prox RCA lesion is 90% stenosed.   Prox RCA to Dist RCA lesion is 100% stenosed.   Acute Marginal Branch   Collaterals   Acute Mrg filled by collaterals from RV Branch.      Right Posterior Descending Artery   Collaterals   RPDA filled by collaterals from 2nd Sept.      Right Posterior Atrioventricular Artery   Collaterals   RPAV filled by collaterals from Dist Cx          Physical Examination  Review of Systems   VITALS: /62 (BP Location: Right arm, Patient Position: Sitting, Cuff Size: Adult  Regular)   Pulse 76   Resp 16   Wt 90.3 kg (199 lb)   SpO2 95%   BMI 32.12 kg/m    BMI: Body mass index is 32.12 kg/m .  Wt Readings from Last 3 Encounters:   09/19/22 89.2 kg (196 lb 9.6 oz)   08/24/22 91.6 kg (202 lb)   08/19/22 92.9 kg (204 lb 14.4 oz)       Wt Readings from Last 5 Encounters:   01/09/23 90.3 kg (199 lb)   09/19/22 89.2 kg (196 lb 9.6 oz)   08/24/22 91.6 kg (202 lb)   08/19/22 92.9 kg (204 lb 14.4 oz)   06/02/22 89.4 kg (197 lb 1.6 oz)       General Appearance:   no distress, normal body habitus, in bed.    ENT/Mouth: membranes moist, no oral lesions or bleeding gums.      EYES:  no scleral icterus, normal conjunctivae   Neck: no carotid bruits or thyromegaly   Chest/Lungs:    No wheezing.  Clear bilaterally.   Cardiovascular:   Regular. Normal first and second heart sounds with no murmurs, rubs, or gallops; the carotid, radial and posterior tibial pulses are intact, Jugular venous pressure normal, mild bilateral lower extremity   Abdomen:  no  tenderness; bowel sounds are present   Extremities: no cyanosis or clubbing   Skin: no xanthelasma, warm.    Neurologic: normal  bilateral, no tremors     Psychiatric: alert and oriented x3, calm     Review Of Systems  Skin: negative  Eyes: negative  Ears/Nose/Throat: negative  Respiratory: Mild dyspnea.   Cardiovascular: Mild exertional dyspnea.  No anginal chest pain.  Gastrointestinal: negative  Genitourinary: negative  Musculoskeletal: negative  Neurologic: negative  Psychiatric: negative  Hematologic/Lymphatic/Immunologic: negative  Endocrine: negative          Lab Results    Chemistry/lipid CBC Cardiac Enzymes/BNP/TSH/INR   Recent Labs   Lab Test 05/03/21  1557   CHOL 110   HDL 35*   LDL 52   TRIG 115     Recent Labs   Lab Test 05/03/21  1557 08/13/19  0926   LDL 52 111     Recent Labs   Lab Test 04/23/22  0441      POTASSIUM 3.8   CHLORIDE 106   CO2 27      BUN 22   CR 0.76   GFRESTIMATED >90   GLENDY 7.7*     Lab Results    Component Value Date    WBC 5.9 05/01/2022     Lab Results   Component Value Date    RBC 2.40 05/01/2022     Lab Results   Component Value Date    HGB 8.6 05/01/2022     Lab Results   Component Value Date    HCT 27.9 05/01/2022     No components found for: MCT  Lab Results   Component Value Date     05/01/2022     Lab Results   Component Value Date    MCH 35.8 05/01/2022     Lab Results   Component Value Date    MCHC 30.8 05/01/2022     Lab Results   Component Value Date    RDW  05/01/2022      Comment:      Dimorphic Population - Unable to Calculate     Lab Results   Component Value Date     05/01/2022          Recent Labs     Recent Labs   Lab Test 04/23/22  0441 04/22/22  0558 04/21/22  0502   HGB 7.0* 7.5* 7.3*    Recent Labs   Lab Test 04/20/22  0405 04/19/22  2143 04/19/22  1441   TROPONINI 0.09 0.11 0.10     Recent Labs   Lab Test 04/19/22  1441 04/01/22  1208   BNP 1,221* 379*     No results for input(s): TSH in the last 28853 hours.  Recent Labs   Lab Test 04/23/22  0441 04/21/22  0502 04/20/22  0405   INR 1.97* 2.82* 2.67*        Medical History  Surgical History Family History Social History   Past Medical History:   Diagnosis Date     Cerebral infarction (H)      COPD (chronic obstructive pulmonary disease) (H)      HLD (hyperlipidemia)      Hypertension      Myeloproliferative disease (H)      Past Surgical History:   Procedure Laterality Date     CV CORONARY ANGIOGRAM N/A 4/26/2022    Procedure: CV CORONARY ANGIOGRAM;  Surgeon: Audrey Holder MD;  Location: [facility] CATH LAB CV     CV LEFT HEART CATH N/A 4/26/2022    Procedure: Left Heart Catheterization;  Surgeon: Audrey Holder MD;  Location: [facility] CATH LAB CV     OPEN REDUCTION INTERNAL FIXATION FOOT Right 2010     OTHER SURGICAL HISTORY  2001    Lip lesion removal     PICC TRIPLE LUMEN PLACEMENT  8/17/2022          TONSILLECTOMY & ADENOIDECTOMY       Family History   Problem Relation Age of Onset     Alcoholism Mother          Social History     Socioeconomic History     Marital status:      Spouse name: Not on file     Number of children: Not on file     Years of education: Not on file     Highest education level: Not on file   Occupational History     Not on file   Tobacco Use     Smoking status: Former     Packs/day: 1.00     Types: Cigarettes     Start date: 1965     Quit date: 2012     Years since quittin.0     Smokeless tobacco: Never   Vaping Use     Vaping Use: Never used   Substance and Sexual Activity     Alcohol use: Yes     Alcohol/week: 19.0 standard drinks     Drug use: Never     Sexual activity: Not on file   Other Topics Concern     Not on file   Social History Narrative     Not on file     Social Determinants of Health     Financial Resource Strain: Not on file   Food Insecurity: Not on file   Transportation Needs: Not on file   Physical Activity: Not on file   Stress: Not on file   Social Connections: Not on file   Intimate Partner Violence: Not on file   Housing Stability: Not on file         Medications  Allergies   Current Outpatient Medications   Medication Sig Dispense Refill     albuterol (PROAIR HFA;PROVENTIL HFA;VENTOLIN HFA) 90 mcg/actuation inhaler Inhale 2 puffs into the lungs every 6 hours as needed for shortness of breath / dyspnea       aspirin (ASA) 81 MG EC tablet Take 1 tablet (81 mg) by mouth daily Start tomorrow morning. 90 tablet 3     atorvastatin (LIPITOR) 80 MG tablet Take 1 tablet (80 mg) by mouth every morning 90 tablet 1     furosemide (LASIX) 20 MG tablet Take 0.5 tablets (10 mg) by mouth daily 45 tablet 3     hydroxyurea (HYDREA) 500 MG capsule Take 2-3 capsules (1,000-1,500 mg) by mouth See Admin Instructions 1500 mg daily- Mon, Wed, & Fri.  1000 mg daily - Tue, Thurs, Sat, & Sun       losartan (COZAAR) 25 MG tablet Take 0.5 tablets (12.5 mg) by mouth daily 45 tablet 3     metoprolol succinate ER (TOPROL-XL) 25 MG 24 hr tablet Take 25 mg by mouth daily       nitroGLYcerin  "(NITROSTAT) 0.4 MG sublingual tablet One tablet under the tongue every 5 minutes if needed for chest pain. May repeat every 5 minutes for a maximum of 3 doses in 15 minutes\" 25 tablet 3     tiotropium (SPIRIVA) 18 mcg inhalation capsule [TIOTROPIUM (SPIRIVA) 18 MCG INHALATION CAPSULE] Place 18 mcg into inhaler and inhale daily.       warfarin ANTICOAGULANT (COUMADIN) 1 MG tablet Take 3 tablets (3 mg) by mouth daily        No Known Allergies      Sp Larson DO  "

## 2023-04-10 DIAGNOSIS — I25.119 CORONARY ARTERY DISEASE INVOLVING NATIVE CORONARY ARTERY OF NATIVE HEART WITH ANGINA PECTORIS (H): ICD-10-CM

## 2023-04-10 DIAGNOSIS — E78.2 MIXED HYPERLIPIDEMIA: ICD-10-CM

## 2023-04-10 RX ORDER — ATORVASTATIN CALCIUM 80 MG/1
80 TABLET, FILM COATED ORAL EVERY MORNING
Qty: 90 TABLET | Refills: 1 | Status: SHIPPED | OUTPATIENT
Start: 2023-04-10 | End: 2023-09-26

## 2023-04-15 DIAGNOSIS — I50.22 CHRONIC SYSTOLIC CONGESTIVE HEART FAILURE (H): ICD-10-CM

## 2023-04-17 RX ORDER — FUROSEMIDE 20 MG
TABLET ORAL
Qty: 45 TABLET | Refills: 3 | Status: SHIPPED | OUTPATIENT
Start: 2023-04-17 | End: 2023-11-16

## 2023-04-24 ENCOUNTER — LAB REQUISITION (OUTPATIENT)
Dept: LAB | Facility: CLINIC | Age: 78
End: 2023-04-24

## 2023-04-24 DIAGNOSIS — I25.10 ATHEROSCLEROTIC HEART DISEASE OF NATIVE CORONARY ARTERY WITHOUT ANGINA PECTORIS: ICD-10-CM

## 2023-04-24 LAB
ANION GAP SERPL CALCULATED.3IONS-SCNC: 14 MMOL/L (ref 7–15)
BUN SERPL-MCNC: 28.3 MG/DL (ref 8–23)
CALCIUM SERPL-MCNC: 9.4 MG/DL (ref 8.8–10.2)
CHLORIDE SERPL-SCNC: 102 MMOL/L (ref 98–107)
CHOLEST SERPL-MCNC: 111 MG/DL
CREAT SERPL-MCNC: 1.06 MG/DL (ref 0.67–1.17)
DEPRECATED HCO3 PLAS-SCNC: 21 MMOL/L (ref 22–29)
GFR SERPL CREATININE-BSD FRML MDRD: 72 ML/MIN/1.73M2
GLUCOSE SERPL-MCNC: 80 MG/DL (ref 70–99)
HDLC SERPL-MCNC: 48 MG/DL
LDLC SERPL CALC-MCNC: 47 MG/DL
NONHDLC SERPL-MCNC: 63 MG/DL
POTASSIUM SERPL-SCNC: 5.4 MMOL/L (ref 3.4–5.3)
SODIUM SERPL-SCNC: 137 MMOL/L (ref 136–145)
TRIGL SERPL-MCNC: 81 MG/DL

## 2023-04-24 PROCEDURE — 80048 BASIC METABOLIC PNL TOTAL CA: CPT | Performed by: FAMILY MEDICINE

## 2023-04-24 PROCEDURE — 80061 LIPID PANEL: CPT | Performed by: FAMILY MEDICINE

## 2023-04-28 ENCOUNTER — OFFICE VISIT (OUTPATIENT)
Dept: CARDIOLOGY | Facility: CLINIC | Age: 78
End: 2023-04-28
Attending: INTERNAL MEDICINE
Payer: COMMERCIAL

## 2023-04-28 VITALS
HEIGHT: 67 IN | SYSTOLIC BLOOD PRESSURE: 134 MMHG | DIASTOLIC BLOOD PRESSURE: 60 MMHG | HEART RATE: 68 BPM | BODY MASS INDEX: 30.45 KG/M2 | RESPIRATION RATE: 16 BRPM | WEIGHT: 194 LBS

## 2023-04-28 DIAGNOSIS — E78.2 MIXED HYPERLIPIDEMIA: ICD-10-CM

## 2023-04-28 DIAGNOSIS — I50.22 CHRONIC SYSTOLIC CONGESTIVE HEART FAILURE, NYHA CLASS 2 (H): ICD-10-CM

## 2023-04-28 DIAGNOSIS — I10 ESSENTIAL HYPERTENSION: ICD-10-CM

## 2023-04-28 DIAGNOSIS — I25.10 CORONARY ARTERY DISEASE INVOLVING NATIVE CORONARY ARTERY OF NATIVE HEART WITHOUT ANGINA PECTORIS: Primary | ICD-10-CM

## 2023-04-28 PROBLEM — I50.9 ACUTE CONGESTIVE HEART FAILURE (H): Status: RESOLVED | Noted: 2022-04-19 | Resolved: 2023-04-28

## 2023-04-28 PROCEDURE — 99214 OFFICE O/P EST MOD 30 MIN: CPT | Performed by: INTERNAL MEDICINE

## 2023-04-28 NOTE — PROGRESS NOTES
HEART CARE CONSULTATON NOTE        Assessment/Recommendations   Assessment:   1.  Chronic congestive heart failure with reduced ejection fraction.  Ischemic cardiomyopathy. LVEF: 35%. NYHA early II (stable).   2.  Coronary Artery disease with occluded mid right coronary artery.  Collaterals from the left and proximal RCA.  No anginal symptoms  3.  Severe coronary calcification on CT.   4.  Paroxysmal A. fib with rapid ventricular response (resolved).  Occurred in August 2022 in the setting of COVID-19 infection. Sinus today.   5.  Ischemic cardiomyopathy  6.  Pulmonary embolism, bilateral  7.  DVT right leg   8.  Severe anemia, improved, 2/2 MPD.     Hemoglobin   Date Value Ref Range Status   08/24/2022 10.2 (L) 13.3 - 17.7 g/dL Final   9.  Thrombocytopenia, improved.   Platelet Count   Date Value Ref Range Status   08/24/2022 281 150 - 450 10e3/uL Final   10.  Myeloproliferative disorder, followed at [facility]  11.  Recent osteomyelitis of the heel.    Plan:   1.  Lasix 10 mg daily  2.  Losartan mg daily, home BP stable (reviewed).   3.  Continue atorvastatin to 80 mg daily.  Recent LDL well controlled.  LDL Cholesterol Calculated   Date Value Ref Range Status   04/24/2023 47 <=100 mg/dL Final   4.  Warfarin for DVT, INR 2-3.  Patient is noticing bruising across his arm.  We did discuss Eliquis and Xarelto therapy as an alternative.   5.  Aspirin 81 mg for coronary artery disease  6.  Continue metoprolol 25 mg XL daily.         History of Present Illness/Subjective    HPI: Patient is a 77-year-old male with ischemic cardiomyopathy, heart failure with reduced ejection fraction, coronary artery disease, hypertension, hyperlipidemia presents to [facility] clinic in follow-up.     Currently patient is doing well from a cardiovascular standpoint.  While in [place] he had osteomyelitis of his heel.  He received 6 weeks of antibiotic therapy.  This is recovering and healing.  He is currently off antibiotic  "therapy.  He denies any significant orthopnea or PND symptoms.  He has mild lower extremity edema.  No active chest pain or lightheadedness.  Does note some nocturia which is worse if he has had a beer or 2 prior to going to bed.    No active chest pain symptoms.  Compliant with medications.  We did discuss anticoagulation in details is noticing some bruising in his hands.    Echo reviewed from 8/17/22  The rhythm was rapid atrial fibrillation.  Left ventricular function is decreased. The ejection fraction is 30-35%  (moderately reduced).  There is moderate global hypokinesia of the left ventricle.  The left atrium is mildly dilated.  The study was technically difficult. No hemodynamically significant valvular  abnormalities on 2D or color flow imaging.    Coronary Angiogram: 4/26/22  Left Main   Mid LM to Dist LM lesion is 25% stenosed. The lesion is calcified.   Left Anterior Descending   Prox LAD to Mid LAD lesion is 20% stenosed. The lesion is calcified.   Ramus Intermedius   The vessel is large.   Left Circumflex   Mid Cx lesion is 30% stenosed. The lesion is calcified.   First Obtuse Marginal Branch   The vessel is small.   Second Obtuse Marginal Branch   The vessel is small.   Right Coronary Artery   Prox RCA lesion is 90% stenosed.   Prox RCA to Dist RCA lesion is 100% stenosed.   Acute Marginal Branch   Collaterals   Acute Mrg filled by collaterals from RV Branch.      Right Posterior Descending Artery   Collaterals   RPDA filled by collaterals from 2nd Sept.      Right Posterior Atrioventricular Artery   Collaterals   RPAV filled by collaterals from Dist Cx          Physical Examination  Review of Systems   VITALS: /60 (BP Location: Left arm, Patient Position: Sitting, Cuff Size: Adult Regular)   Pulse 68   Resp 16   Ht 1.702 m (5' 7\")   Wt 88 kg (194 lb)   BMI 30.38 kg/m    BMI: Body mass index is 30.38 kg/m .  Wt Readings from Last 3 Encounters:   04/28/23 88 kg (194 lb)   01/09/23 90.3 kg " (199 lb)   09/19/22 89.2 kg (196 lb 9.6 oz)       Wt Readings from Last 5 Encounters:   04/28/23 88 kg (194 lb)   01/09/23 90.3 kg (199 lb)   09/19/22 89.2 kg (196 lb 9.6 oz)   08/24/22 91.6 kg (202 lb)   08/19/22 92.9 kg (204 lb 14.4 oz)       General Appearance:   no distress, normal body habitus, in bed.    ENT/Mouth: membranes moist, no oral lesions or bleeding gums.      EYES:  no scleral icterus, normal conjunctivae   Neck: no carotid bruits or thyromegaly   Chest/Lungs:    No wheezing.  Clear bilaterally.   Cardiovascular:   Regular. Normal first and second heart sounds with no murmurs, rubs, or gallops; the carotid, radial and posterior tibial pulses are intact, Jugular venous pressure normal, mild bilateral lower extremity, stable.    Abdomen:  no  tenderness; bowel sounds are present   Extremities: no cyanosis or clubbing   Skin: no xanthelasma, warm.    Neurologic: normal  bilateral, no tremors     Psychiatric: alert and oriented x3, calm     Review Of Systems  Skin: negative  Eyes: negative  Ears/Nose/Throat: negative  Respiratory: Mild dyspnea.   Cardiovascular: Mild exertional dyspnea.  No anginal chest pain.  Gastrointestinal: negative  Genitourinary: negative  Musculoskeletal: negative  Neurologic: negative  Psychiatric: negative  Hematologic/Lymphatic/Immunologic: negative  Endocrine: negative          Lab Results    Chemistry/lipid CBC Cardiac Enzymes/BNP/TSH/INR   Recent Labs   Lab Test 05/03/21  1557   CHOL 110   HDL 35*   LDL 52   TRIG 115     Recent Labs   Lab Test 05/03/21  1557 08/13/19  0926   LDL 52 111     Recent Labs   Lab Test 04/23/22  0441      POTASSIUM 3.8   CHLORIDE 106   CO2 27      BUN 22   CR 0.76   GFRESTIMATED >90   GLENDY 7.7*     Lab Results   Component Value Date    WBC 5.9 05/01/2022     Lab Results   Component Value Date    RBC 2.40 05/01/2022     Lab Results   Component Value Date    HGB 8.6 05/01/2022     Lab Results   Component Value Date    HCT 27.9  05/01/2022     No components found for: MCT  Lab Results   Component Value Date     05/01/2022     Lab Results   Component Value Date    MCH 35.8 05/01/2022     Lab Results   Component Value Date    MCHC 30.8 05/01/2022     Lab Results   Component Value Date    RDW  05/01/2022      Comment:      Dimorphic Population - Unable to Calculate     Lab Results   Component Value Date     05/01/2022          Recent Labs     Recent Labs   Lab Test 04/23/22  0441 04/22/22  0558 04/21/22  0502   HGB 7.0* 7.5* 7.3*    Recent Labs   Lab Test 04/20/22  0405 04/19/22  2143 04/19/22  1441   TROPONINI 0.09 0.11 0.10     Recent Labs   Lab Test 04/19/22  1441 04/01/22  1208   BNP 1,221* 379*     No results for input(s): TSH in the last 76839 hours.  Recent Labs   Lab Test 04/23/22  0441 04/21/22  0502 04/20/22  0405   INR 1.97* 2.82* 2.67*        Medical History  Surgical History Family History Social History   Past Medical History:   Diagnosis Date     Cerebral infarction (H)      COPD (chronic obstructive pulmonary disease) (H)      HLD (hyperlipidemia)      Hypertension      Myeloproliferative disease (H)      Past Surgical History:   Procedure Laterality Date     CV CORONARY ANGIOGRAM N/A 4/26/2022    Procedure: CV CORONARY ANGIOGRAM;  Surgeon: Audrey Holder MD;  Location: [facility] CATH LAB CV     CV LEFT HEART CATH N/A 4/26/2022    Procedure: Left Heart Catheterization;  Surgeon: Audrey Holder MD;  Location: [facility] CATH LAB CV     OPEN REDUCTION INTERNAL FIXATION FOOT Right 2010     OTHER SURGICAL HISTORY  2001    Lip lesion removal     PICC TRIPLE LUMEN PLACEMENT  8/17/2022          TONSILLECTOMY & ADENOIDECTOMY       Family History   Problem Relation Age of Onset     Alcoholism Mother         Social History     Socioeconomic History     Marital status:      Spouse name: Not on file     Number of children: Not on file     Years of education: Not on file     Highest education level: Not on file  "  Occupational History     Not on file   Tobacco Use     Smoking status: Former     Packs/day: 1.00     Types: Cigarettes     Start date: 1965     Quit date: 2012     Years since quittin.3     Smokeless tobacco: Never   Vaping Use     Vaping status: Never Used   Substance and Sexual Activity     Alcohol use: Yes     Alcohol/week: 19.0 standard drinks of alcohol     Drug use: Never     Sexual activity: Not on file   Other Topics Concern     Not on file   Social History Narrative     Not on file     Social Determinants of Health     Financial Resource Strain: Not on file   Food Insecurity: Not on file   Transportation Needs: Not on file   Physical Activity: Not on file   Stress: Not on file   Social Connections: Not on file   Intimate Partner Violence: Not on file   Housing Stability: Not on file         Medications  Allergies   Current Outpatient Medications   Medication Sig Dispense Refill     albuterol (PROAIR HFA;PROVENTIL HFA;VENTOLIN HFA) 90 mcg/actuation inhaler Inhale 2 puffs into the lungs every 6 hours as needed for shortness of breath / dyspnea       ASPIRIN LOW DOSE 81 MG EC tablet TAKE 1 TABLET (81 MG) BY MOUTH DAILY, START THE MORNING OF 22 90 tablet 3     atorvastatin (LIPITOR) 80 MG tablet Take 1 tablet (80 mg) by mouth every morning 90 tablet 1     furosemide (LASIX) 20 MG tablet Take 1/2 TABLET BY mouth daily 45 tablet 3     hydroxyurea (HYDREA) 500 MG capsule Take 1,000-1,500 mg by mouth See Admin Instructions 1000 mg daily- Mon, Wed, & Fri.  1500 mg daily - Tue, Thurs, Sat, & Sun       losartan (COZAAR) 25 MG tablet Take 0.5 tablets (12.5 mg) by mouth daily 45 tablet 3     metoprolol succinate ER (TOPROL-XL) 25 MG 24 hr tablet Take 25 mg by mouth daily       nitroGLYcerin (NITROSTAT) 0.4 MG sublingual tablet One tablet under the tongue every 5 minutes if needed for chest pain. May repeat every 5 minutes for a maximum of 3 doses in 15 minutes\" 25 tablet 3     omeprazole (PRILOSEC) " 20 MG DR capsule Take 20 mg by mouth daily       tiotropium (SPIRIVA) 18 mcg inhalation capsule [TIOTROPIUM (SPIRIVA) 18 MCG INHALATION CAPSULE] Place 18 mcg into inhaler and inhale daily.       warfarin ANTICOAGULANT (COUMADIN) 1 MG tablet Take 3 tablets (3 mg) by mouth daily        No Known Allergies      Sp Larson, DO

## 2023-04-28 NOTE — LETTER
4/28/2023    Steven Caraballo MD, MD  [address] [place] [address]    RE: Shaji Pompa       Dear Colleague,     I had the pleasure of seeing Shaji Pompa in the [facility].    HEART CARE CONSULTATON NOTE        Assessment/Recommendations   Assessment:   1.  Chronic congestive heart failure with reduced ejection fraction.  Ischemic cardiomyopathy. LVEF: 35%. NYHA early II (stable).   2.  Coronary Artery disease with occluded mid right coronary artery.  Collaterals from the left and proximal RCA.  No anginal symptoms  3.  Severe coronary calcification on CT.   4.  Paroxysmal A. fib with rapid ventricular response (resolved).  Occurred in August 2022 in the setting of COVID-19 infection. Sinus today.   5.  Ischemic cardiomyopathy  6.  Pulmonary embolism, bilateral  7.  DVT right leg   8.  Severe anemia, improved, 2/2 MPD.     Hemoglobin   Date Value Ref Range Status   08/24/2022 10.2 (L) 13.3 - 17.7 g/dL Final   9.  Thrombocytopenia, improved.   Platelet Count   Date Value Ref Range Status   08/24/2022 281 150 - 450 10e3/uL Final   10.  Myeloproliferative disorder, followed at [facility]  11.  Recent osteomyelitis of the heel.    Plan:   1.  Lasix 10 mg daily  2.  Losartan mg daily, home BP stable (reviewed).   3.  Continue atorvastatin to 80 mg daily.  Recent LDL well controlled.  LDL Cholesterol Calculated   Date Value Ref Range Status   04/24/2023 47 <=100 mg/dL Final   4.  Warfarin for DVT, INR 2-3.  Patient is noticing bruising across his arm.  We did discuss Eliquis and Xarelto therapy as an alternative.   5.  Aspirin 81 mg for coronary artery disease  6.  Continue metoprolol 25 mg XL daily.         History of Present Illness/Subjective    HPI: Patient is a 77-year-old male with ischemic cardiomyopathy, heart failure with reduced ejection fraction, coronary artery disease, hypertension, hyperlipidemia presents to [facility] in follow-up.      Currently patient is doing well from a cardiovascular standpoint.  While in [place] he had osteomyelitis of his heel.  He received 6 weeks of antibiotic therapy.  This is recovering and healing.  He is currently off antibiotic therapy.  He denies any significant orthopnea or PND symptoms.  He has mild lower extremity edema.  No active chest pain or lightheadedness.  Does note some nocturia which is worse if he has had a beer or 2 prior to going to bed.    No active chest pain symptoms.  Compliant with medications.  We did discuss anticoagulation in details is noticing some bruising in his hands.    Echo reviewed from 8/17/22  The rhythm was rapid atrial fibrillation.  Left ventricular function is decreased. The ejection fraction is 30-35%  (moderately reduced).  There is moderate global hypokinesia of the left ventricle.  The left atrium is mildly dilated.  The study was technically difficult. No hemodynamically significant valvular  abnormalities on 2D or color flow imaging.    Coronary Angiogram: 4/26/22  Left Main   Mid LM to Dist LM lesion is 25% stenosed. The lesion is calcified.   Left Anterior Descending   Prox LAD to Mid LAD lesion is 20% stenosed. The lesion is calcified.   Ramus Intermedius   The vessel is large.   Left Circumflex   Mid Cx lesion is 30% stenosed. The lesion is calcified.   First Obtuse Marginal Branch   The vessel is small.   Second Obtuse Marginal Branch   The vessel is small.   Right Coronary Artery   Prox RCA lesion is 90% stenosed.   Prox RCA to Dist RCA lesion is 100% stenosed.   Acute Marginal Branch   Collaterals   Acute Mrg filled by collaterals from RV Branch.      Right Posterior Descending Artery   Collaterals   RPDA filled by collaterals from 2nd Sept.      Right Posterior Atrioventricular Artery   Collaterals   RPAV filled by collaterals from Dist Cx          Physical Examination  Review of Systems   VITALS: /60 (BP Location: Left arm, Patient Position: Sitting, Cuff  "Size: Adult Regular)   Pulse 68   Resp 16   Ht 1.702 m (5' 7\")   Wt 88 kg (194 lb)   BMI 30.38 kg/m    BMI: Body mass index is 30.38 kg/m .  Wt Readings from Last 3 Encounters:   04/28/23 88 kg (194 lb)   01/09/23 90.3 kg (199 lb)   09/19/22 89.2 kg (196 lb 9.6 oz)       Wt Readings from Last 5 Encounters:   04/28/23 88 kg (194 lb)   01/09/23 90.3 kg (199 lb)   09/19/22 89.2 kg (196 lb 9.6 oz)   08/24/22 91.6 kg (202 lb)   08/19/22 92.9 kg (204 lb 14.4 oz)       General Appearance:   no distress, normal body habitus, in bed.    ENT/Mouth: membranes moist, no oral lesions or bleeding gums.      EYES:  no scleral icterus, normal conjunctivae   Neck: no carotid bruits or thyromegaly   Chest/Lungs:    No wheezing.  Clear bilaterally.   Cardiovascular:   Regular. Normal first and second heart sounds with no murmurs, rubs, or gallops; the carotid, radial and posterior tibial pulses are intact, Jugular venous pressure normal, mild bilateral lower extremity, stable.    Abdomen:  no  tenderness; bowel sounds are present   Extremities: no cyanosis or clubbing   Skin: no xanthelasma, warm.    Neurologic: normal  bilateral, no tremors     Psychiatric: alert and oriented x3, calm     Review Of Systems  Skin: negative  Eyes: negative  Ears/Nose/Throat: negative  Respiratory: Mild dyspnea.   Cardiovascular: Mild exertional dyspnea.  No anginal chest pain.  Gastrointestinal: negative  Genitourinary: negative  Musculoskeletal: negative  Neurologic: negative  Psychiatric: negative  Hematologic/Lymphatic/Immunologic: negative  Endocrine: negative          Lab Results    Chemistry/lipid CBC Cardiac Enzymes/BNP/TSH/INR   Recent Labs   Lab Test 05/03/21  1557   CHOL 110   HDL 35*   LDL 52   TRIG 115     Recent Labs   Lab Test 05/03/21  1557 08/13/19  0926   LDL 52 111     Recent Labs   Lab Test 04/23/22  0441      POTASSIUM 3.8   CHLORIDE 106   CO2 27      BUN 22   CR 0.76   GFRESTIMATED >90   GLENDY 7.7*     Lab " Results   Component Value Date    WBC 5.9 05/01/2022     Lab Results   Component Value Date    RBC 2.40 05/01/2022     Lab Results   Component Value Date    HGB 8.6 05/01/2022     Lab Results   Component Value Date    HCT 27.9 05/01/2022     No components found for: MCT  Lab Results   Component Value Date     05/01/2022     Lab Results   Component Value Date    MCH 35.8 05/01/2022     Lab Results   Component Value Date    MCHC 30.8 05/01/2022     Lab Results   Component Value Date    RDW  05/01/2022      Comment:      Dimorphic Population - Unable to Calculate     Lab Results   Component Value Date     05/01/2022          Recent Labs     Recent Labs   Lab Test 04/23/22  0441 04/22/22  0558 04/21/22  0502   HGB 7.0* 7.5* 7.3*    Recent Labs   Lab Test 04/20/22  0405 04/19/22  2143 04/19/22  1441   TROPONINI 0.09 0.11 0.10     Recent Labs   Lab Test 04/19/22  1441 04/01/22  1208   BNP 1,221* 379*     No results for input(s): TSH in the last 42288 hours.  Recent Labs   Lab Test 04/23/22  0441 04/21/22  0502 04/20/22  0405   INR 1.97* 2.82* 2.67*        Medical History  Surgical History Family History Social History   Past Medical History:   Diagnosis Date    Cerebral infarction (H)     COPD (chronic obstructive pulmonary disease) (H)     HLD (hyperlipidemia)     Hypertension     Myeloproliferative disease (H)      Past Surgical History:   Procedure Laterality Date    CV CORONARY ANGIOGRAM N/A 4/26/2022    Procedure: CV CORONARY ANGIOGRAM;  Surgeon: Audrey Holder MD;  Location: [facility] CATH LAB CV    CV LEFT HEART CATH N/A 4/26/2022    Procedure: Left Heart Catheterization;  Surgeon: Audrey Holder MD;  Location: [facility] CATH LAB CV    OPEN REDUCTION INTERNAL FIXATION FOOT Right 2010    OTHER SURGICAL HISTORY  2001    Lip lesion removal    PICC TRIPLE LUMEN PLACEMENT  8/17/2022         TONSILLECTOMY & ADENOIDECTOMY       Family History   Problem Relation Age of Onset    Alcoholism Mother          Social History     Socioeconomic History    Marital status:      Spouse name: Not on file    Number of children: Not on file    Years of education: Not on file    Highest education level: Not on file   Occupational History    Not on file   Tobacco Use    Smoking status: Former     Packs/day: 1.00     Types: Cigarettes     Start date: 1965     Quit date: 2012     Years since quittin.3    Smokeless tobacco: Never   Vaping Use    Vaping status: Never Used   Substance and Sexual Activity    Alcohol use: Yes     Alcohol/week: 19.0 standard drinks of alcohol    Drug use: Never    Sexual activity: Not on file   Other Topics Concern    Not on file   Social History Narrative    Not on file     Social Determinants of Health     Financial Resource Strain: Not on file   Food Insecurity: Not on file   Transportation Needs: Not on file   Physical Activity: Not on file   Stress: Not on file   Social Connections: Not on file   Intimate Partner Violence: Not on file   Housing Stability: Not on file         Medications  Allergies   Current Outpatient Medications   Medication Sig Dispense Refill    albuterol (PROAIR HFA;PROVENTIL HFA;VENTOLIN HFA) 90 mcg/actuation inhaler Inhale 2 puffs into the lungs every 6 hours as needed for shortness of breath / dyspnea      ASPIRIN LOW DOSE 81 MG EC tablet TAKE 1 TABLET (81 MG) BY MOUTH DAILY, START THE MORNING OF 22 90 tablet 3    atorvastatin (LIPITOR) 80 MG tablet Take 1 tablet (80 mg) by mouth every morning 90 tablet 1    furosemide (LASIX) 20 MG tablet Take 1/2 TABLET BY mouth daily 45 tablet 3    hydroxyurea (HYDREA) 500 MG capsule Take 1,000-1,500 mg by mouth See Admin Instructions 1000 mg daily- Mon, Wed, & Fri.  1500 mg daily - Tue, Thurs, Sat, & Sun      losartan (COZAAR) 25 MG tablet Take 0.5 tablets (12.5 mg) by mouth daily 45 tablet 3    metoprolol succinate ER (TOPROL-XL) 25 MG 24 hr tablet Take 25 mg by mouth daily      nitroGLYcerin (NITROSTAT) 0.4 MG  "sublingual tablet One tablet under the tongue every 5 minutes if needed for chest pain. May repeat every 5 minutes for a maximum of 3 doses in 15 minutes\" 25 tablet 3    omeprazole (PRILOSEC) 20 MG DR capsule Take 20 mg by mouth daily      tiotropium (SPIRIVA) 18 mcg inhalation capsule [TIOTROPIUM (SPIRIVA) 18 MCG INHALATION CAPSULE] Place 18 mcg into inhaler and inhale daily.      warfarin ANTICOAGULANT (COUMADIN) 1 MG tablet Take 3 tablets (3 mg) by mouth daily        No Known Allergies      Sp Larson DO      Thank you for allowing me to participate in the care of your patient.      Sincerely,     Sp Larson DO     [facility]  cc:   Sp Larson DO  [address] [place] [address]        "

## 2023-05-31 ENCOUNTER — LAB REQUISITION (OUTPATIENT)
Dept: LAB | Facility: CLINIC | Age: 78
End: 2023-05-31

## 2023-05-31 DIAGNOSIS — I25.10 ATHEROSCLEROTIC HEART DISEASE OF NATIVE CORONARY ARTERY WITHOUT ANGINA PECTORIS: ICD-10-CM

## 2023-05-31 LAB
ANION GAP SERPL CALCULATED.3IONS-SCNC: 12 MMOL/L (ref 7–15)
BUN SERPL-MCNC: 25.6 MG/DL (ref 8–23)
CALCIUM SERPL-MCNC: 9.2 MG/DL (ref 8.8–10.2)
CHLORIDE SERPL-SCNC: 105 MMOL/L (ref 98–107)
CREAT SERPL-MCNC: 1.09 MG/DL (ref 0.67–1.17)
DEPRECATED HCO3 PLAS-SCNC: 22 MMOL/L (ref 22–29)
GFR SERPL CREATININE-BSD FRML MDRD: 70 ML/MIN/1.73M2
GLUCOSE SERPL-MCNC: 105 MG/DL (ref 70–99)
POTASSIUM SERPL-SCNC: 4.7 MMOL/L (ref 3.4–5.3)
SODIUM SERPL-SCNC: 139 MMOL/L (ref 136–145)

## 2023-05-31 PROCEDURE — 80048 BASIC METABOLIC PNL TOTAL CA: CPT | Performed by: FAMILY MEDICINE

## 2023-06-02 ENCOUNTER — TRANSFERRED RECORDS (OUTPATIENT)
Dept: HEALTH INFORMATION MANAGEMENT | Facility: CLINIC | Age: 78
End: 2023-06-02

## 2023-06-08 ENCOUNTER — TRANSFERRED RECORDS (OUTPATIENT)
Dept: HEALTH INFORMATION MANAGEMENT | Facility: CLINIC | Age: 78
End: 2023-06-08

## 2023-06-08 ENCOUNTER — LAB REQUISITION (OUTPATIENT)
Dept: LAB | Facility: CLINIC | Age: 78
End: 2023-06-08

## 2023-06-08 DIAGNOSIS — D48.9 NEOPLASM OF UNCERTAIN BEHAVIOR, UNSPECIFIED: ICD-10-CM

## 2023-06-08 LAB — EJECTION FRACTION: NORMAL %

## 2023-06-08 PROCEDURE — 88305 TISSUE EXAM BY PATHOLOGIST: CPT | Performed by: PATHOLOGY

## 2023-06-13 ENCOUNTER — TRANSCRIBE ORDERS (OUTPATIENT)
Dept: VASCULAR SURGERY | Facility: CLINIC | Age: 78
End: 2023-06-13
Payer: COMMERCIAL

## 2023-06-13 ENCOUNTER — TELEPHONE (OUTPATIENT)
Dept: VASCULAR SURGERY | Facility: CLINIC | Age: 78
End: 2023-06-13
Payer: COMMERCIAL

## 2023-06-13 DIAGNOSIS — I73.9 PERIPHERAL VASCULAR DISEASE, UNSPECIFIED (H): Primary | ICD-10-CM

## 2023-06-13 NOTE — TELEPHONE ENCOUNTER
Patient's wife called to request an appointment with Dr. Hernandez per patient's PCP. She states patient has inadequate blood flow in his lower extremities as well as a nonhealing sore on the bottom of the left foot, and patient had an ultrasound done recently as well. Writer asked that wife call patient's PCP to resend referral and records/results for review.

## 2023-06-15 LAB
PATH REPORT.COMMENTS IMP SPEC: ABNORMAL
PATH REPORT.COMMENTS IMP SPEC: YES
PATH REPORT.FINAL DX SPEC: ABNORMAL
PATH REPORT.GROSS SPEC: ABNORMAL
PATH REPORT.MICROSCOPIC SPEC OTHER STN: ABNORMAL
PATH REPORT.RELEVANT HX SPEC: ABNORMAL
PHOTO IMAGE: ABNORMAL

## 2023-06-16 ENCOUNTER — OFFICE VISIT (OUTPATIENT)
Dept: VASCULAR SURGERY | Facility: CLINIC | Age: 78
End: 2023-06-16
Attending: FAMILY MEDICINE
Payer: COMMERCIAL

## 2023-06-16 VITALS
HEART RATE: 56 BPM | RESPIRATION RATE: 18 BRPM | BODY MASS INDEX: 31.48 KG/M2 | WEIGHT: 201 LBS | SYSTOLIC BLOOD PRESSURE: 149 MMHG | OXYGEN SATURATION: 97 % | TEMPERATURE: 97.9 F | DIASTOLIC BLOOD PRESSURE: 82 MMHG

## 2023-06-16 DIAGNOSIS — M25.372 ANKLE INSTABILITY, LEFT: ICD-10-CM

## 2023-06-16 DIAGNOSIS — I73.9 PAD (PERIPHERAL ARTERY DISEASE) (H): ICD-10-CM

## 2023-06-16 DIAGNOSIS — L97.423: Primary | ICD-10-CM

## 2023-06-16 PROCEDURE — G0463 HOSPITAL OUTPT CLINIC VISIT: HCPCS | Mod: 25 | Performed by: PODIATRIST

## 2023-06-16 PROCEDURE — 99203 OFFICE O/P NEW LOW 30 MIN: CPT | Mod: 25 | Performed by: PODIATRIST

## 2023-06-16 PROCEDURE — 11043 DBRDMT MUSC&/FSCA 1ST 20/<: CPT | Performed by: PODIATRIST

## 2023-06-16 RX ORDER — OMEGA-3/DHA/EPA/FISH OIL 300-1000MG
CAPSULE ORAL
COMMUNITY
End: 2023-08-11

## 2023-06-16 ASSESSMENT — PAIN SCALES - GENERAL: PAINLEVEL: NO PAIN (0)

## 2023-06-16 NOTE — PATIENT INSTRUCTIONS
Important lnstructions    We will order a wound VAC, and attempt to order Home Care for you. If you receive your wound VAC prior to home care being in place, call us to schedule a nurse visit appointment for us to apply the VAC.      WEIGHT BEARING STATUS: You are to remain NON WEIGHT BEARING on your left foot. NON WEIGHT BEARING MEANS NO PRESSURE ON YOUR FOOT OR HEEL AT ANY TIME FOR ANY REASON!    2. OFFLOADING DEVICE: Must use a A ROLLING KNEE WALKER and A WHEELCHAIR at all times! (do not use affected foot to push wheelchair)    3. STABILIZATION DEVICE: Use a CAM BOOT . You will need to WEAR THIS ANYTIME YOU ARE UP AND OUT OF BED, IT IS OKAY TO REMOVE WHEN YOU ARE SLEEPING..     4. ELEVATE: Elevating your leg means laying with your head on a pillow and your foot ABOVE YOUR HEART.     5. DO NOT MOVE YOUR FOOT.  There is a risk of worsening the wound or incision. To give yourself a higher chance of healing, please DO NOT swing foot back and forth and wiggle foot/toes especially when inside a stabilization device. Limited movement is allowable with therapy as recommended by the doctor.     6. TAKE A PROTEIN SHAKE TWICE A DAY.  (For ex: Boost, Ensure, Glucerna)      Dressing Change lnstructions      LEFT FOOT WOUND:    Gauze dressing applied today. Cleanse your wound with normal saline, cover with 4x4 gauze, roll gauze, and secure with tape. NO tape on skin. Change this dressing every other day until your wound VAC is applied.        Standard - Wound Vac Instructions    1. 3x weekly and as needed cleanse the area with NS    2. Pat dry    3. Apply Cavilon no sting barrier wipe to the skin surrounding the wound to protect from drainage/maceration    4. Apply drape around incision. Cut strip of drape and apply to skin if bridging is needed; plan this area in advance; should not be over bony prominence     5. Cut the foam to fit the area of the incision    6. Cut narrow strip of foam if bridging    7. Cover foam with  drape to obtain air tight seal    8. Cut opening the size of a quarter for where the suction pad will be applied    9. Apply Suction pad    10. 125mmHG suction continuous    [employer] can be reached at [phone], 24 hours a day 7 days a week     - Back up plan in place:     If the negative pressure wound therapy malfunctions or unable to maintain seal: dressing must be removed and reapplied within 2 hours of the incident. If unable to reapply negative pressure wound dressing, place Normal Saline moistened gauze in wound bed and cover with appropriate dressing to keep wound bed moist.  Change wet-to-dry dressing two times a day until healthcare staff can re-implement negative pressure therapy. Change canister at least weekly.  [employer] [facility] can be reached at [phone], 24 hours a day 7 days a week    Information on Vacuum-Assisted Closure of a Wound  Vacuum-assisted closure (VAC) of a wound is a type of treatment to help wounds heal. It s also known as negative pressure wound therapy. During the treatment, a device lowers air pressure on the wound. This can help the wound heal more quickly.  Understanding the wound VAC system  A wound VAC system has several parts. A foam or gauze dressing is put directly on the wound. The dressing is changed every 24 to 72 hours. An adhesive film covers and seals the dressing and wound. A drainage tube leads from under the adhesive film and connects to a portable vacuum pump. This pump removes air pressure over the wound. It may do this constantly. Or it may do it in cycles. During the treatment, you ll need to carry the portable pump everywhere you go.  Why wound VAC is used  You might need this therapy for a recent traumatic wound. Or you may need it for a chronic wound. This is a wound that does not heal the way it should over time. This can happen with wounds in people who have diabetes. You may need a wound VAC if you ve had a recent skin graft. And you  may need a wound VAC for a large wound. Large wounds can take a longer time to heal.  A wound vacuum system may help your wound heal more quickly by:  Draining extra fluid from the wound  Reducing swelling  Reducing bacteria in the wound  Keeping your wound moist and warm  Helping draw together wound edges  Increasing blood flow to your wound  Decreasing inflammation  Wound VAC offers some other advantages over other types of wound care. It may decrease your overall discomfort. The dressings usually need to be changed less often. And they may be easier to keep in position.         It IS NOT ok to get your wound wet in the bath or shower    SEEK MEDICAL CARE IF:  You have an increase in swelling, pain, or redness around the wound.  You have an increase in the amount of pus coming from the wound.  There is a bad smell coming from the wound.  The wound appears to be worsening/enlarging  You have a fever greater than 101.5 F      It is ok to continue current wound care treatment/products for the next 2-3 days until new wound care supplies are ordered and arrive. If longer than this please contact our office at [phone].        We want to hear from you!   In the next few weeks, you should receive a call or email to complete a survey about your visit at [facility]. Please help us improve your appointment experience by letting us know how we did today. We strive to make your experience good and value any ways in which we could do better.      We value your input and suggestions.    Thank you for choosing the [facility]!

## 2023-06-16 NOTE — PROGRESS NOTES
FOOT AND ANKLE SURGERY/PODIATRY CONSULT NOTE        ASSESSMENT: Ulceration left heel        TREATMENT:  -I discussed with the patient and his wife today that the ulceration along the left heel is stable without signs of infection.  However, there is increased depth and I recommend use of a wound VAC at this time to assist with granulation tissue formation.    -Previous MRI obtained in March of this year was negative for abscess or osteomyelitis.  I discussed with the patient that at this time the wound appears stable, however if the wound fails to progress over the next few weeks we will consider repeat MRI of the left rear foot.    -Discussed importance of nonweightbearing on the left foot at all times.  I have referred him for a wheelchair and a knee walker.    -Cam boot dispensed today.    -I was unable to palpate pedal pulses and referred him for ABIs with toe pressures.  He is scheduled to see Dr. Farrar next week.    -After discussion of risk factors nursing staff removed dressing, cleansed wound and consent obtained 2% Lidocaine HCL jelly was applied, under clean conditions, the left heel ulceration(s) were debrided using .into the muscle/ fascia.  Devitalized and nonviable tissue, along with any fibrin and slough, was removed to improve granulation tissue formation, stimulate wound healing, decrease overall bacteria load, disrupt biofilm formation and decrease edge senescence. Wound drainage was scant No. Total excisional debridement was 0.35 sq cm into the muscle/fascia with a depth of 0.6 cm.   Ulcers were improved afterwards and .  Measures were as noted on the flow sheet. A gauze dressing was applied.     -He will follow-up with me in 2 to 3 weeks    Nehemiah Brown DPM  [facility]      HPI: Shaji Pompa was seen today at the request of Dr. Caraballo for left heel ulceration.  Patient reports he first noticed the sore in February of this year and had been seeing  a podiatrist in [place].  He has been soaking the wound daily and has been walking in regular shoes.  Previous MRI obtained in March of this year was negative for osteomyelitis or abscess.    Past Medical History:   Diagnosis Date     Cerebral infarction (H)      COPD (chronic obstructive pulmonary disease) (H)      HLD (hyperlipidemia)      Hypertension      Myeloproliferative disease (H)        Past Surgical History:   Procedure Laterality Date     CV CORONARY ANGIOGRAM N/A 4/26/2022    Procedure: CV CORONARY ANGIOGRAM;  Surgeon: Audrey Holder MD;  Location: [facility] CV     CV LEFT HEART CATH N/A 4/26/2022    Procedure: Left Heart Catheterization;  Surgeon: Audrey Holder MD;  Location: [facility] CV     OPEN REDUCTION INTERNAL FIXATION FOOT Right 2010     OTHER SURGICAL HISTORY  2001    Lip lesion removal     PICC TRIPLE LUMEN PLACEMENT  8/17/2022          TONSILLECTOMY & ADENOIDECTOMY          No Known Allergies      Current Outpatient Medications:      albuterol (PROAIR HFA;PROVENTIL HFA;VENTOLIN HFA) 90 mcg/actuation inhaler, Inhale 2 puffs into the lungs every 6 hours as needed for shortness of breath / dyspnea, Disp: , Rfl:      ASPIRIN LOW DOSE 81 MG EC tablet, TAKE 1 TABLET (81 MG) BY MOUTH DAILY, START THE MORNING OF 4/27/22, Disp: 90 tablet, Rfl: 3     atorvastatin (LIPITOR) 80 MG tablet, Take 1 tablet (80 mg) by mouth every morning, Disp: 90 tablet, Rfl: 1     fish oil-omega-3 fatty acids 1000 MG capsule, Omega-3 Fatty Acids Oral    1 daily    inactive, Disp: , Rfl:      furosemide (LASIX) 20 MG tablet, Take 1/2 TABLET BY mouth daily, Disp: 45 tablet, Rfl: 3     hydroxyurea (HYDREA) 500 MG capsule, Take 1,000-1,500 mg by mouth See Admin Instructions 1000 mg daily- Mon, Wed, & Fri. 1500 mg daily - Tue, Thurs, Sat, & Sun, Disp: , Rfl:      losartan (COZAAR) 25 MG tablet, Take 0.5 tablets (12.5 mg) by mouth daily, Disp: 45 tablet, Rfl: 3     metoprolol succinate ER (TOPROL-XL) 25 MG 24  "hr tablet, Take 25 mg by mouth daily, Disp: , Rfl:      nitroGLYcerin (NITROSTAT) 0.4 MG sublingual tablet, One tablet under the tongue every 5 minutes if needed for chest pain. May repeat every 5 minutes for a maximum of 3 doses in 15 minutes\", Disp: 25 tablet, Rfl: 3     omeprazole (PRILOSEC) 20 MG DR capsule, Take 20 mg by mouth daily, Disp: , Rfl:      tiotropium (SPIRIVA) 18 mcg inhalation capsule, [TIOTROPIUM (SPIRIVA) 18 MCG INHALATION CAPSULE] Place 18 mcg into inhaler and inhale daily., Disp: , Rfl:      warfarin ANTICOAGULANT (COUMADIN) 1 MG tablet, Take 3 tablets (3 mg) by mouth daily, Disp: , Rfl:     Social History     Social History Narrative     Not on file       Family History   Problem Relation Age of Onset     Alcoholism Mother        Review of Systems - 10 point Review of Systems is negative except for left heel ulcer which is noted in HPI.      OBJECTIVE:  Appearance: alert, well appearing, and in no distress.    BP (!) 149/82   Pulse 56   Temp 97.9  F (36.6  C)   Resp 18   Wt 201 lb (91.2 kg)   SpO2 97%   BMI 31.48 kg/m      BMI= Body mass index is 31.48 kg/m .    General appearance: Patient is alert and fully cooperative with history & exam.  No sign of distress is noted during the visit.     Psychiatric: Affect is pleasant & appropriate.  Patient appears motivated to improve health.     Respiratory: Breathing is regular & unlabored while sitting.     HEENT: Hearing is intact to spoken word.  Speech is clear.  No gross evidence of visual impairment that would impact ambulation.    Vascular: Dorsalis pedis non-palpableLeft.  Dermatologic:   VASC Wound Left ankle (Active)   Pre Size Length 0.5 06/16/23 0800   Pre Size Width 0.7 06/16/23 0800   Pre Size Depth 0.6 06/16/23 0800   Pre Total Sq cm 0.35 06/16/23 0800   Ulceration posterior aspect of the left heel has hyperkeratotic rim with increased depth, no fluctuance or erythema noted.  Neurologic: Diminished to light touch " Left.  Musculoskeletal: Contracted digits noted Left.    @LDAVASC(10,16,17)@    Imaging:     No results found.

## 2023-06-20 ENCOUNTER — TELEPHONE (OUTPATIENT)
Dept: VASCULAR SURGERY | Facility: CLINIC | Age: 78
End: 2023-06-20
Payer: COMMERCIAL

## 2023-06-20 ENCOUNTER — OFFICE VISIT (OUTPATIENT)
Dept: VASCULAR SURGERY | Facility: CLINIC | Age: 78
End: 2023-06-20
Attending: FAMILY MEDICINE
Payer: COMMERCIAL

## 2023-06-20 ENCOUNTER — ANCILLARY PROCEDURE (OUTPATIENT)
Dept: VASCULAR ULTRASOUND | Facility: CLINIC | Age: 78
End: 2023-06-20
Attending: RADIOLOGY
Payer: COMMERCIAL

## 2023-06-20 VITALS
HEART RATE: 65 BPM | TEMPERATURE: 97.6 F | DIASTOLIC BLOOD PRESSURE: 80 MMHG | OXYGEN SATURATION: 99 % | SYSTOLIC BLOOD PRESSURE: 143 MMHG | RESPIRATION RATE: 12 BRPM

## 2023-06-20 DIAGNOSIS — R09.89 BILATERAL CAROTID BRUITS: ICD-10-CM

## 2023-06-20 DIAGNOSIS — L97.423: ICD-10-CM

## 2023-06-20 DIAGNOSIS — I73.9 PAD (PERIPHERAL ARTERY DISEASE) (H): Primary | ICD-10-CM

## 2023-06-20 DIAGNOSIS — I70.244 ATHEROSCLEROSIS OF NATIVE ARTERY OF LEFT LOWER EXTREMITY WITH ULCERATION OF HEEL (H): ICD-10-CM

## 2023-06-20 DIAGNOSIS — I73.9 PAD (PERIPHERAL ARTERY DISEASE) (H): ICD-10-CM

## 2023-06-20 PROCEDURE — 93925 LOWER EXTREMITY STUDY: CPT

## 2023-06-20 ASSESSMENT — PAIN SCALES - GENERAL: PAINLEVEL: MILD PAIN (2)

## 2023-06-20 NOTE — TELEPHONE ENCOUNTER
[facility] called stating they are planning on seeing the Hector tomorrow, the RN is going to call him this evening to set up a time. If there are any issues they will give us a call back in the morning

## 2023-06-20 NOTE — TELEPHONE ENCOUNTER
Caller: Wife, Nidhi    Provider: RAJEEV Brown    Detailed reason for call: Wound vac will be delivered tonight. She would like to schedule an appointment for tomorrow morning to apply. Please advise.    Best phone number to contact: [phone]    Best time to contact: any    Ok to leave a detailed message: Yes

## 2023-06-20 NOTE — TELEPHONE ENCOUNTER
[facility] accepted pt for services.  They will be looking to start 6/21/23 and are reaching out to pt.

## 2023-06-20 NOTE — PROGRESS NOTES
VASCULAR AND INTERVENTIONAL OUTPATIENT CONSULT OR VISIT  PHYSICIAN: Eulalio Farrar MD  NEW PATIENT    LOCATION: [facility]    Shaji Pompa   Medical Record #:  [id number]  YOB: 1945  Age:  78 year old     Date of Service: 6/20/2023    PRIMARY CARE PROVIDER: Steven Caraballo    Reason for visit: Nonhealing left heel wound/critical limb ischemia    IMPRESSION: 78-year-old male with a nonhealing left heel wound in setting of peripheral vascular disease.  Noninvasive imaging performed today demonstrates occlusion of the left superficial artery with reconstituted flow in the above-knee popliteal artery.  There has been no significant improvement in the wound since February despite optimal wound care/offloading.  Overall, the findings reflect critical ischemia.    RECOMMENDATION:    1.  Guideline recommended medical therapy for peripheral arterial disease (Janay category 5)  -The patient is on full dose anticoagulation with warfarin plus low-dose aspirin  -Continue high intensity statin therapy with Lipitor 80 mg once daily  -Continue smoking cessation    Outside MRI of the left ankle performed in [place] report was available for review which reported no evidence of osteomyelitis at that time.  Given above findings, we will plan for a left leg angiogram for further evaluation.  Based on imaging there is chronic total occlusion of the superficial femoral artery which may make endovascular crossing/recanalization difficult.  If unsuccessful, he may require a femoral-popliteal bypass.    HPI:  Shaji Pompa is a 78 year old male who was evaluated today for peripheral vascular disease in the setting of a nonhealing left heel ulcer.  The patient reports he initially developed the ulcer in February of this year while he was in [place].  He seen by podiatrist who advised local wound care as well as offloading utilizing a horseshoe insert.  The patient also reports underwent an  MRI of the ankle which did not demonstrate any osteomyelitis at that time.  Despite optimal care, patient reports no skin improvement in his wound.  He denies any prior history of extremity ulcerations.  He is a former smoker.  He has a history of coronary artery disease.    PHH:    Past Medical History:   Diagnosis Date     Cerebral infarction (H)      COPD (chronic obstructive pulmonary disease) (H)      HLD (hyperlipidemia)      Hypertension      Myeloproliferative disease (H)         Past Surgical History:   Procedure Laterality Date     CV CORONARY ANGIOGRAM N/A 4/26/2022    Procedure: CV CORONARY ANGIOGRAM;  Surgeon: Audrey Holder MD;  Location: [facility] CV     CV LEFT HEART CATH N/A 4/26/2022    Procedure: Left Heart Catheterization;  Surgeon: Audrey Holder MD;  Location: [facility] CV     OPEN REDUCTION INTERNAL FIXATION FOOT Right 2010     OTHER SURGICAL HISTORY  2001    Lip lesion removal     PICC TRIPLE LUMEN PLACEMENT  8/17/2022          TONSILLECTOMY & ADENOIDECTOMY         ALLERGIES:  Patient has no known allergies.    MEDS:    Current Outpatient Medications:      albuterol (PROAIR HFA;PROVENTIL HFA;VENTOLIN HFA) 90 mcg/actuation inhaler, Inhale 2 puffs into the lungs every 6 hours as needed for shortness of breath / dyspnea, Disp: , Rfl:      ASPIRIN LOW DOSE 81 MG EC tablet, TAKE 1 TABLET (81 MG) BY MOUTH DAILY, START THE MORNING OF 4/27/22, Disp: 90 tablet, Rfl: 3     atorvastatin (LIPITOR) 80 MG tablet, Take 1 tablet (80 mg) by mouth every morning, Disp: 90 tablet, Rfl: 1     furosemide (LASIX) 20 MG tablet, Take 1/2 TABLET BY mouth daily, Disp: 45 tablet, Rfl: 3     hydroxyurea (HYDREA) 500 MG capsule, Take 1,000-1,500 mg by mouth See Admin Instructions 1000 mg daily- Mon, Wed, & Fri. 1500 mg daily - Tue, Thurs, Sat, & Sun, Disp: , Rfl:      losartan (COZAAR) 25 MG tablet, Take 0.5 tablets (12.5 mg) by mouth daily, Disp: 45 tablet, Rfl: 3     metoprolol succinate ER  "(TOPROL-XL) 25 MG 24 hr tablet, Take 25 mg by mouth daily, Disp: , Rfl:      nitroGLYcerin (NITROSTAT) 0.4 MG sublingual tablet, One tablet under the tongue every 5 minutes if needed for chest pain. May repeat every 5 minutes for a maximum of 3 doses in 15 minutes\", Disp: 25 tablet, Rfl: 3     omeprazole (PRILOSEC) 20 MG DR capsule, Take 20 mg by mouth daily, Disp: , Rfl:      tiotropium (SPIRIVA) 18 mcg inhalation capsule, [TIOTROPIUM (SPIRIVA) 18 MCG INHALATION CAPSULE] Place 18 mcg into inhaler and inhale daily., Disp: , Rfl:      warfarin ANTICOAGULANT (COUMADIN) 1 MG tablet, Take 3 tablets (3 mg) by mouth daily, Disp: , Rfl:      fish oil-omega-3 fatty acids 1000 MG capsule, Omega-3 Fatty Acids Oral    1 daily    inactive, Disp: , Rfl:     SOCIAL HABITS:    History   Smoking Status     Former     Packs/day: 1.00     Types: Cigarettes     Start date: 6/8/1965     Quit date: 1/1/2012   Smokeless Tobacco     Never     Social History    Substance and Sexual Activity      Alcohol use: Yes        Alcohol/week: 19.0 standard drinks of alcohol      History   Drug Use Unknown       FAMILY HISTORY:    Family History   Problem Relation Age of Onset     Alcoholism Mother        ADVANCE CARE DIRECTIVES:    Advance care directives reviewed in the chart and no changes made.     PE:  BP (!) 143/80   Pulse 65   Temp 97.6  F (36.4  C) (Oral)   Resp 12   SpO2 99%   Wt Readings from Last 1 Encounters:   06/16/23 201 lb (91.2 kg)     There is no height or weight on file to calculate BMI.    EXAM:  GENERAL: This is a well-developed 78 year old male who appears his stated age  EYES: Grossly normal.  MOUTH: Buccal mucosa normal   MUSCULOSKELETAL: Grossly normal and both lower extremities are intact.  HEME/LYMPH: No lymphedema  NEUROLOGIC: Focally intact, Alert and oriented x 3.   PSYCH: appropriate affect  INTEGUMENT: Left foot in offloading boot    DIAGNOSTIC STUDIES:     Images:  No results found.    LABS:      Sodium   Date " Value Ref Range Status   05/31/2023 139 136 - 145 mmol/L Final   04/24/2023 137 136 - 145 mmol/L Final   08/24/2022 140 136 - 145 mmol/L Final     Urea Nitrogen   Date Value Ref Range Status   05/31/2023 25.6 (H) 8.0 - 23.0 mg/dL Final   04/24/2023 28.3 (H) 8.0 - 23.0 mg/dL Final   08/24/2022 16.6 8.0 - 23.0 mg/dL Final   08/19/2022 22 8 - 28 mg/dL Final   08/18/2022 21 8 - 28 mg/dL Final   08/17/2022 27 8 - 28 mg/dL Final     Hemoglobin   Date Value Ref Range Status   08/24/2022 10.2 (L) 13.3 - 17.7 g/dL Final   08/19/2022 8.8 (L) 13.3 - 17.7 g/dL Final   08/18/2022 8.7 (L) 13.3 - 17.7 g/dL Final     Platelet Count   Date Value Ref Range Status   08/24/2022 281 150 - 450 10e3/uL Final   08/19/2022 229 150 - 450 10e3/uL Final   08/18/2022 199 150 - 450 10e3/uL Final     BNP   Date Value Ref Range Status   08/17/2022 200 (H) 0 - 80 pg/mL Final   04/19/2022 1,221 (H) 0 - 78 pg/mL Final   04/01/2022 379 (H) 0 - 78 pg/mL Final     INR   Date Value Ref Range Status   08/19/2022 1.89 (H) 0.85 - 1.15 Final   08/18/2022 2.12 (H) 0.85 - 1.15 Final   08/17/2022 1.71 (H) 0.85 - 1.15 Final     INR (External)   Date Value Ref Range Status   10/05/2022 3.1 (A) 0.9 - 1.1 Final       This was a in person visit in which 60 minutes of  total time was spent (either in face-to-face or non-face-to-face time).    Eulalio Farrar MD, [facility]  VASCULAR AND INTERVENTIONAL PHYSICIAN  VASCULAR MEDICINE  INTERNAL MEDICINE  PAGER: [phone]  CALL SERVICE: [phone]

## 2023-06-20 NOTE — PROGRESS NOTES
[facility]        Patient is here for a consult to discuss Peripheral artery disease (PAD).  Referred by Dr. Brown. Symptoms include claudication L>R LE distance of 1 block. Pain starts in ankle and goes half-way up the lower leg. Pain goes away with rest. L heel ulcer present since 2/2022. RADHA done at [facility] 6/2/23.         Pt is currently taking Aspirin, Statin and Warfarin.    BP (!) 143/80   Pulse 65   Temp 97.6  F (36.4  C) (Oral)   Resp 12   SpO2 99%     The provider has been notified that the patient has no concerns.     Questions patient would like addressed today are: N/A.    Refills are needed: No    Has homecare services and agency name:  No- ordered last visit with Dr. Brown but have not heard anything       DOROTHY DOSHI RN     Angiogram instructions went over and hold Warfarin 5 days.Consent signed in clinic and scanned to chart.

## 2023-06-20 NOTE — Clinical Note
Hi, Dr. Brown ordered HC last visit but Hector has not heard anything about it yet. He lives in [place]. Could you please follow up on this, and if not able to get we will need to schedule nurse visits for applying the wound vac. Thanks!

## 2023-06-20 NOTE — PATIENT INSTRUCTIONS
Kevin Girard,    Thank you for entrusting your care with us today. After your visit today with MD Eulalio Farrar this is the plan that was discussed at your appointment.    You are scheduled for an left leg angiogram on 6/29/23 at [facility].      I am including additional information on these things and our contact information if you have any questions or concerns.   Please do not hesitate to reach out to us if you felt we did not answer your questions or you are unsure of the treatment plan after your visit today. Our number is [phone].Thank you for trusting us with your care.         Again thank you for your time.       Shaji Pompa,    Your visit to [facility] for your procedure is coming soon and we look forward to seeing you! This friendly reminder and pre-procedure checklist will help to ensure your procedure goes smoothly and meets your expectations. At [facility], our goal is to provide you with a great patient experience and to deliver genuine, professional care to every patient.     Please complete all the steps in advance of your visit. If you have any questions about the items listed below, please give our office a call. We can be reached at [phone] or visit our website at [email or web address] for more information.     Procedure: Left  Leg  Angiogram     Procedure Date :  6/29/23    Procedure Time :  8 am    Arrival Time: 7 am    2 week Post Procedure Appointment with   and also ultrasound: Ultrasound is on 7/14/23 in [place] and see Dr. Farrar on 7/18/23 in [place].    6 weeks appt with  and repeat ultrasound: TBD    Admission Type: Outpatient    Surgeon:     Procedure Location: [facility]:  [address] [place] [id number] (phone: [phone], Fax: [phone])      If you take blood thinners: SEE SPECIFIC INSTRUCTIONS BELOW    PLEASE DO NOT STOP  YOUR ASPIRIN OR PLAVIX UNLESS SPECIFICALLY DIRECTED BY THE VASCULAR SURGEON TO STOP!  In most cases Vascular providers want you to continue these. This is different from most NON vascular surgeries and may not be well known by your Primary Care Provider.     Coumadin: THIS MUST BE HELD 5 DAYS PRIOR TO THE PROCEDURE/ SURGERY. We may need to have you do blood thinner injections during this time. This is called Bridging. NO BRIDGING IS NEEDED      Prepare for the peripheral angiography as follows:  Do not eat 8 hours before your procedure. You may have clear liquids up to 1 hour before surgery.  Tell your healthcare provider about all medicines you take and any allergies you may have.  Arrange for a family member or friend to drive you home.  If you are on Metformin, please HOLD for 48 hours after the procedure.  Please be sure to address your diabetic medications with your Primary Care Physician prior to your angiogram.    Peripheral Angiography    Peripheral angiography is an outpatient procedure that makes a  map  of the vessels (arteries) in your lower body, legs, and arms, using X-ray and dye.This map can show where blood flow may be blocked.    An angiogram is commonly performed under sedation with the use of local anesthesia.    The procedure usually starts with a needle put into the femoral (groin) artery. From one treatment site, areas all over the body can be treated.  After access is established, catheters (thin tubes) and wires are threaded through the arterial system to a specific area of interest or throughout the entire body.  As a contrast agent (iodine dye) is injected, X-ray images are taken to let your provider view the flow of the dye and identify blockages. The surgeon can then choose the best mode of therapy for you - whether during or following the angiogram. This decision depends on your symptoms and the severity and characteristics of the blockages.  Two common therapies that can be provided  during the angiogram are balloon angioplasty and stent placement.                   Angioplasty can be used to open arterial blockages. Guided by X-ray, your provider navigates through the blockage with a wire and introduces a special device equipped with an inflatable balloon. After positioning the balloon device across the blocked portion of the artery, the provider inflates the balloon to expand the artery and compress the blockage. The balloon is then deflated and removed while keeping the wire in place across the area that has been treated. Next, contrast dye is injected to assess the result. Treatment is considered a success if blood flow is improved and less than 30% of the blockage remains. If the vessel is still considerably narrowed, placing a stent may be the next step.    Stents are used to prop open an artery at the site of a narrowing. Stents are generally placed after balloon angioplasty when there is residual narrowing or insufficient blood flow in a treated vessel. Stents are considered a permanent implant and cannot be used if you have a metal allergy. Stents that are used in the leg are constructed of a nickel-titanium alloy (Nitinol), a memory-shaped metal. This alloy has a predetermined size and shape at body temperature and expands to this size and shape after being introduced through a catheter. These stents resist kinking and are flexible so that damage from activities that involve your legs is minimized.  Your procedure may require other techniques to address the problem or plaque.     If surgery is felt to be a better option, your vascular provider will obtain any additional X-ray images needed to plan a surgical bypass of the blocked vessel/s and will then conclude the angiogram.      During the procedure  Here is what to expect:  You may get medicine through an IV (intravenous) line to relax you. You re given an injection to numb the insertion site. Then, a tiny skin cut (incision) is  made near an artery in your groin.  Your provider inserts a thin tube (catheter) through the incision. He or she then threads the catheter into an artery while looking at a video monitor.  Contrast  dye  is injected into the catheter to confirm position. You may feel warmth or pressure in your legs and back. You lie still as X-rays are taken. The catheter is then taken out.  After the procedure  You ll be taken to a recovery area. A healthcare provider will apply pressure to the site for about 10 minutes. Your healthcare provider will tell you how long to lie down and keep the insertion site still. Your healthcare provider will discuss the results with you soon after the procedure.      Angiogram Procedure Discharge Instructions:     1. If you received sedation for your procedure: Do not drive or operate heavy machinery for the rest of the day.  2. Avoid strenuous activity for 72 hours (3 days):                        - Do not lift greater than 10 pounds.                        - Excessive exercise                        - Straining                        - Return to your normal activities as you tolerate after the 3 days restriction  3. Avoid tub baths, Jacuzzis, hot tubs and pools for 72 hours (3 days) or until puncture site is will healed.  4. You may shower beginning tomorrow. Do not scrub puncture site(s) until well healed, pat dry.  5. You can expect to return to work 1-2 days after your procedure - depending on the nature of your profession.  6. It is normal to have some tenderness and minimal swelling at puncture site. A small area of discoloration may be present. Tenderness typically subsides in 24-48 hours. A small knot may also be present at puncture site for 6-8 weeks, this can be a normal part of the healing process.       After the angiogram If you:      1. Experience any bleeding or active swelling from puncture site: Lie down, firmly apply pressure to puncture site and CALL 9-1-1  2. Fever greater  than 101 degrees Fahrenheit.  3. Redness, swelling, warmth to touch, or purulent (yellow/green/foul smelling) drainage from the puncture site.  4. Increasing pain, tenderness or swelling at puncture site OR of arm/leg near puncture site.  5. Feeling weak or faint.  6. Change in color, temperature, or sensation of arm/leg where puncture was made.  7. You can t feel your thrill (pulse at your dialysis fistula site) or it feels weak (If you had fistulogram done).     Call us with any other questions or concerns after your procedure: [phone]      All invasive procedures can have complications. While the risk of an angiogram is low it is not zero. The most common complications are related to the arterial access site.       Risks/ Complications   Bruising is Common  You will likely have bruising (ecchymosis) where the artery was entered.    Pain and Bleeding  Less commonly, patients experience pain and bleeding that may include blood collecting under the skin (hematoma).    Blockage and Leakage   In rare cases, the access artery can become blocked. Infrequently, patients experience persistent leakage of blood where the artery was entered, which can result in the formation of a pseudoaneurysm--a blood-filled sac--that may require further treatment.  Other complications related to an angiogram include:   Allergic reaction to the iodine contrast dye, which can lead to the development of kidney failure.  Very rarely during balloon angioplasty and/or stent placement, part of the arterial blockage can break off (embolism) and travel to more distant arteries. This can worsen blood flow.    Pre-Procedure checklist:    [] A Pre-op physical within 30 days of the procedure is required. You will need to set up an appointment with your primary care provider.  [] Contact your insurance regarding coverage  If you would like a Good Lois Estimate for your upcoming procedure at [facility], contact Cost of Care  "Estimates   Advocates are available Monday through Friday 8am - 5pm     You may also submit a request online at [email or web address]  - Complete the secure online form found under \"Services and Procedure Pricing\"   If your procedure is at [facility], please contact the numbers below for Cost of Care Estimates.   - Facility Charge: [phone]    Anesthesiology charge:  [phone]   [] DO NOT BRING FMLA WITH TO SURGERY.  These should be sent to the provider's office by fax to [phone].     [] Day of Surgery  Medications - Take as indicated with sips of water.   Wear comfortable loose-fitting clothes. Wear your glasses-Not contacts. Do not wear jewelry and remove body piercings. Surgery may be cancelled if they are not removed.   You may have 1 family member wait in the lobby during your surgery. Visitor restrictions are subject to change. Please verify with the surgery nurse when they call.   If same day surgery-Have a someone come with you to surgery that can help you understand the surgeon's instructions, drive you home and stay with you overnight the first night.    [] You will receive a call from a nurse 1-3 days prior to the procedure. They will go over more details with you    Notify our office right away, if you have any changes in your health status, or if you develop a cold, flu, diarrhea, infection, fever or sore throat before your scheduled surgery date. We can be reached at  [phone] Monday-Friday 8 am-4:30 pm if you have any questions.   Thank you for choosing [facility]        "

## 2023-06-20 NOTE — TELEPHONE ENCOUNTER
Called [facility] to confirm that they are able to see patient tomorrow and apply the wound VAC as patient is still scheduled for NV tomorrow for VAC application. Unclear if they have reached out to the patient to confirm. They will call back once they have talked to the patient so that the NV can be cancelled.

## 2023-06-21 ENCOUNTER — OFFICE VISIT (OUTPATIENT)
Dept: VASCULAR SURGERY | Facility: CLINIC | Age: 78
End: 2023-06-21
Attending: PODIATRIST
Payer: COMMERCIAL

## 2023-06-21 VITALS
DIASTOLIC BLOOD PRESSURE: 74 MMHG | RESPIRATION RATE: 18 BRPM | TEMPERATURE: 97.9 F | HEART RATE: 60 BPM | SYSTOLIC BLOOD PRESSURE: 122 MMHG

## 2023-06-21 DIAGNOSIS — I73.9 PAD (PERIPHERAL ARTERY DISEASE) (H): ICD-10-CM

## 2023-06-21 DIAGNOSIS — L97.423: Primary | ICD-10-CM

## 2023-06-21 PROCEDURE — 97607 NEG PRS WND THR NDME<=50SQCM: CPT

## 2023-06-21 ASSESSMENT — PAIN SCALES - GENERAL: PAINLEVEL: NO PAIN (0)

## 2023-06-21 NOTE — PATIENT INSTRUCTIONS
Important lnstructions    We will order a wound VAC, and attempt to order Home Care for you. If you receive your wound VAC prior to home care being in place, call us to schedule a nurse visit appointment for us to apply the VAC.      WEIGHT BEARING STATUS: You are to remain NON WEIGHT BEARING on your left foot. NON WEIGHT BEARING MEANS NO PRESSURE ON YOUR FOOT OR HEEL AT ANY TIME FOR ANY REASON!    2. OFFLOADING DEVICE: Must use a A ROLLING KNEE WALKER and A WHEELCHAIR at all times! (do not use affected foot to push wheelchair)    3. STABILIZATION DEVICE: Use a CAM BOOT . You will need to WEAR THIS ANYTIME YOU ARE UP AND OUT OF BED, IT IS OKAY TO REMOVE WHEN YOU ARE SLEEPING..     4. ELEVATE: Elevating your leg means laying with your head on a pillow and your foot ABOVE YOUR HEART.     5. DO NOT MOVE YOUR FOOT.  There is a risk of worsening the wound or incision. To give yourself a higher chance of healing, please DO NOT swing foot back and forth and wiggle foot/toes especially when inside a stabilization device. Limited movement is allowable with therapy as recommended by the doctor.     6. TAKE A PROTEIN SHAKE TWICE A DAY.  (For ex: Boost, Ensure, Glucerna)      Dressing Change lnstructions      LEFT FOOT WOUND:    Gauze dressing applied today. Cleanse your wound with normal saline, cover with 4x4 gauze, roll gauze, and secure with tape. NO tape on skin. Change this dressing every other day until your wound VAC is applied.        Standard - Wound Vac Instructions    1. 3x weekly and as needed cleanse the area with NS    2. Pat dry    3. Apply Cavilon no sting barrier wipe to the skin surrounding the wound to protect from drainage/maceration    4. Apply drape around incision. Cut strip of drape and apply to skin if bridging is needed; plan this area in advance; should not be over bony prominence     5. Cut the foam to fit the area of the incision    6. Cut narrow strip of foam if bridging    7. Cover foam with  drape to obtain air tight seal    8. Cut opening the size of a quarter for where the suction pad will be applied    9. Apply Suction pad    10. 125mmHG suction continuous    [employer] [facility] can be reached at [phone], 24 hours a day 7 days a week     - Back up plan in place:     If the negative pressure wound therapy malfunctions or unable to maintain seal: dressing must be removed and reapplied within 2 hours of the incident. If unable to reapply negative pressure wound dressing, place Normal Saline moistened gauze in wound bed and cover with appropriate dressing to keep wound bed moist.  Change wet-to-dry dressing two times a day until healthcare staff can re-implement negative pressure therapy. Change canister at least weekly.  [employer] [facility] can be reached at [phone], 24 hours a day 7 days a week    Information on Vacuum-Assisted Closure of a Wound  Vacuum-assisted closure (VAC) of a wound is a type of treatment to help wounds heal. It s also known as negative pressure wound therapy. During the treatment, a device lowers air pressure on the wound. This can help the wound heal more quickly.  Understanding the wound VAC system  A wound VAC system has several parts. A foam or gauze dressing is put directly on the wound. The dressing is changed every 24 to 72 hours. An adhesive film covers and seals the dressing and wound. A drainage tube leads from under the adhesive film and connects to a portable vacuum pump. This pump removes air pressure over the wound. It may do this constantly. Or it may do it in cycles. During the treatment, you ll need to carry the portable pump everywhere you go.  Why wound VAC is used  You might need this therapy for a recent traumatic wound. Or you may need it for a chronic wound. This is a wound that does not heal the way it should over time. This can happen with wounds in people who have diabetes. You may need a wound VAC if you ve had a recent skin graft. And you  may need a wound VAC for a large wound. Large wounds can take a longer time to heal.  A wound vacuum system may help your wound heal more quickly by:  Draining extra fluid from the wound  Reducing swelling  Reducing bacteria in the wound  Keeping your wound moist and warm  Helping draw together wound edges  Increasing blood flow to your wound  Decreasing inflammation  Wound VAC offers some other advantages over other types of wound care. It may decrease your overall discomfort. The dressings usually need to be changed less often. And they may be easier to keep in position.         It IS NOT ok to get your wound wet in the bath or shower    SEEK MEDICAL CARE IF:  You have an increase in swelling, pain, or redness around the wound.  You have an increase in the amount of pus coming from the wound.  There is a bad smell coming from the wound.  The wound appears to be worsening/enlarging  You have a fever greater than 101.5 F      It is ok to continue current wound care treatment/products for the next 2-3 days until new wound care supplies are ordered and arrive. If longer than this please contact our office at [phone].        We want to hear from you!   In the next few weeks, you should receive a call or email to complete a survey about your visit at [facility]. Please help us improve your appointment experience by letting us know how we did today. We strive to make your experience good and value any ways in which we could do better.      We value your input and suggestions.    Thank you for choosing the [facility]!

## 2023-06-21 NOTE — PROGRESS NOTES
[facility]  -  Nurse Visit        Date of Service:  June 21, 2023     Requesting Provider: RAJEEV Brown    Diagnosis:     ICD-10-CM    1. Ulcer of heel, left, with necrosis of muscle (H)  L97.423       2. PAD (peripheral artery disease) (H)  I73.9           Chief Complaint: Shaji is being seen today at [facility] [place] for his wound(s) dressing change. Denies pain, however stated intermittent pain 4/10 in left foot.  Denies any fevers, chills, or generalized ill feeling.     Dressing on Arrival: Gauze, Pt is currently not using compression.  Upon removal of dressings moderate Serosanguinous drainage is noted.    New Wounds noted: No    Vital Signs: /74   Pulse 60   Temp 97.9  F (36.6  C)   Resp 18     Assessment:      General:  Patient presents to clinic in no apparent distress.   Psychiatric:  Alert and oriented x3.   Lower extremity:  edema is not present.    Integumentary:  Skin is WNL    Circumferential volume measures:       No data to display                Wound info:  VASC Wound Left ankle (Active)   Pre Size Length 0.5 06/16/23 0800   Pre Size Width 0.7 06/16/23 0800   Pre Size Depth 0.6 06/16/23 0800   Pre Total Sq cm 0.35 06/16/23 0800       Undermining is not present.    The periwoundskin is non-viable tissue      Plan:         1. Patient will return in 1 weeks for provider follow up .            2. As listed below, treatment provided irrigation, mechanical cleansing, and dressings to promote autolytic debridement.             Cleansed with: Normal saline and soap and water    Protected skin with: 3M Cavilon    Dressings Applied to wound: Negative wound vac therapy:      Applied vac drape to the periwound skin to protect from the drainage and sponge; window pane fashion     Cut the black vac foam to fit the size of the wound. Used 1 piece(s) of black sponge total.     Applied vac drape to wherever the bridging is going to be (NEVER apply  black sponge to the intact skin)     Covered with vac drape for air tight seal. Cut a hole the size of a quarter and applied suction pad. Be mindful of the direction of the tubing.    Connected tubing and turned vac machine to 125mmHG continuous suction.     No alarm or leaks noted.            Offloading used: CAM boot and Rolling knee walker    Trial Products: No    Provider notified regarding concerns: Yes: non-viable tissue present on meghan-wound, unable to apply vac sponge into wound opening.  Dr. Brown debrided non-viable tissue allowing space to place wound vac foam.    Treatment Changes: No    Tolerated Dressing Change:  Yes    Taught Regarding: follow up appointment(s), angiogram, elevation, offloading and compliance    Educational Barriers: No barriers      Lluvia Berger RN

## 2023-06-28 NOTE — PROGRESS NOTES
Interventional Radiology - Pre-Procedure Note:  [facility]  06/29/2023     Procedure Requested: LEFT lower extremity angiogram  Requested by: Eulalio Farrar MD     History and Physical Reviewed: H&P documented within 30 days (by Eulalio Farrar MD on 06/20/2023). I have personally reviewed the patient's medical history and have updated the medical record as necessary.    Brief HPI: Shaji Pompa is a 78 year old male with a PMH of cerebral infarction, COPD, HTN, HLD, myeloproliferative disease, and PVD who presents for a LEFT lower extremity angiogram. He developed a LEFT nonhealing heel ulcer in February. He was seen by a podiatrist who recommended local wound care, however, it has not improved. ABIs demonstrated occlusion of the left superficial artery with reconstituted flow in the above-knee popliteal artery, see imaging below. Seen by Dr. Farrar in clinic on 06/20/2023, who recommended LLE angiogram.     IMAGING:  US Lower Extremity Arterial Duplex Bilateral 06/20/2023  Arterial Duplex Ultrasound (Date: 06/20/23)    Lower Extremity Artery Evaluation          Indication: Surveillance Bilateral Leg Arterial: PAD. Left Heel Ulcer. Hx: Decrease RADHA's from 6/2/23 at [facility] ( Rt: 0.8; Lt. 0.5) .  Decreased lower extremity pulses, lower extremity pain     Previous: None     History: Previous Smoker, Hypertension, PAD, Claudication, and Vascular Ulcers     Technique: Duplex imaging is performed utilizing gray-scale, two-dimensional images, and color-flow imaging. Doppler waveform analysis and spectral Doppler imaging is also performed.     LOWER EXTREMITY ARTERIAL DUPLEX EXAM WITH WAVEFORMS     Right Leg:(cm/s)  Location: Velocities Waveforms   EIA:   180  M   CFA:   106  B   PFA:   209  B   SFA Proximal:   Occluded   N/A   SFA Mid:   Occluded   N/A   SFA Distal:   59 (Recan.)   M   Popliteal Artery:   40 / 42  B   PTA:   32   M   MAYRA:   40  M   DPA:   35  M    Waveforms: T=Triphasic, M=Monophasic, B=Biphasic        Left Leg:(cm/s)  Location: Velocities Waveforms   EIA:   185  M   CFA:   163  M   PFA:   482 / 113  M   SFA Proximal: Occluded  N/A   SFA Mid:   Occluded   N/A   SFA Distal:   Occluded   N/A   Popliteal Artery:   16 /  33 M   PTA:   24   M   MAYRA:   15  M   DPA:   30  M   Waveforms: T=Triphasic, M=Monophasic, B=Biphasic     Stenotic Profile Ratio: 482 / 113 = 4.27     Impression:      Right Lower Extremity: Monophasic flow in the external iliac artery, suggesting aortoiliac inflow disease.  Occlusion of the proximal/mid superficial femoral artery with reconstituted flow in the distal vessel.  Patent popliteal artery and infrapopliteal vessels without focal stenosis.     Left Lower Extremity: Monophasic flow in the external iliac artery, suggesting aortoiliac inflow disease.  Elevation in profunda femoral artery peak systolic velocities, reflecting 50 to 99% stenosis.  Occlusion of the superficial femoral artery with reconstituted flow in the above-knee popliteal artery.  The remaining vasculature is patent with monophasic flow.     Reference:  Category Normal 1-19% 20-49% 50-99% Occluded   PSV <160 cm/sec without spectral broadening <160 cm/sec with spectral broadening Increased Increased Absent Flow   Ratio N/A N/A < 2.0 >2.0 N/A   Post-Stenotic Turbulence No No No Yes N/A       NPO: Midnight  ANTICOAGULANTS:  Warfarin daily, recommend INR <1.8 for procedure, INR today 1.05  ASA 81 mg  ANTIBIOTICS: Not needed    ALLERGIES  No Known Allergies      LABS:  INR   Date Value Ref Range Status   06/29/2023 1.05 0.85 - 1.15 Final     INR (External)   Date Value Ref Range Status   10/05/2022 3.1 (A) 0.9 - 1.1 Final      Hemoglobin   Date Value Ref Range Status   06/29/2023 12.5 (L) 13.3 - 17.7 g/dL Final     Platelet Count   Date Value Ref Range Status   06/29/2023 187 150 - 450 10e3/uL Final     Creatinine   Date Value Ref Range Status   06/29/2023 1.04 0.67 - 1.17  "mg/dL Final     Potassium   Date Value Ref Range Status   05/31/2023 4.7 3.4 - 5.3 mmol/L Final   08/19/2022 3.9 3.5 - 5.0 mmol/L Final         EXAM:  BP (!) 144/65 (BP Location: Right arm)   Pulse 59   Temp 98.3  F (36.8  C) (Oral)   Resp 14   Ht 1.676 m (5' 6\")   Wt 85.3 kg (188 lb)   SpO2 93%   BMI 30.34 kg/m    General:  Stable.  In no acute distress.    Neuro:  A&O x 3. Moves all extremities equally.  Resp:  Lungs clear to auscultation bilaterally.  Cardio:  S1S2 and reg, without murmur, clicks or rubs.  Vascular:  BLE DP/PT dopplerable.  Skin:  Wound vac in place over LLE side of foot to heel, CDI.    Pre-Sedation Assessment:  Mallampati Airway Classification:  II - Faucial pillars and soft palate may be seen, but uvula is masked by the base of the tongue  Previous reaction to anesthesia/sedation:  No  Sedation plan based on assessment: Moderate (conscious) sedation  ASA Classification: Class 3 - SEVERE SYSTEMIC DISEASE, DEFINITE FUNCTIONAL LIMITATIONS.   Code Status: FULL CODE      ASSESSMENT/PLAN:   LEFT lower extremity angiogram with possible intervention with sedation.    Procedural education reviewed with patient/family in detail including, but not limited to risks, benefits and alternatives with understanding verbalized by patient/family.    Total time spent on the date of the encounter: 25 minutes.      CLEVE RIVERA CNP  Interventional Radiology    "

## 2023-06-29 ENCOUNTER — HOSPITAL ENCOUNTER (OUTPATIENT)
Dept: INTERVENTIONAL RADIOLOGY/VASCULAR | Facility: HOSPITAL | Age: 78
Discharge: HOME | End: 2023-06-29
Attending: RADIOLOGY | Admitting: RADIOLOGY
Payer: COMMERCIAL

## 2023-06-29 VITALS
SYSTOLIC BLOOD PRESSURE: 147 MMHG | RESPIRATION RATE: 20 BRPM | WEIGHT: 188 LBS | HEART RATE: 58 BPM | OXYGEN SATURATION: 97 % | TEMPERATURE: 98 F | BODY MASS INDEX: 30.22 KG/M2 | HEIGHT: 66 IN | DIASTOLIC BLOOD PRESSURE: 65 MMHG

## 2023-06-29 DIAGNOSIS — I73.9 PAD (PERIPHERAL ARTERY DISEASE) (H): ICD-10-CM

## 2023-06-29 DIAGNOSIS — L97.423: ICD-10-CM

## 2023-06-29 DIAGNOSIS — I70.244 ATHEROSCLEROSIS OF NATIVE ARTERY OF LEFT LOWER EXTREMITY WITH ULCERATION OF HEEL (H): ICD-10-CM

## 2023-06-29 LAB
ACT BLD: 189 SECONDS (ref 74–150)
ACT BLD: 226 SECONDS (ref 74–150)
ACT BLD: 239 SECONDS (ref 74–150)
ACT BLD: 264 SECONDS (ref 74–150)
ACT BLD: 284 SECONDS (ref 74–150)
CREAT SERPL-MCNC: 1.04 MG/DL (ref 0.67–1.17)
GFR SERPL CREATININE-BSD FRML MDRD: 73 ML/MIN/1.73M2
HGB BLD-MCNC: 12.5 G/DL (ref 13.3–17.7)
INR PPP: 1.05 (ref 0.85–1.15)
PLATELET # BLD AUTO: 187 10E3/UL (ref 150–450)

## 2023-06-29 PROCEDURE — C1769 GUIDE WIRE: HCPCS

## 2023-06-29 PROCEDURE — 37228 HC REVASC TIB-PERON ART ANGIOPLASTY INIT VESSEL: CPT | Mod: LT

## 2023-06-29 PROCEDURE — 272N000500 HC NEEDLE CR2

## 2023-06-29 PROCEDURE — C1725 CATH, TRANSLUMIN NON-LASER: HCPCS

## 2023-06-29 PROCEDURE — C1724 CATH, TRANS ATHEREC,ROTATION: HCPCS

## 2023-06-29 PROCEDURE — 99152 MOD SED SAME PHYS/QHP 5/>YRS: CPT

## 2023-06-29 PROCEDURE — 82565 ASSAY OF CREATININE: CPT

## 2023-06-29 PROCEDURE — 272N000566 HC SHEATH CR3

## 2023-06-29 PROCEDURE — C2623 CATH, TRANSLUMIN, DRUG-COAT: HCPCS

## 2023-06-29 PROCEDURE — 250N000009 HC RX 250

## 2023-06-29 PROCEDURE — 37225 HC REVASC FEM-POPL ART ANGIO-ATHERECTOMY: CPT | Mod: LT

## 2023-06-29 PROCEDURE — 272N000570 HC SHEATH CR7

## 2023-06-29 PROCEDURE — 85018 HEMOGLOBIN: CPT

## 2023-06-29 PROCEDURE — 250N000011 HC RX IP 250 OP 636: Mod: JZ

## 2023-06-29 PROCEDURE — C1773 RET DEV, INSERTABLE: HCPCS

## 2023-06-29 PROCEDURE — 250N000011 HC RX IP 250 OP 636: Performed by: RADIOLOGY

## 2023-06-29 PROCEDURE — 85347 COAGULATION TIME ACTIVATED: CPT | Mod: 91

## 2023-06-29 PROCEDURE — C1887 CATHETER, GUIDING: HCPCS

## 2023-06-29 PROCEDURE — 76937 US GUIDE VASCULAR ACCESS: CPT

## 2023-06-29 PROCEDURE — 999N000248 HC STATISTIC IV INSERT WITH US BY RN

## 2023-06-29 PROCEDURE — 258N000003 HC RX IP 258 OP 636

## 2023-06-29 PROCEDURE — 272N000302 HC DEVICE INFLATION CR5

## 2023-06-29 PROCEDURE — 250N000009 HC RX 250: Performed by: RADIOLOGY

## 2023-06-29 PROCEDURE — 36415 COLL VENOUS BLD VENIPUNCTURE: CPT

## 2023-06-29 PROCEDURE — C1874 STENT, COATED/COV W/DEL SYS: HCPCS

## 2023-06-29 PROCEDURE — 85610 PROTHROMBIN TIME: CPT

## 2023-06-29 PROCEDURE — 37221 HC REVASC ILIAC ART ANGIO-STENT INIT VESSEL: CPT | Mod: LT

## 2023-06-29 PROCEDURE — 85049 AUTOMATED PLATELET COUNT: CPT

## 2023-06-29 RX ORDER — DEXTROSE MONOHYDRATE 25 G/50ML
25-50 INJECTION, SOLUTION INTRAVENOUS
Status: DISCONTINUED | OUTPATIENT
Start: 2023-06-29 | End: 2023-06-30 | Stop reason: HOSPADM

## 2023-06-29 RX ORDER — FENTANYL CITRATE 50 UG/ML
25-50 INJECTION, SOLUTION INTRAMUSCULAR; INTRAVENOUS EVERY 5 MIN PRN
Status: DISCONTINUED | OUTPATIENT
Start: 2023-06-29 | End: 2023-06-30 | Stop reason: HOSPADM

## 2023-06-29 RX ORDER — FLUMAZENIL 0.1 MG/ML
0.2 INJECTION, SOLUTION INTRAVENOUS
Status: DISCONTINUED | OUTPATIENT
Start: 2023-06-29 | End: 2023-06-30 | Stop reason: HOSPADM

## 2023-06-29 RX ORDER — HEPARIN SODIUM 1000 [USP'U]/ML
8000 INJECTION, SOLUTION INTRAVENOUS; SUBCUTANEOUS ONCE
Status: COMPLETED | OUTPATIENT
Start: 2023-06-29 | End: 2023-06-29

## 2023-06-29 RX ORDER — HEPARIN SODIUM 1000 [USP'U]/ML
4000 INJECTION, SOLUTION INTRAVENOUS; SUBCUTANEOUS ONCE
Status: COMPLETED | OUTPATIENT
Start: 2023-06-29 | End: 2023-06-29

## 2023-06-29 RX ORDER — HEPARIN SODIUM 1000 [USP'U]/ML
2000 INJECTION, SOLUTION INTRAVENOUS; SUBCUTANEOUS ONCE
Status: COMPLETED | OUTPATIENT
Start: 2023-06-29 | End: 2023-06-29

## 2023-06-29 RX ORDER — IODIXANOL 320 MG/ML
200 INJECTION, SOLUTION INTRAVASCULAR ONCE
Status: DISCONTINUED | OUTPATIENT
Start: 2023-06-29 | End: 2023-06-30 | Stop reason: HOSPADM

## 2023-06-29 RX ORDER — PROTAMINE SULFATE 10 MG/ML
10 INJECTION, SOLUTION INTRAVENOUS ONCE
Status: COMPLETED | OUTPATIENT
Start: 2023-06-29 | End: 2023-06-29

## 2023-06-29 RX ORDER — PROTAMINE SULFATE 10 MG/ML
50 INJECTION, SOLUTION INTRAVENOUS ONCE
Status: COMPLETED | OUTPATIENT
Start: 2023-06-29 | End: 2023-06-29

## 2023-06-29 RX ORDER — NALOXONE HYDROCHLORIDE 0.4 MG/ML
0.2 INJECTION, SOLUTION INTRAMUSCULAR; INTRAVENOUS; SUBCUTANEOUS
Status: DISCONTINUED | OUTPATIENT
Start: 2023-06-29 | End: 2023-06-30 | Stop reason: HOSPADM

## 2023-06-29 RX ORDER — NALOXONE HYDROCHLORIDE 0.4 MG/ML
0.4 INJECTION, SOLUTION INTRAMUSCULAR; INTRAVENOUS; SUBCUTANEOUS
Status: DISCONTINUED | OUTPATIENT
Start: 2023-06-29 | End: 2023-06-30 | Stop reason: HOSPADM

## 2023-06-29 RX ORDER — NITROGLYCERIN 5 MG/ML
200 VIAL (ML) INTRAVENOUS ONCE
Status: COMPLETED | OUTPATIENT
Start: 2023-06-29 | End: 2023-06-29

## 2023-06-29 RX ORDER — CEFAZOLIN SODIUM/WATER 2 G/20 ML
2 SYRINGE (ML) INTRAVENOUS ONCE
Status: COMPLETED | OUTPATIENT
Start: 2023-06-29 | End: 2023-06-29

## 2023-06-29 RX ORDER — NICOTINE POLACRILEX 4 MG
15-30 LOZENGE BUCCAL
Status: DISCONTINUED | OUTPATIENT
Start: 2023-06-29 | End: 2023-06-30 | Stop reason: HOSPADM

## 2023-06-29 RX ORDER — VERAPAMIL HYDROCHLORIDE 2.5 MG/ML
2.5 INJECTION, SOLUTION INTRAVENOUS ONCE
Status: COMPLETED | OUTPATIENT
Start: 2023-06-29 | End: 2023-06-29

## 2023-06-29 RX ORDER — LIDOCAINE 40 MG/G
CREAM TOPICAL
Status: DISCONTINUED | OUTPATIENT
Start: 2023-06-29 | End: 2023-06-30 | Stop reason: HOSPADM

## 2023-06-29 RX ORDER — SODIUM CHLORIDE 9 MG/ML
INJECTION, SOLUTION INTRAVENOUS CONTINUOUS
Status: DISCONTINUED | OUTPATIENT
Start: 2023-06-29 | End: 2023-06-30 | Stop reason: HOSPADM

## 2023-06-29 RX ORDER — HEPARIN SODIUM 200 [USP'U]/100ML
1 INJECTION, SOLUTION INTRAVENOUS CONTINUOUS PRN
Status: DISCONTINUED | OUTPATIENT
Start: 2023-06-29 | End: 2023-06-30 | Stop reason: HOSPADM

## 2023-06-29 RX ADMIN — MIDAZOLAM HYDROCHLORIDE 1 MG: 1 INJECTION, SOLUTION INTRAMUSCULAR; INTRAVENOUS at 08:25

## 2023-06-29 RX ADMIN — PROTAMINE SULFATE 50 MG: 10 INJECTION, SOLUTION INTRAVENOUS at 11:13

## 2023-06-29 RX ADMIN — FENTANYL CITRATE 25 MCG: 50 INJECTION, SOLUTION INTRAMUSCULAR; INTRAVENOUS at 09:36

## 2023-06-29 RX ADMIN — NITROGLYCERIN 200 MCG: 10 INJECTION INTRAVENOUS at 10:05

## 2023-06-29 RX ADMIN — FENTANYL CITRATE 25 MCG: 50 INJECTION, SOLUTION INTRAMUSCULAR; INTRAVENOUS at 10:21

## 2023-06-29 RX ADMIN — FENTANYL CITRATE 25 MCG: 50 INJECTION, SOLUTION INTRAMUSCULAR; INTRAVENOUS at 10:41

## 2023-06-29 RX ADMIN — PROTAMINE SULFATE 10 MG: 10 INJECTION, SOLUTION INTRAVENOUS at 11:38

## 2023-06-29 RX ADMIN — MIDAZOLAM HYDROCHLORIDE 0.5 MG: 1 INJECTION, SOLUTION INTRAMUSCULAR; INTRAVENOUS at 08:52

## 2023-06-29 RX ADMIN — MIDAZOLAM HYDROCHLORIDE 1 MG: 1 INJECTION, SOLUTION INTRAMUSCULAR; INTRAVENOUS at 08:15

## 2023-06-29 RX ADMIN — HEPARIN SODIUM 2000 UNITS: 1000 INJECTION INTRAVENOUS; SUBCUTANEOUS at 10:35

## 2023-06-29 RX ADMIN — HEPARIN SODIUM 4000 UNITS: 1000 INJECTION INTRAVENOUS; SUBCUTANEOUS at 09:07

## 2023-06-29 RX ADMIN — LIDOCAINE HYDROCHLORIDE 30 ML: 10 INJECTION, SOLUTION INFILTRATION; PERINEURAL at 08:43

## 2023-06-29 RX ADMIN — MIDAZOLAM HYDROCHLORIDE 0.5 MG: 1 INJECTION, SOLUTION INTRAMUSCULAR; INTRAVENOUS at 09:20

## 2023-06-29 RX ADMIN — SODIUM CHLORIDE: 9 INJECTION, SOLUTION INTRAVENOUS at 08:10

## 2023-06-29 RX ADMIN — HEPARIN SODIUM 8000 UNITS: 1000 INJECTION INTRAVENOUS; SUBCUTANEOUS at 08:50

## 2023-06-29 RX ADMIN — MIDAZOLAM HYDROCHLORIDE 0.5 MG: 1 INJECTION, SOLUTION INTRAMUSCULAR; INTRAVENOUS at 09:47

## 2023-06-29 RX ADMIN — MIDAZOLAM HYDROCHLORIDE 0.5 MG: 1 INJECTION, SOLUTION INTRAMUSCULAR; INTRAVENOUS at 10:31

## 2023-06-29 RX ADMIN — FENTANYL CITRATE 50 MCG: 50 INJECTION, SOLUTION INTRAMUSCULAR; INTRAVENOUS at 08:36

## 2023-06-29 RX ADMIN — MIDAZOLAM HYDROCHLORIDE 0.5 MG: 1 INJECTION, SOLUTION INTRAMUSCULAR; INTRAVENOUS at 10:16

## 2023-06-29 RX ADMIN — FENTANYL CITRATE 25 MCG: 50 INJECTION, SOLUTION INTRAMUSCULAR; INTRAVENOUS at 10:08

## 2023-06-29 RX ADMIN — MIDAZOLAM HYDROCHLORIDE 0.5 MG: 1 INJECTION, SOLUTION INTRAMUSCULAR; INTRAVENOUS at 10:50

## 2023-06-29 RX ADMIN — FENTANYL CITRATE 25 MCG: 50 INJECTION, SOLUTION INTRAMUSCULAR; INTRAVENOUS at 09:07

## 2023-06-29 RX ADMIN — Medication 2 G: at 11:42

## 2023-06-29 RX ADMIN — HEPARIN SODIUM 4000 UNITS: 1000 INJECTION INTRAVENOUS; SUBCUTANEOUS at 09:51

## 2023-06-29 RX ADMIN — FENTANYL CITRATE 50 MCG: 50 INJECTION, SOLUTION INTRAMUSCULAR; INTRAVENOUS at 08:19

## 2023-06-29 RX ADMIN — VERAPAMIL HYDROCHLORIDE 2.5 MG: 2.5 INJECTION INTRAVENOUS at 10:05

## 2023-06-29 RX ADMIN — FENTANYL CITRATE 50 MCG: 50 INJECTION, SOLUTION INTRAMUSCULAR; INTRAVENOUS at 11:01

## 2023-06-29 NOTE — PRE-PROCEDURE
GENERAL PRE-PROCEDURE:   Procedure:  LLE angiogram  Date/Time:  6/29/2023 7:51 AM    Written consent obtained?: Yes    Risks and benefits: Risks, benefits and alternatives were discussed    Consent given by:  Patient (consent scanned, given in clinic)  Patient states understanding of procedure being performed: Yes    Patient's understanding of procedure matches consent: Yes    Procedure consent matches procedure scheduled: Yes    Expected level of sedation:  Moderate  Appropriately NPO:  Yes  ASA Class:  3  Mallampati  :  Grade 2- soft palate, base of uvula, tonsillar pillars, and portion of posterior pharyngeal wall visible  Lungs:  Lungs clear with good breath sounds bilaterally  Heart:  Normal heart sounds and rate  History & Physical reviewed:  History and physical reviewed and no updates needed  Statement of review:  I have reviewed the lab findings, diagnostic data, medications, and the plan for sedation

## 2023-06-29 NOTE — DISCHARGE INSTRUCTIONS
Angiogram Discharge Instructions:    You had an angiogram procedure. An angiogram is a procedure thatuses x-rays to take pictures of your blood vessels. A long, flexible tube or catheter is inserted through the blood stream (through the procedure site) to help deliver contrast (dye) into the arteries so they can be visibleon the x-ray. Angiograms are used to evaluate and treat possible blockages or other disease in the arterial system. Please follow the below instructions after your angiogram, including monitoring of your procedure site.    Care instructions after angiogram procedure:    - If you received sedation for your procedure, do not drive or operate heavy machinery for the rest of the day.    - Do not lift objects greater than 10 poundsfor 2 days following angiogram procedure.    - Avoid excessive exercise and straining for 2 days.    - Avoid tub baths, pools, hot tubs and Jacuzzis for 3 days or until procedure site is well healed.    - Youmay shower beginning tomorrow. Do not scrub procedure site until well healed; pat dry.    - Return to your normal activities as you tolerate after the 2 day restriction.    - You can expect to return to work 1-2days after your procedure - depending on the nature of your profession.    - It is normal to have some tenderness and minimal swelling at procedure puncture site. A small area of discoloration may be present.Tenderness typically subsides in 1-2 days. A small knot may also be present at puncture site for 6-8 weeks. This can be a normal part of the healing process.    Medications and other post-procedure care:    -If you had a blockage opened please make sure to fill your prescription for any new medication, such as Plavix, that you may have been prescribed and take it everyday    - If you have kidney function issues, please makesure to hydrate yourself well for the next two days by drinking lots of fluids to help clear your body of the dye used. If you have heart  problems such as heart failure, this may have to be moderated.    - If you are onMetformin, please do not resume it for 48hrs.    Follow up:    - Follow up with your vascular surgery team at the [facility] vascular center A follow up appointment should already be arranged for you.If you are unsure of your appointment or do not have a follow up appointment in the next 2-3 weeks, please call our office at [phone]. You will need to have an ultrasound 2-3 weeks after your angiogram and should bescheduled at the time of your follow up appt.    Further follow up will be based on ultrasound results. Typical follow up is every 3 months for the first year, then every 6 months to one year thereafter.    seek medical evaluation for:    - If you develop fevers (greater than 101 F (38.3C)).    - If you develop increasing pain, redness, purulent drainage, tenderness, or swelling at procedure site.    If you experience any bleeding from procedure/puncture site: lie down, firmly apply pressure to puncture site and call 911.    - Seek emergent evaluation if you experience any new leg/arm pain, discoloration ornumbness.    Call the vascular center at [phone] with questions/concerns or if you have any of the above symptoms.

## 2023-06-29 NOTE — IP AVS SNAPSHOT
[facility] Interventional Radiology  [address] [place] [id number]  Phone: [phone]  Fax: [phone]                              After Visit Summary   6/29/2023    Shaji Pompa   MRN: [id number]           After Visit Summary Signature Page    I have received my discharge instructions, and my questions have been answered. I have discussed any challenges I see with this plan with the nurse or doctor.    ..........................................................................................................................................  Patient/Patient Representative Signature      ..........................................................................................................................................  Patient Representative Print Name and Relationship to Patient    ..................................................               ................................................  Date                                   Time    ..........................................................................................................................................  Reviewed by Signature/Title    ...................................................              ..............................................  Date                                               Time    22EPIC Rev 08/18

## 2023-06-29 NOTE — PROCEDURES
[facility]    Procedure: Imaging Procedure Note    Date/Time: 6/29/2023 2:12 PM    Performed by: Eulalio Farrar MD  Authorized by: Eulalio Farrar MD      UNIVERSAL PROTOCOL   Site Marked: Yes  Prior Images Obtained and Reviewed:  Yes  Required items: Required blood products, implants, devices and special equipment available    Patient identity confirmed:  Verbally with patient  Patient was reevaluated immediately before administering moderate or deep sedation or anesthesia  Confirmation Checklist:  Patient's identity using two indicators, relevant allergies, procedure was appropriate and matched the consent or emergent situation and correct equipment/implants were available  Time out: Immediately prior to the procedure a time out was called    Universal Protocol: the Joint Commission Universal Protocol was followed    Preparation: Patient was prepped and draped in usual sterile fashion      SEDATION  Patient Sedated: Yes    Vital signs: Vital signs monitored during sedation    See dictated procedure note for full details.    PROCEDURE  Describe Procedure: LLE angiogram  Patient Tolerance:  Patient tolerated the procedure well with no immediate complications  Length of time physician/provider present for 1:1 monitoring during sedation: 195

## 2023-07-07 ENCOUNTER — OFFICE VISIT (OUTPATIENT)
Dept: VASCULAR SURGERY | Facility: CLINIC | Age: 78
End: 2023-07-07
Attending: PODIATRIST
Payer: COMMERCIAL

## 2023-07-07 VITALS
SYSTOLIC BLOOD PRESSURE: 119 MMHG | TEMPERATURE: 97.9 F | DIASTOLIC BLOOD PRESSURE: 71 MMHG | HEIGHT: 66 IN | HEART RATE: 68 BPM | BODY MASS INDEX: 30.34 KG/M2 | OXYGEN SATURATION: 95 %

## 2023-07-07 DIAGNOSIS — L97.423: Primary | ICD-10-CM

## 2023-07-07 PROCEDURE — 11042 DBRDMT SUBQ TIS 1ST 20SQCM/<: CPT | Performed by: PODIATRIST

## 2023-07-07 NOTE — PATIENT INSTRUCTIONS
"  Important lnstructions    HOLD WOUND VAC    WEIGHT BEARING STATUS: You are to remain NON WEIGHT BEARING on your left foot. NON WEIGHT BEARING MEANS NO PRESSURE ON YOUR FOOT OR HEEL AT ANY TIME FOR ANY REASON!    2. OFFLOADING DEVICE: Must use a A ROLLING KNEE WALKER and A WHEELCHAIR at all times! (do not use affected foot to push wheelchair)    3. STABILIZATION DEVICE: Use a CAM BOOT . You will need to WEAR THIS ANYTIME YOU ARE UP AND OUT OF BED, IT IS OKAY TO REMOVE WHEN YOU ARE SLEEPING..     4. ELEVATE: Elevating your leg means laying with your head on a pillow and your foot ABOVE YOUR HEART.     5. DO NOT MOVE YOUR FOOT.  There is a risk of worsening the wound or incision. To give yourself a higher chance of healing, please DO NOT swing foot back and forth and wiggle foot/toes especially when inside a stabilization device. Limited movement is allowable with therapy as recommended by the doctor.     6. TAKE A PROTEIN SHAKE TWICE A DAY.  (For ex: Boost, Ensure, Glucerna)      Dressing Change lnstructions        EVERY OTHER DAY and as needed, Cleanse your left wound(s) with Normal saline.    Pat Dry with non-sterile gauze    Primary Dressing: Apply Medihoney or Manuka honey into/onto the wounds    Secondary dressing: Cover with dry gauze    Secure with non-sterile roll gauze (4\" x 75\" roll) and tape (1\" roll tape) as needed; avoid adhesive directly on the skin     It IS NOT ok to get your wound wet in the bath or shower    SEEK MEDICAL CARE IF:  You have an increase in swelling, pain, or redness around the wound.  You have an increase in the amount of pus coming from the wound.  There is a bad smell coming from the wound.  The wound appears to be worsening/enlarging  You have a fever greater than 101.5 F      It is ok to continue current wound care treatment/products for the next 2-3 days until new wound care supplies are ordered and arrive. If longer than this please contact our office at [phone].        We " want to hear from you!   In the next few weeks, you should receive a call or email to complete a survey about your visit at [facility]. Please help us improve your appointment experience by letting us know how we did today. We strive to make your experience good and value any ways in which we could do better.      We value your input and suggestions.    Thank you for choosing the [facility]!

## 2023-07-07 NOTE — PROGRESS NOTES
FOOT AND ANKLE SURGERY/PODIATRY Progress Note      ASSESSMENT: Ulceration left heel        TREATMENT:  -Discussed with the patient that the left heel ulceration is improved depth from his previous visit.  I would like to hold the wound VAC at this time and begin topical Medihoney with a gauze dressing.    -Reviewed importance of offloading at all times including fully heal overnight and continue nonweightbearing with a knee walker.    -After discussion of risk factors, nursing staff removed dressing, cleansed wound and consent obtained 2% Lidocaine HCL jelly was applied, under clean conditions, the left heel ulceration(s) were debrided using #15 blade scalpel.  Devitalized and nonviable tissue, along with any fibrin and slough, was removed to improve granulation tissue formation, stimulate wound healing, decrease overall bacteria load, disrupt biofilm formation and decrease edge senescence. Wound drainage was scant No. Total excisional debridement was 0.12 sq cm into the subcutaneous tissue with a depth of 0.3 cm.   Ulcers were improved afterwards and .  Measures were as noted on the flow sheet. Medi-honey with a gauze dressing was applied. He will continue to apply Medi-honey with a gauze dressing qoday.    -He will follow-up in 2 weeks    Nehemiah Brown RICHARD  [facility]      HPI: Shaji Pompa was seen again today for a left heel ulceration.  Patient has remained mostly nonweightbearing with a knee walker and has used the wound VAC as directed.  He also underwent an angiogram with Dr. Farrar which indicates three-vessel runoff into his left foot.    Past Medical History:   Diagnosis Date     Cerebral infarction (H)      COPD (chronic obstructive pulmonary disease) (H)      HLD (hyperlipidemia)      Hypertension      Myeloproliferative disease (H)        Past Surgical History:   Procedure Laterality Date     CV CORONARY ANGIOGRAM N/A 4/26/2022    Procedure: CV CORONARY ANGIOGRAM;  " Surgeon: Audrey Holder MD;  Location: [facility] CV     CV LEFT HEART CATH N/A 4/26/2022    Procedure: Left Heart Catheterization;  Surgeon: Audrey oHlder MD;  Location: [facility] CV     IR LOWER EXTREMITY ANGIOGRAM LEFT  6/29/2023     OPEN REDUCTION INTERNAL FIXATION FOOT Right 2010     OTHER SURGICAL HISTORY  2001    Lip lesion removal     PICC TRIPLE LUMEN PLACEMENT  8/17/2022          TONSILLECTOMY & ADENOIDECTOMY         No Known Allergies      Current Outpatient Medications:      albuterol (PROAIR HFA;PROVENTIL HFA;VENTOLIN HFA) 90 mcg/actuation inhaler, Inhale 2 puffs into the lungs every 6 hours as needed for shortness of breath / dyspnea, Disp: , Rfl:      ASPIRIN LOW DOSE 81 MG EC tablet, TAKE 1 TABLET (81 MG) BY MOUTH DAILY, START THE MORNING OF 4/27/22, Disp: 90 tablet, Rfl: 3     atorvastatin (LIPITOR) 80 MG tablet, Take 1 tablet (80 mg) by mouth every morning, Disp: 90 tablet, Rfl: 1     furosemide (LASIX) 20 MG tablet, Take 1/2 TABLET BY mouth daily, Disp: 45 tablet, Rfl: 3     hydroxyurea (HYDREA) 500 MG capsule, Take 1,000-1,500 mg by mouth See Admin Instructions 1000 mg daily- Mon, Wed, & Fri. 1500 mg daily - Tue, Thurs, Sat, & Sun, Disp: , Rfl:      losartan (COZAAR) 25 MG tablet, Take 0.5 tablets (12.5 mg) by mouth daily, Disp: 45 tablet, Rfl: 3     metoprolol succinate ER (TOPROL-XL) 25 MG 24 hr tablet, Take 25 mg by mouth daily, Disp: , Rfl:      nitroGLYcerin (NITROSTAT) 0.4 MG sublingual tablet, One tablet under the tongue every 5 minutes if needed for chest pain. May repeat every 5 minutes for a maximum of 3 doses in 15 minutes\", Disp: 25 tablet, Rfl: 3     omeprazole (PRILOSEC) 20 MG DR capsule, Take 20 mg by mouth daily, Disp: , Rfl:      tiotropium (SPIRIVA) 18 mcg inhalation capsule, [TIOTROPIUM (SPIRIVA) 18 MCG INHALATION CAPSULE] Place 18 mcg into inhaler and inhale daily., Disp: , Rfl:      warfarin ANTICOAGULANT (COUMADIN) 1 MG tablet, Take 3 tablets (3 mg) by " "mouth daily, Disp: , Rfl:      fish oil-omega-3 fatty acids 1000 MG capsule, Omega-3 Fatty Acids Oral    1 daily    inactive, Disp: , Rfl:     Review of Systems - 10 point Review of Systems is negative except for left heel ulcer which is noted in HPI.      OBJECTIVE:  /71   Pulse 68   Temp 97.9  F (36.6  C)   Ht 5' 6\" (1.676 m)   SpO2 95%   BMI 30.34 kg/m    General appearance: Patient is alert and fully cooperative with history & exam.  No sign of distress is noted during the visit.    Vascular: Dorsalis pedis non-palpableLeft.  Dermatologic:    VASC Wound Left ankle (Active)   Pre Size Length 0.5 06/16/23 0800   Pre Size Width 0.7 06/16/23 0800   Pre Size Depth 0.6 06/16/23 0800   Pre Total Sq cm 0.35 06/16/23 0800   Product Used Negative pressure 07/07/23 0700       VASC Wound left heel (Active)   Pre Size Length 0.2 07/07/23 0700   Pre Size Width 0.2 07/07/23 0700   Pre Size Depth 0.2 07/07/23 0700   Pre Total Sq cm 0.04 07/07/23 0700   Post Size Length 0.4 07/07/23 0800   Post Size Width 0.3 07/07/23 0800   Post Size Depth 0.4 07/07/23 0800   Post Total Sq cm 0.12 07/07/23 0800   Mixed granular/fibrotic tissue left heel ulceration, no increased depth.  No erythema left foot.  Neurologic: Diminished to light touch Left.  Musculoskeletal: Contracted digits noted Left.    Imaging:     IR Lower Extremity Angiogram Left    Result Date: 6/29/2023  LOCATION: [facility] DATE: 6/29/2023 PROCEDURE: ABDOMINAL AORTIC, PELVIC AND LEFT LOWER EXTREMITY DIAGNOSTIC ARTERIOGRAPHY, ULTRASOUND GUIDANCE FOR VASCULAR ACCESSX2, ATHERECTOMY AND DRUG COATED BALLOON ANGIOPLASTY OF THE LEFT SUPERFICIAL FEMORAL AND ABOVE-KNEE POPLITEAL ARTERY, BALLOON ANGIOPLASTY OF THE ANTERIOR TIBIAL AND DORSALIS PEDIS ARTERIES, BALLOON ANGIOPLASTY AND BALLOON EXPANDABLE COVERED STENT PLACEMENT OF THE LEFT EXTERNAL ILIAC ARTERY, MODERATE SEDATION INTERVENTIONAL RADIOLOGIST: Eulalio Farrar MD INDICATION: 78-year-old " male with nonhealing left heel wound/critical limb ischemia, plan for left lower extremity angiography with possible intervention. INDICATION FOR DIAGNOSTIC ANGIOGRAPHY: Diagnostic angiography was required to process identified plaque morphology to aid in evaluation and treatment CONSENT: The risks, benefits and alternatives of the procedure were discussed with the patient  in detail. All questions were answered. Informed consent was given to proceed with the procedure. MODERATE SEDATION: Versed 5 mg IV; Fentanyl 275 mcg IV. During the time out, immediately prior to the administration of medications, the patient was reassessed for adequacy to receive conscious sedation.  Under physician supervision, Versed and fentanyl were administered for moderate sedation. Pulse oximetry, heart rate and blood pressure were continuously monitored by an independent trained observer. The physician spent 195 minutes of face-to-face sedation time with the patient. CONTRAST: 100 mL Visipaque ANTIBIOTICS: Ancef ADDITIONAL MEDICATIONS: Heparin 18,000 units, protamine 50 mg, verapamil 2.5 mg, nitroglycerin 200 mcg FLUOROSCOPIC TIME: 64.4 minutes. RADIATION DOSE: Air Kerma: 2743 mGy. COMPLICATIONS: No immediate complications. UNIVERSAL PROTOCOL: The operative site was marked and any prior imaging was reviewed. Required items including blood products, implants, devices and special equipment was made available. Patient identity was confirmed either verbally, with demographic information, hospital assigned identification or other identification markers. A timeout was performed immediately prior to the procedure. STERILE BARRIER TECHNIQUE: Maximum sterile barrier technique was used. Cutaneous antisepsis was performed at the operative site with application of 2% chlorhexidine and large sterile drape. Prior to the procedure, the  and assistant performed hand hygiene and wore hat, mask, sterile gown, and sterile gloves during the entire  procedure. PROCEDURE:  Access was achieved into the right common femoral artery utilizing real-time ultrasound guidance and micropuncture access kit. Imaging demonstrates a patent and compressible vessel. Permanent images were stored to the patient's medical record. Conversion  was made for a 5 Ecuadorean vascular sheath, which was attached to a continuous heparinized saline infusion. A flush catheter was manipulated over a wire into the upper abdomen at the level of the mid abdominal aorta abdominal arteriography was obtained. Imaging demonstrates patent visualized abdominal aorta and bilateral iliac vasculature. There is high-grade stenosis involving the cranial left external iliac artery. Exchange is made for a Glidewire Advantage wire which was carefully advanced across the  area of stenosis in the left external iliac artery to the left profunda femoral artery. Over-the-wire exchange is made for a 5 mm x 4 cm angioplasty balloon and angioplasty of the left external iliac artery was performed. Exchange was made for the Omni Flush catheter which was positioned in the caudal left external iliac artery. From this location, digital subtraction left lower extremity arteriography was obtained. Imaging demonstrates patent common femoral and profunda femoral arteries. 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 Reconstituted flow in the above-knee popliteal artery at the level of the patella. Three-vessel runoff to the foot with moderate stenosis throughout the anterior tibial artery. Systemic heparinization was performed and monitored with periodic activated clotting times. Over-the-wire exchange is made for a 7 Ecuadorean x 45 cm vascular sheath which was positioned in the left common femoral artery. An angled crossing catheter and Glidewire were enhanced through the sheath and selective catheterization of the left superficial femoral artery stump was performed. The  crossing catheter and guidewire were carefully advanced through the occluded vessel to the site of vessel occlusion. There is inability to cross the distal cap of the lesion in the intraluminal plane. Ultrasound imaging of the left dorsalis pedis artery was performed. Imaging demonstrates a patent and compressible vessel. Permanent images were stored to the patient's medical record. A micropuncture needle was then used to access the vessel in a retrograde fashion. The inner 3 Croatian of a micropuncture access kit was used as a sheath. A exchange length V-18 guidewire which was carefully advanced under fluoroscopic guidance through the anterior tibial artery into the occluded popliteal and superficial femoral arteries. Over-the-wire exchange is made for an 018 crossing catheter which was then advanced over the guidewire via the dorsalis pedis artery access. The guidewire and catheter were then carefully used to cross the occluded superficial femoral artery in a retrograde fashion. Positioning within the true lumen of the left common femoral artery was confirmed by injecting a small volume of contrast. A 5 Croatian snare was advanced through the femoral sheath and deployed in the left common femoral artery. The retrograde guidewire was snared and removed through the femoral sheath for through and through access. Utilizing the femoral access a 2.5 mm x 120 mm angioplasty balloon was advanced and angioplasty of the left superficial femoral and above-knee popliteal artery was performed. Angioplasty of the left anterior tibial and dorsalis pedis artery was also performed utilizing the balloon. Utilizing the femoral sheath, a 035 crossing catheter was then exchanged and positioned in the below knee popliteal artery. The guidewire was removed and a Spider FX distal embolic protection filter was deployed in the below knee popliteal artery. A spot  radiograph was performed, confirming appropriate apposition of the filter against  the vessel wall. Over-the-wire exchange is made for a [employer] LX atherectomy catheter. Atherectomy of the entire superficial femoral artery and above-knee popliteal arteries was performed with removal of a large volume of plaque and debris. Post atherectomy angiogram demonstrates excellent result with in-line flow through the vessel. There is residual vessel irregularity throughout. Over-the-wire exchange is made for a 5 mm x 250 mm InPact drug-eluting balloon which was positioned in the above-knee popliteal artery and distal superficial femoral artery and inflated for 3 minutes to burst pressure. Over-the-wire exchange is made for a 6 mm x 250 mm InPact drug-eluting balloon which was positioned in the proximal/mid superficial femoral artery and inflated for 3 minutes to burst pressure. The distal embolic protection filter was then carefully captured utilizing the retrieval catheter and removed completely. Post atherectomy and drug coated balloon angioplasty angiogram demonstrates excellent result with brisk, in-line flow through the revascularized superficial femoral artery into the popliteal artery. Three-vessel runoff to the foot. Over the wire exchange was then made for an exchange He wire which was positioned in the left superficial femoral artery. The sheath was retracted to the caudal left common iliac artery and a magnified iliac intragram was obtained. Again noted is high-grade stenosis involving the cranial left external iliac artery. Over the wire exchange is made for a 6 mm x 19 mm Viabahn VBX balloon extendable covered stent which was deployed in the area of stenosis involving the left cranial external iliac artery. The stent was deployed to burst pressure. Post and dilatation was performed utilizing a 7 mm x 2 cm angioplasty balloon which was inflated to burst pressure. Post intervention imaging demonstrates excellent result with no significant residual stenosis in the left iliac vasculature. At  this point, the procedure was considered complete, protamine sulfate was administered and a repeat activity clotting time was checked. The sheaths were removed and manual pressure applied until hemostasis.     IMPRESSION:  1. Pelvic angiography demonstrates high-grade stenosis involving the cranial left external iliac artery. 2. 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 There is a three-vessel runoff to the foot with moderate stenosis throughout the anterior tibial artery. 3. Successful retrograde crossing, atherectomy and drug coated balloon inflation in the occluded superficial femoral and popliteal arteries with excellent result. Status post balloon angioplasty of the anterior tibial artery. Successful balloon expandable covered stent placement in the area of high-grade stenosis in the cranial left external iliac artery. Post intervention angiography demonstrates brisk flow through the revascularized superficial femoral and popliteal arteries. There remains a three-vessel runoff to the foot without significant residual stenosis. PLAN: Continue warfarin, Lipitor and aspirin therapy. Plan for a short interval follow-up to evaluate response.     US Lower Extremity Arterial Duplex Bilateral    Result Date: 6/20/2023  Table formatting from the original result was not included. Arterial Duplex Ultrasound (Date: 06/20/23) Lower Extremity Artery Evaluation Indication: Surveillance Bilateral Leg Arterial: PAD. Left Heel Ulcer. Hx: Decrease RADHA's from 6/2/23 at [facility] ( Rt: 0.8; Lt. 0.5) .  Decreased lower extremity pulses, lower extremity pain  Previous: None History: Previous Smoker, Hypertension, PAD, Claudication, and Vascular Ulcers Technique: Duplex imaging is performed utilizing gray-scale, two-dimensional images, and color-flow imaging. Doppler  waveform analysis and spectral Doppler imaging is also performed. LOWER EXTREMITY ARTERIAL DUPLEX EXAM WITH WAVEFORMS Right Leg:(cm/s) Location: [facility] Waveforms EIA:   180  M CFA:   106  B PFA:   209  B SFA Proximal:   Occluded   N/A SFA Mid:   Occluded   N/A SFA Distal:   59 (Recan.)   M Popliteal Artery:   40 / 42  B PTA:   32   M MAYRA:   40  M DPA:   35  M Waveforms: T=Triphasic, M=Monophasic, B=Biphasic Left Leg:(cm/s) Location: [facility] Waveforms EIA:   185  M CFA:   163  M PFA:   482 / 113  M SFA Proximal: Occluded  N/A SFA Mid:   Occluded   N/A SFA Distal:   Occluded   N/A Popliteal Artery:   16 /  33 M PTA:   24   M MAYRA:   15  M DPA:   30  M Waveforms: T=Triphasic, M=Monophasic, B=Biphasic Stenotic Profile Ratio: 482 / 113 = 4.27 Impression: Right Lower Extremity: Monophasic flow in the external iliac artery, suggesting aortoiliac inflow disease.  Occlusion of the proximal/mid superficial femoral artery with reconstituted flow in the distal vessel.  Patent popliteal artery and infrapopliteal vessels without focal stenosis. Left Lower Extremity: Monophasic flow in the external iliac artery, suggesting aortoiliac inflow disease.  Elevation in profunda femoral artery peak systolic velocities, reflecting 50 to 99% stenosis.  Occlusion of the superficial femoral artery with reconstituted flow in the above-knee popliteal artery.  The remaining vasculature is patent with monophasic flow. Reference: Category Normal 1-19% 20-49% 50-99% Occluded PSV <160 cm/sec without spectral broadening <160 cm/sec with spectral broadening Increased Increased Absent Flow Ratio N/A N/A < 2.0 >2.0 N/A Post-Stenotic Turbulence No No No Yes N/A           Picture:

## 2023-07-14 ENCOUNTER — ANCILLARY PROCEDURE (OUTPATIENT)
Dept: VASCULAR ULTRASOUND | Facility: CLINIC | Age: 78
End: 2023-07-14
Attending: RADIOLOGY
Payer: COMMERCIAL

## 2023-07-14 DIAGNOSIS — I70.244 ATHEROSCLEROSIS OF NATIVE ARTERY OF LEFT LOWER EXTREMITY WITH ULCERATION OF HEEL (H): ICD-10-CM

## 2023-07-14 DIAGNOSIS — L97.423: ICD-10-CM

## 2023-07-14 DIAGNOSIS — R09.89 BILATERAL CAROTID BRUITS: ICD-10-CM

## 2023-07-14 DIAGNOSIS — I73.9 PAD (PERIPHERAL ARTERY DISEASE) (H): ICD-10-CM

## 2023-07-14 PROCEDURE — 93926 LOWER EXTREMITY STUDY: CPT | Mod: LT

## 2023-07-14 PROCEDURE — 93923 UPR/LXTR ART STDY 3+ LVLS: CPT

## 2023-07-14 PROCEDURE — 93926 LOWER EXTREMITY STUDY: CPT | Mod: 26 | Performed by: SURGERY

## 2023-07-14 PROCEDURE — 93880 EXTRACRANIAL BILAT STUDY: CPT

## 2023-07-14 NOTE — PATIENT INSTRUCTIONS
Kevin Girard,    Thank you for entrusting your care with us today. After your visit today with MD Eulalio Farrar this is the plan that was discussed at your appointment.    Keep your follow up with Dr. Brown on Friday and with Dr. Farrar on August 8th.    CTA of head and neck in about 10 months. We will call you to schedule when the time gets closer.    I am including additional information on these things and our contact information if you have any questions or concerns.   Please do not hesitate to reach out to us if you felt we did not answer your questions or you are unsure of the treatment plan after your visit today. Our number is [phone].Thank you for trusting us with your care.         Again thank you for your time.

## 2023-07-18 ENCOUNTER — OFFICE VISIT (OUTPATIENT)
Dept: VASCULAR SURGERY | Facility: CLINIC | Age: 78
End: 2023-07-18
Attending: RADIOLOGY
Payer: COMMERCIAL

## 2023-07-18 VITALS
TEMPERATURE: 98.3 F | HEART RATE: 64 BPM | SYSTOLIC BLOOD PRESSURE: 163 MMHG | OXYGEN SATURATION: 96 % | DIASTOLIC BLOOD PRESSURE: 93 MMHG

## 2023-07-18 DIAGNOSIS — I65.23 CAROTID STENOSIS, ASYMPTOMATIC, BILATERAL: Primary | ICD-10-CM

## 2023-07-18 PROCEDURE — G0463 HOSPITAL OUTPT CLINIC VISIT: HCPCS

## 2023-07-19 NOTE — PROGRESS NOTES
VASCULAR AND INTERVENTIONAL OUTPATIENT CONSULT OR VISIT  PHYSICIAN: Eulalio Farrar MD  ESTABLISHED PATIENT    LOCATION: [facility]    Shaji Pompa   Medical Record #:  [id number]  YOB: 1945  Age:  78 year old     Date of Service: 7/18/2023    PRIMARY CARE PROVIDER: Steven Caraballo    Reason for visit: Nonhealing left heel wound/critical limb ischemia     IMPRESSION: 78-year-old male with a nonhealing left heel wound in setting of peripheral vascular disease.  Preintervention noninvasive imaging demonstrated occlusion of the left superficial artery with reconstituted flow in the above-knee popliteal artery.  The patient underwent successful revascularization of his left lower extremity on 6/29/2023.  Post revascularization noninvasive imaging demonstrates ankle-brachial index of 0.83 on the left with a digit pressure of 99 mmHg.  There is near complete healing of the heel wound (see photo taken today).  Overall, excellent short-term response to therapy.     RECOMMENDATION:    1.  Guideline recommended medical therapy for peripheral arterial disease (Bo[place] category 5)  -The patient is on full dose anticoagulation with warfarin plus low-dose aspirin  -Continue high intensity statin therapy with Lipitor 80 mg once daily  -Continue smoking cessation     Continue current wound care and medical management as noted above.  Plan for follow-up in early August.     HPI:  Shaji Pompa is a 78 year old male who was evaluated today for peripheral vascular disease in the setting of a nonhealing left heel ulcer.  The patient reports he initially developed the ulcer in February of this year while he was in [place].  He seen by podiatrist who advised local wound care as well as offloading utilizing a horseshoe insert.  The patient underwent a left lower extremity angiogram with revascularization on 6/29/2023.  His perioperative course was unremarkable.  The patient reports near  complete healing of his heel wound.  He denies any prior history of extremity ulcerations.  He is a former smoker.  He has a history of coronary artery disease.    PHH:    Past Medical History:   Diagnosis Date    Cerebral infarction (H)     COPD (chronic obstructive pulmonary disease) (H)     HLD (hyperlipidemia)     Hypertension     Myeloproliferative disease (H)         Past Surgical History:   Procedure Laterality Date    CV CORONARY ANGIOGRAM N/A 4/26/2022    Procedure: CV CORONARY ANGIOGRAM;  Surgeon: Audrey Holder MD;  Location: [facility] CATH LAB CV    CV LEFT HEART CATH N/A 4/26/2022    Procedure: Left Heart Catheterization;  Surgeon: Audrey Holder MD;  Location: [facility] CATH [facility] CV    IR LOWER EXTREMITY ANGIOGRAM LEFT  6/29/2023    OPEN REDUCTION INTERNAL FIXATION FOOT Right 2010    OTHER SURGICAL HISTORY  2001    Lip lesion removal    PICC TRIPLE LUMEN PLACEMENT  8/17/2022         TONSILLECTOMY & ADENOIDECTOMY         ALLERGIES:  Patient has no known allergies.    MEDS:    Current Outpatient Medications:     albuterol (PROAIR HFA;PROVENTIL HFA;VENTOLIN HFA) 90 mcg/actuation inhaler, Inhale 2 puffs into the lungs every 6 hours as needed for shortness of breath / dyspnea, Disp: , Rfl:     ASPIRIN LOW DOSE 81 MG EC tablet, TAKE 1 TABLET (81 MG) BY MOUTH DAILY, START THE MORNING OF 4/27/22, Disp: 90 tablet, Rfl: 3    atorvastatin (LIPITOR) 80 MG tablet, Take 1 tablet (80 mg) by mouth every morning, Disp: 90 tablet, Rfl: 1    furosemide (LASIX) 20 MG tablet, Take 1/2 TABLET BY mouth daily, Disp: 45 tablet, Rfl: 3    hydroxyurea (HYDREA) 500 MG capsule, Take 1,000-1,500 mg by mouth See Admin Instructions 1000 mg daily- Mon, Wed, & Fri. 1500 mg daily - Tue, Thurs, Sat, & Sun, Disp: , Rfl:     losartan (COZAAR) 25 MG tablet, Take 0.5 tablets (12.5 mg) by mouth daily, Disp: 45 tablet, Rfl: 3    metoprolol succinate ER (TOPROL-XL) 25 MG 24 hr tablet, Take 25 mg by mouth daily, Disp: , Rfl:     nitroGLYcerin  "(NITROSTAT) 0.4 MG sublingual tablet, One tablet under the tongue every 5 minutes if needed for chest pain. May repeat every 5 minutes for a maximum of 3 doses in 15 minutes\", Disp: 25 tablet, Rfl: 3    omeprazole (PRILOSEC) 20 MG DR capsule, Take 20 mg by mouth daily, Disp: , Rfl:     tiotropium (SPIRIVA) 18 mcg inhalation capsule, [TIOTROPIUM (SPIRIVA) 18 MCG INHALATION CAPSULE] Place 18 mcg into inhaler and inhale daily., Disp: , Rfl:     warfarin ANTICOAGULANT (COUMADIN) 1 MG tablet, Take 3 tablets (3 mg) by mouth daily, Disp: , Rfl:     fish oil-omega-3 fatty acids 1000 MG capsule, Omega-3 Fatty Acids Oral    1 daily    inactive, Disp: , Rfl:     SOCIAL HABITS:    History   Smoking Status    Former    Packs/day: 1.00    Types: Cigarettes    Start date: 6/8/1965    Quit date: 1/1/2012   Smokeless Tobacco    Never     Social History    Substance and Sexual Activity      Alcohol use: Yes        Alcohol/week: 19.0 standard drinks of alcohol      History   Drug Use Unknown       FAMILY HISTORY:    Family History   Problem Relation Age of Onset    Alcoholism Mother        ADVANCE CARE DIRECTIVES:    Advance care directives reviewed in the chart and no changes made.     PE:  BP (!) 163/93   Pulse 64   Temp 98.3  F (36.8  C) (Oral)   SpO2 96%   Wt Readings from Last 1 Encounters:   06/29/23 188 lb (85.3 kg)     There is no height or weight on file to calculate BMI.    EXAM:  GENERAL: This is a well-developed 78 year old male who appears his stated age  EYES: Grossly normal.  MOUTH: Buccal mucosa normal   MUSCULOSKELETAL: Grossly normal and both lower extremities are intact.  HEME/LYMPH: No lymphedema  NEUROLOGIC: Focally intact, Alert and oriented x 3.   PSYCH: appropriate affect  INTEGUMENT: No open lesions or ulcers    DIAGNOSTIC STUDIES:     Images:  US Carotid Bilateral    Result Date: 7/15/2023  Table formatting from the original result was not included. BILATERAL CAROTID ULTRASOUND (Date: 07/14/23) Indication: " Surveillance Bilateral Carotid Bruit Previous: None Symptoms: Bruit, Hypertension, and Previous Smoker TECHNIQUE: Duplex exam performed utilizing 2D gray-scale imaging, Doppler interrogation with color-flow and spectral waveform analysis. Right Velocity  Chart (cm/sec) Location Right DISTAL CCA-PS 80 DISTAL CCA- ED 11 PROX ICA- PROX ICA-ED 57 ECA- ECA-ED 28 Vertebral- Antegrade 71 Subclavian A- Biphasic 101 Ratio ICA/CCA-PS 2.99 Ratio ICA/CCA-ED 5.18 Left Velocity  Chart (cm/sec) Location Left DISTAL CCA-PS 84 DISTAL CCA- ED 13 PROX ICA-PS 64 PROX ICA-ED 5 MID ICA- MID ICA-ED 34 ECA- ECA-ED 13 Vertebral- Antegrade 53 Subclavian A- Biphasic 158 Ratio ICA/CCA-PS 0.76 Ratio ICA/CCA-ED 0.38 FINDINGS: RIGHT: There is predominantly echogenic  atheromatous plaque.  LEFT: There is predominantly echogenic atheromatous plaque.  RIGHT: Vertebral artery and subclavian artery waveforms are antegrade. LEFT: Vertebral artery and subclavian artery waveforms are antegrade. Impression:  1. 50-69% diameter stenosis of the right ICA relative to the proximal ICA diameter. 2. 50-69% diameter stenosis of the left ICA relative to the proximal ICA diameter. Evaluation based on velocities and NASCET criteria Category PSV (cm/s)  Plaque Imaging EDV (cm/s)  ICA/CCA Ratio Normal,  <125  None <40 <2 Mild <50% <125 < than 50% DR stenosis <40 <2 Moderate Disease 50-69% 125-230 > than 50% DR stenosis  2.0-4.0 Severe->70% but less than near occlusion >230 > than 50% DR stenosis >100 >4.0 Near Occlusion May be low or undetectable Visible, extensive Variable Variable Occlusion No Flow Visible with no detectable lumen N/A N/A Plaquetype Description Type 1 Uniformly echolucent Type 2 Predominantly echolucent >50% Type 3 Predominantly echogenic >50% Type 4 Uniformly echogenic Type 5 Unclassified due to poor visualization Ulcer Visible Ulcerative Plaque      US Low Ext Arterial Dop Seg Pres w/o Exercise    Result Date:  7/15/2023  Table formatting from the original result was not included. BILATERAL RESTING ANKLE-BRACHIAL INDICES (RADHA'S) (Date: 07/14/23) Indication: Surveillance RADHA's: PAD. Left Heel Ulcer. S/P IR Left EIA Stent, Left EIA/ PFA/ MAYRA Balloon Angiogram/ Atherectomy done 6/29/2023. Hx: Right SFA Proximal/ Mid Occlusion. Left SFA Proximal/ Mid/ Distal Occlusion ( Pre Angio.)  Decreased lower extremity pulses, lower extremity pain Previous: 6/2/2023 RADHA's ( [facility]) ( Rt: 0.8; Lt: 0.5), 6/20/2023 US Bilateral Leg Arterial Duplex History: Previous Smoker, Hypertension, PAD, Angioplasty, Vascular Surgery, Surgical Follow-up, and Vascular Ulcers  Resting RADHA's          Right: mmHg Index     Brachial: 151  Ankle-(PT): 117 0.77 Ankle-(DP): 121 0.80          Digit: 0 0               Left: mmHg Index     Brachial: 144  Ankle-(PT): 111 0.74 Ankle-(DP): 126 0.83          Digit: 99 0.66 Resting ankle-brachial index of 0.80 on the right. Toe Pressures of 0 mmHg and TBI of 0 Resting ankle-brachial index of 0.83 on the left. Toe Pressures of 99 mmHg and TBI of 0.66  VPR WAVEFORMS: The right volume plethysmography waveforms are mildly abnormal at the lower thigh level, mildly abnormal at the upper calf level and moderately abnormal at the ankle. The left volume plethysmography waveforms are mildly abnormal at the lower thigh level, mildly abnormal at the upper calf level and mildly abnormal at the ankle. Impression:  1. RIGHT LOWER EXTREMITY: RADHA is Abnormal with an RADHA of 0.80 indicating single level disease. Toe Pressures are Un-interpretable and Non-diagnostic. 2. LEFT LOWER EXTREMITY: RADHA is Abnormal with an RADHA of 0.83 indicating single level disease. Toe Pressures are Mildly abnormal but adequate for wound healing with toe pressures of 99 mmHg. Reference: Wound classification Grade RADHA Ankle Systolic Pressure Toe Pressures 0 > 0.80 > 100 mmHg > 60 mmHg 1 0.6 - 0.79 70 - 100 mmHg 40 - 59 mmHg 2 0.4 - 0.59 50-70 mmHg 30 - 39 mmHg 3  < 0.39 < 50 mmHg < 30 mmHg Digit Pressures DBI Disease Category > 0.70 Normal < 0.70 Abnormal > 30 mmHg Potential wound healing < 30 mmHg Impaired wound healing Ankle Brachial Pressures RADHA Disease Category > 1.3  Likely vessel calcification with monophasic waveforms, non-diagnostic 0.95-1.30 Normal with multiphasic waveforms 0.50-0.95 Single level disease 0.30-0.50 Multilevel disease < 0.30 Critical limb ischema Volume Plethysmography Recording (VPR) at all levels Normal Sharp systolic peak, fast upstroke, prominent dicrotic notch in wave Mild Sharp systolic peak, fast upstroke, absent dicrotic notch in wave Moderate Flattened systolic peak, slowed upstroke, absent dicrotic notch inwave Severe amplitude wave with = upslope and down slope Occluded Flat Line      IR Lower Extremity Angiogram Left    Result Date: 6/29/2023  LOCATION: [facility] DATE: 6/29/2023 PROCEDURE: ABDOMINAL AORTIC, PELVIC AND LEFT LOWER EXTREMITY DIAGNOSTIC ARTERIOGRAPHY, ULTRASOUND GUIDANCE FOR VASCULAR ACCESSX2, ATHERECTOMY AND DRUG COATED BALLOON ANGIOPLASTY OF THE LEFT SUPERFICIAL FEMORAL AND ABOVE-KNEE POPLITEAL ARTERY, BALLOON ANGIOPLASTY OF THE ANTERIOR TIBIAL AND DORSALIS PEDIS ARTERIES, BALLOON ANGIOPLASTY AND BALLOON EXPANDABLE COVERED STENT PLACEMENT OF THE LEFT EXTERNAL ILIAC ARTERY, MODERATE SEDATION INTERVENTIONAL RADIOLOGIST: Eulalio Farrar MD INDICATION: 78-year-old male with nonhealing left heel wound/critical limb ischemia, plan for left lower extremity angiography with possible intervention. INDICATION FOR DIAGNOSTIC ANGIOGRAPHY: Diagnostic angiography was required to process identified plaque morphology to aid in evaluation and treatment CONSENT: The risks, benefits and alternatives of the procedure were discussed with the patient  in detail. All questions were answered. Informed consent was given to proceed with the procedure. MODERATE SEDATION: Versed 5 mg IV; Fentanyl 275 mcg IV. During the time  out, immediately prior to the administration of medications, the patient was reassessed for adequacy to receive conscious sedation.  Under physician supervision, Versed and fentanyl were administered for moderate sedation. Pulse oximetry, heart rate and blood pressure were continuously monitored by an independent trained observer. The physician spent 195 minutes of face-to-face sedation time with the patient. CONTRAST: 100 mL Visipaque ANTIBIOTICS: Ancef ADDITIONAL MEDICATIONS: Heparin 18,000 units, protamine 50 mg, verapamil 2.5 mg, nitroglycerin 200 mcg FLUOROSCOPIC TIME: 64.4 minutes. RADIATION DOSE: Air Kerma: 2743 mGy. COMPLICATIONS: No immediate complications. UNIVERSAL PROTOCOL: The operative site was marked and any prior imaging was reviewed. Required items including blood products, implants, devices and special equipment was made available. Patient identity was confirmed either verbally, with demographic information, hospital assigned identification or other identification markers. A timeout was performed immediately prior to the procedure. STERILE BARRIER TECHNIQUE: Maximum sterile barrier technique was used. Cutaneous antisepsis was performed at the operative site with application of 2% chlorhexidine and large sterile drape. Prior to the procedure, the  and assistant performed hand hygiene and wore hat, mask, sterile gown, and sterile gloves during the entire procedure. PROCEDURE:  Access was achieved into the right common femoral artery utilizing real-time ultrasound guidance and micropuncture access kit. Imaging demonstrates a patent and compressible vessel. Permanent images were stored to the patient's medical record. Conversion  was made for a 5 Irish vascular sheath, which was attached to a continuous heparinized saline infusion. A flush catheter was manipulated over a wire into the upper abdomen at the level of the mid abdominal aorta abdominal arteriography was obtained. Imaging  demonstrates patent visualized abdominal aorta and bilateral iliac vasculature. There is high-grade stenosis involving the cranial left external iliac artery. Exchange is made for a Glidewire Advantage wire which was carefully advanced across the  area of stenosis in the left external iliac artery to the left profunda femoral artery. Over-the-wire exchange is made for a 5 mm x 4 cm angioplasty balloon and angioplasty of the left external iliac artery was performed. Exchange was made for the Omni Flush catheter which was positioned in the caudal left external iliac artery. From this location, digital subtraction left lower extremity arteriography was obtained. Imaging demonstrates patent common femoral and profunda femoral arteries. 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 Reconstituted flow in the above-knee popliteal artery at the level of the patella. Three-vessel runoff to the foot with moderate stenosis throughout the anterior tibial artery. Systemic heparinization was performed and monitored with periodic activated clotting times. Over-the-wire exchange is made for a 7 Albanian x 45 cm vascular sheath which was positioned in the left common femoral artery. An angled crossing catheter and Glidewire were enhanced through the sheath and selective catheterization of the left superficial femoral artery stump was performed. The crossing catheter and guidewire were carefully advanced through the occluded vessel to the site of vessel occlusion. There is inability to cross the distal cap of the lesion in the intraluminal plane. Ultrasound imaging of the left dorsalis pedis artery was performed. Imaging demonstrates a patent and compressible vessel. Permanent images were stored to the patient's medical record. A micropuncture needle was then used to access the vessel in a retrograde fashion. The inner 3 Albanian of a micropuncture access kit was used as a sheath. A  exchange length V-18 guidewire which was carefully advanced under fluoroscopic guidance through the anterior tibial artery into the occluded popliteal and superficial femoral arteries. Over-the-wire exchange is made for an 018 crossing catheter which was then advanced over the guidewire via the dorsalis pedis artery access. The guidewire and catheter were then carefully used to cross the occluded superficial femoral artery in a retrograde fashion. Positioning within the true lumen of the left common femoral artery was confirmed by injecting a small volume of contrast. A 5 Kiswahili snare was advanced through the femoral sheath and deployed in the left common femoral artery. The retrograde guidewire was snared and removed through the femoral sheath for through and through access. Utilizing the femoral access a 2.5 mm x 120 mm angioplasty balloon was advanced and angioplasty of the left superficial femoral and above-knee popliteal artery was performed. Angioplasty of the left anterior tibial and dorsalis pedis artery was also performed utilizing the balloon. Utilizing the femoral sheath, a 035 crossing catheter was then exchanged and positioned in the below knee popliteal artery. The guidewire was removed and a Spider FX distal embolic protection filter was deployed in the below knee popliteal artery. A spot  radiograph was performed, confirming appropriate apposition of the filter against the vessel wall. Over-the-wire exchange is made for a Qustreet LX atherectomy catheter. Atherectomy of the entire superficial femoral artery and above-knee popliteal arteries was performed with removal of a large volume of plaque and debris. Post atherectomy angiogram demonstrates excellent result with in-line flow through the vessel. There is residual vessel irregularity throughout. Over-the-wire exchange is made for a 5 mm x 250 mm InPact drug-eluting balloon which was positioned in the above-knee popliteal artery and distal  superficial femoral artery and inflated for 3 minutes to burst pressure. Over-the-wire exchange is made for a 6 mm x 250 mm InPact drug-eluting balloon which was positioned in the proximal/mid superficial femoral artery and inflated for 3 minutes to burst pressure. The distal embolic protection filter was then carefully captured utilizing the retrieval catheter and removed completely. Post atherectomy and drug coated balloon angioplasty angiogram demonstrates excellent result with brisk, in-line flow through the revascularized superficial femoral artery into the popliteal artery. Three-vessel runoff to the foot. Over the wire exchange was then made for an exchange He wire which was positioned in the left superficial femoral artery. The sheath was retracted to the caudal left common iliac artery and a magnified iliac intragram was obtained. Again noted is high-grade stenosis involving the cranial left external iliac artery. Over the wire exchange is made for a 6 mm x 19 mm Viabahn VBX balloon extendable covered stent which was deployed in the area of stenosis involving the left cranial external iliac artery. The stent was deployed to burst pressure. Post and dilatation was performed utilizing a 7 mm x 2 cm angioplasty balloon which was inflated to burst pressure. Post intervention imaging demonstrates excellent result with no significant residual stenosis in the left iliac vasculature. At this point, the procedure was considered complete, protamine sulfate was administered and a repeat activity clotting time was checked. The sheaths were removed and manual pressure applied until hemostasis.     IMPRESSION:  1. Pelvic angiography demonstrates high-grade stenosis involving the cranial left external iliac artery. 2. Left lower extremity angiography demonstrates chronic total occlusion of the entire superficial femoral artery and P1 segment of the popliteal artery with reconstitution of the P2 segment popliteal  artery. There is a three-vessel runoff to the foot with moderate stenosis throughout the anterior tibial artery. 3. Successful retrograde crossing, atherectomy and drug coated balloon inflation in the occluded superficial femoral and popliteal arteries with excellent result. Status post balloon angioplasty of the anterior tibial artery. Successful balloon expandable covered stent placement in the area of high-grade stenosis in the cranial left external iliac artery. Post intervention angiography demonstrates brisk flow through the revascularized superficial femoral and popliteal arteries. There remains a three-vessel runoff to the foot without significant residual stenosis. PLAN: Continue warfarin, Lipitor and aspirin therapy. Plan for a short interval follow-up to evaluate response.     US Lower Extremity Arterial Duplex Bilateral    Result Date: 6/20/2023  Table formatting from the original result was not included. Arterial Duplex Ultrasound (Date: 06/20/23) Lower Extremity Artery Evaluation Indication: Surveillance Bilateral Leg Arterial: PAD. Left Heel Ulcer. Hx: Decrease RADHA's from 6/2/23 at [facility] ( Rt: 0.8; Lt. 0.5) .  Decreased lower extremity pulses, lower extremity pain  Previous: None History: Previous Smoker, Hypertension, PAD, Claudication, and Vascular Ulcers Technique: Duplex imaging is performed utilizing gray-scale, two-dimensional images, and color-flow imaging. Doppler waveform analysis and spectral Doppler imaging is also performed. LOWER EXTREMITY ARTERIAL DUPLEX EXAM WITH WAVEFORMS Right Leg:(cm/s) Location: Velocities Waveforms EIA:   180  M CFA:   106  B PFA:   209  B SFA Proximal:   Occluded   N/A SFA Mid:   Occluded   N/A SFA Distal:   59 (Recan.)   M Popliteal Artery:   40 / 42  B PTA:   32   M MAYRA:   40  M DPA:   35  M Waveforms: T=Triphasic, M=Monophasic, B=Biphasic Left Leg:(cm/s) Location: Velocities Waveforms EIA:   185  M CFA:   163  M PFA:   482 / 113  M SFA Proximal:  Occluded  N/A SFA Mid:   Occluded   N/A SFA Distal:   Occluded   N/A Popliteal Artery:   16 /  33 M PTA:   24   M MAYRA:   15  M DPA:   30  M Waveforms: T=Triphasic, M=Monophasic, B=Biphasic Stenotic Profile Ratio: 482 / 113 = 4.27 Impression: Right Lower Extremity: Monophasic flow in the external iliac artery, suggesting aortoiliac inflow disease.  Occlusion of the proximal/mid superficial femoral artery with reconstituted flow in the distal vessel.  Patent popliteal artery and infrapopliteal vessels without focal stenosis. Left Lower Extremity: Monophasic flow in the external iliac artery, suggesting aortoiliac inflow disease.  Elevation in profunda femoral artery peak systolic velocities, reflecting 50 to 99% stenosis.  Occlusion of the superficial femoral artery with reconstituted flow in the above-knee popliteal artery.  The remaining vasculature is patent with monophasic flow. Reference: Category Normal 1-19% 20-49% 50-99% Occluded PSV <160 cm/sec without spectral broadening <160 cm/sec with spectral broadening Increased Increased Absent Flow Ratio N/A N/A < 2.0 >2.0 N/A Post-Stenotic Turbulence No No No Yes N/A        LABS:      Sodium   Date Value Ref Range Status   05/31/2023 139 136 - 145 mmol/L Final   04/24/2023 137 136 - 145 mmol/L Final   08/24/2022 140 136 - 145 mmol/L Final     Urea Nitrogen   Date Value Ref Range Status   05/31/2023 25.6 (H) 8.0 - 23.0 mg/dL Final   04/24/2023 28.3 (H) 8.0 - 23.0 mg/dL Final   08/24/2022 16.6 8.0 - 23.0 mg/dL Final   08/19/2022 22 8 - 28 mg/dL Final   08/18/2022 21 8 - 28 mg/dL Final   08/17/2022 27 8 - 28 mg/dL Final     Hemoglobin   Date Value Ref Range Status   06/29/2023 12.5 (L) 13.3 - 17.7 g/dL Final   08/24/2022 10.2 (L) 13.3 - 17.7 g/dL Final   08/19/2022 8.8 (L) 13.3 - 17.7 g/dL Final     Platelet Count   Date Value Ref Range Status   06/29/2023 187 150 - 450 10e3/uL Final   08/24/2022 281 150 - 450 10e3/uL Final   08/19/2022 229 150 - 450 10e3/uL Final      BNP   Date Value Ref Range Status   08/17/2022 200 (H) 0 - 80 pg/mL Final   04/19/2022 1,221 (H) 0 - 78 pg/mL Final   04/01/2022 379 (H) 0 - 78 pg/mL Final     INR   Date Value Ref Range Status   06/29/2023 1.05 0.85 - 1.15 Final   08/19/2022 1.89 (H) 0.85 - 1.15 Final   08/18/2022 2.12 (H) 0.85 - 1.15 Final     INR (External)   Date Value Ref Range Status   10/05/2022 3.1 (A) 0.9 - 1.1 Final       This was a in person visit in which 30 minutes of  total time was spent (either in face-to-face or non-face-to-face time).    Eulalio Farrar MD, VI  VASCULAR AND INTERVENTIONAL PHYSICIAN  VASCULAR MEDICINE  INTERNAL MEDICINE  PAGER: [phone]  CALL SERVICE: [phone]

## 2023-07-21 ENCOUNTER — OFFICE VISIT (OUTPATIENT)
Dept: VASCULAR SURGERY | Facility: CLINIC | Age: 78
End: 2023-07-21
Attending: PODIATRIST
Payer: COMMERCIAL

## 2023-07-21 VITALS
SYSTOLIC BLOOD PRESSURE: 138 MMHG | HEART RATE: 66 BPM | TEMPERATURE: 97.9 F | DIASTOLIC BLOOD PRESSURE: 82 MMHG | OXYGEN SATURATION: 97 % | BODY MASS INDEX: 30.34 KG/M2 | HEIGHT: 66 IN

## 2023-07-21 DIAGNOSIS — L97.423: Primary | ICD-10-CM

## 2023-07-21 PROCEDURE — 11042 DBRDMT SUBQ TIS 1ST 20SQCM/<: CPT | Performed by: PODIATRIST

## 2023-07-21 NOTE — PROGRESS NOTES
FOOT AND ANKLE SURGERY/PODIATRY Progress Note      ASSESSMENT: Ulceration left heel      TREATMENT:  -I discussed with the patient and his wife today that the left heel ulceration is measuring significantly smaller than the previous visit.  I recommend we continue to apply Medihoney and continue with nonweightbearing on the left foot.    -After discussion of risk factors, nursing staff removed dressing, cleansed wound and consent obtained 2% Lidocaine HCL jelly was applied, under clean conditions, the left heel ulceration(s) were debrided using #15 blade scalpel.  Devitalized and nonviable tissue, along with any fibrin and slough, was removed to improve granulation tissue formation, stimulate wound healing, decrease overall bacteria load, disrupt biofilm formation and decrease edge senescence. Wound drainage was scant No. Total excisional debridement was 0.04 sq cm into the subcutaneous tissue with a depth of 0.2 cm.   Ulcers were improved afterwards and .  Measures were as noted on the flow sheet. Medi-honey with a gauze dressing was applied. He will continue to apply Medi-honey with a gauze dressing qoday.    -He will follow-up in 3 to 4 weeks    Nehemiah Brown DPM  [facility]      HPI: Shaji Pompa was seen again today for left heel ulceration.  Patient reports he is remained nonweightbearing and has used Medihoney as directed.    Past Medical History:   Diagnosis Date     Cerebral infarction (H)      COPD (chronic obstructive pulmonary disease) (H)      HLD (hyperlipidemia)      Hypertension      Myeloproliferative disease (H)        Past Surgical History:   Procedure Laterality Date     CV CORONARY ANGIOGRAM N/A 4/26/2022    Procedure: CV CORONARY ANGIOGRAM;  Surgeon: Audrey Holder MD;  Location: [facility] CV     CV LEFT HEART CATH N/A 4/26/2022    Procedure: Left Heart Catheterization;  Surgeon: Audrey Holder MD;  Location: [facility] CV     IR LOWER  "EXTREMITY ANGIOGRAM LEFT  6/29/2023     OPEN REDUCTION INTERNAL FIXATION FOOT Right 2010     OTHER SURGICAL HISTORY  2001    Lip lesion removal     PICC TRIPLE LUMEN PLACEMENT  8/17/2022          TONSILLECTOMY & ADENOIDECTOMY         No Known Allergies      Current Outpatient Medications:      albuterol (PROAIR HFA;PROVENTIL HFA;VENTOLIN HFA) 90 mcg/actuation inhaler, Inhale 2 puffs into the lungs every 6 hours as needed for shortness of breath / dyspnea, Disp: , Rfl:      ASPIRIN LOW DOSE 81 MG EC tablet, TAKE 1 TABLET (81 MG) BY MOUTH DAILY, START THE MORNING OF 4/27/22, Disp: 90 tablet, Rfl: 3     atorvastatin (LIPITOR) 80 MG tablet, Take 1 tablet (80 mg) by mouth every morning, Disp: 90 tablet, Rfl: 1     furosemide (LASIX) 20 MG tablet, Take 1/2 TABLET BY mouth daily, Disp: 45 tablet, Rfl: 3     hydroxyurea (HYDREA) 500 MG capsule, Take 1,000-1,500 mg by mouth See Admin Instructions 1000 mg daily- Mon, Wed, & Fri. 1500 mg daily - Tue, Thurs, Sat, & Sun, Disp: , Rfl:      losartan (COZAAR) 25 MG tablet, Take 0.5 tablets (12.5 mg) by mouth daily, Disp: 45 tablet, Rfl: 3     metoprolol succinate ER (TOPROL-XL) 25 MG 24 hr tablet, Take 25 mg by mouth daily, Disp: , Rfl:      nitroGLYcerin (NITROSTAT) 0.4 MG sublingual tablet, One tablet under the tongue every 5 minutes if needed for chest pain. May repeat every 5 minutes for a maximum of 3 doses in 15 minutes\", Disp: 25 tablet, Rfl: 3     omeprazole (PRILOSEC) 20 MG DR capsule, Take 20 mg by mouth daily, Disp: , Rfl:      tiotropium (SPIRIVA) 18 mcg inhalation capsule, [TIOTROPIUM (SPIRIVA) 18 MCG INHALATION CAPSULE] Place 18 mcg into inhaler and inhale daily., Disp: , Rfl:      warfarin ANTICOAGULANT (COUMADIN) 1 MG tablet, Take 3 tablets (3 mg) by mouth daily, Disp: , Rfl:      fish oil-omega-3 fatty acids 1000 MG capsule, Omega-3 Fatty Acids Oral    1 daily    inactive, Disp: , Rfl:     Review of Systems - 10 point Review of Systems is negative except for left " "heel ulcer which is noted in HPI.      OBJECTIVE:  /82   Pulse 66   Temp 97.9  F (36.6  C)   Ht 5' 6\" (1.676 m)   SpO2 97%   BMI 30.34 kg/m    General appearance: Patient is alert and fully cooperative with history & exam.  No sign of distress is noted during the visit.    Vascular: Dorsalis pedis palpableLeft.  Dermatologic:    VASC Wound Left ankle (Active)   Pre Size Length 0 07/18/23 0900   Pre Size Width 0 07/18/23 0900   Pre Size Depth 0 07/18/23 0900   Pre Total Sq cm 0 07/18/23 0900   Description Wound appears closed 07/18/23 0900   Product Used Negative pressure 07/07/23 0700       VASC Wound left heel (Active)   Pre Size Length 0.2 07/07/23 0700   Pre Size Width 0.2 07/07/23 0700   Pre Size Depth 0.2 07/07/23 0700   Pre Total Sq cm 0.04 07/07/23 0700   Post Size Length 0.2 07/21/23 0800   Post Size Width 0.2 07/21/23 0800   Post Size Depth 0.2 07/21/23 0800   Post Total Sq cm 0.04 07/21/23 0800   Mixed granular/fibrotic tissue posterior left heel ulceration, no erythema.  Neurologic: Diminished to light touch Left.  Musculoskeletal: Contracted digits noted Left.    Imaging:     US Carotid Bilateral    Result Date: 7/15/2023  Table formatting from the original result was not included. BILATERAL CAROTID ULTRASOUND (Date: 07/14/23) Indication: Surveillance Bilateral Carotid Bruit Previous: None Symptoms: Bruit, Hypertension, and Previous Smoker TECHNIQUE: Duplex exam performed utilizing 2D gray-scale imaging, Doppler interrogation with color-flow and spectral waveform analysis. Right Velocity  Chart (cm/sec) Location Right DISTAL CCA-PS 80 DISTAL CCA- ED 11 PROX ICA- PROX ICA-ED 57 ECA- ECA-ED 28 Vertebral- Antegrade 71 Subclavian A- Biphasic 101 Ratio ICA/CCA-PS 2.99 Ratio ICA/CCA-ED 5.18 Left Velocity  Chart (cm/sec) Location Left DISTAL CCA-PS 84 DISTAL CCA- ED 13 PROX ICA-PS 64 PROX ICA-ED 5 MID ICA- MID ICA-ED 34 ECA- ECA-ED 13 Vertebral- Antegrade 53 Subclavian A- " Biphasic 158 Ratio ICA/CCA-PS 0.76 Ratio ICA/CCA-ED 0.38 FINDINGS: RIGHT: There is predominantly echogenic  atheromatous plaque.  LEFT: There is predominantly echogenic atheromatous plaque.  RIGHT: Vertebral artery and subclavian artery waveforms are antegrade. LEFT: Vertebral artery and subclavian artery waveforms are antegrade. Impression:  1. 50-69% diameter stenosis of the right ICA relative to the proximal ICA diameter. 2. 50-69% diameter stenosis of the left ICA relative to the proximal ICA diameter. Evaluation based on velocities and NASCET criteria Category PSV (cm/s)  Plaque Imaging EDV (cm/s)  ICA/CCA Ratio Normal,  <125  None <40 <2 Mild <50% <125 < than 50% DR stenosis <40 <2 Moderate Disease 50-69% 125-230 > than 50% DR stenosis  2.0-4.0 Severe->70% but less than near occlusion >230 > than 50% DR stenosis >100 >4.0 Near Occlusion May be low or undetectable Visible, extensive Variable Variable Occlusion No Flow Visible with no detectable lumen N/A N/A Plaquetype Description Type 1 Uniformly echolucent Type 2 Predominantly echolucent >50% Type 3 Predominantly echogenic >50% Type 4 Uniformly echogenic Type 5 Unclassified due to poor visualization Ulcer Visible Ulcerative Plaque      US Low Ext Arterial Dop Seg Pres w/o Exercise    Result Date: 7/15/2023  Table formatting from the original result was not included. BILATERAL RESTING ANKLE-BRACHIAL INDICES (RADHA'S) (Date: 07/14/23) Indication: Surveillance RADHA's: PAD. Left Heel Ulcer. S/P IR Left EIA Stent, Left EIA/ PFA/ MAYRA Balloon Angiogram/ Atherectomy done 6/29/2023. Hx: Right SFA Proximal/ Mid Occlusion. Left SFA Proximal/ Mid/ Distal Occlusion ( Pre Angio.)  Decreased lower extremity pulses, lower extremity pain Previous: 6/2/2023 RADHA's ( Rayus) ( Rt: 0.8; Lt: 0.5), 6/20/2023 US Bilateral Leg Arterial Duplex History: Previous Smoker, Hypertension, PAD, Angioplasty, Vascular Surgery, Surgical Follow-up, and Vascular Ulcers  Resting RADHA's           Right: mmHg Index     Brachial: 151  Ankle-(PT): 117 0.77 Ankle-(DP): 121 0.80          Digit: 0 0               Left: mmHg Index     Brachial: 144  Ankle-(PT): 111 0.74 Ankle-(DP): 126 0.83          Digit: 99 0.66 Resting ankle-brachial index of 0.80 on the right. Toe Pressures of 0 mmHg and TBI of 0 Resting ankle-brachial index of 0.83 on the left. Toe Pressures of 99 mmHg and TBI of 0.66  VPR WAVEFORMS: The right volume plethysmography waveforms are mildly abnormal at the lower thigh level, mildly abnormal at the upper calf level and moderately abnormal at the ankle. The left volume plethysmography waveforms are mildly abnormal at the lower thigh level, mildly abnormal at the upper calf level and mildly abnormal at the ankle. Impression:  1. RIGHT LOWER EXTREMITY: RADHA is Abnormal with an RADHA of 0.80 indicating single level disease. Toe Pressures are Un-interpretable and Non-diagnostic. 2. LEFT LOWER EXTREMITY: RADHA is Abnormal with an RADHA of 0.83 indicating single level disease. Toe Pressures are Mildly abnormal but adequate for wound healing with toe pressures of 99 mmHg. Reference: Wound classification Grade RADHA Ankle Systolic Pressure Toe Pressures 0 > 0.80 > 100 mmHg > 60 mmHg 1 0.6 - 0.79 70 - 100 mmHg 40 - 59 mmHg 2 0.4 - 0.59 50-70 mmHg 30 - 39 mmHg 3 < 0.39 < 50 mmHg < 30 mmHg Digit Pressures DBI Disease Category > 0.70 Normal < 0.70 Abnormal > 30 mmHg Potential wound healing < 30 mmHg Impaired wound healing Ankle Brachial Pressures RADHA Disease Category > 1.3  Likely vessel calcification with monophasic waveforms, non-diagnostic 0.95-1.30 Normal with multiphasic waveforms 0.50-0.95 Single level disease 0.30-0.50 Multilevel disease < 0.30 Critical limb ischema Volume Plethysmography Recording (VPR) at all levels Normal Sharp systolic peak, fast upstroke, prominent dicrotic notch in wave Mild Sharp systolic peak, fast upstroke, absent dicrotic notch in wave Moderate Flattened systolic peak, slowed upstroke,  absent dicrotic notch inwave Severe amplitude wave with = upslope and down slope Occluded Flat Line      IR Lower Extremity Angiogram Left    Result Date: 6/29/2023  LOCATION: [facility] DATE: 6/29/2023 PROCEDURE: ABDOMINAL AORTIC, PELVIC AND LEFT LOWER EXTREMITY DIAGNOSTIC ARTERIOGRAPHY, ULTRASOUND GUIDANCE FOR VASCULAR ACCESSX2, ATHERECTOMY AND DRUG COATED BALLOON ANGIOPLASTY OF THE LEFT SUPERFICIAL FEMORAL AND ABOVE-KNEE POPLITEAL ARTERY, BALLOON ANGIOPLASTY OF THE ANTERIOR TIBIAL AND DORSALIS PEDIS ARTERIES, BALLOON ANGIOPLASTY AND BALLOON EXPANDABLE COVERED STENT PLACEMENT OF THE LEFT EXTERNAL ILIAC ARTERY, MODERATE SEDATION INTERVENTIONAL RADIOLOGIST: Eulalio Farrar MD INDICATION: 78-year-old male with nonhealing left heel wound/critical limb ischemia, plan for left lower extremity angiography with possible intervention. INDICATION FOR DIAGNOSTIC ANGIOGRAPHY: Diagnostic angiography was required to process identified plaque morphology to aid in evaluation and treatment CONSENT: The risks, benefits and alternatives of the procedure were discussed with the patient  in detail. All questions were answered. Informed consent was given to proceed with the procedure. MODERATE SEDATION: Versed 5 mg IV; Fentanyl 275 mcg IV. During the time out, immediately prior to the administration of medications, the patient was reassessed for adequacy to receive conscious sedation.  Under physician supervision, Versed and fentanyl were administered for moderate sedation. Pulse oximetry, heart rate and blood pressure were continuously monitored by an independent trained observer. The physician spent 195 minutes of face-to-face sedation time with the patient. CONTRAST: 100 mL Visipaque ANTIBIOTICS: Ancef ADDITIONAL MEDICATIONS: Heparin 18,000 units, protamine 50 mg, verapamil 2.5 mg, nitroglycerin 200 mcg FLUOROSCOPIC TIME: 64.4 minutes. RADIATION DOSE: Air Kerma: 2743 mGy. COMPLICATIONS: No immediate  complications. UNIVERSAL PROTOCOL: The operative site was marked and any prior imaging was reviewed. Required items including blood products, implants, devices and special equipment was made available. Patient identity was confirmed either verbally, with demographic information, hospital assigned identification or other identification markers. A timeout was performed immediately prior to the procedure. STERILE BARRIER TECHNIQUE: Maximum sterile barrier technique was used. Cutaneous antisepsis was performed at the operative site with application of 2% chlorhexidine and large sterile drape. Prior to the procedure, the  and assistant performed hand hygiene and wore hat, mask, sterile gown, and sterile gloves during the entire procedure. PROCEDURE:  Access was achieved into the right common femoral artery utilizing real-time ultrasound guidance and micropuncture access kit. Imaging demonstrates a patent and compressible vessel. Permanent images were stored to the patient's medical record. Conversion  was made for a 5 Libyan vascular sheath, which was attached to a continuous heparinized saline infusion. A flush catheter was manipulated over a wire into the upper abdomen at the level of the mid abdominal aorta abdominal arteriography was obtained. Imaging demonstrates patent visualized abdominal aorta and bilateral iliac vasculature. There is high-grade stenosis involving the cranial left external iliac artery. Exchange is made for a Glidewire Advantage wire which was carefully advanced across the  area of stenosis in the left external iliac artery to the left profunda femoral artery. Over-the-wire exchange is made for a 5 mm x 4 cm angioplasty balloon and angioplasty of the left external iliac artery was performed. Exchange was made for the Omni Flush catheter which was positioned in the caudal left external iliac artery. From this location, digital subtraction left lower extremity arteriography was obtained.  Imaging demonstrates patent common femoral and profunda femoral arteries. 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 Reconstituted flow in the above-knee popliteal artery at the level of the patella. Three-vessel runoff to the foot with moderate stenosis throughout the anterior tibial artery. Systemic heparinization was performed and monitored with periodic activated clotting times. Over-the-wire exchange is made for a 7 Panamanian x 45 cm vascular sheath which was positioned in the left common femoral artery. An angled crossing catheter and Glidewire were enhanced through the sheath and selective catheterization of the left superficial femoral artery stump was performed. The crossing catheter and guidewire were carefully advanced through the occluded vessel to the site of vessel occlusion. There is inability to cross the distal cap of the lesion in the intraluminal plane. Ultrasound imaging of the left dorsalis pedis artery was performed. Imaging demonstrates a patent and compressible vessel. Permanent images were stored to the patient's medical record. A micropuncture needle was then used to access the vessel in a retrograde fashion. The inner 3 Panamanian of a micropuncture access kit was used as a sheath. A exchange length V-18 guidewire which was carefully advanced under fluoroscopic guidance through the anterior tibial artery into the occluded popliteal and superficial femoral arteries. Over-the-wire exchange is made for an 018 crossing catheter which was then advanced over the guidewire via the dorsalis pedis artery access. The guidewire and catheter were then carefully used to cross the occluded superficial femoral artery in a retrograde fashion. Positioning within the true lumen of the left common femoral artery was confirmed by injecting a small volume of contrast. A 5 Panamanian snare was advanced through the femoral sheath and deployed in the left common femoral  artery. The retrograde guidewire was snared and removed through the femoral sheath for through and through access. Utilizing the femoral access a 2.5 mm x 120 mm angioplasty balloon was advanced and angioplasty of the left superficial femoral and above-knee popliteal artery was performed. Angioplasty of the left anterior tibial and dorsalis pedis artery was also performed utilizing the balloon. Utilizing the femoral sheath, a 035 crossing catheter was then exchanged and positioned in the below knee popliteal artery. The guidewire was removed and a Spider FX distal embolic protection filter was deployed in the below knee popliteal artery. A spot  radiograph was performed, confirming appropriate apposition of the filter against the vessel wall. Over-the-wire exchange is made for a Seedfuse LX atherectomy catheter. Atherectomy of the entire superficial femoral artery and above-knee popliteal arteries was performed with removal of a large volume of plaque and debris. Post atherectomy angiogram demonstrates excellent result with in-line flow through the vessel. There is residual vessel irregularity throughout. Over-the-wire exchange is made for a 5 mm x 250 mm InPact drug-eluting balloon which was positioned in the above-knee popliteal artery and distal superficial femoral artery and inflated for 3 minutes to burst pressure. Over-the-wire exchange is made for a 6 mm x 250 mm InPact drug-eluting balloon which was positioned in the proximal/mid superficial femoral artery and inflated for 3 minutes to burst pressure. The distal embolic protection filter was then carefully captured utilizing the retrieval catheter and removed completely. Post atherectomy and drug coated balloon angioplasty angiogram demonstrates excellent result with brisk, in-line flow through the revascularized superficial femoral artery into the popliteal artery. Three-vessel runoff to the foot. Over the wire exchange was then made for an exchange He  wire which was positioned in the left superficial femoral artery. The sheath was retracted to the caudal left common iliac artery and a magnified iliac intragram was obtained. Again noted is high-grade stenosis involving the cranial left external iliac artery. Over the wire exchange is made for a 6 mm x 19 mm Viabahn VBX balloon extendable covered stent which was deployed in the area of stenosis involving the left cranial external iliac artery. The stent was deployed to burst pressure. Post and dilatation was performed utilizing a 7 mm x 2 cm angioplasty balloon which was inflated to burst pressure. Post intervention imaging demonstrates excellent result with no significant residual stenosis in the left iliac vasculature. At this point, the procedure was considered complete, protamine sulfate was administered and a repeat activity clotting time was checked. The sheaths were removed and manual pressure applied until hemostasis.     IMPRESSION:  1. Pelvic angiography demonstrates high-grade stenosis involving the cranial left external iliac artery. 2. 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 There is a three-vessel runoff to the foot with moderate stenosis throughout the anterior tibial artery. 3. Successful retrograde crossing, atherectomy and drug coated balloon inflation in the occluded superficial femoral and popliteal arteries with excellent result. Status post balloon angioplasty of the anterior tibial artery. Successful balloon expandable covered stent placement in the area of high-grade stenosis in the cranial left external iliac artery. Post intervention angiography demonstrates brisk flow through the revascularized superficial femoral and popliteal arteries. There remains a three-vessel runoff to the foot without significant residual stenosis.  PLAN: Continue warfarin, Lipitor and aspirin therapy. Plan for a short interval follow-up to evaluate response.          Picture:

## 2023-07-21 NOTE — PATIENT INSTRUCTIONS
"  Important lnstructions      WEIGHT BEARING STATUS: You are to remain NON WEIGHT BEARING on your left foot. NON WEIGHT BEARING MEANS NO PRESSURE ON YOUR FOOT OR HEEL AT ANY TIME FOR ANY REASON!    2. OFFLOADING DEVICE: Must use a A ROLLING KNEE WALKER and A WHEELCHAIR at all times! (do not use affected foot to push wheelchair)    3. STABILIZATION DEVICE: Use a CAM BOOT . You will need to WEAR THIS ANYTIME YOU ARE UP AND OUT OF BED, IT IS OKAY TO REMOVE WHEN YOU ARE SLEEPING..     4. ELEVATE: Elevating your leg means laying with your head on a pillow and your foot ABOVE YOUR HEART.     5. DO NOT MOVE YOUR FOOT.  There is a risk of worsening the wound or incision. To give yourself a higher chance of healing, please DO NOT swing foot back and forth and wiggle foot/toes especially when inside a stabilization device. Limited movement is allowable with therapy as recommended by the doctor.     6. TAKE A PROTEIN SHAKE TWICE A DAY.  (For ex: Boost, Ensure, Glucerna)      Dressing Change lnstructions        EVERY OTHER DAY and as needed, Cleanse your left wound(s) with Normal saline.    Pat Dry with non-sterile gauze    Primary Dressing: Apply Medihoney or Manuka honey into/onto the wounds    Secondary dressing: Cover with dry gauze    Secure with non-sterile roll gauze (4\" x 75\" roll) and tape (1\" roll tape) as needed; avoid adhesive directly on the skin     It IS NOT ok to get your wound wet in the bath or shower    SEEK MEDICAL CARE IF:  You have an increase in swelling, pain, or redness around the wound.  You have an increase in the amount of pus coming from the wound.  There is a bad smell coming from the wound.  The wound appears to be worsening/enlarging  You have a fever greater than 101.5 F      It is ok to continue current wound care treatment/products for the next 2-3 days until new wound care supplies are ordered and arrive. If longer than this please contact our office at [phone].        We want to hear " from you!   In the next few weeks, you should receive a call or email to complete a survey about your visit at [facility]. Please help us improve your appointment experience by letting us know how we did today. We strive to make your experience good and value any ways in which we could do better.      We value your input and suggestions.    Thank you for choosing the [facility]!

## 2023-08-08 ENCOUNTER — ANCILLARY PROCEDURE (OUTPATIENT)
Dept: VASCULAR ULTRASOUND | Facility: CLINIC | Age: 78
End: 2023-08-08
Attending: RADIOLOGY
Payer: COMMERCIAL

## 2023-08-08 ENCOUNTER — OFFICE VISIT (OUTPATIENT)
Dept: VASCULAR SURGERY | Facility: CLINIC | Age: 78
End: 2023-08-08
Attending: RADIOLOGY
Payer: COMMERCIAL

## 2023-08-08 VITALS
SYSTOLIC BLOOD PRESSURE: 124 MMHG | DIASTOLIC BLOOD PRESSURE: 65 MMHG | HEART RATE: 56 BPM | RESPIRATION RATE: 12 BRPM | OXYGEN SATURATION: 98 %

## 2023-08-08 DIAGNOSIS — I73.9 PAD (PERIPHERAL ARTERY DISEASE) (H): ICD-10-CM

## 2023-08-08 DIAGNOSIS — I70.244 ATHEROSCLEROSIS OF NATIVE ARTERY OF LEFT LOWER EXTREMITY WITH ULCERATION OF HEEL (H): Primary | ICD-10-CM

## 2023-08-08 DIAGNOSIS — L97.423: ICD-10-CM

## 2023-08-08 DIAGNOSIS — I70.244 ATHEROSCLEROSIS OF NATIVE ARTERY OF LEFT LOWER EXTREMITY WITH ULCERATION OF HEEL (H): ICD-10-CM

## 2023-08-08 DIAGNOSIS — I73.9 PERIPHERAL VASCULAR DISEASE, UNSPECIFIED (H): ICD-10-CM

## 2023-08-08 PROCEDURE — G0463 HOSPITAL OUTPT CLINIC VISIT: HCPCS | Mod: 25

## 2023-08-08 PROCEDURE — 93923 UPR/LXTR ART STDY 3+ LVLS: CPT

## 2023-08-08 PROCEDURE — 93926 LOWER EXTREMITY STUDY: CPT | Mod: LT

## 2023-08-08 ASSESSMENT — PAIN SCALES - GENERAL: PAINLEVEL: NO PAIN (0)

## 2023-08-08 NOTE — PROGRESS NOTES
VASCULAR AND INTERVENTIONAL OUTPATIENT CONSULT OR VISIT  PHYSICIAN: Eulalio Farrar MD  ESTABLISHED PATIENT    LOCATION: [facility]    Shaji Pompa   Medical Record #:  [id number]  YOB: 1945  Age:  78 year old     Date of Service: 8/8/2023    PRIMARY CARE PROVIDER: Steven Caraballo    Reason for visit: Nonhealing left heel wound/critical limb ischemia     IMPRESSION: 78-year-old male with a nonhealing left heel wound in setting of peripheral vascular disease.  Preintervention noninvasive imaging demonstrated occlusion of the left superficial artery with reconstituted flow in the above-knee popliteal artery.  The patient underwent successful revascularization of his left lower extremity on 6/29/2023.  Noninvasive imaging performed today demonstrates an ankle-brachial index of 1.0 on the left with a digit pressure of 85 mmHg, indicating adequate wound healing potential.  The patient reports near complete healing of his heel wound and there is significant improvement when compared to prior imaging of the wound in the chart.     RECOMMENDATION:    1.  Guideline recommended medical therapy for peripheral arterial disease (Janay category 5)  -The patient is on full dose anticoagulation with warfarin plus low-dose aspirin  -Continue high intensity statin therapy with Lipitor 80 mg once daily  -Continue smoking cessation     Continue current wound care and medical management as noted above.  The patient is noted to have a digital pressure of 50 mmHg on the right and was instructed to closely monitor both feet for the development of any wounds or ulcerations.  Plan for 6-month follow-up or sooner should the need arise.     HPI:  Shaji Pompa is a 78 year old male who was evaluated today for peripheral vascular disease in the setting of a nonhealing left heel ulcer.  The patient reports he initially developed the ulcer in February of this year while he was in [place].  He  seen by podiatrist who advised local wound care as well as offloading utilizing a horseshoe insert.  The patient underwent a left lower extremity angiogram with revascularization on 6/29/2023.  His perioperative course was unremarkable.  The patient reports near complete healing of his heel wound.  He denies any prior history of extremity ulcerations.  He is a former smoker.  He has a history of coronary artery disease.    PHH:    Past Medical History:   Diagnosis Date    Cerebral infarction (H)     COPD (chronic obstructive pulmonary disease) (H)     HLD (hyperlipidemia)     Hypertension     Myeloproliferative disease (H)         Past Surgical History:   Procedure Laterality Date    CV CORONARY ANGIOGRAM N/A 4/26/2022    Procedure: CV CORONARY ANGIOGRAM;  Surgeon: Audrey Holder MD;  Location: [facility] CATH LAB CV    CV LEFT HEART CATH N/A 4/26/2022    Procedure: Left Heart Catheterization;  Surgeon: Audrey Holder MD;  Location: [facility] CATH LAB CV    IR LOWER EXTREMITY ANGIOGRAM LEFT  6/29/2023    OPEN REDUCTION INTERNAL FIXATION FOOT Right 2010    OTHER SURGICAL HISTORY  2001    Lip lesion removal    PICC TRIPLE LUMEN PLACEMENT  8/17/2022         TONSILLECTOMY & ADENOIDECTOMY         ALLERGIES:  Patient has no known allergies.    MEDS:    Current Outpatient Medications:     albuterol (PROAIR HFA;PROVENTIL HFA;VENTOLIN HFA) 90 mcg/actuation inhaler, Inhale 2 puffs into the lungs every 6 hours as needed for shortness of breath / dyspnea, Disp: , Rfl:     ASPIRIN LOW DOSE 81 MG EC tablet, TAKE 1 TABLET (81 MG) BY MOUTH DAILY, START THE MORNING OF 4/27/22, Disp: 90 tablet, Rfl: 3    atorvastatin (LIPITOR) 80 MG tablet, Take 1 tablet (80 mg) by mouth every morning, Disp: 90 tablet, Rfl: 1    furosemide (LASIX) 20 MG tablet, Take 1/2 TABLET BY mouth daily, Disp: 45 tablet, Rfl: 3    hydroxyurea (HYDREA) 500 MG capsule, Take 1,000-1,500 mg by mouth See Admin Instructions 1000 mg daily- Mon, Wed, & Fri. 1500 mg  "daily - Tue, Thurs, Sat, & Sun, Disp: , Rfl:     losartan (COZAAR) 25 MG tablet, Take 0.5 tablets (12.5 mg) by mouth daily, Disp: 45 tablet, Rfl: 3    metoprolol succinate ER (TOPROL-XL) 25 MG 24 hr tablet, Take 25 mg by mouth daily, Disp: , Rfl:     nitroGLYcerin (NITROSTAT) 0.4 MG sublingual tablet, One tablet under the tongue every 5 minutes if needed for chest pain. May repeat every 5 minutes for a maximum of 3 doses in 15 minutes\", Disp: 25 tablet, Rfl: 3    omeprazole (PRILOSEC) 20 MG DR capsule, Take 20 mg by mouth daily, Disp: , Rfl:     tiotropium (SPIRIVA) 18 mcg inhalation capsule, [TIOTROPIUM (SPIRIVA) 18 MCG INHALATION CAPSULE] Place 18 mcg into inhaler and inhale daily., Disp: , Rfl:     warfarin ANTICOAGULANT (COUMADIN) 1 MG tablet, Take 3 tablets (3 mg) by mouth daily, Disp: , Rfl:     fish oil-omega-3 fatty acids 1000 MG capsule, Omega-3 Fatty Acids Oral    1 daily    inactive, Disp: , Rfl:     SOCIAL HABITS:    History   Smoking Status    Former    Packs/day: 1.00    Types: Cigarettes    Start date: 6/8/1965    Quit date: 1/1/2012   Smokeless Tobacco    Never     Social History    Substance and Sexual Activity      Alcohol use: Yes        Alcohol/week: 19.0 standard drinks of alcohol      History   Drug Use Unknown       FAMILY HISTORY:    Family History   Problem Relation Age of Onset    Alcoholism Mother        ADVANCE CARE DIRECTIVES:    Advance care directives reviewed in the chart and no changes made.     PE:  /65   Pulse 56   Resp 12   SpO2 98%   Wt Readings from Last 1 Encounters:   06/29/23 188 lb (85.3 kg)     There is no height or weight on file to calculate BMI.    EXAM:  GENERAL: This is a well-developed 78 year old male who appears his stated age  EYES: Grossly normal.  MOUTH: Buccal mucosa normal   MUSCULOSKELETAL: Grossly normal and both lower extremities are intact.  HEME/LYMPH: No lymphedema  NEUROLOGIC: Focally intact, Alert and oriented x 3.   PSYCH: appropriate " affect  INTEGUMENT: Near complete healing of the left heel wound.    DIAGNOSTIC STUDIES:     Images:  US Lower Extremity Arterial Duplex Left    Result Date: 7/26/2023  Table formatting from the original result was not included. Arterial Duplex Ultrasound (Date: 07/14/23) Lower Extremity Artery Evaluation Indication: Surveillance Left Arterial Leg: PAD. Left Heel Ulcer. S/P IR Left EIA Stent, Left EIA/ PFA/ MAYRA Balloon Angiogram/ Atherectomy done 6/29/2023. Hx: Right SFA Proximal/ Mid Occlusion. Left SFA Proximal/ Mid/ Distal Occlusion ( Pre Angio.)  Previous: 6/2/2023 RADHA's ( Rayus) ( Rt: 0.8; Lt: 0.5), 6/20/2023 US Bilateral Leg Arterial Duplex History: Previous Smoker, Hypertension, PAD, Angioplasty, Vascular Surgery, Surgical Follow-up, and Vascular Ulcers Technique: Duplex imaging is performed utilizing gray-scale, two-dimensional images, and color-flow imaging. Doppler waveform analysis and spectral Doppler imaging is also performed. LOWER EXTREMITY ARTERIAL DUPLEX EXAM WITH WAVEFORMS Left Leg:(cm/s) Location: Velocities Waveforms EIA: (Stent)    135 /  115 M CFA:   153  M PFA:   440 /  99 M SFA Proximal:   220 / 65  M SFA Mid:   147 /  124 M SFA Distal:   119 / 88 / 92 M Popliteal Artery:   102 /  89 M PTA:   51   M MAYRA:   45  M DPA:   43  M Waveforms: T=Triphasic, M=Monophasic, B=Biphasic Stenotic Profile Ratio: 440 / 153 = 2.88 Right  Leg:(cm/s) Location: Velocities Waveforms PTA:   37   M MAYRA:   28  M DPA:   23  M Waveforms: T=Triphasic, M=Monophasic, B=Biphasic Comments: Unable to see proximal stent.  Impression: Right Lower Extremity: Not fully evaluated but monophasic flow in tibial vessels suggest more proximal disease Left Lower Extremity: Monophasic flow is still noted in the common femoral artery but with sharp upstroke suggesting underlying arterial disease but without hemodynamic significance.  Highly elevated velocities in profunda femoris suggesting hemodynamically significant disease.   Monophasic flow remains throughout the entire left lower extremity with moderately elevated velocities throughout the entire SFA suggesting underlying arterial disease as well. Reference: Category Normal 1-19% 20-49% 50-99% Occluded PSV <160 cm/sec without spectral broadening <160 cm/sec with spectral broadening Increased Increased Absent Flow Ratio N/A N/A < 2.0 >2.0 N/A Post-Stenotic Turbulence No No No Yes N/A      US Carotid Bilateral    Result Date: 7/15/2023  Table formatting from the original result was not included. BILATERAL CAROTID ULTRASOUND (Date: 07/14/23) Indication: Surveillance Bilateral Carotid Bruit Previous: None Symptoms: Bruit, Hypertension, and Previous Smoker TECHNIQUE: Duplex exam performed utilizing 2D gray-scale imaging, Doppler interrogation with color-flow and spectral waveform analysis. Right Velocity  Chart (cm/sec) Location Right DISTAL CCA-PS 80 DISTAL CCA- ED 11 PROX ICA- PROX ICA-ED 57 ECA- ECA-ED 28 Vertebral- Antegrade 71 Subclavian A- Biphasic 101 Ratio ICA/CCA-PS 2.99 Ratio ICA/CCA-ED 5.18 Left Velocity  Chart (cm/sec) Location Left DISTAL CCA-PS 84 DISTAL CCA- ED 13 PROX ICA-PS 64 PROX ICA-ED 5 MID ICA- MID ICA-ED 34 ECA- ECA-ED 13 Vertebral- Antegrade 53 Subclavian A- Biphasic 158 Ratio ICA/CCA-PS 0.76 Ratio ICA/CCA-ED 0.38 FINDINGS: RIGHT: There is predominantly echogenic  atheromatous plaque.  LEFT: There is predominantly echogenic atheromatous plaque.  RIGHT: Vertebral artery and subclavian artery waveforms are antegrade. LEFT: Vertebral artery and subclavian artery waveforms are antegrade. Impression:  1. 50-69% diameter stenosis of the right ICA relative to the proximal ICA diameter. 2. 50-69% diameter stenosis of the left ICA relative to the proximal ICA diameter. Evaluation based on velocities and NASCET criteria Category PSV (cm/s)  Plaque Imaging EDV (cm/s)  ICA/CCA Ratio Normal,  <125  None <40 <2 Mild <50% <125 < than 50% DR stenosis <40 <2  Moderate Disease 50-69% 125-230 > than 50% DR stenosis  2.0-4.0 Severe->70% but less than near occlusion >230 > than 50% DR stenosis >100 >4.0 Near Occlusion May be low or undetectable Visible, extensive Variable Variable Occlusion No Flow Visible with no detectable lumen N/A N/A Plaquetype Description Type 1 Uniformly echolucent Type 2 Predominantly echolucent >50% Type 3 Predominantly echogenic >50% Type 4 Uniformly echogenic Type 5 Unclassified due to poor visualization Ulcer Visible Ulcerative Plaque      US Low Ext Arterial Dop Seg Pres w/o Exercise    Result Date: 7/15/2023  Table formatting from the original result was not included. BILATERAL RESTING ANKLE-BRACHIAL INDICES (RADHA'S) (Date: 07/14/23) Indication: Surveillance RADHA's: PAD. Left Heel Ulcer. S/P IR Left EIA Stent, Left EIA/ PFA/ MAYRA Balloon Angiogram/ Atherectomy done 6/29/2023. Hx: Right SFA Proximal/ Mid Occlusion. Left SFA Proximal/ Mid/ Distal Occlusion ( Pre Angio.)  Decreased lower extremity pulses, lower extremity pain Previous: 6/2/2023 RADHA's ( Rayus) ( Rt: 0.8; Lt: 0.5), 6/20/2023 US Bilateral Leg Arterial Duplex History: Previous Smoker, Hypertension, PAD, Angioplasty, Vascular Surgery, Surgical Follow-up, and Vascular Ulcers  Resting RADHA's          Right: mmHg Index     Brachial: 151  Ankle-(PT): 117 0.77 Ankle-(DP): 121 0.80          Digit: 0 0               Left: mmHg Index     Brachial: 144  Ankle-(PT): 111 0.74 Ankle-(DP): 126 0.83          Digit: 99 0.66 Resting ankle-brachial index of 0.80 on the right. Toe Pressures of 0 mmHg and TBI of 0 Resting ankle-brachial index of 0.83 on the left. Toe Pressures of 99 mmHg and TBI of 0.66  VPR WAVEFORMS: The right volume plethysmography waveforms are mildly abnormal at the lower thigh level, mildly abnormal at the upper calf level and moderately abnormal at the ankle. The left volume plethysmography waveforms are mildly abnormal at the lower thigh level, mildly abnormal at the upper calf  level and mildly abnormal at the ankle. Impression:  1. RIGHT LOWER EXTREMITY: RADHA is Abnormal with an RADHA of 0.80 indicating single level disease. Toe Pressures are Un-interpretable and Non-diagnostic. 2. LEFT LOWER EXTREMITY: RADHA is Abnormal with an RADHA of 0.83 indicating single level disease. Toe Pressures are Mildly abnormal but adequate for wound healing with toe pressures of 99 mmHg. Reference: Wound classification Grade RADHA Ankle Systolic Pressure Toe Pressures 0 > 0.80 > 100 mmHg > 60 mmHg 1 0.6 - 0.79 70 - 100 mmHg 40 - 59 mmHg 2 0.4 - 0.59 50-70 mmHg 30 - 39 mmHg 3 < 0.39 < 50 mmHg < 30 mmHg Digit Pressures DBI Disease Category > 0.70 Normal < 0.70 Abnormal > 30 mmHg Potential wound healing < 30 mmHg Impaired wound healing Ankle Brachial Pressures RADHA Disease Category > 1.3  Likely vessel calcification with monophasic waveforms, non-diagnostic 0.95-1.30 Normal with multiphasic waveforms 0.50-0.95 Single level disease 0.30-0.50 Multilevel disease < 0.30 Critical limb ischema Volume Plethysmography Recording (VPR) at all levels Normal Sharp systolic peak, fast upstroke, prominent dicrotic notch in wave Mild Sharp systolic peak, fast upstroke, absent dicrotic notch in wave Moderate Flattened systolic peak, slowed upstroke, absent dicrotic notch inwave Severe amplitude wave with = upslope and down slope Occluded Flat Line        LABS:      Sodium   Date Value Ref Range Status   05/31/2023 139 136 - 145 mmol/L Final   04/24/2023 137 136 - 145 mmol/L Final   08/24/2022 140 136 - 145 mmol/L Final     Urea Nitrogen   Date Value Ref Range Status   05/31/2023 25.6 (H) 8.0 - 23.0 mg/dL Final   04/24/2023 28.3 (H) 8.0 - 23.0 mg/dL Final   08/24/2022 16.6 8.0 - 23.0 mg/dL Final   08/19/2022 22 8 - 28 mg/dL Final   08/18/2022 21 8 - 28 mg/dL Final   08/17/2022 27 8 - 28 mg/dL Final     Hemoglobin   Date Value Ref Range Status   06/29/2023 12.5 (L) 13.3 - 17.7 g/dL Final   08/24/2022 10.2 (L) 13.3 - 17.7 g/dL Final    08/19/2022 8.8 (L) 13.3 - 17.7 g/dL Final     Platelet Count   Date Value Ref Range Status   06/29/2023 187 150 - 450 10e3/uL Final   08/24/2022 281 150 - 450 10e3/uL Final   08/19/2022 229 150 - 450 10e3/uL Final     BNP   Date Value Ref Range Status   08/17/2022 200 (H) 0 - 80 pg/mL Final   04/19/2022 1,221 (H) 0 - 78 pg/mL Final   04/01/2022 379 (H) 0 - 78 pg/mL Final     INR   Date Value Ref Range Status   06/29/2023 1.05 0.85 - 1.15 Final   08/19/2022 1.89 (H) 0.85 - 1.15 Final   08/18/2022 2.12 (H) 0.85 - 1.15 Final     INR (External)   Date Value Ref Range Status   10/05/2022 3.1 (A) 0.9 - 1.1 Final       This was a in person visit in which 30 minutes of  total time was spent (either in face-to-face or non-face-to-face time).    Eulalio Farrar MD, [facility], [place]  VASCULAR AND INTERVENTIONAL PHYSICIAN  VASCULAR MEDICINE  INTERNAL MEDICINE  PAGER: [phone]  CALL SERVICE: [phone]

## 2023-08-08 NOTE — PROGRESS NOTES
[facility]        Patient is here for a 6 week follow up to discuss left lower extremity  angiogram. Pt denies any pain in the left leg and thinks it has improved. Saw Dr. Brown 7/21 and noted left heel measuring significantly smaller. Pt using CAM boot and rolling knee walker.         Pt is currently taking Aspirin, Statin, and Warfarin.    /65   Pulse 56   Resp 12   SpO2 98%     The provider has been notified that the patient has no concerns.     Questions patient would like addressed today are: N/A.    Refills are needed: No    Has homecare services and agency name:  Yes, [facility]

## 2023-08-08 NOTE — PATIENT INSTRUCTIONS
"Kevin Girard,    Thank you for entrusting your care with us today. After your visit today with MD Eulalio Farrar this is the plan that was discussed at your appointment.    Follow up in 6 month with repeat imaging. We will call you for your follow up when time gets closer to 6 months.    Continue to stay off your left foot and use you boot and knee walker.         I am including additional information on these things and our contact information if you have any questions or concerns.   Please do not hesitate to reach out to us if you felt we did not answer your questions or you are unsure of the treatment plan after your visit today. Our number is [phone].Thank you for trusting us with your care.         Again thank you for your time.     Patient Education   Peripheral Arterial Disease (PAD): Care Instructions  Overview  Peripheral arterial disease (PAD) occurs when the blood vessels (arteries) that supply blood to the legs, belly, pelvis, arms, or neck get narrow or blocked. This reduces blood flow to that area. The legs are affected most often.  PAD is often caused by fatty buildup (plaque) in the arteries. This buildup is also called \"hardening\" of the arteries. Your risk of PAD increases if you smoke or have high cholesterol, high blood pressure, diabetes, or a family history of PAD.  Many people don't have symptoms. If you do have symptoms, you may have weak or tired legs, difficulty walking or balancing, or pain. If you have pain, you might feel a tight, aching, or squeezing pain in the calf, foot, thigh, or buttock that occurs during exercise. The pain usually gets worse during exercise and goes away when you rest. If PAD gets worse, you may have symptoms of poor blood flow, such as leg pain when you rest.  Medicines and lifestyle changes may help your symptoms and lower your risk of heart attack and stroke. In some cases, surgery or other treatment is needed. It is important that you follow up with your " doctor.  Follow-up care is a key part of your treatment and safety. Be sure to make and go to all appointments, and call your doctor if you are having problems. It's also a good idea to know your test results and keep a list of the medicines you take.  How can you care for yourself at home?  Do not smoke. Smoking can make PAD worse. If you need help quitting, talk to your doctor about stop-smoking programs and medicines. These can increase your chances of quitting for good.  Take your medicines exactly as prescribed. Call your doctor if you think you are having a problem with your medicine.  If you take a blood thinner, such as aspirin, be sure to get instructions about how to take your medicine safely. Blood thinners can cause serious bleeding problems.  Ask your doctor if a cardiac rehab program is right for you. Cardiac rehab can help you make lifestyle changes. In cardiac rehab, a team of health professionals provides education and support to help you make new, healthy habits.  Eat heart-healthy foods such as fruits, vegetables, whole grains, fish, lean meats, and low-fat or nonfat dairy foods. Limit sodium, sugar, and alcohol.  If your doctor recommends it, get more exercise. Walking is a good choice. Bit by bit, increase the amount you walk every day. Try for at least 30 minutes on most days of the week. If you have symptoms when you exercise, ask your doctor about a special exercise program that may help relieve your symptoms.  Stay at a healthy weight. Lose weight if you need to.  Take good care of your feet.  Treat cuts and scrapes on your legs right away. Poor blood flow prevents (or slows) quick healing of even small cuts or scrapes. This is even more important if you have diabetes.  Avoid shoes that are too tight or that rub your feet. Shoes should be comfortable and fit well.  Avoid socks or stockings that are tight enough to leave elastic-band marks on your legs. Tight socks can make circulation  problems worse.  Keep your feet clean and moisturized to prevent drying and cracking. Place cotton or lamb's wool between your toes to prevent rubbing and to absorb moisture.  If you have a sore on your leg or foot, keep it dry and cover it with a nonstick bandage until you see your doctor.  Avoid infections such as COVID-19, colds, and the flu. Get the flu vaccine every year. Get a pneumococcal vaccine. If you have had one before, ask your doctor whether you need another dose. Stay up to date on your COVID-19 vaccines.  When should you call for help?   Call 911 anytime you think you may need emergency care. For example, call if:    You have symptoms of a heart attack. These may include:  Chest pain or pressure, or a strange feeling in the chest.  Sweating.  Shortness of breath.  Nausea or vomiting.  Pain, pressure, or a strange feeling in the back, neck, jaw, or upper belly or in one or both shoulders or arms.  Lightheadedness or sudden weakness.  A fast or irregular heartbeat.   After you call 911, the  may tell you to chew 1 adult-strength or 2 to 4 low-dose aspirin. Wait for an ambulance. Do not try to drive yourself.    You have sudden, severe leg pain, and your leg is cool and pale.     You have symptoms of a stroke. These may include:  Sudden numbness, tingling, weakness, or loss of movement in your face, arm, or leg, especially on only one side of your body.  Sudden vision changes.  Sudden trouble speaking.  Sudden confusion or trouble understanding simple statements.  Sudden problems with walking or balance.  A sudden, severe headache that is different from past headaches.   Call your doctor now or seek immediate medical care if:    You have leg pain that does not go away even if you rest.     Your leg pain changes or gets worse. For example, if you have more pain with normal activity or the same pain with decreased activity, you should call.     You have cold or numb feet or toes.     You have leg  "or foot sores that are slow to heal.     The skin on your legs or feet changes color.     You have an open sore on your leg or foot that is infected. Signs of infection include:  Increased pain, swelling, warmth, or redness.  Red streaks leading from the sore.  Pus draining from the sore.  A fever.   Watch closely for changes in your health, and be sure to contact your doctor if you have any problems.  Where can you learn more?  Go to https://www.[employer].net/patiented  Enter H735 in the search box to learn more about \"Peripheral Arterial Disease (PAD): Care Instructions.\"  Current as of: September 7, 2022               Content Version: 13.7    9327-0212 [employer].   Care instructions adapted under license by your healthcare professional. If you have questions about a medical condition or this instruction, always ask your healthcare professional. [employer] disclaims any warranty or liability for your use of this information.           "

## 2023-08-08 NOTE — Clinical Note
Pt will need 6 month follow up with imaging before. Orders are in. Thank you. Pt would like to try and coordinate a visit with his cardiologist  on same day if possible. Told pt Tuesday only day to see. Dr. Mendoza. Thank you!

## 2023-08-11 ENCOUNTER — OFFICE VISIT (OUTPATIENT)
Dept: VASCULAR SURGERY | Facility: CLINIC | Age: 78
End: 2023-08-11
Attending: FAMILY MEDICINE
Payer: COMMERCIAL

## 2023-08-11 VITALS
OXYGEN SATURATION: 93 % | RESPIRATION RATE: 24 BRPM | DIASTOLIC BLOOD PRESSURE: 66 MMHG | HEART RATE: 63 BPM | SYSTOLIC BLOOD PRESSURE: 135 MMHG | TEMPERATURE: 97.9 F

## 2023-08-11 DIAGNOSIS — L97.423: Primary | ICD-10-CM

## 2023-08-11 PROCEDURE — 99212 OFFICE O/P EST SF 10 MIN: CPT | Performed by: PODIATRIST

## 2023-08-11 PROCEDURE — G0463 HOSPITAL OUTPT CLINIC VISIT: HCPCS | Performed by: PODIATRIST

## 2023-08-11 ASSESSMENT — PAIN SCALES - GENERAL: PAINLEVEL: NO PAIN (0)

## 2023-08-11 NOTE — PATIENT INSTRUCTIONS
Congratulations! Your wound has healed.    For the next 2 weeks Dr. Brown would like you remain in the CAM boot, You can start walking!      You may begin showering as normal in 2 weeks    Continue to monitor the area for breakdown & call us if your wound reopens.    We want to hear from you!   In the next few weeks, you should receive a call or email to complete a survey about your visit at [facility]. Please help us improve your appointment experience by letting us know how we did today. We strive to make your experience good and value any ways in which we could do better.      We value your input and suggestions.    Thank you for choosing the [facility]!    Your doctor recommends you use a pumice stone to get rid of your callous. You can purchase a pumice stone at your local drug store.    How to use your Pumice Stone        1. Soak your calloused skin in warm water. The most common part of the body to exfoliate with a pumice stone is the feet. Heels tend to develop a layer of hard, calloused skin that can become cracked or scaled. Soak the calloused body part in warm water for about five minutes to soften the skin.    2. Wait until your dry skin has softened. The skin will be easier to remove if it's soft and supple. Feel your skin after several minutes of soaking. If it still feels tough, wait a few more minutes (giving the water a warm-up if necessary). If it's soft, your skin is ready for the pumice stone.     3. Wet the stone. Wetting the stone will help it slide more easily across your skin, rather than catching on it. Run the stone under warm water, or dip it in the water where you're soaking your skin, in order to thoroughly wet it.    4. Rub it gently over the calloused area. Use a circular motion to start sloughing away the dead skin with the pumice stone. If the skin is nice and soft, it should start coming right off. Keep going until you remove the dead skin and  get to the fresh, supple skin underneath.   Don't press too hard. Light pressure is all that is needed; let the surface of the stone do the work.   If you're working on your feet, focus on the heels, the sides of your toes, and other areas where dry skin tends to build up.    5. Rinse and repeat. Rinse off the dead skin and take a look to see if you need to keep going. If you still see bits of dead skin, go over the area again with the pumice stone. Continue using the stone on the area until you're satisfied with the results.   Since the pumice stone will wear down slightly while you use it, you may need to turn it over to get a fresh surface you can use to exfoliate your skin.   Rinse the pumice stone often to keep its surface clean and effective.    6. Dry and moisturize your skin. When you're finished, use a towel to pat your skin dry. Coat the area with an oil or cream to prevent it from drying out too quickly. Your formerly calloused skin should now be soft, supple and gleaming.   Coconut oil, almond oil, or body lotion are all fine to use to condition your skin after pumicing.   Repeat as often as needed to keep your skin in good shape.    CARING FOR YOUR PUMICE STONE:    1. Scrub it after use. Dead skin will build up in the pores of the stone as you use it, so you'll want to clean the stone after use. Use a scrub brush to scrub the stone while holding it under running water. Add a bit of soap to help clean the stone completely. This way your stone will be clean and ready to use next time you need it.     2. Allow it to completely dry out. Set the pumice stone in a dry place so that it doesn't stay damp in between uses. Some pumice stones come with a string attached that allows you to hang the stone to dry. If you let the stone stay wet, bacteria could grow in the pores, making it unsafe to use.     3. Boil it if necessary. Every once in a while, you'll want to give the stone a deep cleaning to make sure it  isn't harboring bacteria. Bring a small pot of water to a full boil, drop in the stone, and boil it for five minutes. Use tongs to remove the stone from the water and allow it to dry completely before storing.   If you use the stone frequently, boil it every two weeks to ensure it stays clean.   If you'd like, you can add a capful of bleach to the water to be certain all the bacteria is killed.    4. Replace the stone when it wears down. Pumice is a soft stone that will eventually wear away after you've used it for awhile. When it gets too small to handle easily, or the surface becomes too smooth to be effective, purchase a new one. Pumice stones are inexpensive and can be found at any store that sells beauty supplies.

## 2023-08-11 NOTE — PROGRESS NOTES
FOOT AND ANKLE SURGERY/PODIATRY Progress Note      ASSESSMENT: Ulceration left heel      TREATMENT:  -The left heel ulceration has resolved.     -We discussed that due to his anatomy, there is a pressure point which will continue to build callus tissue. If the callus tissue becomes too thick, skin breakdown will likely occur.     -I recommend use of a pumice stone x2-3 per week to remove callus tissue. I have asked that he return for sharp debridement of the callus prn.     -Recommend use of the cam boot x 2 weeks then return to regular shoes.  I have asked him to monitor for any skin irritation or skin breakdown.    -He is discharged from my care at this time but encouraged to return as concerns develop.     Nehemiah Brown DPM  [facility] [place]      HPI: Shaji HUBBARD Peña was seen again today for left heel ulceration.  Patient has remained nonweightbearing with a knee walker.    Past Medical History:   Diagnosis Date    Cerebral infarction (H)     COPD (chronic obstructive pulmonary disease) (H)     HLD (hyperlipidemia)     Hypertension     Myeloproliferative disease (H)        Past Surgical History:   Procedure Laterality Date    CV CORONARY ANGIOGRAM N/A 4/26/2022    Procedure: CV CORONARY ANGIOGRAM;  Surgeon: Audrey Holder MD;  Location: [facility] CATH LAB CV    CV LEFT HEART CATH N/A 4/26/2022    Procedure: Left Heart Catheterization;  Surgeon: Audrey Holder MD;  Location: [facility] CATH LAB CV    IR LOWER EXTREMITY ANGIOGRAM LEFT  6/29/2023    OPEN REDUCTION INTERNAL FIXATION FOOT Right 2010    OTHER SURGICAL HISTORY  2001    Lip lesion removal    PICC TRIPLE LUMEN PLACEMENT  8/17/2022         TONSILLECTOMY & ADENOIDECTOMY         No Known Allergies      Current Outpatient Medications:     albuterol (PROAIR HFA;PROVENTIL HFA;VENTOLIN HFA) 90 mcg/actuation inhaler, Inhale 2 puffs into the lungs every 6 hours as needed for shortness of breath / dyspnea, Disp: , Rfl:     ASPIRIN LOW DOSE  "81 MG EC tablet, TAKE 1 TABLET (81 MG) BY MOUTH DAILY, START THE MORNING OF 4/27/22, Disp: 90 tablet, Rfl: 3    atorvastatin (LIPITOR) 80 MG tablet, Take 1 tablet (80 mg) by mouth every morning, Disp: 90 tablet, Rfl: 1    furosemide (LASIX) 20 MG tablet, Take 1/2 TABLET BY mouth daily, Disp: 45 tablet, Rfl: 3    hydroxyurea (HYDREA) 500 MG capsule, Take 1,000-1,500 mg by mouth See Admin Instructions 1000 mg daily- Mon, Wed, & Fri. 1500 mg daily - Tue, Thurs, Sat, & Sun, Disp: , Rfl:     losartan (COZAAR) 25 MG tablet, Take 0.5 tablets (12.5 mg) by mouth daily, Disp: 45 tablet, Rfl: 3    metoprolol succinate ER (TOPROL-XL) 25 MG 24 hr tablet, Take 25 mg by mouth daily, Disp: , Rfl:     nitroGLYcerin (NITROSTAT) 0.4 MG sublingual tablet, One tablet under the tongue every 5 minutes if needed for chest pain. May repeat every 5 minutes for a maximum of 3 doses in 15 minutes\", Disp: 25 tablet, Rfl: 3    omeprazole (PRILOSEC) 20 MG DR capsule, Take 20 mg by mouth daily, Disp: , Rfl:     tiotropium (SPIRIVA) 18 mcg inhalation capsule, [TIOTROPIUM (SPIRIVA) 18 MCG INHALATION CAPSULE] Place 18 mcg into inhaler and inhale daily., Disp: , Rfl:     warfarin ANTICOAGULANT (COUMADIN) 1 MG tablet, Take 3 tablets (3 mg) by mouth daily, Disp: , Rfl:     Review of Systems - 10 point Review of Systems is negative except for left heel ulcer which is noted in HPI.      OBJECTIVE:  /66   Pulse 63   Temp 97.9  F (36.6  C)   Resp 24   SpO2 93%   General appearance: Patient is alert and fully cooperative with history & exam.  No sign of distress is noted during the visit.    Vascular: Dorsalis pedis non-palpableLeft.  Dermatologic:    VASC Wound left heel (Active)   Pre Size Length 0 08/11/23 0700   Pre Size Width 0 08/11/23 0700   Pre Size Depth 0 08/11/23 0700   Pre Total Sq cm 0 08/11/23 0700   Post Size Length 0 08/11/23 0700   Post Size Width 0 08/11/23 0700   Post Size Depth 0 08/11/23 0700   Post Total Sq cm 0 08/11/23 0700 "   Tunneling callous 08/11/23 0700     Neurologic: Diminished to light touch Left.  Musculoskeletal: Contracted digits noted Left.    Imaging:     US Lower Extremity Arterial Duplex Left    Result Date: 8/9/2023  Table formatting from the original result was not included. Arterial Duplex Ultrasound (Date: 08/08/23) Lower Extremity Artery Evaluation Indication: Surveillance Left Arterial Leg: PAD. Left Heel Ulcer. S/P IR Left EIA Stent, Left EIA/ PFA/ MAYRA Balloon Angiogram/ Atherectomy done 6/29/2023. Hx: Right SFA Proximal/ Mid Occlusion. Left SFA Proximal/ Mid/ Distal Occlusion ( Pre Angio.)  Decreased lower extremity pulses, lower extremity pain  Previous: 6/2/2023 RADHA's ( Rayus) ( Rt: 0.8; Lt: 0.5), 6/20/2023; 07/14/23  History: Previous Smoker, Hypertension, PAD, Angioplasty, Vascular Surgery, Surgical Follow-up, and Vascular Ulcers Technique: Duplex imaging is performed utilizing gray-scale, two-dimensional images, and color-flow imaging. Doppler waveform analysis and spectral Doppler imaging is also performed. LOWER EXTREMITY ARTERIAL DUPLEX EXAM WITH WAVEFORMS Left Leg:(cm/s) Location: Velocities Waveforms EIA:   176  B CFA:   156  B PFA:   200  B SFA Proximal:   217  B SFA Mid:   139  B SFA Distal:   96  B Popliteal Artery:   111  B PTA:   65   B MAYRA:   38  B DPA:   41  B Waveforms: T=Triphasic, M=Monophasic, B=Biphasic Impression: Left Lower Extremity: Patent vasculature with biphasic flow.  No significant stenosis identified. Reference: Category Normal 1-19% 20-49% 50-99% Occluded PSV <160 cm/sec without spectral broadening <160 cm/sec with spectral broadening Increased Increased Absent Flow Ratio N/A N/A < 2.0 >2.0 N/A Post-Stenotic Turbulence No No No Yes N/A      US Low Ext Arterial Dop Seg Pres w/o Exercise    Result Date: 8/9/2023  Table formatting from the original result was not included. BILATERAL RESTING ANKLE-BRACHIAL INDICES (RADHA'S) (Date: 08/08/23) Indication: Surveillance Left Arterial Leg:  PAD. Left Heel Ulcer. S/P IR Left EIA Stent, Left EIA/ PFA/ MAYRA Balloon Angiogram/ Atherectomy done 6/29/2023. Hx: Right SFA Proximal/ Mid Occlusion. Left SFA Proximal/ Mid/ Distal Occlusion ( Pre Angio.),  Decreased lower extremity pulses, lower extremity pain Previous: 6/2/2023 RADHA's ( Rayus) ( Rt: 0.8; Lt: 0.5), 6/20/2023; 07/14/23  History: Previous Smoker, Hypertension, PAD, Angioplasty, Vascular Surgery, Surgical Follow-up, and Vascular Ulcers Resting RADHA's          Right: mmHg Index     Brachial: 124  Ankle-(PT): 90 0.73 Ankle-(DP): 96 0.77          Digit: 50 0.40               Left: mmHg Index     Brachial: 122  Ankle-(PT): 124 1.00 Ankle-(DP): 111 0.90          Digit: 85 0.69 Resting ankle-brachial index of 0.77 on the right. Toe Pressures of 50 mmHg and TBI of 0.40 Resting ankle-brachial index of 1.00 on the left. Toe Pressures of 85 mmHg and TBI of 0.69  VPR WAVEFORMS: The right volume plethysmography waveforms are moderately abnormal at the lower thigh level, moderately abnormal at the upper calf level and severely abnormal at the ankle. The left volume plethysmography waveforms are mildly abnormal at the lower thigh level, mildly abnormal at the upper calf level and mildly abnormal at the ankle. Impression:  1. RIGHT LOWER EXTREMITY: RADHA is Abnormal with an RADHA of 0.77 indicating single level disease. Toe Pressures are Mildly abnormal but adequate for wound healing with toe pressures of 50 mmHg. 2. LEFT LOWER EXTREMITY: RADHA is Normal with multiphasic waveforms with an RADHA of 1.0. Toe Pressures are Mildly abnormal but adequate for wound healing with toe pressures of 85 mmHg. Reference: Wound classification Grade RADHA Ankle Systolic Pressure Toe Pressures 0 > 0.80 > 100 mmHg > 60 mmHg 1 0.6 - 0.79 70 - 100 mmHg 40 - 59 mmHg 2 0.4 - 0.59 50-70 mmHg 30 - 39 mmHg 3 < 0.39 < 50 mmHg < 30 mmHg Digit Pressures DBI Disease Category > 0.70 Normal < 0.70 Abnormal > 30 mmHg Potential wound healing < 30 mmHg Impaired  wound healing Ankle Brachial Pressures RADHA Disease Category > 1.3  Likely vessel calcification with monophasic waveforms, non-diagnostic 0.95-1.30 Normal with multiphasic waveforms 0.50-0.95 Single level disease 0.30-0.50 Multilevel disease < 0.30 Critical limb ischema Volume Plethysmography Recording (VPR) at all levels Normal Sharp systolic peak, fast upstroke, prominent dicrotic notch in wave Mild Sharp systolic peak, fast upstroke, absent dicrotic notch in wave Moderate Flattened systolic peak, slowed upstroke, absent dicrotic notch inwave Severe amplitude wave with = upslope and down slope Occluded Flat Line      US Lower Extremity Arterial Duplex Left    Result Date: 7/26/2023  Table formatting from the original result was not included. Arterial Duplex Ultrasound (Date: 07/14/23) Lower Extremity Artery Evaluation Indication: Surveillance Left Arterial Leg: PAD. Left Heel Ulcer. S/P IR Left EIA Stent, Left EIA/ PFA/ MAYRA Balloon Angiogram/ Atherectomy done 6/29/2023. Hx: Right SFA Proximal/ Mid Occlusion. Left SFA Proximal/ Mid/ Distal Occlusion ( Pre Angio.)  Previous: 6/2/2023 RADHA's ( Rayus) ( Rt: 0.8; Lt: 0.5), 6/20/2023 US Bilateral Leg Arterial Duplex History: Previous Smoker, Hypertension, PAD, Angioplasty, Vascular Surgery, Surgical Follow-up, and Vascular Ulcers Technique: Duplex imaging is performed utilizing gray-scale, two-dimensional images, and color-flow imaging. Doppler waveform analysis and spectral Doppler imaging is also performed. LOWER EXTREMITY ARTERIAL DUPLEX EXAM WITH WAVEFORMS Left Leg:(cm/s) Location: Velocities Waveforms EIA: (Stent)    135 /  115 M CFA:   153  M PFA:   440 /  99 M SFA Proximal:   220 / 65  M SFA Mid:   147 /  124 M SFA Distal:   119 / 88 / 92 M Popliteal Artery:   102 /  89 M PTA:   51   M MAYRA:   45  M DPA:   43  M Waveforms: T=Triphasic, M=Monophasic, B=Biphasic Stenotic Profile Ratio: 440 / 153 = 2.88 Right  Leg:(cm/s) Location: Velocities Waveforms PTA:   37   M  MAYRA:   28  M DPA:   23  M Waveforms: T=Triphasic, M=Monophasic, B=Biphasic Comments: Unable to see proximal stent.  Impression: Right Lower Extremity: Not fully evaluated but monophasic flow in tibial vessels suggest more proximal disease Left Lower Extremity: Monophasic flow is still noted in the common femoral artery but with sharp upstroke suggesting underlying arterial disease but without hemodynamic significance.  Highly elevated velocities in profunda femoris suggesting hemodynamically significant disease.  Monophasic flow remains throughout the entire left lower extremity with moderately elevated velocities throughout the entire SFA suggesting underlying arterial disease as well. Reference: Category Normal 1-19% 20-49% 50-99% Occluded PSV <160 cm/sec without spectral broadening <160 cm/sec with spectral broadening Increased Increased Absent Flow Ratio N/A N/A < 2.0 >2.0 N/A Post-Stenotic Turbulence No No No Yes N/A      US Carotid Bilateral    Result Date: 7/15/2023  Table formatting from the original result was not included. BILATERAL CAROTID ULTRASOUND (Date: 07/14/23) Indication: Surveillance Bilateral Carotid Bruit Previous: None Symptoms: Bruit, Hypertension, and Previous Smoker TECHNIQUE: Duplex exam performed utilizing 2D gray-scale imaging, Doppler interrogation with color-flow and spectral waveform analysis. Right Velocity  Chart (cm/sec) Location Right DISTAL CCA-PS 80 DISTAL CCA- ED 11 PROX ICA- PROX ICA-ED 57 ECA- ECA-ED 28 Vertebral- Antegrade 71 Subclavian A- Biphasic 101 Ratio ICA/CCA-PS 2.99 Ratio ICA/CCA-ED 5.18 Left Velocity  Chart (cm/sec) Location Left DISTAL CCA-PS 84 DISTAL CCA- ED 13 PROX ICA-PS 64 PROX ICA-ED 5 MID ICA- MID ICA-ED 34 ECA- ECA-ED 13 Vertebral- Antegrade 53 Subclavian A- Biphasic 158 Ratio ICA/CCA-PS 0.76 Ratio ICA/CCA-ED 0.38 FINDINGS: RIGHT: There is predominantly echogenic  atheromatous plaque.  LEFT: There is predominantly echogenic  atheromatous plaque.  RIGHT: Vertebral artery and subclavian artery waveforms are antegrade. LEFT: Vertebral artery and subclavian artery waveforms are antegrade. Impression:  1. 50-69% diameter stenosis of the right ICA relative to the proximal ICA diameter. 2. 50-69% diameter stenosis of the left ICA relative to the proximal ICA diameter. Evaluation based on velocities and NASCET criteria Category PSV (cm/s)  Plaque Imaging EDV (cm/s)  ICA/CCA Ratio Normal,  <125  None <40 <2 Mild <50% <125 < than 50% DR stenosis <40 <2 Moderate Disease 50-69% 125-230 > than 50% DR stenosis  2.0-4.0 Severe->70% but less than near occlusion >230 > than 50% DR stenosis >100 >4.0 Near Occlusion May be low or undetectable Visible, extensive Variable Variable Occlusion No Flow Visible with no detectable lumen N/A N/A Plaquetype Description Type 1 Uniformly echolucent Type 2 Predominantly echolucent >50% Type 3 Predominantly echogenic >50% Type 4 Uniformly echogenic Type 5 Unclassified due to poor visualization Ulcer Visible Ulcerative Plaque      US Low Ext Arterial Dop Seg Pres w/o Exercise    Result Date: 7/15/2023  Table formatting from the original result was not included. BILATERAL RESTING ANKLE-BRACHIAL INDICES (RADHA'S) (Date: 07/14/23) Indication: Surveillance RADHA's: PAD. Left Heel Ulcer. S/P IR Left EIA Stent, Left EIA/ PFA/ MAYRA Balloon Angiogram/ Atherectomy done 6/29/2023. Hx: Right SFA Proximal/ Mid Occlusion. Left SFA Proximal/ Mid/ Distal Occlusion ( Pre Angio.)  Decreased lower extremity pulses, lower extremity pain Previous: 6/2/2023 RADHA's ( Rayus) ( Rt: 0.8; Lt: 0.5), 6/20/2023 US Bilateral Leg Arterial Duplex History: Previous Smoker, Hypertension, PAD, Angioplasty, Vascular Surgery, Surgical Follow-up, and Vascular Ulcers  Resting RADHA's          Right: mmHg Index     Brachial: 151  Ankle-(PT): 117 0.77 Ankle-(DP): 121 0.80          Digit: 0 0               Left: mmHg Index     Brachial: 144  Ankle-(PT): 111 0.74  Ankle-(DP): 126 0.83          Digit: 99 0.66 Resting ankle-brachial index of 0.80 on the right. Toe Pressures of 0 mmHg and TBI of 0 Resting ankle-brachial index of 0.83 on the left. Toe Pressures of 99 mmHg and TBI of 0.66  VPR WAVEFORMS: The right volume plethysmography waveforms are mildly abnormal at the lower thigh level, mildly abnormal at the upper calf level and moderately abnormal at the ankle. The left volume plethysmography waveforms are mildly abnormal at the lower thigh level, mildly abnormal at the upper calf level and mildly abnormal at the ankle. Impression:  1. RIGHT LOWER EXTREMITY: RADHA is Abnormal with an RADHA of 0.80 indicating single level disease. Toe Pressures are Un-interpretable and Non-diagnostic. 2. LEFT LOWER EXTREMITY: RADHA is Abnormal with an RADHA of 0.83 indicating single level disease. Toe Pressures are Mildly abnormal but adequate for wound healing with toe pressures of 99 mmHg. Reference: Wound classification Grade RADHA Ankle Systolic Pressure Toe Pressures 0 > 0.80 > 100 mmHg > 60 mmHg 1 0.6 - 0.79 70 - 100 mmHg 40 - 59 mmHg 2 0.4 - 0.59 50-70 mmHg 30 - 39 mmHg 3 < 0.39 < 50 mmHg < 30 mmHg Digit Pressures DBI Disease Category > 0.70 Normal < 0.70 Abnormal > 30 mmHg Potential wound healing < 30 mmHg Impaired wound healing Ankle Brachial Pressures RADHA Disease Category > 1.3  Likely vessel calcification with monophasic waveforms, non-diagnostic 0.95-1.30 Normal with multiphasic waveforms 0.50-0.95 Single level disease 0.30-0.50 Multilevel disease < 0.30 Critical limb ischema Volume Plethysmography Recording (VPR) at all levels Normal Sharp systolic peak, fast upstroke, prominent dicrotic notch in wave Mild Sharp systolic peak, fast upstroke, absent dicrotic notch in wave Moderate Flattened systolic peak, slowed upstroke, absent dicrotic notch inwave Severe amplitude wave with = upslope and down slope Occluded Flat Line           Picture:

## 2023-09-18 DIAGNOSIS — I50.22 CHRONIC SYSTOLIC CONGESTIVE HEART FAILURE, NYHA CLASS 2 (H): ICD-10-CM

## 2023-09-19 RX ORDER — LOSARTAN POTASSIUM 25 MG/1
12.5 TABLET ORAL DAILY
Qty: 45 TABLET | Refills: 1 | Status: SHIPPED | OUTPATIENT
Start: 2023-09-19 | End: 2024-02-01

## 2023-09-25 DIAGNOSIS — E78.2 MIXED HYPERLIPIDEMIA: ICD-10-CM

## 2023-09-25 DIAGNOSIS — I25.119 CORONARY ARTERY DISEASE INVOLVING NATIVE CORONARY ARTERY OF NATIVE HEART WITH ANGINA PECTORIS (H): ICD-10-CM

## 2023-09-26 RX ORDER — ATORVASTATIN CALCIUM 80 MG/1
80 TABLET, FILM COATED ORAL EVERY MORNING
Qty: 90 TABLET | Refills: 1 | Status: SHIPPED | OUTPATIENT
Start: 2023-09-26 | End: 2024-01-16

## 2023-11-08 ENCOUNTER — TELEPHONE (OUTPATIENT)
Dept: VASCULAR SURGERY | Facility: CLINIC | Age: 78
End: 2023-11-08
Payer: COMMERCIAL

## 2023-11-08 NOTE — TELEPHONE ENCOUNTER
[facility] to schedule ultrasound and 6 month visit with Dr. Farrar.  This is due in February 2024 [phone]

## 2023-11-16 ENCOUNTER — TELEPHONE (OUTPATIENT)
Dept: CARDIOLOGY | Facility: CLINIC | Age: 78
End: 2023-11-16
Payer: COMMERCIAL

## 2023-11-16 DIAGNOSIS — I50.22 CHRONIC SYSTOLIC CONGESTIVE HEART FAILURE (H): Primary | ICD-10-CM

## 2023-11-16 RX ORDER — FUROSEMIDE 20 MG
20 TABLET ORAL DAILY
Qty: 90 TABLET | Refills: 1 | Status: SHIPPED | OUTPATIENT
Start: 2023-11-16 | End: 2023-12-05

## 2023-11-16 NOTE — TELEPHONE ENCOUNTER
===View-only below this line===  ----- Message -----  From: Sp Larson DO  Sent: 11/16/2023   3:02 PM CST  To: Domingo Sebastian RN    increase lasix to 20 mg daily and follow-up with KATTY in clinic 1-2 weeks    PC to patient, no answer and LVM to return call. JOHNRn

## 2023-11-16 NOTE — TELEPHONE ENCOUNTER
Return incoming call and review of provider response. Wife verbalized understanding. Will increase. Rx sent to pharmacy. KATTY follow-up ordered, and direct transfer to schedule. Arranged 12/5/23. JOHN,Rn

## 2023-11-16 NOTE — TELEPHONE ENCOUNTER
Dr. Larson, recommend furosemide increase to 20 mg daily and follow-up with KATTY in clinic 1-2 weeks? Alt rec? Thank you CMM,Rn   _________________________________________________________________    Incoming call and VM from wife Nidhi to report the patient is up to 205 lbs. Requests return call to schedule an appointment. ADAM LOPEZ    PC to patient, introduction. Pt hands the phone to his wife. Discussion with wife Nidhi. Reports pt is walking more and eating better, watching his sodium intake. Weight at 205 Lbs, and won't go lower. Wife denies orthopnea, sleeping well. Although falls asleep during the day/nap. Denies complaints of dizziness, lightheadedness, daily heartburn or chest pain/pressure. Some SOB with activity. Unsure if patient is having LE edema. Medication compliant, no missed doses. No new medications. Currently on furosemide 10 mg daily. Pt checks BP readings daily. Wife reports 's. Doesn't recall DBP. Pt and wife are not home at time of call to review written list. Informed wife will send to [facility], for review, and return call if any recommendations. Wife is concerned and wants to make sure all is addressed before heading to [place] in February. ADAM LOPEZ

## 2023-12-04 ENCOUNTER — TRANSFERRED RECORDS (OUTPATIENT)
Dept: HEALTH INFORMATION MANAGEMENT | Facility: CLINIC | Age: 78
End: 2023-12-04
Payer: COMMERCIAL

## 2023-12-05 ENCOUNTER — OFFICE VISIT (OUTPATIENT)
Dept: CARDIOLOGY | Facility: CLINIC | Age: 78
End: 2023-12-05
Payer: COMMERCIAL

## 2023-12-05 VITALS
WEIGHT: 210 LBS | DIASTOLIC BLOOD PRESSURE: 68 MMHG | HEART RATE: 68 BPM | SYSTOLIC BLOOD PRESSURE: 143 MMHG | BODY MASS INDEX: 33.89 KG/M2 | OXYGEN SATURATION: 96 %

## 2023-12-05 DIAGNOSIS — I10 ESSENTIAL HYPERTENSION: ICD-10-CM

## 2023-12-05 DIAGNOSIS — I50.22 CHRONIC SYSTOLIC CONGESTIVE HEART FAILURE (H): ICD-10-CM

## 2023-12-05 DIAGNOSIS — I25.10 CORONARY ARTERY DISEASE INVOLVING NATIVE CORONARY ARTERY OF NATIVE HEART WITHOUT ANGINA PECTORIS: ICD-10-CM

## 2023-12-05 DIAGNOSIS — R03.0 ELEVATED BLOOD PRESSURE READING WITHOUT DIAGNOSIS OF HYPERTENSION: Primary | ICD-10-CM

## 2023-12-05 DIAGNOSIS — I26.94 MULTIPLE SUBSEGMENTAL PULMONARY EMBOLI WITHOUT ACUTE COR PULMONALE (H): ICD-10-CM

## 2023-12-05 DIAGNOSIS — I82.431 ACUTE DEEP VEIN THROMBOSIS (DVT) OF POPLITEAL VEIN OF RIGHT LOWER EXTREMITY (H): ICD-10-CM

## 2023-12-05 PROCEDURE — 99214 OFFICE O/P EST MOD 30 MIN: CPT

## 2023-12-05 RX ORDER — FUROSEMIDE 20 MG
10 TABLET ORAL DAILY
Qty: 45 TABLET | Refills: 3 | Status: ON HOLD | OUTPATIENT
Start: 2023-12-05 | End: 2024-07-29

## 2023-12-05 NOTE — PATIENT INSTRUCTIONS
It was a pleasure taking part in your care today:    - Reduce lasix back to 10 mg daily    Please call the [facility] clinic with any questions or concerns at [phone].     Sona Murillo PA-C

## 2023-12-05 NOTE — LETTER
12/5/2023    Steven Caraballo MD, MD [address] [place] [address]    RE: Shaji Pompa       Dear Colleague,     I had the pleasure of seeing Shaji Pompa in the [facility].     HEART CARE ENCOUNTER CONSULTATON NOTE      [facility]  [phone]      Assessment/Recommendations   Assessment:   Weight gain: Appears to be due to reduced activity, poor diet. No sights or symptoms of HF on exam/history.  Chronic HFrEF/ischemic cardiomyopathy: Recent echo 6/2023 through [facility] shows normalization of LVEF to 55-60% previously 30-35% on echo 8/2022  - losartan, metoprolol succinate, Lasix  Coronary artery disease: Angiogram 4/2022 Mid RCA occlusion with left-sided collaterals, mild left sided disease  Paroxysmal atrial fibrillation: Warfarin and metoprolol, regular rate today  Pulmonary embolism/Hx DVT right leg: Warfarin  Hypertension: elevated today, historically controlled, apparently controlled at home and was 1 day ago at PCP visit.     Plan:   Reduce Lasix back to 10 mg   Increase activity with walking, healthy diet to help with weight loss  Discussed monitoring for elevated BP and reporting consistent elevation to clinic for medication adjustment    Follow up in 4 months for annual follow up with Dr. Larson     History of Present Illness/Subjective    HPI: Shaji Pompa is a 78 year old male with PMHx of CAD, ischemic cardiomyopathy, anemia, myeloproliferative disorder presents for follow up after report of weight gain and increase in Lasix. Start lasix 20 mg 11/16 for weight gain, previously taking 10 mg for maintenance. After discussion patient has been noticing steady weight gain for last several months since summer. He was extra sedentary this year due to heal wound that was difficult to year. He denies recent weight gain of multiple pounds in within 1 week or few days.  Currently 210 lb.    No shortness of breath, chest pain,  increased LE swelling, orthopnea, or abdominal swelling/tightness. No increased urination or change in weight with increasing Lasix. BP is elevated today, usual control of systolic BP in 130s systolic at home. Patient actually had repeat echocardiogram through [facility] showing normalization of LVEF in June. Recent lab work with oncology is overall stable.    Patient is accompanied by his wife. They are leaving for [place] in February and returning in April 2024.    See media tab for ECHO 6/2023 with [facility]    Echocardiogram 8/2022 Results:  The rhythm was rapid atrial fibrillation.  Left ventricular function is decreased. The ejection fraction is 30-35%  (moderately reduced).  There is moderate global hypokinesia of the left ventricle.  The left atrium is mildly dilated.  The study was technically difficult. No hemodynamically significant valvular  abnormalities on 2D or color flow imaging.     Physical Examination  Review of Systems   Vitals: BP (!) 143/68 (BP Location: Left arm, Patient Position: Sitting, Cuff Size: Adult Regular)   Pulse 68   Wt 95.3 kg (210 lb)   SpO2 96%   BMI 33.89 kg/m    BMI= Body mass index is 33.89 kg/m .  Wt Readings from Last 3 Encounters:   12/05/23 95.3 kg (210 lb)   06/29/23 85.3 kg (188 lb)   06/16/23 91.2 kg (201 lb)           ENT/Mouth: membranes moist, no oral lesions or bleeding gums.      EYES:  no scleral icterus, normal conjunctivae                    Neck: No thyromegaly   Chest/Lungs:   lungs are clear to auscultation, no rales or wheezing,, equal chest wall expansion    Cardiovascular:   Regular. Normal first and second heart sounds with no murmurs, rubs, or gallops; the carotid, radial and posterior tibial pulses are intact, Jugular venous pressure normal, trace edema bilaterally    Abdomen:  no tenderness; bowel sounds are present   Extremities: no cyanosis or clubbing   Skin: no xanthelasma, warm.    Neurologic: , no tremors     Psychiatric: alert and oriented  x3, calm        Please refer above for cardiac ROS details.        Medical History  Surgical History Family History Social History   Past Medical History:   Diagnosis Date    Cerebral infarction (H)     COPD (chronic obstructive pulmonary disease) (H)     HLD (hyperlipidemia)     Hypertension     Myeloproliferative disease (H)      Past Surgical History:   Procedure Laterality Date    CV CORONARY ANGIOGRAM N/A 2022    Procedure: CV CORONARY ANGIOGRAM;  Surgeon: Audrey Holder MD;  Location: [facility] CATH LAB CV    CV LEFT HEART CATH N/A 2022    Procedure: Left Heart Catheterization;  Surgeon: Audrey Holder MD;  Location: [facility] CATH LAB CV    IR LOWER EXTREMITY ANGIOGRAM LEFT  2023    OPEN REDUCTION INTERNAL FIXATION FOOT Right     OTHER SURGICAL HISTORY      Lip lesion removal    PICC TRIPLE LUMEN PLACEMENT  2022         TONSILLECTOMY & ADENOIDECTOMY       Family History   Problem Relation Age of Onset    Alcoholism Mother         Social History     Socioeconomic History    Marital status:      Spouse name: Not on file    Number of children: Not on file    Years of education: Not on file    Highest education level: Not on file   Occupational History    Not on file   Tobacco Use    Smoking status: Former     Packs/day: 1     Types: Cigarettes     Start date: 1965     Quit date: 2012     Years since quittin.9    Smokeless tobacco: Never   Vaping Use    Vaping Use: Never used   Substance and Sexual Activity    Alcohol use: Yes     Alcohol/week: 19.0 standard drinks of alcohol    Drug use: Never    Sexual activity: Not on file   Other Topics Concern    Not on file   Social History Narrative    Not on file     Social Determinants of Health     Financial Resource Strain: Not on file   Food Insecurity: Not on file   Transportation Needs: Not on file   Physical Activity: Not on file   Stress: Not on file   Social Connections: Not on file   Interpersonal Safety: Not on  "file   Housing Stability: Not on file           Medications  Allergies   Current Outpatient Medications   Medication Sig Dispense Refill    albuterol (PROAIR HFA;PROVENTIL HFA;VENTOLIN HFA) 90 mcg/actuation inhaler Inhale 2 puffs into the lungs every 6 hours as needed for shortness of breath / dyspnea      ASPIRIN LOW DOSE 81 MG EC tablet TAKE 1 TABLET (81 MG) BY MOUTH DAILY, START THE MORNING OF 4/27/22 90 tablet 3    atorvastatin (LIPITOR) 80 MG tablet Take 1 tablet (80 mg) by mouth every morning 90 tablet 1    furosemide (LASIX) 20 MG tablet Take 1 tablet (20 mg) by mouth daily 90 tablet 1    hydroxyurea (HYDREA) 500 MG capsule Take 1,000-1,500 mg by mouth See Admin Instructions 1000 mg daily- Mon, Wed, & Fri.  1500 mg daily - Tue, Thurs, Sat, & Sun      losartan (COZAAR) 25 MG tablet Take 1/2 tablet by mouth daily 45 tablet 1    metoprolol succinate ER (TOPROL-XL) 25 MG 24 hr tablet Take 25 mg by mouth daily      nitroGLYcerin (NITROSTAT) 0.4 MG sublingual tablet One tablet under the tongue every 5 minutes if needed for chest pain. May repeat every 5 minutes for a maximum of 3 doses in 15 minutes\" 25 tablet 3    omeprazole (PRILOSEC) 20 MG DR capsule Take 20 mg by mouth daily      tiotropium (SPIRIVA) 18 mcg inhalation capsule [TIOTROPIUM (SPIRIVA) 18 MCG INHALATION CAPSULE] Place 18 mcg into inhaler and inhale daily.      warfarin ANTICOAGULANT (COUMADIN) 1 MG tablet Take 3 tablets (3 mg) by mouth daily       No Known Allergies       Lab Results    Chemistry/lipid CBC Cardiac Enzymes/BNP/TSH/INR   Recent Labs   Lab Test 04/24/23  1120   CHOL 111   HDL 48   LDL 47   TRIG 81     Recent Labs   Lab Test 04/24/23  1120 05/03/21  1557 08/13/19  0926   LDL 47 52 111     Recent Labs   Lab Test 06/29/23  0708 05/31/23  1123   NA  --  139   POTASSIUM  --  4.7   CHLORIDE  --  105   CO2  --  22   GLC  --  105*   BUN  --  25.6*   CR 1.04 1.09   GFRESTIMATED 73 70   GLENDY  --  9.2     Recent Labs   Lab Test 06/29/23  0708 " "05/31/23  1123 04/24/23  1120   CR 1.04 1.09 1.06     Recent Labs   Lab Test 10/31/19  2000   A1C 6.0          Recent Labs   Lab Test 06/29/23  0708 08/24/22  0913   WBC  --  4.4   HGB 12.5* 10.2*   HCT  --  31.9*   MCV  --  131*    281     Recent Labs   Lab Test 06/29/23  0708 08/24/22  0913 08/19/22  0434   HGB 12.5* 10.2* 8.8*    Recent Labs   Lab Test 08/17/22  1621 08/17/22  1011 04/20/22  0405   TROPONINI 2.47* 3.25* 0.09     Recent Labs   Lab Test 08/17/22  1011 04/19/22  1441 04/01/22  1208   * 1,221* 379*     No results for input(s): \"TSH\" in the last 56362 hours.  Recent Labs   Lab Test 06/29/23  0708 10/05/22  1001 08/19/22  0434   INR 1.05 3.1* 1.89*        Sona Harris PA-C          Thank you for allowing me to participate in the care of your patient.      Sincerely,     Sona Murillo PA-C     [facility]  cc:   Sp Larson, DO  [address] [place] [address]      "

## 2023-12-05 NOTE — PROGRESS NOTES
HEART CARE ENCOUNTER CONSULTATON NOTE      [facility]  [phone]      Assessment/Recommendations   Assessment:   Weight gain: Appears to be due to reduced activity, poor diet. No sights or symptoms of HF on exam/history.  Chronic HFrEF/ischemic cardiomyopathy: Recent echo 6/2023 through [facility] clinic shows normalization of LVEF to 55-60% previously 30-35% on echo 8/2022  - losartan, metoprolol succinate, Lasix  Coronary artery disease: Angiogram 4/2022 Mid RCA occlusion with left-sided collaterals, mild left sided disease  Paroxysmal atrial fibrillation: Warfarin and metoprolol, regular rate today  Pulmonary embolism/Hx DVT right leg: Warfarin  Hypertension: elevated today, historically controlled, apparently controlled at home and was 1 day ago at PCP visit.     Plan:   Reduce Lasix back to 10 mg   Increase activity with walking, healthy diet to help with weight loss  Discussed monitoring for elevated BP and reporting consistent elevation to clinic for medication adjustment    Follow up in 4 months for annual follow up with Dr. Larson     History of Present Illness/Subjective    HPI: Shaji Pompa is a 78 year old male with PMHx of CAD, ischemic cardiomyopathy, anemia, myeloproliferative disorder presents for follow up after report of weight gain and increase in Lasix. Start lasix 20 mg 11/16 for weight gain, previously taking 10 mg for maintenance. After discussion patient has been noticing steady weight gain for last several months since summer. He was extra sedentary this year due to heal wound that was difficult to year. He denies recent weight gain of multiple pounds in within 1 week or few days.  Currently 210 lb.    No shortness of breath, chest pain, increased LE swelling, orthopnea, or abdominal swelling/tightness. No increased urination or change in weight with increasing Lasix. BP is elevated today, usual control of systolic BP in 130s systolic at home. Patient actually  had repeat echocardiogram through [facility] showing normalization of LVEF in June. Recent lab work with oncology is overall stable.    Patient is accompanied by his wife. They are leaving for [place] in February and returning in April 2024.    See media tab for ECHO 6/2023 with [facility]    Echocardiogram 8/2022 Results:  The rhythm was rapid atrial fibrillation.  Left ventricular function is decreased. The ejection fraction is 30-35%  (moderately reduced).  There is moderate global hypokinesia of the left ventricle.  The left atrium is mildly dilated.  The study was technically difficult. No hemodynamically significant valvular  abnormalities on 2D or color flow imaging.     Physical Examination  Review of Systems   Vitals: BP (!) 143/68 (BP Location: Left arm, Patient Position: Sitting, Cuff Size: Adult Regular)   Pulse 68   Wt 95.3 kg (210 lb)   SpO2 96%   BMI 33.89 kg/m    BMI= Body mass index is 33.89 kg/m .  Wt Readings from Last 3 Encounters:   12/05/23 95.3 kg (210 lb)   06/29/23 85.3 kg (188 lb)   06/16/23 91.2 kg (201 lb)           ENT/Mouth: membranes moist, no oral lesions or bleeding gums.      EYES:  no scleral icterus, normal conjunctivae                    Neck: No thyromegaly   Chest/Lungs:   lungs are clear to auscultation, no rales or wheezing,, equal chest wall expansion    Cardiovascular:   Regular. Normal first and second heart sounds with no murmurs, rubs, or gallops; the carotid, radial and posterior tibial pulses are intact, Jugular venous pressure normal, trace edema bilaterally    Abdomen:  no tenderness; bowel sounds are present   Extremities: no cyanosis or clubbing   Skin: no xanthelasma, warm.    Neurologic: , no tremors     Psychiatric: alert and oriented x3, calm        Please refer above for cardiac ROS details.        Medical History  Surgical History Family History Social History   Past Medical History:   Diagnosis Date    Cerebral infarction (H)     COPD (chronic  obstructive pulmonary disease) (H)     HLD (hyperlipidemia)     Hypertension     Myeloproliferative disease (H)      Past Surgical History:   Procedure Laterality Date    CV CORONARY ANGIOGRAM N/A 2022    Procedure: CV CORONARY ANGIOGRAM;  Surgeon: Audrey Holder MD;  Location: [facility] CATH LAB CV    CV LEFT HEART CATH N/A 2022    Procedure: Left Heart Catheterization;  Surgeon: Audrey Holder MD;  Location: [facility] CATH LAB CV    IR LOWER EXTREMITY ANGIOGRAM LEFT  2023    OPEN REDUCTION INTERNAL FIXATION FOOT Right     OTHER SURGICAL HISTORY      Lip lesion removal    PICC TRIPLE LUMEN PLACEMENT  2022         TONSILLECTOMY & ADENOIDECTOMY       Family History   Problem Relation Age of Onset    Alcoholism Mother         Social History     Socioeconomic History    Marital status:      Spouse name: Not on file    Number of children: Not on file    Years of education: Not on file    Highest education level: Not on file   Occupational History    Not on file   Tobacco Use    Smoking status: Former     Packs/day: 1     Types: Cigarettes     Start date: 1965     Quit date: 2012     Years since quittin.9    Smokeless tobacco: Never   Vaping Use    Vaping Use: Never used   Substance and Sexual Activity    Alcohol use: Yes     Alcohol/week: 19.0 standard drinks of alcohol    Drug use: Never    Sexual activity: Not on file   Other Topics Concern    Not on file   Social History Narrative    Not on file     Social Determinants of Health     Financial Resource Strain: Not on file   Food Insecurity: Not on file   Transportation Needs: Not on file   Physical Activity: Not on file   Stress: Not on file   Social Connections: Not on file   Interpersonal Safety: Not on file   Housing Stability: Not on file           Medications  Allergies   Current Outpatient Medications   Medication Sig Dispense Refill    albuterol (PROAIR HFA;PROVENTIL HFA;VENTOLIN HFA) 90 mcg/actuation inhaler  "Inhale 2 puffs into the lungs every 6 hours as needed for shortness of breath / dyspnea      ASPIRIN LOW DOSE 81 MG EC tablet TAKE 1 TABLET (81 MG) BY MOUTH DAILY, START THE MORNING OF 4/27/22 90 tablet 3    atorvastatin (LIPITOR) 80 MG tablet Take 1 tablet (80 mg) by mouth every morning 90 tablet 1    furosemide (LASIX) 20 MG tablet Take 1 tablet (20 mg) by mouth daily 90 tablet 1    hydroxyurea (HYDREA) 500 MG capsule Take 1,000-1,500 mg by mouth See Admin Instructions 1000 mg daily- Mon, Wed, & Fri.  1500 mg daily - Tue, Thurs, Sat, & Sun      losartan (COZAAR) 25 MG tablet Take 1/2 tablet by mouth daily 45 tablet 1    metoprolol succinate ER (TOPROL-XL) 25 MG 24 hr tablet Take 25 mg by mouth daily      nitroGLYcerin (NITROSTAT) 0.4 MG sublingual tablet One tablet under the tongue every 5 minutes if needed for chest pain. May repeat every 5 minutes for a maximum of 3 doses in 15 minutes\" 25 tablet 3    omeprazole (PRILOSEC) 20 MG DR capsule Take 20 mg by mouth daily      tiotropium (SPIRIVA) 18 mcg inhalation capsule [TIOTROPIUM (SPIRIVA) 18 MCG INHALATION CAPSULE] Place 18 mcg into inhaler and inhale daily.      warfarin ANTICOAGULANT (COUMADIN) 1 MG tablet Take 3 tablets (3 mg) by mouth daily       No Known Allergies       Lab Results    Chemistry/lipid CBC Cardiac Enzymes/BNP/TSH/INR   Recent Labs   Lab Test 04/24/23  1120   CHOL 111   HDL 48   LDL 47   TRIG 81     Recent Labs   Lab Test 04/24/23  1120 05/03/21  1557 08/13/19  0926   LDL 47 52 111     Recent Labs   Lab Test 06/29/23  0708 05/31/23  1123   NA  --  139   POTASSIUM  --  4.7   CHLORIDE  --  105   CO2  --  22   GLC  --  105*   BUN  --  25.6*   CR 1.04 1.09   GFRESTIMATED 73 70   GLENDY  --  9.2     Recent Labs   Lab Test 06/29/23  0708 05/31/23  1123 04/24/23  1120   CR 1.04 1.09 1.06     Recent Labs   Lab Test 10/31/19  2000   A1C 6.0          Recent Labs   Lab Test 06/29/23  0708 08/24/22  0913   WBC  --  4.4   HGB 12.5* 10.2*   HCT  --  31.9*   MCV " " --  131*    281     Recent Labs   Lab Test 06/29/23  0708 08/24/22  0913 08/19/22  0434   HGB 12.5* 10.2* 8.8*    Recent Labs   Lab Test 08/17/22  1621 08/17/22  1011 04/20/22  0405   TROPONINI 2.47* 3.25* 0.09     Recent Labs   Lab Test 08/17/22  1011 04/19/22  1441 04/01/22  1208   * 1,221* 379*     No results for input(s): \"TSH\" in the last 17583 hours.  Recent Labs   Lab Test 06/29/23  0708 10/05/22  1001 08/19/22  0434   INR 1.05 3.1* 1.89*        Sona Harris PA-C                                       "

## 2023-12-24 ENCOUNTER — HEALTH MAINTENANCE LETTER (OUTPATIENT)
Age: 78
End: 2023-12-24

## 2024-01-16 DIAGNOSIS — I25.119 CORONARY ARTERY DISEASE INVOLVING NATIVE CORONARY ARTERY OF NATIVE HEART WITH ANGINA PECTORIS (H): ICD-10-CM

## 2024-01-16 DIAGNOSIS — I25.5 ISCHEMIC CARDIOMYOPATHY: ICD-10-CM

## 2024-01-16 DIAGNOSIS — E78.2 MIXED HYPERLIPIDEMIA: ICD-10-CM

## 2024-01-16 RX ORDER — ATORVASTATIN CALCIUM 80 MG/1
80 TABLET, FILM COATED ORAL EVERY MORNING
Qty: 90 TABLET | Refills: 1 | Status: SHIPPED | OUTPATIENT
Start: 2024-01-16 | End: 2024-09-12

## 2024-01-16 RX ORDER — ASPIRIN 81 MG/1
TABLET, COATED ORAL
Qty: 90 TABLET | Refills: 1 | Status: SHIPPED | OUTPATIENT
Start: 2024-01-16 | End: 2024-09-12

## 2024-01-23 ENCOUNTER — ANCILLARY PROCEDURE (OUTPATIENT)
Dept: VASCULAR ULTRASOUND | Facility: CLINIC | Age: 79
End: 2024-01-23
Attending: RADIOLOGY
Payer: COMMERCIAL

## 2024-01-23 ENCOUNTER — OFFICE VISIT (OUTPATIENT)
Dept: VASCULAR SURGERY | Facility: CLINIC | Age: 79
End: 2024-01-23
Attending: RADIOLOGY
Payer: COMMERCIAL

## 2024-01-23 VITALS
DIASTOLIC BLOOD PRESSURE: 82 MMHG | OXYGEN SATURATION: 98 % | HEART RATE: 60 BPM | RESPIRATION RATE: 16 BRPM | SYSTOLIC BLOOD PRESSURE: 151 MMHG

## 2024-01-23 DIAGNOSIS — I70.244 ATHEROSCLEROSIS OF NATIVE ARTERY OF LEFT LOWER EXTREMITY WITH ULCERATION OF HEEL (H): ICD-10-CM

## 2024-01-23 DIAGNOSIS — Z48.812 ENCOUNTER FOR SURGICAL AFTERCARE FOLLOWING SURGERY ON THE CIRCULATORY SYSTEM: ICD-10-CM

## 2024-01-23 DIAGNOSIS — I73.9 PAD (PERIPHERAL ARTERY DISEASE) (H): Primary | ICD-10-CM

## 2024-01-23 DIAGNOSIS — I73.9 PERIPHERAL VASCULAR DISEASE, UNSPECIFIED (H): ICD-10-CM

## 2024-01-23 PROCEDURE — G0463 HOSPITAL OUTPT CLINIC VISIT: HCPCS | Mod: 25

## 2024-01-23 PROCEDURE — 93923 UPR/LXTR ART STDY 3+ LVLS: CPT

## 2024-01-23 PROCEDURE — 93926 LOWER EXTREMITY STUDY: CPT | Mod: LT

## 2024-01-23 NOTE — PATIENT INSTRUCTIONS
Kevin Girard,    Thank you for entrusting your care with us today. After your visit today with MD Eulalio Farrar this is the plan that was discussed at your appointment.    We will contact you in a few months to schedule 1 year follow-up with Dr. Farrar with studies    I am including additional information on these things and our contact information if you have any questions or concerns.   Please do not hesitate to reach out to us if you felt we did not answer your questions or you are unsure of the treatment plan after your visit today. Our number is [phone].Thank you for trusting us with your care.         Again thank you for your time.

## 2024-01-23 NOTE — PROGRESS NOTES
VASCULAR AND INTERVENTIONAL OUTPATIENT CONSULT OR VISIT  PHYSICIAN: Eulalio Farrar MD  ESTABLISHED PATIENT    LOCATION: [facility]    Shaji Pompa   Medical Record #:  [id number]  YOB: 1945  Age:  78 year old     Date of Service: 1/23/2024    PRIMARY CARE PROVIDER: Steven Caraballo    Reason for visit: Nonhealing left heel wound/critical limb ischemia     IMPRESSION: 78-year-old male with past history of a nonhealing left heel wound in setting of peripheral vascular disease status post revascularization.  The patient reports complete healing of his heel wound and reports no claudication type symptoms.  Noninvasive imaging performed the lab demonstrates an ankle-brachial index of 0.71 on the right and 0.93 on the left.  Both toe pressures are adequate for wound healing.  The patient does note a healing blister on the bottom of his right foot as well as toe fungus involving his left foot.  Overall, stable peripheral vascular disease.     RECOMMENDATION:    1.  Guideline recommended medical therapy for peripheral arterial disease (Janay category 0)  -The patient is on full dose anticoagulation with warfarin plus low-dose aspirin  -Continue high intensity statin therapy with Lipitor 80 mg once daily  -Continue smoking cessation  -Moderate bilateral carotid stenosis on screening ultrasound performed in July.  Will plan for a repeat carotid ultrasound prior to the next visit.     Continue medical management as noted above. Plan for 12-month follow-up or sooner should the need arise.     HPI:  Shaji Pompa is a 78 year old male who was evaluated today in follow up for peripheral vascular disease in the setting of a nonhealing left heel ulcer. The patient underwent a left lower extremity angiogram with successful revascularization on 6/29/2023.  His perioperative course was unremarkable and there has been complete healing of his wound.  The patient reports he has been  dealing with fungus involving a digit in his left foot.  The patient also reports developing a blister on the bottom of his right foot after wearing a pair of ill fitting shoes.  The patient reports that his right foot blister is nearly completely healed.  The patient denies any claudication symptoms.  He is a former smoker.  He has a history of coronary artery disease.    PHH:    Past Medical History:   Diagnosis Date    Cerebral infarction (H)     COPD (chronic obstructive pulmonary disease) (H)     HLD (hyperlipidemia)     Hypertension     Myeloproliferative disease (H)         Past Surgical History:   Procedure Laterality Date    CV CORONARY ANGIOGRAM N/A 4/26/2022    Procedure: CV CORONARY ANGIOGRAM;  Surgeon: Audrey Holder MD;  Location: [facility] CATH LAB CV    CV LEFT HEART CATH N/A 4/26/2022    Procedure: Left Heart Catheterization;  Surgeon: Audrey Holder MD;  Location: [facility] CATH LAB CV    IR LOWER EXTREMITY ANGIOGRAM LEFT  6/29/2023    OPEN REDUCTION INTERNAL FIXATION FOOT Right 2010    OTHER SURGICAL HISTORY  2001    Lip lesion removal    PICC TRIPLE LUMEN PLACEMENT  8/17/2022         TONSILLECTOMY & ADENOIDECTOMY         ALLERGIES:  Patient has no known allergies.    MEDS:    Current Outpatient Medications:     albuterol (PROAIR HFA;PROVENTIL HFA;VENTOLIN HFA) 90 mcg/actuation inhaler, Inhale 2 puffs into the lungs every 6 hours as needed for shortness of breath / dyspnea, Disp: , Rfl:     aspirin (ASPIRIN LOW DOSE) 81 MG EC tablet, TAKE 1 TABLET (81 MG) BY MOUTH DAILY. START THE MORNING OF 4/27/22, Disp: 90 tablet, Rfl: 1    atorvastatin (LIPITOR) 80 MG tablet, Take 1 tablet (80 mg) by mouth every morning, Disp: 90 tablet, Rfl: 1    furosemide (LASIX) 20 MG tablet, Take 0.5 tablets (10 mg) by mouth daily, Disp: 45 tablet, Rfl: 3    hydroxyurea (HYDREA) 500 MG capsule, Take 1,000-1,500 mg by mouth See Admin Instructions 1000 mg daily- Mon, Wed, & Fri. 1500 mg daily - Tue, Thurs, Sat, & Sun,  "Disp: , Rfl:     losartan (COZAAR) 25 MG tablet, Take 1/2 tablet by mouth daily, Disp: 45 tablet, Rfl: 1    metoprolol succinate ER (TOPROL-XL) 25 MG 24 hr tablet, Take 25 mg by mouth daily, Disp: , Rfl:     nitroGLYcerin (NITROSTAT) 0.4 MG sublingual tablet, One tablet under the tongue every 5 minutes if needed for chest pain. May repeat every 5 minutes for a maximum of 3 doses in 15 minutes\", Disp: 25 tablet, Rfl: 3    omeprazole (PRILOSEC) 20 MG DR capsule, Take 20 mg by mouth daily, Disp: , Rfl:     tiotropium (SPIRIVA) 18 mcg inhalation capsule, [TIOTROPIUM (SPIRIVA) 18 MCG INHALATION CAPSULE] Place 18 mcg into inhaler and inhale daily., Disp: , Rfl:     warfarin ANTICOAGULANT (COUMADIN) 1 MG tablet, Take 3 tablets (3 mg) by mouth daily, Disp: , Rfl:     SOCIAL HABITS:    History   Smoking Status    Former    Packs/day: 1.00    Types: Cigarettes    Start date: 6/8/1965    Quit date: 1/1/2012   Smokeless Tobacco    Never     Social History    Substance and Sexual Activity      Alcohol use: Yes        Alcohol/week: 19.0 standard drinks of alcohol      History   Drug Use Unknown       FAMILY HISTORY:    Family History   Problem Relation Age of Onset    Alcoholism Mother        ADVANCE CARE DIRECTIVES:    Advance care directives reviewed in the chart and no changes made.     PE:  There were no vitals taken for this visit.  Wt Readings from Last 1 Encounters:   12/05/23 95.3 kg (210 lb)     There is no height or weight on file to calculate BMI.    EXAM:  GENERAL: This is a well-developed 78 year old male who appears his stated age  EYES: Grossly normal.  MOUTH: Buccal mucosa normal   MUSCULOSKELETAL: Grossly normal and both lower extremities are intact.  HEME/LYMPH: No lymphedema  NEUROLOGIC: Focally intact, Alert and oriented x 3.   PSYCH: appropriate affect    DIAGNOSTIC STUDIES:     Images:  No results found.    LABS:      Sodium   Date Value Ref Range Status   05/31/2023 139 136 - 145 mmol/L Final   04/24/2023 " 137 136 - 145 mmol/L Final   08/24/2022 140 136 - 145 mmol/L Final     Urea Nitrogen   Date Value Ref Range Status   05/31/2023 25.6 (H) 8.0 - 23.0 mg/dL Final   04/24/2023 28.3 (H) 8.0 - 23.0 mg/dL Final   08/24/2022 16.6 8.0 - 23.0 mg/dL Final   08/19/2022 22 8 - 28 mg/dL Final   08/18/2022 21 8 - 28 mg/dL Final   08/17/2022 27 8 - 28 mg/dL Final     Hemoglobin   Date Value Ref Range Status   06/29/2023 12.5 (L) 13.3 - 17.7 g/dL Final   08/24/2022 10.2 (L) 13.3 - 17.7 g/dL Final   08/19/2022 8.8 (L) 13.3 - 17.7 g/dL Final     Platelet Count   Date Value Ref Range Status   06/29/2023 187 150 - 450 10e3/uL Final   08/24/2022 281 150 - 450 10e3/uL Final   08/19/2022 229 150 - 450 10e3/uL Final     BNP   Date Value Ref Range Status   08/17/2022 200 (H) 0 - 80 pg/mL Final   04/19/2022 1,221 (H) 0 - 78 pg/mL Final   04/01/2022 379 (H) 0 - 78 pg/mL Final     INR   Date Value Ref Range Status   06/29/2023 1.05 0.85 - 1.15 Final   08/19/2022 1.89 (H) 0.85 - 1.15 Final   08/18/2022 2.12 (H) 0.85 - 1.15 Final     INR (External)   Date Value Ref Range Status   10/05/2022 3.1 (A) 0.9 - 1.1 Final       This was a in person visit in which 45 minutes of  total time was spent (either in face-to-face or non-face-to-face time).    Eulalio Farrar MD, [facility]  VASCULAR AND INTERVENTIONAL PHYSICIAN  VASCULAR MEDICINE  INTERNAL MEDICINE  PAGER: [phone]  CALL SERVICE: [phone]

## 2024-01-23 NOTE — PROGRESS NOTES
[facility]        Patient is here for a 9 month follow up  to discuss Peripheral artery disease (PAD). Symptoms include none in both lower extremities. Was seen in the past for intervention for non healing left heel wound.    Pt is currently taking Aspirin, Statin, and Warfarin.    BP (!) 151/82   Pulse 60   Resp 16   SpO2 98%     The provider has been notified that the patient has no concerns.     Questions patient would like addressed today are: N/A.    Refills are needed: No    Has homecare services and agency name:  No

## 2024-01-29 ENCOUNTER — LAB REQUISITION (OUTPATIENT)
Dept: LAB | Facility: CLINIC | Age: 79
End: 2024-01-29

## 2024-01-29 ENCOUNTER — TRANSFERRED RECORDS (OUTPATIENT)
Dept: HEALTH INFORMATION MANAGEMENT | Facility: CLINIC | Age: 79
End: 2024-01-29

## 2024-01-29 DIAGNOSIS — I25.10 ATHEROSCLEROTIC HEART DISEASE OF NATIVE CORONARY ARTERY WITHOUT ANGINA PECTORIS: ICD-10-CM

## 2024-01-29 PROCEDURE — 80048 BASIC METABOLIC PNL TOTAL CA: CPT | Performed by: FAMILY MEDICINE

## 2024-01-30 LAB
ANION GAP SERPL CALCULATED.3IONS-SCNC: 10 MMOL/L (ref 7–15)
BUN SERPL-MCNC: 26.5 MG/DL (ref 8–23)
CALCIUM SERPL-MCNC: 9 MG/DL (ref 8.8–10.2)
CHLORIDE SERPL-SCNC: 107 MMOL/L (ref 98–107)
CREAT SERPL-MCNC: 1.04 MG/DL (ref 0.67–1.17)
DEPRECATED HCO3 PLAS-SCNC: 24 MMOL/L (ref 22–29)
EGFRCR SERPLBLD CKD-EPI 2021: 73 ML/MIN/1.73M2
GLUCOSE SERPL-MCNC: 89 MG/DL (ref 70–99)
POTASSIUM SERPL-SCNC: 4.1 MMOL/L (ref 3.4–5.3)
SODIUM SERPL-SCNC: 141 MMOL/L (ref 135–145)

## 2024-02-01 DIAGNOSIS — I50.22 CHRONIC SYSTOLIC CONGESTIVE HEART FAILURE, NYHA CLASS 2 (H): ICD-10-CM

## 2024-02-01 RX ORDER — LOSARTAN POTASSIUM 25 MG/1
12.5 TABLET ORAL DAILY
Qty: 45 TABLET | Refills: 1 | Status: SHIPPED | OUTPATIENT
Start: 2024-02-01 | End: 2024-04-29

## 2024-04-29 ENCOUNTER — OFFICE VISIT (OUTPATIENT)
Dept: CARDIOLOGY | Facility: CLINIC | Age: 79
End: 2024-04-29
Payer: COMMERCIAL

## 2024-04-29 VITALS
BODY MASS INDEX: 33.39 KG/M2 | WEIGHT: 206.9 LBS | DIASTOLIC BLOOD PRESSURE: 70 MMHG | SYSTOLIC BLOOD PRESSURE: 150 MMHG | OXYGEN SATURATION: 94 % | HEART RATE: 55 BPM

## 2024-04-29 DIAGNOSIS — I25.10 CORONARY ARTERY DISEASE INVOLVING NATIVE CORONARY ARTERY OF NATIVE HEART WITHOUT ANGINA PECTORIS: ICD-10-CM

## 2024-04-29 DIAGNOSIS — I25.5 ISCHEMIC CARDIOMYOPATHY: ICD-10-CM

## 2024-04-29 DIAGNOSIS — I10 ESSENTIAL HYPERTENSION: ICD-10-CM

## 2024-04-29 DIAGNOSIS — I50.22 CHRONIC SYSTOLIC CONGESTIVE HEART FAILURE, NYHA CLASS 2 (H): Primary | ICD-10-CM

## 2024-04-29 DIAGNOSIS — I48.0 PAROXYSMAL ATRIAL FIBRILLATION (H): ICD-10-CM

## 2024-04-29 DIAGNOSIS — E78.2 MIXED HYPERLIPIDEMIA: ICD-10-CM

## 2024-04-29 PROCEDURE — G2211 COMPLEX E/M VISIT ADD ON: HCPCS | Performed by: INTERNAL MEDICINE

## 2024-04-29 PROCEDURE — 99214 OFFICE O/P EST MOD 30 MIN: CPT | Performed by: INTERNAL MEDICINE

## 2024-04-29 RX ORDER — LOSARTAN POTASSIUM 25 MG/1
25 TABLET ORAL DAILY
Qty: 90 TABLET | Refills: 3 | Status: SHIPPED | OUTPATIENT
Start: 2024-04-29

## 2024-04-29 NOTE — LETTER
4/29/2024    Steven Caraballo MD, MD  [address] [place] [address]    RE: Shaji Pompa       Dear Colleague,     I had the pleasure of seeing Shaji Pompa in the [facility].    HEART CARE CONSULTATON NOTE        Assessment/Recommendations   Assessment:   1.  Chronic congestive heart failure with reduced ejection fraction.  Ischemic cardiomyopathy. LVEF: 35%. NYHA early II (stable).   2.  Coronary Artery disease with occluded mid right coronary artery.  Collaterals from the left and proximal RCA.  No anginal symptoms  3.  Severe coronary calcification on CT.   4.  Paroxysmal A. fib with rapid ventricular response (resolved).  Occurred in August 2022 in the setting of COVID-19 infection. Sinus today.   5.  Ischemic cardiomyopathy  6.  Pulmonary embolism, bilateral  7.  DVT right leg   8.  Chronic anemia, improved, 2/2 MPD.     Hemoglobin   Date Value Ref Range Status   06/29/2023 12.5 (L) 13.3 - 17.7 g/dL Final   9.  Thrombocytopenia, improved.   Platelet Count   Date Value Ref Range Status   06/29/2023 187 150 - 450 10e3/uL Final   10.  Myeloproliferative disorder, followed at [facility]    Plan:   1.  Lasix 10 mg daily  2.  Losartan mg daily, home BP stable (reviewed).   3.  Continue atorvastatin to 80 mg daily.  Recent LDL well controlled.  LDL Cholesterol Calculated   Date Value Ref Range Status   04/24/2023 47 <=100 mg/dL Final   4.  Warfarin for DVT, INR 2-3.  Patient is noticing bruising across his arm.  We did discuss Eliquis and Xarelto therapy as an alternative.   5.  Aspirin 81 mg for coronary artery disease  6.  Continue metoprolol 25 mg XL daily.         History of Present Illness/Subjective    HPI: Patient is a 78-year-old male with ischemic cardiomyopathy, heart failure with reduced ejection fraction, coronary artery disease, hypertension, hyperlipidemia presents to [facility] in follow-up.     Last clinical evaluation patient is doing  well.  He wintered in [place] with no difficulties this year.  Denies any anginal chest pain, orthopnea or PND symptoms.  He has mild lower extremity edema which has been chronic and stable.  Occasional dyspnea with strenuous activity.  Overall symptoms are stable.  Compliant with current medications.  Blood pressure has remained elevated.  Uptitrating losartan today.  Home blood pressures systolically in the 140s.  Advised him to lower his sodium intake and increase activity throughout the day.  Reviewed outpatient notes by cardiology KATTY.  Reviewed most recent labs    Echo reviewed from 8/17/22  The rhythm was rapid atrial fibrillation.  Left ventricular function is decreased. The ejection fraction is 30-35%  (moderately reduced).  There is moderate global hypokinesia of the left ventricle.  The left atrium is mildly dilated.  The study was technically difficult. No hemodynamically significant valvular  abnormalities on 2D or color flow imaging.    Coronary Angiogram: 4/26/22  Left Main   Mid LM to Dist LM lesion is 25% stenosed. The lesion is calcified.   Left Anterior Descending   Prox LAD to Mid LAD lesion is 20% stenosed. The lesion is calcified.   Ramus Intermedius   The vessel is large.   Left Circumflex   Mid Cx lesion is 30% stenosed. The lesion is calcified.   First Obtuse Marginal Branch   The vessel is small.   Second Obtuse Marginal Branch   The vessel is small.   Right Coronary Artery   Prox RCA lesion is 90% stenosed.   Prox RCA to Dist RCA lesion is 100% stenosed.   Acute Marginal Branch   Collaterals   Acute Mrg filled by collaterals from RV Branch.      Right Posterior Descending Artery   Collaterals   RPDA filled by collaterals from 2nd Sept.      Right Posterior Atrioventricular Artery   Collaterals   RPAV filled by collaterals from Dist Cx          Physical Examination  Review of Systems   VITALS: BP (!) 150/70 (BP Location: Right arm, Patient Position: Sitting, Cuff Size: Adult Regular)    Pulse 55   Wt 93.8 kg (206 lb 14.4 oz)   SpO2 94%   BMI 33.39 kg/m    BMI: Body mass index is 33.39 kg/m .  Wt Readings from Last 3 Encounters:   04/29/24 93.8 kg (206 lb 14.4 oz)   12/05/23 95.3 kg (210 lb)   06/29/23 85.3 kg (188 lb)       Wt Readings from Last 5 Encounters:   04/29/24 93.8 kg (206 lb 14.4 oz)   12/05/23 95.3 kg (210 lb)   06/29/23 85.3 kg (188 lb)   06/16/23 91.2 kg (201 lb)   04/28/23 88 kg (194 lb)       General Appearance:   no distress, normal body habitus, in bed.    ENT/Mouth: membranes moist, no oral lesions or bleeding gums.      EYES:  no scleral icterus, normal conjunctivae   Neck: no carotid bruits or thyromegaly   Chest/Lungs:    No wheezing.  Clear bilaterally.   Cardiovascular:   Regular. Normal first and second heart sounds with no murmurs, rubs, or gallops; the carotid, radial and posterior tibial pulses are intact, Jugular venous pressure normal, mild bilateral lower extremity (unchanged).    Abdomen:  no  tenderness; bowel sounds are present   Extremities: no cyanosis or clubbing   Skin: no xanthelasma, warm.    Neurologic: normal  bilateral, no tremors     Psychiatric: alert and oriented x3, calm     Review Of Systems  Skin: negative  Eyes: negative  Ears/Nose/Throat: negative  Respiratory: Mild dyspnea.   Cardiovascular: Mild exertional dyspnea.  No anginal chest pain.  Gastrointestinal: negative  Genitourinary: negative  Musculoskeletal: negative  Neurologic: negative  Psychiatric: negative  Hematologic/Lymphatic/Immunologic: negative  Endocrine: negative          Lab Results    Chemistry/lipid CBC Cardiac Enzymes/BNP/TSH/INR   Recent Labs   Lab Test 05/03/21  1557   CHOL 110   HDL 35*   LDL 52   TRIG 115     Recent Labs   Lab Test 05/03/21  1557 08/13/19  0926   LDL 52 111     Recent Labs   Lab Test 04/23/22  0441      POTASSIUM 3.8   CHLORIDE 106   CO2 27      BUN 22   CR 0.76   GFRESTIMATED >90   GLENDY 7.7*     Lab Results   Component Value Date    WBC  5.9 05/01/2022     Lab Results   Component Value Date    RBC 2.40 05/01/2022     Lab Results   Component Value Date    HGB 8.6 05/01/2022     Lab Results   Component Value Date    HCT 27.9 05/01/2022     No components found for: MCT  Lab Results   Component Value Date     05/01/2022     Lab Results   Component Value Date    MCH 35.8 05/01/2022     Lab Results   Component Value Date    MCHC 30.8 05/01/2022     Lab Results   Component Value Date    RDW  05/01/2022      Comment:      Dimorphic Population - Unable to Calculate     Lab Results   Component Value Date     05/01/2022          Recent Labs     Recent Labs   Lab Test 04/23/22  0441 04/22/22  0558 04/21/22  0502   HGB 7.0* 7.5* 7.3*    Recent Labs   Lab Test 04/20/22  0405 04/19/22  2143 04/19/22  1441   TROPONINI 0.09 0.11 0.10     Recent Labs   Lab Test 04/19/22  1441 04/01/22  1208   BNP 1,221* 379*     No results for input(s): TSH in the last 42489 hours.  Recent Labs   Lab Test 04/23/22  0441 04/21/22  0502 04/20/22  0405   INR 1.97* 2.82* 2.67*        Medical History  Surgical History Family History Social History   Past Medical History:   Diagnosis Date    Cerebral infarction (H)     COPD (chronic obstructive pulmonary disease) (H)     HLD (hyperlipidemia)     Hypertension     Myeloproliferative disease (H)      Past Surgical History:   Procedure Laterality Date    CV CORONARY ANGIOGRAM N/A 4/26/2022    Procedure: CV CORONARY ANGIOGRAM;  Surgeon: Audrey Holder MD;  Location: [facility] CATH LAB CV    CV LEFT HEART CATH N/A 4/26/2022    Procedure: Left Heart Catheterization;  Surgeon: Audrey Holder MD;  Location: [facility] CATH LAB CV    IR LOWER EXTREMITY ANGIOGRAM LEFT  6/29/2023    OPEN REDUCTION INTERNAL FIXATION FOOT Right 2010    OTHER SURGICAL HISTORY  2001    Lip lesion removal    PICC TRIPLE LUMEN PLACEMENT  8/17/2022         TONSILLECTOMY & ADENOIDECTOMY       Family History   Problem Relation Age of Onset    Alcoholism Mother          Social History     Socioeconomic History    Marital status:      Spouse name: Not on file    Number of children: Not on file    Years of education: Not on file    Highest education level: Not on file   Occupational History    Not on file   Tobacco Use    Smoking status: Former     Current packs/day: 0.00     Average packs/day: 1 pack/day for 46.6 years (46.6 ttl pk-yrs)     Types: Cigarettes     Start date: 1965     Quit date: 2012     Years since quittin.3    Smokeless tobacco: Never   Vaping Use    Vaping status: Never Used   Substance and Sexual Activity    Alcohol use: Yes     Alcohol/week: 19.0 standard drinks of alcohol    Drug use: Never    Sexual activity: Not on file   Other Topics Concern    Not on file   Social History Narrative    Not on file     Social Determinants of Health     Financial Resource Strain: Not on file   Food Insecurity: Not on file   Transportation Needs: Not on file   Physical Activity: Not on file   Stress: Not on file   Social Connections: Not on file   Interpersonal Safety: Not on file   Housing Stability: Not on file         Medications  Allergies   Current Outpatient Medications   Medication Sig Dispense Refill    albuterol (PROAIR HFA;PROVENTIL HFA;VENTOLIN HFA) 90 mcg/actuation inhaler Inhale 2 puffs into the lungs every 6 hours as needed for shortness of breath / dyspnea      aspirin (ASPIRIN LOW DOSE) 81 MG EC tablet TAKE 1 TABLET (81 MG) BY MOUTH DAILY. START THE MORNING OF 22 90 tablet 1    atorvastatin (LIPITOR) 80 MG tablet Take 1 tablet (80 mg) by mouth every morning 90 tablet 1    furosemide (LASIX) 20 MG tablet Take 0.5 tablets (10 mg) by mouth daily 45 tablet 3    hydroxyurea (HYDREA) 500 MG capsule Take 1,000-1,500 mg by mouth See Admin Instructions 1000 mg daily- Wed, Th, Fri, Sat, & Sun  1500 mg daily - Mon & Tu      losartan (COZAAR) 25 MG tablet Take ONE-HALF tablet by mouth daily 45 tablet 1    metoprolol succinate ER  "(TOPROL-XL) 25 MG 24 hr tablet Take 25 mg by mouth daily      nitroGLYcerin (NITROSTAT) 0.4 MG sublingual tablet One tablet under the tongue every 5 minutes if needed for chest pain. May repeat every 5 minutes for a maximum of 3 doses in 15 minutes\" 25 tablet 3    omeprazole (PRILOSEC) 20 MG DR capsule Take 20 mg by mouth daily      tiotropium (SPIRIVA) 18 mcg inhalation capsule [TIOTROPIUM (SPIRIVA) 18 MCG INHALATION CAPSULE] Place 18 mcg into inhaler and inhale daily.      warfarin ANTICOAGULANT (COUMADIN) 1 MG tablet Take 3 tablets (3 mg) by mouth daily        No Known Allergies      Sp Larson DO    Patient require longitudinal management of his congestive heart failure.  Will continue to have frequent follow-ups in cardiology.    Thank you for allowing me to participate in the care of your patient.      Sincerely,     Sp Larson DO     [facility]  cc:   Sp Larson DO  [address] [place] [address]  "

## 2024-04-29 NOTE — PROGRESS NOTES
HEART CARE CONSULTATON NOTE        Assessment/Recommendations   Assessment:   1.  Chronic congestive heart failure with reduced ejection fraction.  Ischemic cardiomyopathy. LVEF: 35%. NYHA early II (stable).   2.  Coronary Artery disease with occluded mid right coronary artery.  Collaterals from the left and proximal RCA.  No anginal symptoms  3.  Severe coronary calcification on CT.   4.  Paroxysmal A. fib with rapid ventricular response (resolved).  Occurred in August 2022 in the setting of COVID-19 infection. Sinus today.   5.  Ischemic cardiomyopathy  6.  Pulmonary embolism, bilateral  7.  DVT right leg   8.  Chronic anemia, improved, 2/2 MPD.     Hemoglobin   Date Value Ref Range Status   06/29/2023 12.5 (L) 13.3 - 17.7 g/dL Final   9.  Thrombocytopenia, improved.   Platelet Count   Date Value Ref Range Status   06/29/2023 187 150 - 450 10e3/uL Final   10.  Myeloproliferative disorder, followed at [facility]    Plan:   1.  Lasix 10 mg daily  2.  Losartan mg daily, home BP stable (reviewed).   3.  Continue atorvastatin to 80 mg daily.  Recent LDL well controlled.  LDL Cholesterol Calculated   Date Value Ref Range Status   04/24/2023 47 <=100 mg/dL Final   4.  Warfarin for DVT, INR 2-3.  Patient is noticing bruising across his arm.  We did discuss Eliquis and Xarelto therapy as an alternative.   5.  Aspirin 81 mg for coronary artery disease  6.  Continue metoprolol 25 mg XL daily.         History of Present Illness/Subjective    HPI: Patient is a 78-year-old male with ischemic cardiomyopathy, heart failure with reduced ejection fraction, coronary artery disease, hypertension, hyperlipidemia presents to [facility] clinic in follow-up.     Last clinical evaluation patient is doing well.  He wintered in [place] with no difficulties this year.  Denies any anginal chest pain, orthopnea or PND symptoms.  He has mild lower extremity edema which has been chronic and stable.  Occasional dyspnea with strenuous  activity.  Overall symptoms are stable.  Compliant with current medications.  Blood pressure has remained elevated.  Uptitrating losartan today.  Home blood pressures systolically in the 140s.  Advised him to lower his sodium intake and increase activity throughout the day.  Reviewed outpatient notes by cardiology KATTY.  Reviewed most recent labs    Echo reviewed from 8/17/22  The rhythm was rapid atrial fibrillation.  Left ventricular function is decreased. The ejection fraction is 30-35%  (moderately reduced).  There is moderate global hypokinesia of the left ventricle.  The left atrium is mildly dilated.  The study was technically difficult. No hemodynamically significant valvular  abnormalities on 2D or color flow imaging.    Coronary Angiogram: 4/26/22  Left Main   Mid LM to Dist LM lesion is 25% stenosed. The lesion is calcified.   Left Anterior Descending   Prox LAD to Mid LAD lesion is 20% stenosed. The lesion is calcified.   Ramus Intermedius   The vessel is large.   Left Circumflex   Mid Cx lesion is 30% stenosed. The lesion is calcified.   First Obtuse Marginal Branch   The vessel is small.   Second Obtuse Marginal Branch   The vessel is small.   Right Coronary Artery   Prox RCA lesion is 90% stenosed.   Prox RCA to Dist RCA lesion is 100% stenosed.   Acute Marginal Branch   Collaterals   Acute Mrg filled by collaterals from RV Branch.      Right Posterior Descending Artery   Collaterals   RPDA filled by collaterals from 2nd Sept.      Right Posterior Atrioventricular Artery   Collaterals   RPAV filled by collaterals from Dist Cx          Physical Examination  Review of Systems   VITALS: BP (!) 150/70 (BP Location: Right arm, Patient Position: Sitting, Cuff Size: Adult Regular)   Pulse 55   Wt 93.8 kg (206 lb 14.4 oz)   SpO2 94%   BMI 33.39 kg/m    BMI: Body mass index is 33.39 kg/m .  Wt Readings from Last 3 Encounters:   04/29/24 93.8 kg (206 lb 14.4 oz)   12/05/23 95.3 kg (210 lb)   06/29/23 85.3 kg  (188 lb)       Wt Readings from Last 5 Encounters:   04/29/24 93.8 kg (206 lb 14.4 oz)   12/05/23 95.3 kg (210 lb)   06/29/23 85.3 kg (188 lb)   06/16/23 91.2 kg (201 lb)   04/28/23 88 kg (194 lb)       General Appearance:   no distress, normal body habitus, in bed.    ENT/Mouth: membranes moist, no oral lesions or bleeding gums.      EYES:  no scleral icterus, normal conjunctivae   Neck: no carotid bruits or thyromegaly   Chest/Lungs:    No wheezing.  Clear bilaterally.   Cardiovascular:   Regular. Normal first and second heart sounds with no murmurs, rubs, or gallops; the carotid, radial and posterior tibial pulses are intact, Jugular venous pressure normal, mild bilateral lower extremity (unchanged).    Abdomen:  no  tenderness; bowel sounds are present   Extremities: no cyanosis or clubbing   Skin: no xanthelasma, warm.    Neurologic: normal  bilateral, no tremors     Psychiatric: alert and oriented x3, calm     Review Of Systems  Skin: negative  Eyes: negative  Ears/Nose/Throat: negative  Respiratory: Mild dyspnea.   Cardiovascular: Mild exertional dyspnea.  No anginal chest pain.  Gastrointestinal: negative  Genitourinary: negative  Musculoskeletal: negative  Neurologic: negative  Psychiatric: negative  Hematologic/Lymphatic/Immunologic: negative  Endocrine: negative          Lab Results    Chemistry/lipid CBC Cardiac Enzymes/BNP/TSH/INR   Recent Labs   Lab Test 05/03/21  1557   CHOL 110   HDL 35*   LDL 52   TRIG 115     Recent Labs   Lab Test 05/03/21  1557 08/13/19  0926   LDL 52 111     Recent Labs   Lab Test 04/23/22  0441      POTASSIUM 3.8   CHLORIDE 106   CO2 27      BUN 22   CR 0.76   GFRESTIMATED >90   GLENDY 7.7*     Lab Results   Component Value Date    WBC 5.9 05/01/2022     Lab Results   Component Value Date    RBC 2.40 05/01/2022     Lab Results   Component Value Date    HGB 8.6 05/01/2022     Lab Results   Component Value Date    HCT 27.9 05/01/2022     No components found for:  MCT  Lab Results   Component Value Date     05/01/2022     Lab Results   Component Value Date    MCH 35.8 05/01/2022     Lab Results   Component Value Date    MCHC 30.8 05/01/2022     Lab Results   Component Value Date    RDW  05/01/2022      Comment:      Dimorphic Population - Unable to Calculate     Lab Results   Component Value Date     05/01/2022          Recent Labs     Recent Labs   Lab Test 04/23/22  0441 04/22/22  0558 04/21/22  0502   HGB 7.0* 7.5* 7.3*    Recent Labs   Lab Test 04/20/22  0405 04/19/22  2143 04/19/22  1441   TROPONINI 0.09 0.11 0.10     Recent Labs   Lab Test 04/19/22  1441 04/01/22  1208   BNP 1,221* 379*     No results for input(s): TSH in the last 30425 hours.  Recent Labs   Lab Test 04/23/22  0441 04/21/22  0502 04/20/22  0405   INR 1.97* 2.82* 2.67*        Medical History  Surgical History Family History Social History   Past Medical History:   Diagnosis Date    Cerebral infarction (H)     COPD (chronic obstructive pulmonary disease) (H)     HLD (hyperlipidemia)     Hypertension     Myeloproliferative disease (H)      Past Surgical History:   Procedure Laterality Date    CV CORONARY ANGIOGRAM N/A 4/26/2022    Procedure: CV CORONARY ANGIOGRAM;  Surgeon: Audrey Holder MD;  Location: [facility] CATH LAB CV    CV LEFT HEART CATH N/A 4/26/2022    Procedure: Left Heart Catheterization;  Surgeon: Audrey Holder MD;  Location: [facility] CATH LAB CV    IR LOWER EXTREMITY ANGIOGRAM LEFT  6/29/2023    OPEN REDUCTION INTERNAL FIXATION FOOT Right 2010    OTHER SURGICAL HISTORY  2001    Lip lesion removal    PICC TRIPLE LUMEN PLACEMENT  8/17/2022         TONSILLECTOMY & ADENOIDECTOMY       Family History   Problem Relation Age of Onset    Alcoholism Mother         Social History     Socioeconomic History    Marital status:      Spouse name: Not on file    Number of children: Not on file    Years of education: Not on file    Highest education level: Not on file   Occupational  History    Not on file   Tobacco Use    Smoking status: Former     Current packs/day: 0.00     Average packs/day: 1 pack/day for 46.6 years (46.6 ttl pk-yrs)     Types: Cigarettes     Start date: 1965     Quit date: 2012     Years since quittin.3    Smokeless tobacco: Never   Vaping Use    Vaping status: Never Used   Substance and Sexual Activity    Alcohol use: Yes     Alcohol/week: 19.0 standard drinks of alcohol    Drug use: Never    Sexual activity: Not on file   Other Topics Concern    Not on file   Social History Narrative    Not on file     Social Determinants of Health     Financial Resource Strain: Not on file   Food Insecurity: Not on file   Transportation Needs: Not on file   Physical Activity: Not on file   Stress: Not on file   Social Connections: Not on file   Interpersonal Safety: Not on file   Housing Stability: Not on file         Medications  Allergies   Current Outpatient Medications   Medication Sig Dispense Refill    albuterol (PROAIR HFA;PROVENTIL HFA;VENTOLIN HFA) 90 mcg/actuation inhaler Inhale 2 puffs into the lungs every 6 hours as needed for shortness of breath / dyspnea      aspirin (ASPIRIN LOW DOSE) 81 MG EC tablet TAKE 1 TABLET (81 MG) BY MOUTH DAILY. START THE MORNING OF 22 90 tablet 1    atorvastatin (LIPITOR) 80 MG tablet Take 1 tablet (80 mg) by mouth every morning 90 tablet 1    furosemide (LASIX) 20 MG tablet Take 0.5 tablets (10 mg) by mouth daily 45 tablet 3    hydroxyurea (HYDREA) 500 MG capsule Take 1,000-1,500 mg by mouth See Admin Instructions 1000 mg daily- Wed, Th, Fri, Sat, & Sun  1500 mg daily - Mon & Tu      losartan (COZAAR) 25 MG tablet Take ONE-HALF tablet by mouth daily 45 tablet 1    metoprolol succinate ER (TOPROL-XL) 25 MG 24 hr tablet Take 25 mg by mouth daily      nitroGLYcerin (NITROSTAT) 0.4 MG sublingual tablet One tablet under the tongue every 5 minutes if needed for chest pain. May repeat every 5 minutes for a maximum of 3 doses in  "15 minutes\" 25 tablet 3    omeprazole (PRILOSEC) 20 MG DR capsule Take 20 mg by mouth daily      tiotropium (SPIRIVA) 18 mcg inhalation capsule [TIOTROPIUM (SPIRIVA) 18 MCG INHALATION CAPSULE] Place 18 mcg into inhaler and inhale daily.      warfarin ANTICOAGULANT (COUMADIN) 1 MG tablet Take 3 tablets (3 mg) by mouth daily        No Known Allergies      Sp DINAH Larson, DO    Patient require longitudinal management of his congestive heart failure.  Will continue to have frequent follow-ups in cardiology.  "

## 2024-04-29 NOTE — PATIENT INSTRUCTIONS
Please contact direct nurses line Monday through Friday 8 AM to 5 PM @ [phone]    After-hours contact cardiology office at [phone].    Plan:    Increase losartan to 25 mg daily.

## 2024-06-10 ENCOUNTER — TELEPHONE (OUTPATIENT)
Dept: PHARMACY | Facility: OTHER | Age: 79
End: 2024-06-10
Payer: COMMERCIAL

## 2024-06-10 NOTE — TELEPHONE ENCOUNTER
MT Recruitment: [facility] insurance     Referral outreach attempt #1 on Temitope 10, 2024      Outcome: left voicemail- Call back number [phone]    Earnestine Diallo CPhT  [facility]

## 2024-07-27 ENCOUNTER — APPOINTMENT (OUTPATIENT)
Dept: RADIOLOGY | Facility: CLINIC | Age: 79
DRG: 871 | End: 2024-07-27
Attending: STUDENT IN AN ORGANIZED HEALTH CARE EDUCATION/TRAINING PROGRAM
Payer: COMMERCIAL

## 2024-07-27 ENCOUNTER — APPOINTMENT (OUTPATIENT)
Dept: ULTRASOUND IMAGING | Facility: CLINIC | Age: 79
DRG: 871 | End: 2024-07-27
Attending: INTERNAL MEDICINE
Payer: COMMERCIAL

## 2024-07-27 ENCOUNTER — ANCILLARY PROCEDURE (OUTPATIENT)
Dept: ULTRASOUND IMAGING | Facility: CLINIC | Age: 79
DRG: 871 | End: 2024-07-27
Attending: STUDENT IN AN ORGANIZED HEALTH CARE EDUCATION/TRAINING PROGRAM
Payer: COMMERCIAL

## 2024-07-27 ENCOUNTER — APPOINTMENT (OUTPATIENT)
Dept: CT IMAGING | Facility: CLINIC | Age: 79
DRG: 871 | End: 2024-07-27
Attending: STUDENT IN AN ORGANIZED HEALTH CARE EDUCATION/TRAINING PROGRAM
Payer: COMMERCIAL

## 2024-07-27 ENCOUNTER — HOSPITAL ENCOUNTER (INPATIENT)
Facility: CLINIC | Age: 79
LOS: 2 days | Discharge: HOME | DRG: 871 | End: 2024-07-29
Attending: STUDENT IN AN ORGANIZED HEALTH CARE EDUCATION/TRAINING PROGRAM | Admitting: INTERNAL MEDICINE
Payer: COMMERCIAL

## 2024-07-27 DIAGNOSIS — B37.2 CANDIDIASIS OF SKIN: Primary | ICD-10-CM

## 2024-07-27 DIAGNOSIS — I50.23 ACUTE ON CHRONIC SYSTOLIC HEART FAILURE (H): ICD-10-CM

## 2024-07-27 DIAGNOSIS — J18.9 PNEUMONIA OF LEFT LOWER LOBE DUE TO INFECTIOUS ORGANISM: ICD-10-CM

## 2024-07-27 DIAGNOSIS — E87.20 LACTIC ACIDOSIS: ICD-10-CM

## 2024-07-27 DIAGNOSIS — I50.22 CHRONIC SYSTOLIC CONGESTIVE HEART FAILURE (H): ICD-10-CM

## 2024-07-27 DIAGNOSIS — I50.22 CHRONIC SYSTOLIC HEART FAILURE (H): ICD-10-CM

## 2024-07-27 DIAGNOSIS — A41.9 SEPSIS, DUE TO UNSPECIFIED ORGANISM, UNSPECIFIED WHETHER ACUTE ORGAN DYSFUNCTION PRESENT (H): ICD-10-CM

## 2024-07-27 DIAGNOSIS — L03.116 CELLULITIS OF LEFT LOWER EXTREMITY: ICD-10-CM

## 2024-07-27 LAB
ALBUMIN SERPL BCG-MCNC: 3.8 G/DL (ref 3.5–5.2)
ALBUMIN UR-MCNC: 30 MG/DL
ALP SERPL-CCNC: 70 U/L (ref 40–150)
ALT SERPL W P-5'-P-CCNC: 24 U/L (ref 0–70)
ANION GAP SERPL CALCULATED.3IONS-SCNC: 11 MMOL/L (ref 7–15)
APPEARANCE UR: CLEAR
AST SERPL W P-5'-P-CCNC: 33 U/L (ref 0–45)
ATRIAL RATE - MUSE: 96 BPM
BASOPHILS # BLD AUTO: 0.1 10E3/UL (ref 0–0.2)
BASOPHILS NFR BLD AUTO: 0 %
BILIRUB DIRECT SERPL-MCNC: 0.35 MG/DL (ref 0–0.3)
BILIRUB SERPL-MCNC: 1.2 MG/DL
BILIRUB UR QL STRIP: NEGATIVE
BUN SERPL-MCNC: 30.7 MG/DL (ref 8–23)
CALCIUM SERPL-MCNC: 8.8 MG/DL (ref 8.8–10.4)
CHLORIDE SERPL-SCNC: 102 MMOL/L (ref 98–107)
COLOR UR AUTO: YELLOW
CREAT SERPL-MCNC: 1.6 MG/DL (ref 0.67–1.17)
DIASTOLIC BLOOD PRESSURE - MUSE: NORMAL MMHG
EGFRCR SERPLBLD CKD-EPI 2021: 44 ML/MIN/1.73M2
EOSINOPHIL # BLD AUTO: 0 10E3/UL (ref 0–0.7)
EOSINOPHIL NFR BLD AUTO: 0 %
ERYTHROCYTE [DISTWIDTH] IN BLOOD BY AUTOMATED COUNT: 14 % (ref 10–15)
GLUCOSE SERPL-MCNC: 107 MG/DL (ref 70–99)
GLUCOSE UR STRIP-MCNC: NEGATIVE MG/DL
HCO3 SERPL-SCNC: 24 MMOL/L (ref 22–29)
HCT VFR BLD AUTO: 38.1 % (ref 40–53)
HGB BLD-MCNC: 13 G/DL (ref 13.3–17.7)
HGB UR QL STRIP: NEGATIVE
HOLD SPECIMEN: NORMAL
HYALINE CASTS: 5 /LPF
IMM GRANULOCYTES # BLD: 0.4 10E3/UL
IMM GRANULOCYTES NFR BLD: 3 %
INR PPP: 2.4 (ref 0.85–1.15)
INTERPRETATION ECG - MUSE: NORMAL
KETONES UR STRIP-MCNC: ABNORMAL MG/DL
LACTATE SERPL-SCNC: 3.1 MMOL/L (ref 0.7–2)
LACTATE SERPL-SCNC: 4.3 MMOL/L (ref 0.7–2)
LEUKOCYTE ESTERASE UR QL STRIP: NEGATIVE
LYMPHOCYTES # BLD AUTO: 0.4 10E3/UL (ref 0.8–5.3)
LYMPHOCYTES NFR BLD AUTO: 3 %
MAGNESIUM SERPL-MCNC: 1.6 MG/DL (ref 1.7–2.3)
MCH RBC QN AUTO: 43.2 PG (ref 26.5–33)
MCHC RBC AUTO-ENTMCNC: 34.1 G/DL (ref 31.5–36.5)
MCV RBC AUTO: 127 FL (ref 78–100)
MONOCYTES # BLD AUTO: 0.9 10E3/UL (ref 0–1.3)
MONOCYTES NFR BLD AUTO: 6 %
MUCOUS THREADS #/AREA URNS LPF: PRESENT /LPF
NEUTROPHILS # BLD AUTO: 13 10E3/UL (ref 1.6–8.3)
NEUTROPHILS NFR BLD AUTO: 89 %
NITRATE UR QL: NEGATIVE
NRBC # BLD AUTO: 0 10E3/UL
NRBC BLD AUTO-RTO: 0 /100
P AXIS - MUSE: 65 DEGREES
PH UR STRIP: 5.5 [PH] (ref 5–7)
PLATELET # BLD AUTO: 365 10E3/UL (ref 150–450)
POTASSIUM SERPL-SCNC: 4.1 MMOL/L (ref 3.4–5.3)
PR INTERVAL - MUSE: 146 MS
PROCALCITONIN SERPL IA-MCNC: 6.1 NG/ML
PROT SERPL-MCNC: 7 G/DL (ref 6.4–8.3)
QRS DURATION - MUSE: 104 MS
QT - MUSE: 394 MS
QTC - MUSE: 497 MS
R AXIS - MUSE: 62 DEGREES
RBC # BLD AUTO: 3.01 10E6/UL (ref 4.4–5.9)
RBC URINE: 2 /HPF
SODIUM SERPL-SCNC: 137 MMOL/L (ref 135–145)
SP GR UR STRIP: 1.03 (ref 1–1.03)
SQUAMOUS EPITHELIAL: <1 /HPF
SYSTOLIC BLOOD PRESSURE - MUSE: NORMAL MMHG
T AXIS - MUSE: -65 DEGREES
TROPONIN T SERPL HS-MCNC: 30 NG/L
TROPONIN T SERPL HS-MCNC: 34 NG/L
UROBILINOGEN UR STRIP-MCNC: <2 MG/DL
VENTRICULAR RATE- MUSE: 96 BPM
WBC # BLD AUTO: 14.7 10E3/UL (ref 4–11)
WBC URINE: 4 /HPF

## 2024-07-27 PROCEDURE — 85610 PROTHROMBIN TIME: CPT | Performed by: STUDENT IN AN ORGANIZED HEALTH CARE EDUCATION/TRAINING PROGRAM

## 2024-07-27 PROCEDURE — 87040 BLOOD CULTURE FOR BACTERIA: CPT | Performed by: STUDENT IN AN ORGANIZED HEALTH CARE EDUCATION/TRAINING PROGRAM

## 2024-07-27 PROCEDURE — 93971 EXTREMITY STUDY: CPT | Mod: LT

## 2024-07-27 PROCEDURE — 96365 THER/PROPH/DIAG IV INF INIT: CPT

## 2024-07-27 PROCEDURE — 71045 X-RAY EXAM CHEST 1 VIEW: CPT

## 2024-07-27 PROCEDURE — 96366 THER/PROPH/DIAG IV INF ADDON: CPT

## 2024-07-27 PROCEDURE — 120N000004 HC R&B MS OVERFLOW

## 2024-07-27 PROCEDURE — 99285 EMERGENCY DEPT VISIT HI MDM: CPT | Mod: 25

## 2024-07-27 PROCEDURE — 36415 COLL VENOUS BLD VENIPUNCTURE: CPT | Performed by: STUDENT IN AN ORGANIZED HEALTH CARE EDUCATION/TRAINING PROGRAM

## 2024-07-27 PROCEDURE — 85004 AUTOMATED DIFF WBC COUNT: CPT | Performed by: STUDENT IN AN ORGANIZED HEALTH CARE EDUCATION/TRAINING PROGRAM

## 2024-07-27 PROCEDURE — 93005 ELECTROCARDIOGRAM TRACING: CPT | Performed by: STUDENT IN AN ORGANIZED HEALTH CARE EDUCATION/TRAINING PROGRAM

## 2024-07-27 PROCEDURE — 83605 ASSAY OF LACTIC ACID: CPT | Performed by: STUDENT IN AN ORGANIZED HEALTH CARE EDUCATION/TRAINING PROGRAM

## 2024-07-27 PROCEDURE — 250N000013 HC RX MED GY IP 250 OP 250 PS 637: Performed by: STUDENT IN AN ORGANIZED HEALTH CARE EDUCATION/TRAINING PROGRAM

## 2024-07-27 PROCEDURE — 80053 COMPREHEN METABOLIC PANEL: CPT | Performed by: STUDENT IN AN ORGANIZED HEALTH CARE EDUCATION/TRAINING PROGRAM

## 2024-07-27 PROCEDURE — 83880 ASSAY OF NATRIURETIC PEPTIDE: CPT | Performed by: INTERNAL MEDICINE

## 2024-07-27 PROCEDURE — 81001 URINALYSIS AUTO W/SCOPE: CPT | Performed by: STUDENT IN AN ORGANIZED HEALTH CARE EDUCATION/TRAINING PROGRAM

## 2024-07-27 PROCEDURE — 74176 CT ABD & PELVIS W/O CONTRAST: CPT

## 2024-07-27 PROCEDURE — 99223 1ST HOSP IP/OBS HIGH 75: CPT | Performed by: INTERNAL MEDICINE

## 2024-07-27 PROCEDURE — 96367 TX/PROPH/DG ADDL SEQ IV INF: CPT

## 2024-07-27 PROCEDURE — 258N000003 HC RX IP 258 OP 636: Performed by: STUDENT IN AN ORGANIZED HEALTH CARE EDUCATION/TRAINING PROGRAM

## 2024-07-27 PROCEDURE — 84145 PROCALCITONIN (PCT): CPT | Performed by: STUDENT IN AN ORGANIZED HEALTH CARE EDUCATION/TRAINING PROGRAM

## 2024-07-27 PROCEDURE — 250N000011 HC RX IP 250 OP 636: Performed by: STUDENT IN AN ORGANIZED HEALTH CARE EDUCATION/TRAINING PROGRAM

## 2024-07-27 PROCEDURE — 76882 US LMTD JT/FCL EVL NVASC XTR: CPT | Mod: LT

## 2024-07-27 PROCEDURE — 84484 ASSAY OF TROPONIN QUANT: CPT | Performed by: STUDENT IN AN ORGANIZED HEALTH CARE EDUCATION/TRAINING PROGRAM

## 2024-07-27 PROCEDURE — 76857 US EXAM PELVIC LIMITED: CPT

## 2024-07-27 PROCEDURE — 73610 X-RAY EXAM OF ANKLE: CPT | Mod: LT

## 2024-07-27 PROCEDURE — 83735 ASSAY OF MAGNESIUM: CPT | Performed by: STUDENT IN AN ORGANIZED HEALTH CARE EDUCATION/TRAINING PROGRAM

## 2024-07-27 PROCEDURE — 93308 TTE F-UP OR LMTD: CPT

## 2024-07-27 RX ORDER — HYDROMORPHONE HCL IN WATER/PF 6 MG/30 ML
0.2 PATIENT CONTROLLED ANALGESIA SYRINGE INTRAVENOUS EVERY 4 HOURS PRN
Status: DISCONTINUED | OUTPATIENT
Start: 2024-07-27 | End: 2024-07-29 | Stop reason: HOSPADM

## 2024-07-27 RX ORDER — PIPERACILLIN SODIUM, TAZOBACTAM SODIUM 3; .375 G/15ML; G/15ML
3.38 INJECTION, POWDER, LYOPHILIZED, FOR SOLUTION INTRAVENOUS EVERY 8 HOURS
Status: DISCONTINUED | OUTPATIENT
Start: 2024-07-28 | End: 2024-07-29 | Stop reason: HOSPADM

## 2024-07-27 RX ORDER — ATORVASTATIN CALCIUM 40 MG/1
80 TABLET, FILM COATED ORAL EVERY MORNING
Status: DISCONTINUED | OUTPATIENT
Start: 2024-07-28 | End: 2024-07-29 | Stop reason: HOSPADM

## 2024-07-27 RX ORDER — ACETAMINOPHEN 325 MG/1
650 TABLET ORAL EVERY 4 HOURS PRN
Status: DISCONTINUED | OUTPATIENT
Start: 2024-07-27 | End: 2024-07-29 | Stop reason: HOSPADM

## 2024-07-27 RX ORDER — AMOXICILLIN 250 MG
2 CAPSULE ORAL 2 TIMES DAILY PRN
Status: DISCONTINUED | OUTPATIENT
Start: 2024-07-27 | End: 2024-07-29 | Stop reason: HOSPADM

## 2024-07-27 RX ORDER — METOPROLOL SUCCINATE 25 MG/1
25 TABLET, EXTENDED RELEASE ORAL DAILY
Status: DISCONTINUED | OUTPATIENT
Start: 2024-07-28 | End: 2024-07-29 | Stop reason: HOSPADM

## 2024-07-27 RX ORDER — NALOXONE HYDROCHLORIDE 0.4 MG/ML
0.4 INJECTION, SOLUTION INTRAMUSCULAR; INTRAVENOUS; SUBCUTANEOUS
Status: DISCONTINUED | OUTPATIENT
Start: 2024-07-27 | End: 2024-07-29 | Stop reason: HOSPADM

## 2024-07-27 RX ORDER — ASPIRIN 81 MG/1
81 TABLET ORAL DAILY
Status: DISCONTINUED | OUTPATIENT
Start: 2024-07-28 | End: 2024-07-29 | Stop reason: HOSPADM

## 2024-07-27 RX ORDER — FUROSEMIDE 20 MG/1
10 TABLET ORAL DAILY
Status: DISCONTINUED | OUTPATIENT
Start: 2024-07-28 | End: 2024-07-29 | Stop reason: HOSPADM

## 2024-07-27 RX ORDER — PANTOPRAZOLE SODIUM 40 MG/1
40 TABLET, DELAYED RELEASE ORAL DAILY
Status: DISCONTINUED | OUTPATIENT
Start: 2024-07-28 | End: 2024-07-29 | Stop reason: HOSPADM

## 2024-07-27 RX ORDER — VANCOMYCIN 1.75 GRAM/500 ML IN 0.9 % SODIUM CHLORIDE INTRAVENOUS
1750 ONCE
Status: COMPLETED | OUTPATIENT
Start: 2024-07-27 | End: 2024-07-27

## 2024-07-27 RX ORDER — VANCOMYCIN HYDROCHLORIDE
1250 EVERY 24 HOURS
Status: DISCONTINUED | OUTPATIENT
Start: 2024-07-28 | End: 2024-07-29 | Stop reason: HOSPADM

## 2024-07-27 RX ORDER — CALCIUM CARBONATE 500 MG/1
1000 TABLET, CHEWABLE ORAL 4 TIMES DAILY PRN
Status: DISCONTINUED | OUTPATIENT
Start: 2024-07-27 | End: 2024-07-29 | Stop reason: HOSPADM

## 2024-07-27 RX ORDER — LOSARTAN POTASSIUM 25 MG/1
25 TABLET ORAL DAILY
Status: DISCONTINUED | OUTPATIENT
Start: 2024-07-28 | End: 2024-07-29 | Stop reason: HOSPADM

## 2024-07-27 RX ORDER — TIOTROPIUM BROMIDE 18 UG/1
18 CAPSULE ORAL; RESPIRATORY (INHALATION) DAILY
Status: DISCONTINUED | OUTPATIENT
Start: 2024-07-28 | End: 2024-07-27

## 2024-07-27 RX ORDER — PIPERACILLIN SODIUM, TAZOBACTAM SODIUM 3; .375 G/15ML; G/15ML
3.38 INJECTION, POWDER, LYOPHILIZED, FOR SOLUTION INTRAVENOUS ONCE
Status: COMPLETED | OUTPATIENT
Start: 2024-07-27 | End: 2024-07-27

## 2024-07-27 RX ORDER — AMOXICILLIN 250 MG
1 CAPSULE ORAL 2 TIMES DAILY PRN
Status: DISCONTINUED | OUTPATIENT
Start: 2024-07-27 | End: 2024-07-29 | Stop reason: HOSPADM

## 2024-07-27 RX ORDER — NALOXONE HYDROCHLORIDE 0.4 MG/ML
0.2 INJECTION, SOLUTION INTRAMUSCULAR; INTRAVENOUS; SUBCUTANEOUS
Status: DISCONTINUED | OUTPATIENT
Start: 2024-07-27 | End: 2024-07-29 | Stop reason: HOSPADM

## 2024-07-27 RX ORDER — LIDOCAINE 40 MG/G
CREAM TOPICAL
Status: DISCONTINUED | OUTPATIENT
Start: 2024-07-27 | End: 2024-07-29 | Stop reason: HOSPADM

## 2024-07-27 RX ORDER — HYDROCODONE BITARTRATE AND ACETAMINOPHEN 5; 325 MG/1; MG/1
1 TABLET ORAL EVERY 4 HOURS PRN
Status: DISCONTINUED | OUTPATIENT
Start: 2024-07-27 | End: 2024-07-29 | Stop reason: HOSPADM

## 2024-07-27 RX ORDER — ALBUTEROL SULFATE 90 UG/1
2 AEROSOL, METERED RESPIRATORY (INHALATION) EVERY 6 HOURS PRN
Status: DISCONTINUED | OUTPATIENT
Start: 2024-07-27 | End: 2024-07-29 | Stop reason: HOSPADM

## 2024-07-27 RX ORDER — HYDROXYUREA 500 MG/1
1000 CAPSULE ORAL DAILY
Status: DISCONTINUED | OUTPATIENT
Start: 2024-07-28 | End: 2024-07-28

## 2024-07-27 RX ORDER — ACETAMINOPHEN 325 MG/1
975 TABLET ORAL ONCE
Status: COMPLETED | OUTPATIENT
Start: 2024-07-27 | End: 2024-07-27

## 2024-07-27 RX ORDER — ACETAMINOPHEN 650 MG/1
650 SUPPOSITORY RECTAL EVERY 4 HOURS PRN
Status: DISCONTINUED | OUTPATIENT
Start: 2024-07-27 | End: 2024-07-29 | Stop reason: HOSPADM

## 2024-07-27 RX ADMIN — PIPERACILLIN AND TAZOBACTAM 3.38 G: 3; .375 INJECTION, POWDER, FOR SOLUTION INTRAVENOUS at 18:07

## 2024-07-27 RX ADMIN — VANCOMYCIN HYDROCHLORIDE 1750 MG: 5 INJECTION, POWDER, LYOPHILIZED, FOR SOLUTION INTRAVENOUS at 18:41

## 2024-07-27 RX ADMIN — ACETAMINOPHEN 975 MG: 325 TABLET ORAL at 21:54

## 2024-07-27 RX ADMIN — SODIUM CHLORIDE 500 ML: 9 INJECTION, SOLUTION INTRAVENOUS at 17:51

## 2024-07-27 RX ADMIN — SODIUM CHLORIDE 500 ML: 9 INJECTION, SOLUTION INTRAVENOUS at 19:36

## 2024-07-27 RX ADMIN — SODIUM CHLORIDE 500 ML: 9 INJECTION, SOLUTION INTRAVENOUS at 21:55

## 2024-07-27 ASSESSMENT — ACTIVITIES OF DAILY LIVING (ADL)
ADLS_ACUITY_SCORE: 38

## 2024-07-27 ASSESSMENT — COLUMBIA-SUICIDE SEVERITY RATING SCALE - C-SSRS
2. HAVE YOU ACTUALLY HAD ANY THOUGHTS OF KILLING YOURSELF IN THE PAST MONTH?: NO
6. HAVE YOU EVER DONE ANYTHING, STARTED TO DO ANYTHING, OR PREPARED TO DO ANYTHING TO END YOUR LIFE?: NO
1. IN THE PAST MONTH, HAVE YOU WISHED YOU WERE DEAD OR WISHED YOU COULD GO TO SLEEP AND NOT WAKE UP?: NO

## 2024-07-27 NOTE — ED TRIAGE NOTES
PT states he developed left foot pain around 10am today. He denies injury to it. He states he was sitting down and noticed it was aching.     PT is hypotensive in triage 88/52. PT endorses dizziness that started around 1:00 and he states it resolved around 2pm.      Triage Assessment (Adult)       Row Name 07/27/24 1792          Triage Assessment    Airway WDL WDL        Respiratory WDL    Respiratory WDL WDL        Skin Circulation/Temperature WDL    Skin Circulation/Temperature WDL WDL        Cardiac WDL    Cardiac WDL X  hypotension        Peripheral/Neurovascular WDL    Peripheral Neurovascular WDL WDL        Cognitive/Neuro/Behavioral WDL    Cognitive/Neuro/Behavioral WDL WDL

## 2024-07-27 NOTE — PHARMACY-VANCOMYCIN DOSING SERVICE
Pharmacy Vancomycin Initial Note  Date of Service 2024  Patient's  1945  79 year old, male    Indication: Skin and Soft Tissue Infection    Current estimated CrCl = Estimated Creatinine Clearance: 38.8 mL/min (A) (based on SCr of 1.6 mg/dL (H)).    Creatinine for last 3 days  2024:  5:23 PM Creatinine 1.60 mg/dL    Recent Vancomycin Level(s) for last 3 days  No results found for requested labs within last 3 days.      Vancomycin IV Administrations (past 72 hours)        No vancomycin orders with administrations in past 72 hours.                    Nephrotoxins and other renal medications (From now, onward)      Start     Dose/Rate Route Frequency Ordered Stop    24 1830  vancomycin (VANCOCIN) 1,750 mg in 0.9% NaCl 500 mL intermittent infusion         1,750 mg  250 mL/hr over 2 Hours Intravenous ONCE 24 1804      24 1800  piperacillin-tazobactam (ZOSYN) 3.375 g vial to attach to  mL bag         3.375 g  over 30 Minutes Intravenous ONCE 24 1755              Contrast Orders - past 72 hours (72h ago, onward)      None                Plan:  Load: Vancomycin 1750mg (~19mg/kg) iv x 1 in ED  Please re-consult pharmacy if vancomycin therapy is to continue beyond this loading dose    Ernie Caballero [facility]

## 2024-07-27 NOTE — ED PROVIDER NOTES
EMERGENCY DEPARTMENT ENCOUNTER      NAME: Shaji Pompa  AGE: 79 year old male  YOB: 1945  MRN: [id number]  EVALUATION DATE & TIME: 7/27/2024  5:08 PM    PCP: Steven Caraballo    ED PROVIDER: Gianni Mi MD      Chief Complaint   Patient presents with    Foot Pain         FINAL IMPRESSION:  1. Sepsis, due to unspecified organism, unspecified whether acute organ dysfunction present (H)    2. Lactic acidosis    3. Pneumonia of left lower lobe due to infectious organism          ED COURSE & MEDICAL DECISION MAKING:    Pertinent Labs & Imaging studies reviewed. (See chart for details)  79 year old male presents to the Emergency Department for evaluation of left foot pain    ED Course as of 07/27/24 2234   Sat Jul 27, 2024   1723 Patient is a 79-year-old male with history of heart failure EF around 35%, NSTEMI, ischemic cardiomyopathy, myeloproliferative disease on hydroxyurea, hypertension and COPD who presents emergency department for evaluation of left lower extremity pain.  Patient noted he was having some aching in his left foot without any inciting injury or trauma.  Has not had any fevers at home.  Has had chronic lower extremity edema but his wife does note it seems to be a little bit worse over the past several days.  Denies any chest pain or shortness of breath.  Did note a period of dizziness between 1 and 2 PM this afternoon that spontaneously resolved.  At the moment denies any ongoing dizziness.  Denies any abdominal pain.  No nausea or vomiting.  Denies any abdominal pain.  No recent adjustments to his home medications he is on warfarin, metoprolol, losartan and furosemide.  Upon triage vitals was noted to be hypotensive at 88/52 tachycardic 104 beats a minute.    On exam patient is awake and alert and answers questions appropriately.  He is borderline tachycardic and has trace Rales at the bases bilaterally but no wheezes.  Abdomen is soft and benign.  His bilateral lower  extremity edema up to the midshin and some tenderness palpation of the medial malleolus of the left ankle as well as left-sided posterior calf tenderness and swelling.  No erythema.  No breaks in the skin.    Initial differential is broad but includes not limited to underlying infectious process such as cellulitis, ACS, DVT.  Will obtain broad metabolic and infectious workup as well as a plain films of the left lower extremity DVT study of the left lower extremity.   1859 Labs reviewed and interpreted myself.  CBC notable for leukocytosis with neutrophilic predominance.  Lactic acid also elevated at 4.3.  Given leukocytosis, hypotension at presentation as well as borderline tachycardia code sepsis: Patient was started on empiric antibiosis with vancomycin and Zosyn.    Did perform a bedside ultrasound to evaluate for volume status the patient has a collapsible IVC with no signs of significant pulmonary edema.  Will begin fluid resuscitation with 500 mL aliquots given his history of heart failure to avoid over resuscitation and pulmonary edema and volume overload.   1901 BMP does show borderline MING but no significant electrolyte derangements.  LFTs within normal limits.  Troponin first draw 34 will repeat significant increase   1901 Patient's blood pressure did begin to improve with first aliquot of 500 mL with maps now around 70.  Will give a second 500 mL bolus.   1901 EKG shows a sinus rhythm at 96 beats a minute, normal axis, normal IL, QRS and QTc durations, no ST elevations, Q waves in the inferior leads, T wave inversions noted as well as inferior leads as well as V5 and V6 which was noted on prior EKG.   2130 Chest x-ray does not show any obvious pneumonia and urinalysis not suggestive of infection.  Given unclear etiology of patient's hypotension and potential sepsis this point in time to get a CT abdomen pelvis without contrast given his acute kidney injury.    CT ab pelvis demonstrates groundglass  opacities in the left lower lobe which could potentially represent infection and potential source of this presentation.  Additionally is noted to be nonspecific stranding/fluid collection within the left retroperitoneum, left inguinal region and left groin vasculature.    Reviewed prior records and discussed with the patient he had a left lower extremity angiography done in June 2023 but nothing since then or any recent interpretation of the leg vasculature.    Discussed with reading radiologist difficult to determine exact etiology of straining fluid but this seems less typical of extravasation is relatively small volume.  Have a lower suspicion at this point in time that active extravasation would be the cause of his hypotension at presentation but will obtain a repeat hemoglobin around midnight and if this drop significantly would proceed with CT angiography to evaluate for possible active extravasation       At this point in time the exact source of sepsis is somewhat unclear but could potentially be from lower lobe pneumonia.  He is covered broadly with first dose antibiotics here in the emergency department and thus far has been fluid responsive although held off on initial 30 cc/kg bolus given his underlying history of CHF.    Discussed with hospitalist agrees admit the patient this point in time for ongoing management of his sepsis.      5:15 I met and evaluated the patient.       Medical Decision Making  Obtained supplemental history:Supplemental history obtained?: Documented in chart and Family Member/Significant Other  Reviewed external records: External records reviewed?: Documented in chart  Care impacted by chronic illness:Chronic Lung Disease, Heart Disease, and Peripheral Vascular Disease  Care significantly affected by social determinants of health:N/A  Did you consider but not order tests?: Work up considered but not performed and documented in chart, if applicable  Did you interpret images  independently?: Independent interpretation of ECG and images noted in documentation, when applicable.  Consultation discussion with other provider:Did you involve another provider (consultant, MH, pharmacy, etc.)?: I discussed the care with another health care provider, see documentation for details.  Admit.          At the conclusion of the encounter I discussed the results of all of the tests and the disposition. The questions were answered. The patient or family acknowledged understanding and was agreeable with the care plan.     35 minutes of critical care time     MEDICATIONS GIVEN IN THE EMERGENCY:  Medications   sodium chloride 0.9% BOLUS 500 mL (500 mLs Intravenous $New Bag 7/27/24 2155)   aspirin EC tablet 81 mg (has no administration in time range)   albuterol (PROVENTIL HFA/VENTOLIN HFA) inhaler (has no administration in time range)   atorvastatin (LIPITOR) tablet 80 mg (has no administration in time range)   furosemide (LASIX) half-tab 10 mg (has no administration in time range)   losartan (COZAAR) tablet 25 mg ( Oral Automatically Held 7/31/24 0800)   metoprolol succinate ER (TOPROL XL) 24 hr tablet 25 mg ( Oral Automatically Held 7/31/24 0800)   pantoprazole (PROTONIX) EC tablet 40 mg (has no administration in time range)   tiotropium (SPIRIVA) capsule 18 mcg (has no administration in time range)   lidocaine 1 % 0.1-1 mL (has no administration in time range)   lidocaine (LMX4) cream (has no administration in time range)   sodium chloride (PF) 0.9% PF flush 3 mL (3 mLs Intracatheter Not Given 7/27/24 2226)   sodium chloride (PF) 0.9% PF flush 3 mL (has no administration in time range)   senna-docusate (SENOKOT-S/PERICOLACE) 8.6-50 MG per tablet 1 tablet (has no administration in time range)     Or   senna-docusate (SENOKOT-S/PERICOLACE) 8.6-50 MG per tablet 2 tablet (has no administration in time range)   calcium carbonate (TUMS) chewable tablet 1,000 mg (has no administration in time range)   Patient  is already receiving anticoagulation with heparin, enoxaparin (LOVENOX), warfarin (COUMADIN)  or other anticoagulant medication (has no administration in time range)   acetaminophen (TYLENOL) tablet 650 mg (has no administration in time range)     Or   acetaminophen (TYLENOL) Suppository 650 mg (has no administration in time range)   piperacillin-tazobactam (ZOSYN) 3.375 g vial to attach to  mL bag (has no administration in time range)   vancomycin (VANCOCIN) 1,250 mg in 0.9% NaCl 250 mL intermittent infusion (has no administration in time range)   sodium chloride 0.9% BOLUS 500 mL (0 mLs Intravenous Stopped 7/27/24 1818)   piperacillin-tazobactam (ZOSYN) 3.375 g vial to attach to  mL bag (0 g Intravenous Stopped 7/27/24 1841)   vancomycin (VANCOCIN) 1,750 mg in 0.9% NaCl 500 mL intermittent infusion (0 mg Intravenous Stopped 7/27/24 2116)   sodium chloride 0.9% BOLUS 500 mL (0 mLs Intravenous Stopped 7/27/24 2116)   acetaminophen (TYLENOL) tablet 975 mg (975 mg Oral $Given 7/27/24 2154)       NEW PRESCRIPTIONS STARTED AT TODAY'S ER VISIT  New Prescriptions    No medications on file          =================================================================    HPI    Patient information was obtained from: patient and family member  Use of : N/A      Shaji Pompa is a 79 year old male with a pertinent history of COPD, stroke, hyperlipidemia, peripheral venous insufficiency, myeloproliferative disease, DVT of right lower extremity, and 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     Patient stated that he has pain and swelling in his left foot starting this morning. Patient noted that both of his feet were sore originally but the right foot is no longer painful. Patient described the pain as being on top of the foot. Patient's wife stated that she noticed he has a lot more swelling in his leg today than he usually does. Patient  noted he does have shortness of breath but that is chronic for him. Patient denied any chest pain, nausea, vomiting, abdominal pain, or change in dose to his blood pressure medication. Patient noted he has not taken anything for the pain prior to arrival.     REVIEW OF SYSTEMS   Refer to the HPI    PAST MEDICAL HISTORY:  Past Medical History:   Diagnosis Date    Cerebral infarction (H)     COPD (chronic obstructive pulmonary disease) (H)     HLD (hyperlipidemia)     Hypertension     Myeloproliferative disease (H)        PAST SURGICAL HISTORY:  Past Surgical History:   Procedure Laterality Date    CV CORONARY ANGIOGRAM N/A 4/26/2022    Procedure: CV CORONARY ANGIOGRAM;  Surgeon: Audrey Holder MD;  Location: [facility] CATH LAB CV    CV LEFT HEART CATH N/A 4/26/2022    Procedure: Left Heart Catheterization;  Surgeon: Audrey Holder MD;  Location: [facility] CATH LAB CV    IR LOWER EXTREMITY ANGIOGRAM LEFT  6/29/2023    OPEN REDUCTION INTERNAL FIXATION FOOT Right 2010    OTHER SURGICAL HISTORY  2001    Lip lesion removal    PICC TRIPLE LUMEN PLACEMENT  8/17/2022         TONSILLECTOMY & ADENOIDECTOMY             CURRENT MEDICATIONS:    albuterol (PROAIR HFA;PROVENTIL HFA;VENTOLIN HFA) 90 mcg/actuation inhaler  aspirin (ASPIRIN LOW DOSE) 81 MG EC tablet  atorvastatin (LIPITOR) 80 MG tablet  furosemide (LASIX) 20 MG tablet  hydroxyurea (HYDREA) 500 MG capsule  losartan (COZAAR) 25 MG tablet  metoprolol succinate ER (TOPROL-XL) 25 MG 24 hr tablet  nitroGLYcerin (NITROSTAT) 0.4 MG sublingual tablet  omeprazole (PRILOSEC) 20 MG DR capsule  tiotropium (SPIRIVA) 18 mcg inhalation capsule  warfarin ANTICOAGULANT (COUMADIN) 1 MG tablet        ALLERGIES:  No Known Allergies    FAMILY HISTORY:  Family History   Problem Relation Age of Onset    Alcoholism Mother        SOCIAL HISTORY:   Social History     Socioeconomic History    Marital status:    Tobacco Use    Smoking status: Former     Current packs/day: 0.00      "Average packs/day: 1 pack/day for 46.6 years (46.6 ttl pk-yrs)     Types: Cigarettes     Start date: 1965     Quit date: 2012     Years since quittin.5    Smokeless tobacco: Never   Vaping Use    Vaping status: Never Used   Substance and Sexual Activity    Alcohol use: Yes     Alcohol/week: 19.0 standard drinks of alcohol    Drug use: Never       VITALS:  /51   Pulse 80   Temp 98.5  F (36.9  C) (Oral)   Resp 24   Ht 1.651 m (5' 5\")   Wt 90.7 kg (200 lb)   SpO2 94%   BMI 33.28 kg/m      PHYSICAL EXAM    Constitutional: Well developed, Well nourished, NAD,  HENT: Normocephalic, Atraumatic,  mucous membranes moist,   Neck- trachea midline, No stridor.    Eyes:EOMI, Conjunctiva normal, No discharge.   Respiratory: No respiratory distress, No wheezing. Rales at the bases bilaterally.    Cardiovascular: tachycardic, Regular rhythm, No murmurs   Abdominal: Soft, No tenderness, No rebound or guarding.    Musculoskeletal: no deformity or malalignment, Bilateral lower extremity edema up to shin, left posterior calf tenderness and tenderness over left medial malleolus, 1+ DP pulse bilaterally, feet are warm and well-perfused bilaterally, questionably small patch about 2 to 3 cm in size of erythema over the left posterior medial calf  Integument: Warm, Dry, No erythema,    Neurologic: Alert & oriented x 3  Psychiatric: Affect normal, Cooperative.     LAB:  All pertinent labs reviewed and interpreted.  Results for orders placed or performed during the hospital encounter of 24   XR Ankle Left G/E 3 Views    Impression    IMPRESSION:       There are small minimally displaced ossific avulsion fracture fragments along the medial malleolus which could be chronic but are technically age indeterminate. Correlation with point tenderness would be helpful.    Scattered mild hindfoot and midfoot degenerative changes. There is diffuse soft tissue swelling over the ankle.   US Lower Extremity Venous Duplex " Left    Impression    IMPRESSION:  1.  No deep venous thrombosis in the left lower extremity.    2.  Incidentally noted are some mildly prominent lymph nodes in the proximal left thigh with the largest measuring 2.3 x 0.6 x 1.8 cm.   XR Chest Port 1 View    Impression    IMPRESSION: Borderline hyperinflation of both lungs. Mildly calcified thoracic aortic arch. No active CHF, inflammatory infiltrates, or pneumothorax. Since 8/17/2022 the prior seen right PICC has been removed.   CT Abdomen Pelvis w/o Contrast    Impression    IMPRESSION:  1.  Groundglass opacities and interlobular septal thickening involving the left lower lobe, likely sequela of infection or aspiration.  2.  Nonspecific stranding/fluid tracking within the left retroperitoneum, left inguinal region, and surrounding left groin vasculature. Correlate with history and for recent procedures.  3.  Left thigh soft tissue edema.  4.  Additional chronic and ancillary findings as described       Result Value Ref Range    INR 2.40 (H) 0.85 - 1.15   Basic metabolic panel   Result Value Ref Range    Sodium 137 135 - 145 mmol/L    Potassium 4.1 3.4 - 5.3 mmol/L    Chloride 102 98 - 107 mmol/L    Carbon Dioxide (CO2) 24 22 - 29 mmol/L    Anion Gap 11 7 - 15 mmol/L    Urea Nitrogen 30.7 (H) 8.0 - 23.0 mg/dL    Creatinine 1.60 (H) 0.67 - 1.17 mg/dL    GFR Estimate 44 (L) >60 mL/min/1.73m2    Calcium 8.8 8.8 - 10.4 mg/dL    Glucose 107 (H) 70 - 99 mg/dL   Hepatic function panel   Result Value Ref Range    Protein Total 7.0 6.4 - 8.3 g/dL    Albumin 3.8 3.5 - 5.2 g/dL    Bilirubin Total 1.2 <=1.2 mg/dL    Alkaline Phosphatase 70 40 - 150 U/L    AST 33 0 - 45 U/L    ALT 24 0 - 70 U/L    Bilirubin Direct 0.35 (H) 0.00 - 0.30 mg/dL   Lactic acid whole blood with 1x repeat in 2 hr when >2   Result Value Ref Range    Lactic Acid, Initial 4.3 (HH) 0.7 - 2.0 mmol/L   Result Value Ref Range    Troponin T, High Sensitivity 34 (H) <=22 ng/L   Result Value Ref Range     Magnesium 1.6 (L) 1.7 - 2.3 mg/dL   CBC with platelets and differential   Result Value Ref Range    WBC Count 14.7 (H) 4.0 - 11.0 10e3/uL    RBC Count 3.01 (L) 4.40 - 5.90 10e6/uL    Hemoglobin 13.0 (L) 13.3 - 17.7 g/dL    Hematocrit 38.1 (L) 40.0 - 53.0 %     (H) 78 - 100 fL    MCH 43.2 (H) 26.5 - 33.0 pg    MCHC 34.1 31.5 - 36.5 g/dL    RDW 14.0 10.0 - 15.0 %    Platelet Count 365 150 - 450 10e3/uL    % Neutrophils 89 %    % Lymphocytes 3 %    % Monocytes 6 %    % Eosinophils 0 %    % Basophils 0 %    % Immature Granulocytes 3 %    NRBCs per 100 WBC 0 <1 /100    Absolute Neutrophils 13.0 (H) 1.6 - 8.3 10e3/uL    Absolute Lymphocytes 0.4 (L) 0.8 - 5.3 10e3/uL    Absolute Monocytes 0.9 0.0 - 1.3 10e3/uL    Absolute Eosinophils 0.0 0.0 - 0.7 10e3/uL    Absolute Basophils 0.1 0.0 - 0.2 10e3/uL    Absolute Immature Granulocytes 0.4 <=0.4 10e3/uL    Absolute NRBCs 0.0 10e3/uL   UA with Microscopic reflex to Culture    Specimen: Urine, Midstream   Result Value Ref Range    Color Urine Yellow Colorless, Straw, Light Yellow, Yellow    Appearance Urine Clear Clear    Glucose Urine Negative Negative mg/dL    Bilirubin Urine Negative Negative    Ketones Urine Trace (A) Negative mg/dL    Specific Gravity Urine 1.029 1.001 - 1.030    Blood Urine Negative Negative    pH Urine 5.5 5.0 - 7.0    Protein Albumin Urine 30 (A) Negative mg/dL    Urobilinogen Urine <2.0 <2.0 mg/dL    Nitrite Urine Negative Negative    Leukocyte Esterase Urine Negative Negative    Mucus Urine Present (A) None Seen /LPF    RBC Urine 2 <=2 /HPF    WBC Urine 4 <=5 /HPF    Squamous Epithelials Urine <1 <=1 /HPF    Hyaline Casts Urine 5 (H) <=2 /LPF   Result Value Ref Range    Procalcitonin 6.10 (HH) <0.50 ng/mL   Extra Blood Culture Bottle   Result Value Ref Range    Hold Specimen JIC    Extra Red Top Tube   Result Value Ref Range    Hold Specimen JIC    Lactic acid whole blood   Result Value Ref Range    Lactic Acid 3.1 (H) 0.7 - 2.0 mmol/L   Result  Value Ref Range    Troponin T, High Sensitivity 30 (H) <=22 ng/L   Extra Green Top (Lithium Heparin) Tube   Result Value Ref Range    Hold Specimen JIC    ECG 12-LEAD WITH MUSE (LHE)   Result Value Ref Range    Systolic Blood Pressure  mmHg    Diastolic Blood Pressure  mmHg    Ventricular Rate 96 BPM    Atrial Rate 96 BPM    SD Interval 146 ms    QRS Duration 104 ms     ms    QTc 497 ms    P Axis 65 degrees    R AXIS 62 degrees    T Axis -65 degrees    Interpretation ECG       Sinus rhythm  Inferior infarct , age undetermined  Anterior infarct (cited on or before 17-Aug-2022)  Abnormal ECG  When compared with ECG of 17-Aug-2022 13:53,  Sinus rhythm has replaced Atrial fibrillation  Inferior infarct is now Present  Confirmed by SEE ED PROVIDER NOTE FOR, ECG INTERPRETATION ([id number]),  Urmila Chavez ([id number]) on 7/27/2024 5:47:29 PM         RADIOLOGY:  Reviewed all pertinent imaging. Please see official radiology report.  CT Abdomen Pelvis w/o Contrast   Final Result   IMPRESSION:   1.  Groundglass opacities and interlobular septal thickening involving the left lower lobe, likely sequela of infection or aspiration.   2.  Nonspecific stranding/fluid tracking within the left retroperitoneum, left inguinal region, and surrounding left groin vasculature. Correlate with history and for recent procedures.   3.  Left thigh soft tissue edema.   4.  Additional chronic and ancillary findings as described            XR Chest Port 1 View   Final Result   IMPRESSION: Borderline hyperinflation of both lungs. Mildly calcified thoracic aortic arch. No active CHF, inflammatory infiltrates, or pneumothorax. Since 8/17/2022 the prior seen right PICC has been removed.      XR Ankle Left G/E 3 Views   Final Result   IMPRESSION:          There are small minimally displaced ossific avulsion fracture fragments along the medial malleolus which could be chronic but are technically age indeterminate. Correlation with point tenderness would  be helpful.      Scattered mild hindfoot and midfoot degenerative changes. There is diffuse soft tissue swelling over the ankle.      US Lower Extremity Venous Duplex Left   Final Result   IMPRESSION:   1.  No deep venous thrombosis in the left lower extremity.      2.  Incidentally noted are some mildly prominent lymph nodes in the proximal left thigh with the largest measuring 2.3 x 0.6 x 1.8 cm.      POC US ECHO LIMITED    (Results Pending)   US Lower Extremity Non Vascular Left    (Results Pending)   US Pelvic Limited    (Results Pending)       EKG:    Performed at:     Impression: EKG shows a sinus rhythm at 96 beats a minute, normal axis, normal MA, QRS and QTc durations, no ST elevations, Q waves in the inferior leads, T wave inversions noted as well as inferior leads as well as V5 and V6 which was noted on prior EKG.        I have independently reviewed and interpreted the EKG(s) documented above.    PROCEDURES:     PROCEDURE:    Emergency Department Limited Bedside Screening Cardiac Ultrasound   INDICATIONS: hypotension   PROCEDURE PROVIDER: Gianni Mi    WINDOW AND FINDINGS:    SUB-XYPHOID     :      Cardiac activity: Hypokinetic  Pericardial effusion: No clinically significant pericardial effusion  Signs of Tamponade physiology: Absent   PARASTERNAL    :  Cardiac activity: Normal  Cardiac activity: Hypokinetic  Pericardial effusion: No clinically significant pericardial effusion  Signs of Tamponade physiology: Absent     IMAGES SAVED AND STORED FOR ARCHIVE AND REVIEW: Yes          [employer] [facility] Documentation:   CMS Diagnoses: The patient has signs of Severe Sepsis        If one the following conditions is present, a 30 mL/kg bolus is recommended as part of the 6 hour bundle (IBW can be used for BMI >30, or document refusal/contraindication):      1.   Initial hypotension  defined as 2 bps < 90 or map < 65 in the 6hrs before or 3hrs after time zero.     2.  Lactate >4.      The patient has signs  of Severe Sepsis as evidenced by:    1. 2 SIRS criteria, AND  2. Suspected infection, AND   3. Organ dysfunction:  SBP <90, MAP < 65, or SBP decrease of >40 from baseline due to infection and Lactic Acidosis with value >2.0      3 Hour Severe Sepsis Bundle Completion:    1. Initial Lactic Acid Result:   Recent Labs   Lab Test 07/27/24 2023 07/27/24  1723 08/17/22  1621   LACT 3.1* 4.3* 1.1     2. Blood Cultures before Antibiotics: Yes  Note: Due to a national blood culture bottle shortage, reduced blood cultures may have been drawn on this patient.  3. Broad Spectrum Antibiotics Administered:  yes       Anti-infectives (From admission through now)      Start     Dose/Rate Route Frequency Ordered Stop    07/27/24 1830  vancomycin (VANCOCIN) 1,750 mg in 0.9% NaCl 500 mL intermittent infusion         1,750 mg  250 mL/hr over 2 Hours Intravenous ONCE 07/27/24 1804 07/27/24 2116    07/27/24 1800  piperacillin-tazobactam (ZOSYN) 3.375 g vial to attach to  mL bag         3.375 g  over 30 Minutes Intravenous ONCE 07/27/24 1755 07/27/24 1841            4. Is initial hypotension present?     Yes. (Definition - 2 SBPs <90, MAP <65, or decrease > 40 from baseline)   Full 30 mL/kg bolus intentionally NOT administered to this patient due to CHF and acute Pulmonary Edema. The target volume to infuse in this patient is 500 mL aliquots until patient is no longer fluid responsive or develops signs of volume overload.    BMI Readings from Last 1 Encounters:   07/27/24 33.28 kg/m      30 mL/kg fluids based on weight: 2,720 mL  30 mL/kg fluids based on IBW (must be >= 60 inches tall): 1,850 mL                    Severe Sepsis reassessment:  1. Repeat Lactic Acid Level within 6 hours of time zero: 3.1  2.              I, Bonifacio Jaramillo, am serving as a scribe to document services personally performed by Gianni Mi MD based on my observation and the provider's statements to me. I, Gianni Mi MD, attest that Bonifacio Jaramillo is acting  in a scribe capacity, has observed my performance of the services and has documented them in accordance with my direction.    Gianni Mi MD  [facility] EMERGENCY ROOM  [id number] [address] [phone]       Gianni Mi MD  07/27/24 3819

## 2024-07-27 NOTE — PHARMACY-ADMISSION MEDICATION HISTORY
"Pharmacist Admission Medication History    Admission medication history is complete. The information provided in this note is only as accurate as the sources available at the time of the update.    Information Source(s): Patient, Patient's pharmacy, and Clinic records via in-person    Pertinent Information: None    Changes made to PTA medication list:  Added: None  Deleted: None  Changed: Hydroxyurea decreased to 1000mg daily (was taking 1500mg on two days per week w/ 100mg ROW)    Allergies reviewed with patient and updates made in EHR: yes    Medication History Completed By: Mark Stoddard RPH 7/27/2024 6:31 PM    PTA Med List   Medication Sig Last Dose    albuterol (PROAIR HFA;PROVENTIL HFA;VENTOLIN HFA) 90 mcg/actuation inhaler Inhale 2 puffs into the lungs every 6 hours as needed for shortness of breath / dyspnea More than a month    aspirin (ASPIRIN LOW DOSE) 81 MG EC tablet TAKE 1 TABLET (81 MG) BY MOUTH DAILY. START THE MORNING OF 4/27/22 7/27/2024 at 0700    atorvastatin (LIPITOR) 80 MG tablet Take 1 tablet (80 mg) by mouth every morning 7/27/2024 at 0700    furosemide (LASIX) 20 MG tablet Take 0.5 tablets (10 mg) by mouth daily 7/27/2024 at 0700    hydroxyurea (HYDREA) 500 MG capsule Take 1,000 mg by mouth daily 7/27/2024 at 0700    losartan (COZAAR) 25 MG tablet Take 1 tablet (25 mg) by mouth daily 7/27/2024 at 0700    metoprolol succinate ER (TOPROL-XL) 25 MG 24 hr tablet Take 25 mg by mouth daily 7/27/2024 at 0700    nitroGLYcerin (NITROSTAT) 0.4 MG sublingual tablet One tablet under the tongue every 5 minutes if needed for chest pain. May repeat every 5 minutes for a maximum of 3 doses in 15 minutes\" More than a month    omeprazole (PRILOSEC) 20 MG DR capsule Take 20 mg by mouth daily 7/27/2024 at 0700    tiotropium (SPIRIVA) 18 mcg inhalation capsule [TIOTROPIUM (SPIRIVA) 18 MCG INHALATION CAPSULE] Place 18 mcg into inhaler and inhale daily. 7/27/2024 at 0700    warfarin ANTICOAGULANT (COUMADIN) 1 MG " tablet Take 3 tablets (3 mg) by mouth daily 7/27/2024 at 0700

## 2024-07-28 ENCOUNTER — APPOINTMENT (OUTPATIENT)
Dept: PHYSICAL THERAPY | Facility: CLINIC | Age: 79
DRG: 871 | End: 2024-07-28
Attending: STUDENT IN AN ORGANIZED HEALTH CARE EDUCATION/TRAINING PROGRAM
Payer: COMMERCIAL

## 2024-07-28 ENCOUNTER — APPOINTMENT (OUTPATIENT)
Dept: CARDIOLOGY | Facility: CLINIC | Age: 79
DRG: 871 | End: 2024-07-28
Attending: INTERNAL MEDICINE
Payer: COMMERCIAL

## 2024-07-28 LAB
ALBUMIN SERPL BCG-MCNC: 3.2 G/DL (ref 3.5–5.2)
ALP SERPL-CCNC: 67 U/L (ref 40–150)
ALT SERPL W P-5'-P-CCNC: 21 U/L (ref 0–70)
ANION GAP SERPL CALCULATED.3IONS-SCNC: 6 MMOL/L (ref 7–15)
AST SERPL W P-5'-P-CCNC: 26 U/L (ref 0–45)
BASOPHILS # BLD AUTO: 0 10E3/UL (ref 0–0.2)
BASOPHILS # BLD AUTO: 0 10E3/UL (ref 0–0.2)
BASOPHILS NFR BLD AUTO: 0 %
BASOPHILS NFR BLD AUTO: 0 %
BILIRUB SERPL-MCNC: 1 MG/DL
BUN SERPL-MCNC: 33.7 MG/DL (ref 8–23)
CALCIUM SERPL-MCNC: 7.9 MG/DL (ref 8.8–10.4)
CHLORIDE SERPL-SCNC: 107 MMOL/L (ref 98–107)
CHOLEST SERPL-MCNC: 77 MG/DL
CK SERPL-CCNC: 136 U/L (ref 39–308)
CREAT SERPL-MCNC: 1.52 MG/DL (ref 0.67–1.17)
EGFRCR SERPLBLD CKD-EPI 2021: 46 ML/MIN/1.73M2
EOSINOPHIL # BLD AUTO: 0 10E3/UL (ref 0–0.7)
EOSINOPHIL # BLD AUTO: 0 10E3/UL (ref 0–0.7)
EOSINOPHIL NFR BLD AUTO: 0 %
EOSINOPHIL NFR BLD AUTO: 0 %
ERYTHROCYTE [DISTWIDTH] IN BLOOD BY AUTOMATED COUNT: 14.1 % (ref 10–15)
ERYTHROCYTE [DISTWIDTH] IN BLOOD BY AUTOMATED COUNT: 14.2 % (ref 10–15)
FLUAV RNA SPEC QL NAA+PROBE: NEGATIVE
FLUBV RNA RESP QL NAA+PROBE: NEGATIVE
GLUCOSE SERPL-MCNC: 112 MG/DL (ref 70–99)
HBA1C MFR BLD: 5 %
HCO3 SERPL-SCNC: 26 MMOL/L (ref 22–29)
HCT VFR BLD AUTO: 31.7 % (ref 40–53)
HCT VFR BLD AUTO: 34.1 % (ref 40–53)
HDLC SERPL-MCNC: 32 MG/DL
HGB BLD-MCNC: 10.7 G/DL (ref 13.3–17.7)
HGB BLD-MCNC: 11.2 G/DL (ref 13.3–17.7)
HOLD SPECIMEN: NORMAL
IMM GRANULOCYTES # BLD: 0.1 10E3/UL
IMM GRANULOCYTES # BLD: 0.1 10E3/UL
IMM GRANULOCYTES NFR BLD: 1 %
IMM GRANULOCYTES NFR BLD: 1 %
INR PPP: 2.62 (ref 0.85–1.15)
LACTATE SERPL-SCNC: 1.6 MMOL/L (ref 0.7–2)
LDLC SERPL CALC-MCNC: 30 MG/DL
LVEF ECHO: NORMAL
LYMPHOCYTES # BLD AUTO: 0.5 10E3/UL (ref 0.8–5.3)
LYMPHOCYTES # BLD AUTO: 0.6 10E3/UL (ref 0.8–5.3)
LYMPHOCYTES NFR BLD AUTO: 4 %
LYMPHOCYTES NFR BLD AUTO: 6 %
MAGNESIUM SERPL-MCNC: 1.7 MG/DL (ref 1.7–2.3)
MCH RBC QN AUTO: 43.1 PG (ref 26.5–33)
MCH RBC QN AUTO: 43.5 PG (ref 26.5–33)
MCHC RBC AUTO-ENTMCNC: 32.8 G/DL (ref 31.5–36.5)
MCHC RBC AUTO-ENTMCNC: 33.8 G/DL (ref 31.5–36.5)
MCV RBC AUTO: 129 FL (ref 78–100)
MCV RBC AUTO: 131 FL (ref 78–100)
MONOCYTES # BLD AUTO: 0.3 10E3/UL (ref 0–1.3)
MONOCYTES # BLD AUTO: 0.5 10E3/UL (ref 0–1.3)
MONOCYTES NFR BLD AUTO: 3 %
MONOCYTES NFR BLD AUTO: 4 %
MRSA DNA SPEC QL NAA+PROBE: NEGATIVE
NEUTROPHILS # BLD AUTO: 11.4 10E3/UL (ref 1.6–8.3)
NEUTROPHILS # BLD AUTO: 8.4 10E3/UL (ref 1.6–8.3)
NEUTROPHILS NFR BLD AUTO: 89 %
NEUTROPHILS NFR BLD AUTO: 90 %
NONHDLC SERPL-MCNC: 45 MG/DL
NRBC # BLD AUTO: 0 10E3/UL
NRBC # BLD AUTO: 0 10E3/UL
NRBC BLD AUTO-RTO: 0 /100
NRBC BLD AUTO-RTO: 0 /100
NT-PROBNP SERPL-MCNC: 2117 PG/ML (ref 0–1800)
PLATELET # BLD AUTO: 232 10E3/UL (ref 150–450)
PLATELET # BLD AUTO: 289 10E3/UL (ref 150–450)
POTASSIUM SERPL-SCNC: 3.6 MMOL/L (ref 3.4–5.3)
PROT SERPL-MCNC: 6.2 G/DL (ref 6.4–8.3)
RBC # BLD AUTO: 2.46 10E6/UL (ref 4.4–5.9)
RBC # BLD AUTO: 2.6 10E6/UL (ref 4.4–5.9)
RSV RNA SPEC NAA+PROBE: NEGATIVE
SA TARGET DNA: POSITIVE
SARS-COV-2 RNA RESP QL NAA+PROBE: NEGATIVE
SODIUM SERPL-SCNC: 139 MMOL/L (ref 135–145)
TRIGL SERPL-MCNC: 74 MG/DL
TSH SERPL DL<=0.005 MIU/L-ACNC: 1.01 UIU/ML (ref 0.3–4.2)
WBC # BLD AUTO: 12.7 10E3/UL (ref 4–11)
WBC # BLD AUTO: 9.4 10E3/UL (ref 4–11)

## 2024-07-28 PROCEDURE — 80053 COMPREHEN METABOLIC PANEL: CPT | Performed by: INTERNAL MEDICINE

## 2024-07-28 PROCEDURE — 97530 THERAPEUTIC ACTIVITIES: CPT | Mod: GP

## 2024-07-28 PROCEDURE — 87637 SARSCOV2&INF A&B&RSV AMP PRB: CPT | Performed by: STUDENT IN AN ORGANIZED HEALTH CARE EDUCATION/TRAINING PROGRAM

## 2024-07-28 PROCEDURE — 85025 COMPLETE CBC W/AUTO DIFF WBC: CPT | Performed by: INTERNAL MEDICINE

## 2024-07-28 PROCEDURE — 97161 PT EVAL LOW COMPLEX 20 MIN: CPT | Mod: GP

## 2024-07-28 PROCEDURE — 83605 ASSAY OF LACTIC ACID: CPT | Performed by: INTERNAL MEDICINE

## 2024-07-28 PROCEDURE — 80061 LIPID PANEL: CPT | Performed by: STUDENT IN AN ORGANIZED HEALTH CARE EDUCATION/TRAINING PROGRAM

## 2024-07-28 PROCEDURE — 120N000004 HC R&B MS OVERFLOW

## 2024-07-28 PROCEDURE — 83735 ASSAY OF MAGNESIUM: CPT | Performed by: INTERNAL MEDICINE

## 2024-07-28 PROCEDURE — C8929 TTE W OR WO FOL WCON,DOPPLER: HCPCS

## 2024-07-28 PROCEDURE — 82550 ASSAY OF CK (CPK): CPT | Performed by: STUDENT IN AN ORGANIZED HEALTH CARE EDUCATION/TRAINING PROGRAM

## 2024-07-28 PROCEDURE — 87641 MR-STAPH DNA AMP PROBE: CPT | Performed by: INTERNAL MEDICINE

## 2024-07-28 PROCEDURE — 255N000002 HC RX 255 OP 636: Performed by: STUDENT IN AN ORGANIZED HEALTH CARE EDUCATION/TRAINING PROGRAM

## 2024-07-28 PROCEDURE — 93306 TTE W/DOPPLER COMPLETE: CPT | Mod: 26 | Performed by: INTERNAL MEDICINE

## 2024-07-28 PROCEDURE — 250N000013 HC RX MED GY IP 250 OP 250 PS 637: Performed by: STUDENT IN AN ORGANIZED HEALTH CARE EDUCATION/TRAINING PROGRAM

## 2024-07-28 PROCEDURE — 97116 GAIT TRAINING THERAPY: CPT | Mod: GP

## 2024-07-28 PROCEDURE — 87640 STAPH A DNA AMP PROBE: CPT | Performed by: INTERNAL MEDICINE

## 2024-07-28 PROCEDURE — 250N000013 HC RX MED GY IP 250 OP 250 PS 637: Performed by: INTERNAL MEDICINE

## 2024-07-28 PROCEDURE — 250N000011 HC RX IP 250 OP 636: Performed by: INTERNAL MEDICINE

## 2024-07-28 PROCEDURE — 85610 PROTHROMBIN TIME: CPT | Performed by: INTERNAL MEDICINE

## 2024-07-28 PROCEDURE — 36415 COLL VENOUS BLD VENIPUNCTURE: CPT | Performed by: INTERNAL MEDICINE

## 2024-07-28 PROCEDURE — 258N000003 HC RX IP 258 OP 636: Performed by: INTERNAL MEDICINE

## 2024-07-28 PROCEDURE — 99232 SBSQ HOSP IP/OBS MODERATE 35: CPT | Performed by: STUDENT IN AN ORGANIZED HEALTH CARE EDUCATION/TRAINING PROGRAM

## 2024-07-28 PROCEDURE — 84443 ASSAY THYROID STIM HORMONE: CPT | Performed by: STUDENT IN AN ORGANIZED HEALTH CARE EDUCATION/TRAINING PROGRAM

## 2024-07-28 PROCEDURE — 83036 HEMOGLOBIN GLYCOSYLATED A1C: CPT | Performed by: STUDENT IN AN ORGANIZED HEALTH CARE EDUCATION/TRAINING PROGRAM

## 2024-07-28 RX ORDER — WARFARIN SODIUM 2 MG/1
2 TABLET ORAL
Status: DISCONTINUED | OUTPATIENT
Start: 2024-07-28 | End: 2024-07-28

## 2024-07-28 RX ORDER — DOXYCYCLINE 100 MG/1
100 CAPSULE ORAL EVERY 12 HOURS SCHEDULED
Status: DISCONTINUED | OUTPATIENT
Start: 2024-07-28 | End: 2024-07-29 | Stop reason: HOSPADM

## 2024-07-28 RX ORDER — HYDROXYUREA 500 MG/1
500 CAPSULE ORAL DAILY
Status: DISCONTINUED | OUTPATIENT
Start: 2024-07-28 | End: 2024-07-29 | Stop reason: HOSPADM

## 2024-07-28 RX ORDER — DOXYCYCLINE HYCLATE 20 MG
100 TABLET ORAL EVERY 12 HOURS SCHEDULED
Status: DISCONTINUED | OUTPATIENT
Start: 2024-07-28 | End: 2024-07-28

## 2024-07-28 RX ORDER — WARFARIN SODIUM 2.5 MG/1
2.5 TABLET ORAL
Status: COMPLETED | OUTPATIENT
Start: 2024-07-28 | End: 2024-07-28

## 2024-07-28 RX ORDER — SODIUM CHLORIDE 9 MG/ML
INJECTION, SOLUTION INTRAVENOUS CONTINUOUS
Status: DISCONTINUED | OUTPATIENT
Start: 2024-07-28 | End: 2024-07-28

## 2024-07-28 RX ADMIN — SODIUM CHLORIDE: 9 INJECTION, SOLUTION INTRAVENOUS at 05:57

## 2024-07-28 RX ADMIN — WARFARIN SODIUM 2.5 MG: 2.5 TABLET ORAL at 17:10

## 2024-07-28 RX ADMIN — MICONAZOLE NITRATE: 2 POWDER TOPICAL at 20:11

## 2024-07-28 RX ADMIN — MICONAZOLE NITRATE: 2 POWDER TOPICAL at 15:52

## 2024-07-28 RX ADMIN — HYDROXYUREA 500 MG: 500 CAPSULE ORAL at 11:37

## 2024-07-28 RX ADMIN — PANTOPRAZOLE SODIUM 40 MG: 40 TABLET, DELAYED RELEASE ORAL at 08:53

## 2024-07-28 RX ADMIN — DOXYCYCLINE HYCLATE 100 MG: 100 CAPSULE ORAL at 20:11

## 2024-07-28 RX ADMIN — ATORVASTATIN CALCIUM 80 MG: 40 TABLET, FILM COATED ORAL at 08:53

## 2024-07-28 RX ADMIN — PIPERACILLIN AND TAZOBACTAM 3.38 G: 3; .375 INJECTION, POWDER, FOR SOLUTION INTRAVENOUS at 08:29

## 2024-07-28 RX ADMIN — ASPIRIN 81 MG: 81 TABLET, COATED ORAL at 08:53

## 2024-07-28 RX ADMIN — PIPERACILLIN AND TAZOBACTAM 3.38 G: 3; .375 INJECTION, POWDER, FOR SOLUTION INTRAVENOUS at 00:16

## 2024-07-28 RX ADMIN — UMECLIDINIUM 1 PUFF: 62.5 AEROSOL, POWDER ORAL at 08:53

## 2024-07-28 RX ADMIN — PIPERACILLIN AND TAZOBACTAM 3.38 G: 3; .375 INJECTION, POWDER, FOR SOLUTION INTRAVENOUS at 23:35

## 2024-07-28 RX ADMIN — PERFLUTREN 2 ML: 6.52 INJECTION, SUSPENSION INTRAVENOUS at 13:37

## 2024-07-28 RX ADMIN — PIPERACILLIN AND TAZOBACTAM 3.38 G: 3; .375 INJECTION, POWDER, FOR SOLUTION INTRAVENOUS at 15:41

## 2024-07-28 RX ADMIN — DOXYCYCLINE HYCLATE 100 MG: 100 CAPSULE ORAL at 08:53

## 2024-07-28 RX ADMIN — VANCOMYCIN HYDROCHLORIDE 1250 MG: 5 INJECTION, POWDER, LYOPHILIZED, FOR SOLUTION INTRAVENOUS at 20:11

## 2024-07-28 ASSESSMENT — ACTIVITIES OF DAILY LIVING (ADL)
NUMBER_OF_TIMES_PATIENT_HAS_FALLEN_WITHIN_LAST_SIX_MONTHS: 1
FALL_HISTORY_WITHIN_LAST_SIX_MONTHS: YES
ADLS_ACUITY_SCORE: 26
HEARING_DIFFICULTY_OR_DEAF: NO
ADLS_ACUITY_SCORE: 23
CHANGE_IN_FUNCTIONAL_STATUS_SINCE_ONSET_OF_CURRENT_ILLNESS/INJURY: NO
ADLS_ACUITY_SCORE: 26
ADLS_ACUITY_SCORE: 23
ADLS_ACUITY_SCORE: 23
ADLS_ACUITY_SCORE: 26
ADLS_ACUITY_SCORE: 23
WEAR_GLASSES_OR_BLIND: YES
ADLS_ACUITY_SCORE: 38
DIFFICULTY_COMMUNICATING: NO
ADLS_ACUITY_SCORE: 23
ADLS_ACUITY_SCORE: 26
DRESSING/BATHING_DIFFICULTY: NO
ADLS_ACUITY_SCORE: 26
DOING_ERRANDS_INDEPENDENTLY_DIFFICULTY: NO
ADLS_ACUITY_SCORE: 26
ADLS_ACUITY_SCORE: 23
ADLS_ACUITY_SCORE: 23
CONCENTRATING,_REMEMBERING_OR_MAKING_DECISIONS_DIFFICULTY: NO
WALKING_OR_CLIMBING_STAIRS_DIFFICULTY: NO
TOILETING_ISSUES: NO
ADLS_ACUITY_SCORE: 26
DIFFICULTY_EATING/SWALLOWING: NO
ADLS_ACUITY_SCORE: 38

## 2024-07-28 NOTE — PHARMACY-ANTICOAGULATION SERVICE
Clinical Pharmacy - Warfarin Dosing Consult     Pharmacy has been consulted to manage this patient s warfarin therapy.  Indication: Atrial Fibrillation  Therapy Goal: INR 2-3  Provider/Team: ANDRY  Warfarin Prior to Admission: Yes  Warfarin PTA Regimen: 3 mg daily  Significant drug interactions: doxycycline, asa  Dose Comments: with recent start of antibiotics and trend up in INR, will dose carefully and give a slightly reduced dose today - give 2.5 mg    INR   Date Value Ref Range Status   07/28/2024 2.62 (H) 0.85 - 1.15 Final   07/27/2024 2.40 (H) 0.85 - 1.15 Final       Recommend warfarin 2.5 mg today.  Pharmacy will monitor Shaji HUBBARD Peña daily and order warfarin doses to achieve specified goal.      Please contact pharmacy as soon as possible if the warfarin needs to be held for a procedure or if the warfarin goals change.

## 2024-07-28 NOTE — H&P
[facility] MEDICINE ADMISSION HISTORY AND PHYSICAL       Assessment & Plan      This is a 79 year old white male with left leg/foot pain        Present on Admission (POA)    1. Cellulitis - back of left leg -- can't rule out abscess.  Has 102 fever, and low BP -- improved with boluses. Consider sepsis, though did not appear toxic looking     - This is the same leg he had Angio in June 2023 to assess PVD --- That time he had  stent placement in the area of high-grade stenosis in the cranial left external iliac artery.     - No trauma but XR today small minimally displaced ossific avulsion fracture fragments along the medial malleolus which could be chronic but are technically age indeterminate.     - Ankle joint is not tender no limitation of ROM --- low suspicion for septic joint -- but needs close monitoring    - No skin breakdown on left leg - not sure how he ended up having cellulitis    - Currently, not in pain     - Zosyn and vanco to continue  - MRSA checks  - Follow up culture  - Cardiac tele and pulse ox  - CBC/CMP in AM   - PRN pain meds     2. CT also showed -- stranding within the left retroperitoneum extending into the left inguinal region  AND US showed mildly prominent lymph nodes in the proximal left thigh with the largest measuring 2.3 x 0.6 x 1.8 cm.     - Pelvic US assess fluid in retroperitoneum and leg US assess for leg abscess   - Of note, abdomen and left groin not tender     3. CT also showed Groundglass opacities and interlobular septal thickening involving the left lower lobe, likely sequela of infection or   - BNP check    4. Acute renal failure from low BP and ATN  - IVF - careful   - check UA and CK    5. Hypotension - resolved, by fluid boluses  - HOLD all BP lowering drugs     6. Mag of 1.6  - electrolyte protocol    7. Troponin elevation, demand ischemia r/o ACS  - repeat trop  - ECHO in AM - check for WMA       Chronic Medical Conditions    1. CHF with  history of EF of 35% - stable, denies SOB or CP  --- ECHO in June 2023 -- EF is now 55%   2. CAD  3. PAF on counadin - pharm to manage   4. History of PE and DVT right leg   5. Chronic anemia   6. PVD and seeing vascular surgery - History of occlusion of the left superficial artery with reconstituted flow in the above-knee popliteal artery.  The patient underwent successful revascularization of his left lower extremity on 6/29/2023.  7. ?COPD - history of smoking       VTE prophylaxis -- on coumadin   Diet --  Cardiac diet  Code Status -- Full,  Barriers to discharge -- Admitting/Acute medical condition/s  Discharge Disposition and goals --  Unable to determine at this point, pending progress/treatment response.     PPE - I was wearing PPE including but not limited to - N95 mask, Gloves, and/or Safety glasses.  Admission Status --  Inpatient     Care plan is based on available information and patient's condition at encounter. Care plan was discussed and agreed to proceed by the patient/family member. Made aware that care plan may change.     I recommend to review/revise history/care plan for information/results not available during my encounter. All or some home medication/s were not resumed or was modified on admission due to safety concerns. Will have rounding AM doc  review safety to resume held/modified home medications.     Availability -- If need to coordinate care after night shift  -- Contact assigned AM rounding Hospitalist.     75 minutes spent by me on the date of service doing chart review, history, exam, diagnostic test results interpretation, care coordination with RN, RT and/or Pharm, & further activities per the note.      Andrade Cardenas MD, MPH, FACP, [facility]  Internal Medicine - Hospitalist        Chief Complaint      HISTORY     - Patient was seen in ED - 1  - He was awake and interactive. Felt warm and I checked temp - its 102 axillary. Per report, he was hypotensive when he arrived.  - His  complain was left leg pain, some foot pain but this is resolved. Back of left leg is red and tender touch. This started after he got out of the trailer this morning. Pain not better - hence he decided to come.  - No CP or SOB. No belly pain. No diarrhea. No cough.     - ED course/treatments/referral - Multiple imagings. Vanco and zosyn given. Fluid boluses - now .      - ROS --- No headache. No dizziness. No weakness. No CP or SOB. No palpitations. No abdominal pain. No nausea or vomiting. No urinary symptoms. No bleeding symptoms. No weight loss. Rest of 12 point ROS was reviewed and negative.       Past Medical History     Past Medical History:   Diagnosis Date    Cerebral infarction (H)     COPD (chronic obstructive pulmonary disease) (H)     HLD (hyperlipidemia)     Hypertension     Myeloproliferative disease (H)          Surgical History     Past Surgical History:   Procedure Laterality Date    CV CORONARY ANGIOGRAM N/A 4/26/2022    Procedure: CV CORONARY ANGIOGRAM;  Surgeon: Audrey Holder MD;  Location: [facility] CATH LAB CV    CV LEFT HEART CATH N/A 4/26/2022    Procedure: Left Heart Catheterization;  Surgeon: Audrey Holder MD;  Location: [facility] CATH LAB CV    IR LOWER EXTREMITY ANGIOGRAM LEFT  6/29/2023    OPEN REDUCTION INTERNAL FIXATION FOOT Right 2010    OTHER SURGICAL HISTORY  2001    Lip lesion removal    PICC TRIPLE LUMEN PLACEMENT  8/17/2022         TONSILLECTOMY & ADENOIDECTOMY          Family History      Family History   Problem Relation Age of Onset    Alcoholism Mother          Social History      .  Social History     Socioeconomic History    Marital status:      Spouse name: Not on file    Number of children: Not on file    Years of education: Not on file    Highest education level: Not on file   Occupational History    Not on file   Tobacco Use    Smoking status: Former     Current packs/day: 0.00     Average packs/day: 1 pack/day for 46.6 years (46.6 ttl pk-yrs)     Types:  Cigarettes     Start date: 1965     Quit date: 2012     Years since quittin.5    Smokeless tobacco: Never   Vaping Use    Vaping status: Never Used   Substance and Sexual Activity    Alcohol use: Yes     Alcohol/week: 19.0 standard drinks of alcohol    Drug use: Never    Sexual activity: Not on file   Other Topics Concern    Not on file   Social History Narrative    Not on file     Social Determinants of Health     Financial Resource Strain: Not on file   Food Insecurity: Not on file   Transportation Needs: Not on file   Physical Activity: Not on file   Stress: Not on file   Social Connections: Not on file   Interpersonal Safety: Not on file   Housing Stability: Not on file          Allergies      No Known Allergies      Prior to Admission Medications      Current Facility-Administered Medications   Medication Dose Route Frequency Provider Last Rate Last Admin    acetaminophen (TYLENOL) tablet 975 mg  975 mg Oral Once Gianni Mi MD        sodium chloride 0.9% BOLUS 500 mL  500 mL Intravenous Once Gianni iM MD         Current Outpatient Medications   Medication Sig Dispense Refill    albuterol (PROAIR HFA;PROVENTIL HFA;VENTOLIN HFA) 90 mcg/actuation inhaler Inhale 2 puffs into the lungs every 6 hours as needed for shortness of breath / dyspnea      aspirin (ASPIRIN LOW DOSE) 81 MG EC tablet TAKE 1 TABLET (81 MG) BY MOUTH DAILY. START THE MORNING OF 22 90 tablet 1    atorvastatin (LIPITOR) 80 MG tablet Take 1 tablet (80 mg) by mouth every morning 90 tablet 1    furosemide (LASIX) 20 MG tablet Take 0.5 tablets (10 mg) by mouth daily 45 tablet 3    hydroxyurea (HYDREA) 500 MG capsule Take 1,000 mg by mouth daily      losartan (COZAAR) 25 MG tablet Take 1 tablet (25 mg) by mouth daily 90 tablet 3    metoprolol succinate ER (TOPROL-XL) 25 MG 24 hr tablet Take 25 mg by mouth daily      nitroGLYcerin (NITROSTAT) 0.4 MG sublingual tablet One tablet under the tongue every 5 minutes if needed for chest  "pain. May repeat every 5 minutes for a maximum of 3 doses in 15 minutes\" 25 tablet 3    omeprazole (PRILOSEC) 20 MG DR capsule Take 20 mg by mouth daily      tiotropium (SPIRIVA) 18 mcg inhalation capsule [TIOTROPIUM (SPIRIVA) 18 MCG INHALATION CAPSULE] Place 18 mcg into inhaler and inhale daily.      warfarin ANTICOAGULANT (COUMADIN) 1 MG tablet Take 3 tablets (3 mg) by mouth daily             Review of Systems     A 12 point comprehensive review of systems was negative except as noted above in HPI.    PHYSICAL EXAMINATION       Vitals      Vitals: /63   Pulse 88   Temp 98.5  F (36.9  C) (Oral)   Resp 25   Ht 1.651 m (5' 5\")   Wt 90.7 kg (200 lb)   SpO2 94%   BMI 33.28 kg/m    BMI= Body mass index is 33.28 kg/m .      Examination     General Appearance:  Alert, cooperative, no distress  Head:    Normocephalic, without obvious abnormality, atraumatic  EENT:  PERRL, conjunctiva/corneas clear, EOM's intact.   Neck:   Supple, symmetrical, trachea midline, no adenopathy; no NVE  Back:  Symmetric, no curvature, no CVA tenderness  Chest/Lungs: Clear to auscultation bilaterally, respirations unlabored, No tenderness or deformity. No abdominal breathing or use of accessory muscles.   Heart:    Regular rate and rhythm, S1 and S2 normal, no murmur, rub   or gallop  Abdomen: Soft, non-tender, bowel sounds active all four quadrants, not peritoneal on palpation. Not distended  Extremities:  left leg swollen and red back of leg ? Fluctuant   Skin:  Skin color, texture, turgor normal, no rashes or lesion  Neurologic:  Awake and alert, No lateralizing or localizing signs       Pertinent Lab     Results for orders placed or performed during the hospital encounter of 07/27/24   XR Ankle Left G/E 3 Views    Impression    IMPRESSION:       There are small minimally displaced ossific avulsion fracture fragments along the medial malleolus which could be chronic but are technically age indeterminate. Correlation with point " tenderness would be helpful.    Scattered mild hindfoot and midfoot degenerative changes. There is diffuse soft tissue swelling over the ankle.   US Lower Extremity Venous Duplex Left    Impression    IMPRESSION:  1.  No deep venous thrombosis in the left lower extremity.    2.  Incidentally noted are some mildly prominent lymph nodes in the proximal left thigh with the largest measuring 2.3 x 0.6 x 1.8 cm.   XR Chest Port 1 View    Impression    IMPRESSION: Borderline hyperinflation of both lungs. Mildly calcified thoracic aortic arch. No active CHF, inflammatory infiltrates, or pneumothorax. Since 8/17/2022 the prior seen right PICC has been removed.   CT Abdomen Pelvis w/o Contrast    Impression    IMPRESSION:  1.  Groundglass opacities and interlobular septal thickening involving the left lower lobe, likely sequela of infection or aspiration.  2.  Nonspecific stranding/fluid tracking within the left retroperitoneum, left inguinal region, and surrounding left groin vasculature. Correlate with history and for recent procedures.  3.  Left thigh soft tissue edema.  4.  Additional chronic and ancillary findings as described       Result Value Ref Range    INR 2.40 (H) 0.85 - 1.15   Basic metabolic panel   Result Value Ref Range    Sodium 137 135 - 145 mmol/L    Potassium 4.1 3.4 - 5.3 mmol/L    Chloride 102 98 - 107 mmol/L    Carbon Dioxide (CO2) 24 22 - 29 mmol/L    Anion Gap 11 7 - 15 mmol/L    Urea Nitrogen 30.7 (H) 8.0 - 23.0 mg/dL    Creatinine 1.60 (H) 0.67 - 1.17 mg/dL    GFR Estimate 44 (L) >60 mL/min/1.73m2    Calcium 8.8 8.8 - 10.4 mg/dL    Glucose 107 (H) 70 - 99 mg/dL   Hepatic function panel   Result Value Ref Range    Protein Total 7.0 6.4 - 8.3 g/dL    Albumin 3.8 3.5 - 5.2 g/dL    Bilirubin Total 1.2 <=1.2 mg/dL    Alkaline Phosphatase 70 40 - 150 U/L    AST 33 0 - 45 U/L    ALT 24 0 - 70 U/L    Bilirubin Direct 0.35 (H) 0.00 - 0.30 mg/dL   Lactic acid whole blood with 1x repeat in 2 hr when >2    Result Value Ref Range    Lactic Acid, Initial 4.3 (HH) 0.7 - 2.0 mmol/L   Result Value Ref Range    Troponin T, High Sensitivity 34 (H) <=22 ng/L   Result Value Ref Range    Magnesium 1.6 (L) 1.7 - 2.3 mg/dL   CBC with platelets and differential   Result Value Ref Range    WBC Count 14.7 (H) 4.0 - 11.0 10e3/uL    RBC Count 3.01 (L) 4.40 - 5.90 10e6/uL    Hemoglobin 13.0 (L) 13.3 - 17.7 g/dL    Hematocrit 38.1 (L) 40.0 - 53.0 %     (H) 78 - 100 fL    MCH 43.2 (H) 26.5 - 33.0 pg    MCHC 34.1 31.5 - 36.5 g/dL    RDW 14.0 10.0 - 15.0 %    Platelet Count 365 150 - 450 10e3/uL    % Neutrophils 89 %    % Lymphocytes 3 %    % Monocytes 6 %    % Eosinophils 0 %    % Basophils 0 %    % Immature Granulocytes 3 %    NRBCs per 100 WBC 0 <1 /100    Absolute Neutrophils 13.0 (H) 1.6 - 8.3 10e3/uL    Absolute Lymphocytes 0.4 (L) 0.8 - 5.3 10e3/uL    Absolute Monocytes 0.9 0.0 - 1.3 10e3/uL    Absolute Eosinophils 0.0 0.0 - 0.7 10e3/uL    Absolute Basophils 0.1 0.0 - 0.2 10e3/uL    Absolute Immature Granulocytes 0.4 <=0.4 10e3/uL    Absolute NRBCs 0.0 10e3/uL   UA with Microscopic reflex to Culture    Specimen: Urine, Midstream   Result Value Ref Range    Color Urine Yellow Colorless, Straw, Light Yellow, Yellow    Appearance Urine Clear Clear    Glucose Urine Negative Negative mg/dL    Bilirubin Urine Negative Negative    Ketones Urine Trace (A) Negative mg/dL    Specific Gravity Urine 1.029 1.001 - 1.030    Blood Urine Negative Negative    pH Urine 5.5 5.0 - 7.0    Protein Albumin Urine 30 (A) Negative mg/dL    Urobilinogen Urine <2.0 <2.0 mg/dL    Nitrite Urine Negative Negative    Leukocyte Esterase Urine Negative Negative    Mucus Urine Present (A) None Seen /LPF    RBC Urine 2 <=2 /HPF    WBC Urine 4 <=5 /HPF    Squamous Epithelials Urine <1 <=1 /HPF    Hyaline Casts Urine 5 (H) <=2 /LPF   Result Value Ref Range    Procalcitonin 6.10 (HH) <0.50 ng/mL   Extra Blood Culture Bottle   Result Value Ref Range    Hold  Specimen JIC    Extra Red Top Tube   Result Value Ref Range    Hold Specimen JIC    Lactic acid whole blood   Result Value Ref Range    Lactic Acid 3.1 (H) 0.7 - 2.0 mmol/L   Result Value Ref Range    Troponin T, High Sensitivity 30 (H) <=22 ng/L   ECG 12-LEAD WITH MUSE (LHE)   Result Value Ref Range    Systolic Blood Pressure  mmHg    Diastolic Blood Pressure  mmHg    Ventricular Rate 96 BPM    Atrial Rate 96 BPM    SC Interval 146 ms    QRS Duration 104 ms     ms    QTc 497 ms    P Axis 65 degrees    R AXIS 62 degrees    T Axis -65 degrees    Interpretation ECG       Sinus rhythm  Inferior infarct , age undetermined  Anterior infarct (cited on or before 17-Aug-2022)  Abnormal ECG  When compared with ECG of 17-Aug-2022 13:53,  Sinus rhythm has replaced Atrial fibrillation  Inferior infarct is now Present  Confirmed by SEE ED PROVIDER NOTE FOR, ECG INTERPRETATION ([id number]),  Urmila Chavez ([id number]) on 7/27/2024 5:47:29 PM             Pertinent Radiology

## 2024-07-28 NOTE — PLAN OF CARE
Problem: Adult Inpatient Plan of Care  Goal: Optimal Comfort and Wellbeing  Outcome: Progressing     Problem: Pneumonia  Goal: Resolution of Infection Signs and Symptoms  Outcome: Progressing   Goal Outcome Evaluation:      Plan of Care Reviewed With: patient    Overall Patient Progress: improving     A/Ox4. Patient on RA. Tele NSR. Up A1 with gb/fww. Patient denies pain. IV abx. NS infusing.

## 2024-07-28 NOTE — PROGRESS NOTES
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  He was found to have fever and hypotension, swelling left leg in the ED.  CT reviewed stranding of soft tissue abdomen the left retroperitoneal space extending to the thigh.  He was started on vancomycin and Zosyn.      Barriers to Discharge:   Lack of improvement of leg cellulitis    Disposition:   Inpatient    Assessment and Plan:  Severe sepsis due to cellulitis of left leg  - This is the same leg he had Angio in June 2023 to assess PVD --- That time he had  stent placement in the area of high-grade stenosis in the cranial left external iliac artery.   - No trauma but XR today small minimally displaced ossific avulsion fracture fragments along the medial malleolus which could be chronic but are technically age indeterminate.   - Ankle joint is not tender no limitation of ROM --- low suspicion for septic joint -- but needs close monitoring  - No skin breakdown on left leg - not sure how he ended up having cellulitis  -CT also showed -- stranding within the left retroperitoneum extending into the left inguinal region  AND US showed mildly prominent lymph nodes in the proximal left thigh with the largest measuring 2.3 x 0.6 x 1.8 cm, consider biopsy if symptom doesn't resolve with current abx  - Pelvic US assess fluid in retroperitoneum and leg US assess for leg abscess   - Of note, abdomen and left groin not tender   - Zosyn and vanco to continue, also swab negative  will continue vancomycin for possible MRSE  - Follow up blood culture culture    LLL groundglass opacities  Possible community-acquired pneumonia  -Check COVID  -Start doxycycline long with Zosyn     Acute renal failure from low BP and ATN  - IVF - careful for history of HFrEF  - check UA and CK     Hypertension  - HOLD all BP lowering drugs for severe sepsis on admission     Hypomagnesemia  - electrolyte protocol     Troponin elevation, demand ischemia r/o ACS  34> 30  - repeat trop  - ECHO in AM - check for WMA     Candidiasis of bilateral groin  More on the left than right  Blood-tinged moist crust of the groin on the left side  Miconazole powder twice a day     Chronic Medical Conditions     1. CHF with history of EF of 35% - stable, denies SOB or CP  --- ECHO in June 2023 -- EF is now 55%   2. CAD  3. PAF on counadin - pharm to manage   4. History of PE and DVT right leg   5. Chronic anemia   6. PVD and seeing vascular surgery - History of occlusion of the left superficial artery with reconstituted flow in the above-knee popliteal artery.  The patient underwent successful revascularization of his left lower extremity on 6/29/2023.  7.  Myelodysplastic syndrome -on hydroxyurea 1000 mg a day, decrease to 500 mg while creatinine clearance is less than 60      Anticoagulation   Orders (Includes Only Anticoagulants)  warfarin ANTICOAGULANT, 2.5 mg, Oral, ONCE at 18:00  Warfarin Therapy Reminder, 1 each, Oral, See Admin Instructions      Therapy: PT  Garrett:Not present  Lines: None       Current Diet  Orders Placed This Encounter      Combination Diet Low Saturated Fat Na <2400mg Diet, No Caffeine Diet        Clinically Significant Risk Factors Present on Admission          # Hypocalcemia: Lowest Ca = 7.9 mg/dL in last 2 days, will monitor and replace as appropriate   # Hypomagnesemia: Lowest Mg = 1.6 mg/dL in last 2 days, will replace as needed   # Hypoalbuminemia: Lowest albumin = 3.2 g/dL at 7/28/2024  4:15 AM,  "will monitor as appropriate  # Drug Induced Coagulation Defect: home medication list includes an anticoagulant medication  # Drug Induced Platelet Defect: home medication list includes an antiplatelet medication   # Hypertension: Noted on problem list  # Chronic heart failure with reduced ejection fraction: last echo with EF <40%   # Anemia: based on hgb <11       # Obesity: Estimated body mass index is 34.83 kg/m  as calculated from the following:    Height as of this encounter: 1.651 m (5' 5\").    Weight as of this encounter: 94.9 kg (209 lb 4.8 oz).       # COPD: noted on problem list        Interval History/Subjective:  Patient reports feeling okay.  Her pain is better on the left ankle.  Wife is at bedside and reported recent swelling of the left leg.  Patient denies discharge from the penis.        Physical Exam/Objective:  Temp:  [98.5  F (36.9  C)-102  F (38.9  C)] 99  F (37.2  C)  Pulse:  [] 75  Resp:  [17-30] 20  BP: ()/(42-63) 105/52  Cuff Mean (mmHg):  [71] 71  SpO2:  [91 %-96 %] 94 %  Wt Readings from Last 4 Encounters:   07/28/24 94.9 kg (209 lb 4.8 oz)   04/29/24 93.8 kg (206 lb 14.4 oz)   12/05/23 95.3 kg (210 lb)   06/29/23 85.3 kg (188 lb)     Body mass index is 34.83 kg/m .    General Appearance: Alert and wake, not in distress  Respiratory: clear lungs, no crackles or wheezing  Cardiovascular: rhythmic, normal S1 and S2, no murmur  GI: soft, non-tender, normal bowel sound  Neurology: oriented x 3  Psych: cooperative and calm, normal affect  L leg: Swelling, erythema, excessive warmth on the medial thigh and lower leg, ankle, foot on left side only, no tenderness throughout, no limited range of motion, pulse 2+ at the anterior tibialis, sensation intact    Medications:   Personally Reviewed.  Medications   Current Facility-Administered Medications   Medication Dose Route Frequency Provider Last Rate Last Admin    Patient is already receiving anticoagulation with heparin, enoxaparin " (LOVENOX), warfarin (COUMADIN)  or other anticoagulant medication   Does not apply Continuous PRN Bruno Cardenas MD        sodium chloride 0.9 % infusion   Intravenous Continuous Bruno Cardenas MD 50 mL/hr at 07/28/24 0800 Rate Verify at 07/28/24 0800     Current Facility-Administered Medications   Medication Dose Route Frequency Provider Last Rate Last Admin    aspirin EC tablet 81 mg  81 mg Oral Daily Bruno Cardenas MD   81 mg at 07/28/24 0853    atorvastatin (LIPITOR) tablet 80 mg  80 mg Oral QAM Bruno Cardenas MD   80 mg at 07/28/24 0853    doxycycline hyclate (VIBRAMYCIN) capsule 100 mg  100 mg Oral Q12H KATELYN (08/20) Haley Jackson MD   100 mg at 07/28/24 0853    [Held by provider] furosemide (LASIX) half-tab 10 mg  10 mg Oral Daily Bruno Cardenas MD        hydroxyurea (HYDREA) capsule 500 mg  500 mg Oral Daily Haley Jackson MD        [Held by provider] losartan (COZAAR) tablet 25 mg  25 mg Oral Daily Bruno Cardenas MD        [Held by provider] metoprolol succinate ER (TOPROL XL) 24 hr tablet 25 mg  25 mg Oral Daily Bruno Cardenas MD        pantoprazole (PROTONIX) EC tablet 40 mg  40 mg Oral Daily Bruno Cardenas MD   40 mg at 07/28/24 0853    piperacillin-tazobactam (ZOSYN) 3.375 g vial to attach to  mL bag  3.375 g Intravenous Q8H Bruno Cardenas MD   3.375 g at 07/28/24 0829    sodium chloride (PF) 0.9% PF flush 3 mL  3 mL Intracatheter Q8H Bruno Cardenas MD   3 mL at 07/28/24 0552    umeclidinium (INCRUSE ELLIPTA) 62.5 MCG/ACT inhaler 1 puff  1 puff Inhalation Daily Bruno Cardenas MD   1 puff at 07/28/24 0853    vancomycin (VANCOCIN) 1,250 mg in 0.9% NaCl 250 mL intermittent infusion  1,250 mg Intravenous Q24H Bruno Cardenas MD        warfarin ANTICOAGULANT (COUMADIN) tablet 2.5 mg  2.5 mg Oral ONCE at 18:00 Haley Jackson MD        Warfarin Dose Required Daily - Pharmacist Managed  1 each Oral See Admin Instructions Bruno Cardenas MD           Data  reviewed today: I personally reviewed all new medications, labs, imaging/diagnostics reports over the past 24 hours. Pertinent findings include:    Imaging:   Recent Results (from the past 24 hour(s))   US Lower Extremity Venous Duplex Left    Narrative    EXAM: US LOWER EXTREMITY VENOUS DUPLEX LEFT  LOCATION: [facility]  DATE: 7/27/2024    INDICATION: left ankle and posterior calf swelling and pain, eval for possible DVT  COMPARISON: None.  TECHNIQUE: Venous Duplex ultrasound of the left lower extremity with and without compression, augmentation and duplex. Color flow and spectral Doppler with waveform analysis performed.    FINDINGS: Exam includes the common femoral, femoral, popliteal, and contralateral common femoral veins as well as segmentally visualized deep calf veins and greater saphenous vein.     LEFT: No deep vein thrombosis. No superficial thrombophlebitis. No popliteal cyst.      Impression    IMPRESSION:  1.  No deep venous thrombosis in the left lower extremity.    2.  Incidentally noted are some mildly prominent lymph nodes in the proximal left thigh with the largest measuring 2.3 x 0.6 x 1.8 cm.   XR Ankle Left G/E 3 Views    Narrative    EXAM: XR ANKLE LEFT G/E 3 VIEWS  LOCATION: [facility]  DATE: 7/27/2024    INDICATION: left medial ankle pain, eval for fx  COMPARISON: None.      Impression    IMPRESSION:       There are small minimally displaced ossific avulsion fracture fragments along the medial malleolus which could be chronic but are technically age indeterminate. Correlation with point tenderness would be helpful.    Scattered mild hindfoot and midfoot degenerative changes. There is diffuse soft tissue swelling over the ankle.   XR Chest Port 1 View    Narrative    EXAM: XR CHEST PORT 1 VIEW  LOCATION: [facility]  DATE: 7/27/2024    INDICATION: hypotension, eval for pna or pulm edema  COMPARISON: 8/17/2022       Impression    IMPRESSION: Borderline hyperinflation of both lungs. Mildly calcified thoracic aortic arch. No active CHF, inflammatory infiltrates, or pneumothorax. Since 8/17/2022 the prior seen right PICC has been removed.   CT Abdomen Pelvis w/o Contrast    Narrative    EXAM: CT ABDOMEN PELVIS W/O CONTRAST  LOCATION: [facility]  DATE: 7/27/2024    INDICATION: sepsis without clear source, eval for possible acute intraabdominal process  COMPARISON: None.  TECHNIQUE: CT scan of the abdomen and pelvis was performed without IV contrast. Multiplanar reformats were obtained. Dose reduction techniques were used.  CONTRAST: None.    FINDINGS:     LOWER CHEST: Groundglass opacities and interlobular septal thickening involving the dependent left lower lobe. Coronary calcifications.    Limited evaluation of solid organs given lack of intravenous contrast.     HEPATOBILIARY: Normal liver contours. No biliary dilation. Normal gallbladder.     PANCREAS: Normal contours.     SPLEEN: Normal contours.    ADRENAL GLANDS: Normal contours.     KIDNEYS/BLADDER: 1.1 cm indeterminate attenuation lesion within the left kidney (series 2 image 25). Nonobstructing bilateral nephrolithiasis. Normal urinary bladder.      BOWEL: No obstruction. Normal appendix. No bowel wall thickening or pneumatosis. There is stranding within the left retroperitoneum extending into the left inguinal region (series 2 image 58, 63, 70).    LYMPH NODES: No pathologically enlarged lymph nodes.    VASCULATURE: Nonaneurysmal aorta with severe aortoiliac calcifications. Mild dilation of the bilateral common iliac arteries measuring up to 2.2 cm on the right and 1.7 cm on the left. 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 Short segment stent graft is seen within the left external iliac artery (series 2 image 60).    PELVIC ORGANS: No pelvic masses.     MUSCULOSKELETAL: No acute  osseous abnormalities. Soft tissue edema is seen throughout the left thigh and extending into the left inguinal region.      Impression    IMPRESSION:  1.  Groundglass opacities and interlobular septal thickening involving the left lower lobe, likely sequela of infection or aspiration.  2.  Nonspecific stranding/fluid tracking within the left retroperitoneum, left inguinal region, and surrounding left groin vasculature. Correlate with history and for recent procedures.  3.  Left thigh soft tissue edema.  4.  Additional chronic and ancillary findings as described       US Pelvic Limited    Narrative    EXAM: US PELVIC LIMITED  LOCATION: [facility]  DATE: 7/27/2024    INDICATION: Abnormal CT abdomen, eval for retroperitoneal fluid collection.  COMPARISON: CT from 7/27/2024.  TECHNIQUE: Transabdominal scans were performed. Endovaginal ultrasound was performed to better visualize the adnexa.    FINDINGS:  Limited pelvic ultrasound with evaluation performed in the left lower quadrant corresponding with the area of stranding seen on the prior CT scan. No fluid collections or other sonographic abnormality visualized.       Impression    IMPRESSION:  1.  No fluid collections or sonographic abnormalities visualized in the left lower quadrant.         US Lower Extremity Non Vascular Left    Narrative    EXAM: US LOWER EXTREMITY NON VASCULAR LEFT  LOCATION: [facility]  DATE: 7/27/2024    INDICATION: back of left leg tender, fluctuant, came in fever and low BP    eval fo abscess  COMPARISON: None.  TECHNIQUE: Routine.    FINDINGS: Subcutaneous edema is seen in the visualized portion of the lower leg no organized fluid collections identified. The underlying Achilles tendon is somewhat thicker on the left than the right.      Impression    IMPRESSION:  1.  Subcutaneous edema and the visualized portion of the left lower leg, consistent with edema  2.  Asymmetric thickening of the  left Achilles tendon, may reflect mild tendinosis. Correlation with Achilles tendon symptomatology.       Labs:  US Lower Extremity Non Vascular Left   Final Result   IMPRESSION:   1.  Subcutaneous edema and the visualized portion of the left lower leg, consistent with edema   2.  Asymmetric thickening of the left Achilles tendon, may reflect mild tendinosis. Correlation with Achilles tendon symptomatology.      US Pelvic Limited   Final Result   IMPRESSION:   1.  No fluid collections or sonographic abnormalities visualized in the left lower quadrant.               CT Abdomen Pelvis w/o Contrast   Final Result   IMPRESSION:   1.  Groundglass opacities and interlobular septal thickening involving the left lower lobe, likely sequela of infection or aspiration.   2.  Nonspecific stranding/fluid tracking within the left retroperitoneum, left inguinal region, and surrounding left groin vasculature. Correlate with history and for recent procedures.   3.  Left thigh soft tissue edema.   4.  Additional chronic and ancillary findings as described            XR Chest Port 1 View   Final Result   IMPRESSION: Borderline hyperinflation of both lungs. Mildly calcified thoracic aortic arch. No active CHF, inflammatory infiltrates, or pneumothorax. Since 8/17/2022 the prior seen right PICC has been removed.      XR Ankle Left G/E 3 Views   Final Result   IMPRESSION:          There are small minimally displaced ossific avulsion fracture fragments along the medial malleolus which could be chronic but are technically age indeterminate. Correlation with point tenderness would be helpful.      Scattered mild hindfoot and midfoot degenerative changes. There is diffuse soft tissue swelling over the ankle.      US Lower Extremity Venous Duplex Left   Final Result   IMPRESSION:   1.  No deep venous thrombosis in the left lower extremity.      2.  Incidentally noted are some mildly prominent lymph nodes in the proximal left thigh with the  largest measuring 2.3 x 0.6 x 1.8 cm.      POC US ECHO LIMITED    (Results Pending)   Echocardiogram Complete    (Results Pending)     Recent Results (from the past 24 hour(s))   INR    Collection Time: 07/27/24  5:23 PM   Result Value Ref Range    INR 2.40 (H) 0.85 - 1.15   Basic metabolic panel    Collection Time: 07/27/24  5:23 PM   Result Value Ref Range    Sodium 137 135 - 145 mmol/L    Potassium 4.1 3.4 - 5.3 mmol/L    Chloride 102 98 - 107 mmol/L    Carbon Dioxide (CO2) 24 22 - 29 mmol/L    Anion Gap 11 7 - 15 mmol/L    Urea Nitrogen 30.7 (H) 8.0 - 23.0 mg/dL    Creatinine 1.60 (H) 0.67 - 1.17 mg/dL    GFR Estimate 44 (L) >60 mL/min/1.73m2    Calcium 8.8 8.8 - 10.4 mg/dL    Glucose 107 (H) 70 - 99 mg/dL   Hepatic function panel    Collection Time: 07/27/24  5:23 PM   Result Value Ref Range    Protein Total 7.0 6.4 - 8.3 g/dL    Albumin 3.8 3.5 - 5.2 g/dL    Bilirubin Total 1.2 <=1.2 mg/dL    Alkaline Phosphatase 70 40 - 150 U/L    AST 33 0 - 45 U/L    ALT 24 0 - 70 U/L    Bilirubin Direct 0.35 (H) 0.00 - 0.30 mg/dL   Lactic acid whole blood with 1x repeat in 2 hr when >2    Collection Time: 07/27/24  5:23 PM   Result Value Ref Range    Lactic Acid, Initial 4.3 (HH) 0.7 - 2.0 mmol/L   Troponin T, High Sensitivity    Collection Time: 07/27/24  5:23 PM   Result Value Ref Range    Troponin T, High Sensitivity 34 (H) <=22 ng/L   Magnesium    Collection Time: 07/27/24  5:23 PM   Result Value Ref Range    Magnesium 1.6 (L) 1.7 - 2.3 mg/dL   CBC with platelets and differential    Collection Time: 07/27/24  5:23 PM   Result Value Ref Range    WBC Count 14.7 (H) 4.0 - 11.0 10e3/uL    RBC Count 3.01 (L) 4.40 - 5.90 10e6/uL    Hemoglobin 13.0 (L) 13.3 - 17.7 g/dL    Hematocrit 38.1 (L) 40.0 - 53.0 %     (H) 78 - 100 fL    MCH 43.2 (H) 26.5 - 33.0 pg    MCHC 34.1 31.5 - 36.5 g/dL    RDW 14.0 10.0 - 15.0 %    Platelet Count 365 150 - 450 10e3/uL    % Neutrophils 89 %    % Lymphocytes 3 %    % Monocytes 6 %    %  Eosinophils 0 %    % Basophils 0 %    % Immature Granulocytes 3 %    NRBCs per 100 WBC 0 <1 /100    Absolute Neutrophils 13.0 (H) 1.6 - 8.3 10e3/uL    Absolute Lymphocytes 0.4 (L) 0.8 - 5.3 10e3/uL    Absolute Monocytes 0.9 0.0 - 1.3 10e3/uL    Absolute Eosinophils 0.0 0.0 - 0.7 10e3/uL    Absolute Basophils 0.1 0.0 - 0.2 10e3/uL    Absolute Immature Granulocytes 0.4 <=0.4 10e3/uL    Absolute NRBCs 0.0 10e3/uL   Procalcitonin    Collection Time: 07/27/24  5:30 PM   Result Value Ref Range    Procalcitonin 6.10 (HH) <0.50 ng/mL   Extra Blood Culture Bottle    Collection Time: 07/27/24  5:30 PM   Result Value Ref Range    Hold Specimen JIC    Extra Red Top Tube    Collection Time: 07/27/24  5:30 PM   Result Value Ref Range    Hold Specimen JIC    Blood Culture Peripheral Blood    Collection Time: 07/27/24  5:30 PM    Specimen: Peripheral Blood   Result Value Ref Range    Culture No growth after 12 hours    Extra Green Top (Lithium Heparin) Tube    Collection Time: 07/27/24  5:30 PM   Result Value Ref Range    Hold Specimen JIC    ECG 12-LEAD WITH MUSE (LHE)    Collection Time: 07/27/24  5:36 PM   Result Value Ref Range    Systolic Blood Pressure  mmHg    Diastolic Blood Pressure  mmHg    Ventricular Rate 96 BPM    Atrial Rate 96 BPM    OH Interval 146 ms    QRS Duration 104 ms     ms    QTc 497 ms    P Axis 65 degrees    R AXIS 62 degrees    T Axis -65 degrees    Interpretation ECG       Sinus rhythm  Inferior infarct , age undetermined  Anterior infarct (cited on or before 17-Aug-2022)  Abnormal ECG  When compared with ECG of 17-Aug-2022 13:53,  Sinus rhythm has replaced Atrial fibrillation  Inferior infarct is now Present  Confirmed by SEE ED PROVIDER NOTE FOR, ECG INTERPRETATION ([id number]),  Urmila Chavez ([id number]) on 7/27/2024 5:47:29 PM     UA with Microscopic reflex to Culture    Collection Time: 07/27/24  7:58 PM    Specimen: Urine, Midstream   Result Value Ref Range    Color Urine Yellow Colorless, Straw,  Light Yellow, Yellow    Appearance Urine Clear Clear    Glucose Urine Negative Negative mg/dL    Bilirubin Urine Negative Negative    Ketones Urine Trace (A) Negative mg/dL    Specific Gravity Urine 1.029 1.001 - 1.030    Blood Urine Negative Negative    pH Urine 5.5 5.0 - 7.0    Protein Albumin Urine 30 (A) Negative mg/dL    Urobilinogen Urine <2.0 <2.0 mg/dL    Nitrite Urine Negative Negative    Leukocyte Esterase Urine Negative Negative    Mucus Urine Present (A) None Seen /LPF    RBC Urine 2 <=2 /HPF    WBC Urine 4 <=5 /HPF    Squamous Epithelials Urine <1 <=1 /HPF    Hyaline Casts Urine 5 (H) <=2 /LPF   Lactic acid whole blood    Collection Time: 07/27/24  8:23 PM   Result Value Ref Range    Lactic Acid 3.1 (H) 0.7 - 2.0 mmol/L   Troponin T, High Sensitivity    Collection Time: 07/27/24  8:23 PM   Result Value Ref Range    Troponin T, High Sensitivity 30 (H) <=22 ng/L   Nt probnp inpatient    Collection Time: 07/27/24  8:23 PM   Result Value Ref Range    N terminal Pro BNP Inpatient 2,117 (H) 0 - 1,800 pg/mL   CBC with platelets and differential    Collection Time: 07/27/24 11:48 PM   Result Value Ref Range    WBC Count 12.7 (H) 4.0 - 11.0 10e3/uL    RBC Count 2.60 (L) 4.40 - 5.90 10e6/uL    Hemoglobin 11.2 (L) 13.3 - 17.7 g/dL    Hematocrit 34.1 (L) 40.0 - 53.0 %     (H) 78 - 100 fL    MCH 43.1 (H) 26.5 - 33.0 pg    MCHC 32.8 31.5 - 36.5 g/dL    RDW 14.2 10.0 - 15.0 %    Platelet Count 289 150 - 450 10e3/uL    % Neutrophils 90 %    % Lymphocytes 4 %    % Monocytes 4 %    % Eosinophils 0 %    % Basophils 0 %    % Immature Granulocytes 1 %    NRBCs per 100 WBC 0 <1 /100    Absolute Neutrophils 11.4 (H) 1.6 - 8.3 10e3/uL    Absolute Lymphocytes 0.5 (L) 0.8 - 5.3 10e3/uL    Absolute Monocytes 0.5 0.0 - 1.3 10e3/uL    Absolute Eosinophils 0.0 0.0 - 0.7 10e3/uL    Absolute Basophils 0.0 0.0 - 0.2 10e3/uL    Absolute Immature Granulocytes 0.1 <=0.4 10e3/uL    Absolute NRBCs 0.0 10e3/uL   Comprehensive  metabolic panel    Collection Time: 07/28/24  4:15 AM   Result Value Ref Range    Sodium 139 135 - 145 mmol/L    Potassium 3.6 3.4 - 5.3 mmol/L    Carbon Dioxide (CO2) 26 22 - 29 mmol/L    Anion Gap 6 (L) 7 - 15 mmol/L    Urea Nitrogen 33.7 (H) 8.0 - 23.0 mg/dL    Creatinine 1.52 (H) 0.67 - 1.17 mg/dL    GFR Estimate 46 (L) >60 mL/min/1.73m2    Calcium 7.9 (L) 8.8 - 10.4 mg/dL    Chloride 107 98 - 107 mmol/L    Glucose 112 (H) 70 - 99 mg/dL    Alkaline Phosphatase 67 40 - 150 U/L    AST 26 0 - 45 U/L    ALT 21 0 - 70 U/L    Protein Total 6.2 (L) 6.4 - 8.3 g/dL    Albumin 3.2 (L) 3.5 - 5.2 g/dL    Bilirubin Total 1.0 <=1.2 mg/dL   INR    Collection Time: 07/28/24  4:15 AM   Result Value Ref Range    INR 2.62 (H) 0.85 - 1.15   Magnesium    Collection Time: 07/28/24  4:15 AM   Result Value Ref Range    Magnesium 1.7 1.7 - 2.3 mg/dL   Lactic Acid Whole Blood w/ 1x repeat in 2 hrs when >2    Collection Time: 07/28/24  4:15 AM   Result Value Ref Range    Lactic Acid, Initial 1.6 0.7 - 2.0 mmol/L   CBC with platelets and differential    Collection Time: 07/28/24  4:15 AM   Result Value Ref Range    WBC Count 9.4 4.0 - 11.0 10e3/uL    RBC Count 2.46 (L) 4.40 - 5.90 10e6/uL    Hemoglobin 10.7 (L) 13.3 - 17.7 g/dL    Hematocrit 31.7 (L) 40.0 - 53.0 %     (H) 78 - 100 fL    MCH 43.5 (H) 26.5 - 33.0 pg    MCHC 33.8 31.5 - 36.5 g/dL    RDW 14.1 10.0 - 15.0 %    Platelet Count 232 150 - 450 10e3/uL    % Neutrophils 89 %    % Lymphocytes 6 %    % Monocytes 3 %    % Eosinophils 0 %    % Basophils 0 %    % Immature Granulocytes 1 %    NRBCs per 100 WBC 0 <1 /100    Absolute Neutrophils 8.4 (H) 1.6 - 8.3 10e3/uL    Absolute Lymphocytes 0.6 (L) 0.8 - 5.3 10e3/uL    Absolute Monocytes 0.3 0.0 - 1.3 10e3/uL    Absolute Eosinophils 0.0 0.0 - 0.7 10e3/uL    Absolute Basophils 0.0 0.0 - 0.2 10e3/uL    Absolute Immature Granulocytes 0.1 <=0.4 10e3/uL    Absolute NRBCs 0.0 10e3/uL   Extra Red Top Tube    Collection Time: 07/28/24   4:15 AM   Result Value Ref Range    Hold Specimen JI    MRSA MSSA PCR, Nasal Swab    Collection Time: 07/28/24  4:38 AM    Specimen: Nares, Bilateral; Swab   Result Value Ref Range    MRSA Target DNA Negative Negative    SA Target DNA Positive        Pending Labs:  Unresulted Labs Ordered in the Past 30 Days of this Admission       Date and Time Order Name Status Description    7/27/2024  7:26 PM Blood Culture Peripheral Blood Preliminary             I spoke with spouse to discuss patient's care.    KATE PATINO MD  [facility]  [facility]  Phone: #[phone]    Securely message me with [employer] [place] (more info)

## 2024-07-28 NOTE — PROGRESS NOTES
07/28/24 5887   Appointment Info   Signing Clinician's Name / Credentials (PT) Paige Cartwright PT DPT [facility]   Living Environment   People in Home spouse   Current Living Arrangements house   Home Accessibility stairs to enter home;stairs within home   Number of Stairs, Main Entrance 2   Stair Railings, Main Entrance railing on right side (ascending);railings safe and in good condition   Number of Stairs, Within Home, Primary greater than 10 stairs   Stair Railings, Within Home, Primary railing on right side (ascending)   Transportation Anticipated family or friend will provide   Living Environment Comments Walk-in shower in basement   Self-Care   Usual Activity Tolerance good   Current Activity Tolerance fair   Regular Exercise No   Equipment Currently Used at Home none   Fall history within last six months yes   Number of times patient has fallen within last six months 1   General Information   Onset of Illness/Injury or Date of Surgery 07/27/24   Referring Physician Haley Jackson MD   Patient/Family Therapy Goals Statement (PT) return home with spouse   Pertinent History of Current Problem (include personal factors and/or comorbidities that impact the POC) Admitted to ED via personal vehicle after report of weakness and L LE pain/edema/fever/cellulitis/severe sepsis, ARF, hypomagnesemia, and L Lower Lobe pneumonia.   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   Existing Precautions/Restrictions fall   Cognition   Affect/Mental Status (Cognition) WNL   Orientation Status (Cognition) oriented x 4   Follows Commands (Cognition) WNL   Range of Motion (ROM)   ROM Comment B LE grossly WNL   Strength (Manual Muscle Testing)   Strength Comments B LE  Grossly > 3/5   Bed Mobility   Comment, (Bed Mobility) supine > sit with supervision   Transfers   Comment, (Transfers) sit <> stand with CGA   Gait/Stairs (Locomotion)   [place] Level (Gait) contact guard   Distance in Feet (Gait) 10'   Pattern (Gait) step-through;swing-through  (increased B Lateral upper trunk lateral flexion; decreased B step length)   Deviations/Abnormal Patterns (Gait) base of support, wide;rosa decreased;gait speed decreased;stride length decreased;weight shifting decreased   Clinical Impression   Criteria for Skilled Therapeutic Intervention Yes, treatment indicated   PT Diagnosis (PT) impaired functional mobility   Influenced by the following impairments weakness, decreased endurance/activity tolerance; h/o falls   Functional limitations due to impairments transfers, ambulation   Clinical Presentation (PT Evaluation Complexity) stable   Clinical Presentation Rationale pt presents as medically diagnosed   Clinical Decision Making (Complexity) low complexity   Planned Therapy Interventions (PT) balance training;gait training;home exercise program;patient/family education;stair training;strengthening;progressive activity/exercise;home program guidelines   Risk & Benefits of therapy have been explained evaluation/treatment results reviewed;care plan/treatment goals reviewed;risks/benefits reviewed;current/potential barriers reviewed;participants voiced agreement with care plan;participants included;patient;spouse/significant other   PT Total Evaluation Time   PT Eval, Low Complexity Minutes (22233) 10   Physical Therapy Goals   PT Frequency Daily   PT Predicted Duration/Target Date for Goal Attainment 07/31/24   PT Goals Gait;Stairs;Transfers   PT: Transfers Modified independent;Sit to/from stand;Bed to/from chair;Assistive device   PT: Gait Modified independent;Rolling walker;150 feet   PT: Stairs Modified independent;Greater than 10 stairs;Rail on right   Interventions   Interventions  Quick Adds Gait Training;Therapeutic Activity;Therapeutic Procedure   Therapeutic Activity   Therapeutic Activities: dynamic activities to improve functional performance Minutes (63375) 8   Symptoms Noted During/After Treatment Fatigue;Shortness of breath   Treatment Detail/Skilled Intervention Pt supine in bed when approached for PT and agreeable to participate in evaluation/treatment. Pt performed sit <> stand x 3 with FWW and Supervision with cues for corrent UE sequencing using FWW.  Pt left up in BS chair at end of session with chair alarm engaged, needs within reach and wife present.   Gait Training   Gait Training Minutes (32238) 15   Symptoms Noted During/After Treatment (Gait Training) fatigue;shortness of breath   Treatment Detail/Skilled Intervention Pt ambulated with FWW in hallway with CGA and Up/down 4 steps with L rail and CGA with step to gait pattern.  Trial of no AD and FWW with incresed steadiness/increased gait speed and less gait deviations when using FWW   Distance in Feet 130' x 2 with seated rest between distances   [place] Level (Gait Training) contact guard   Physical Assistance Level (Gait Training) supervision;1 person assist;verbal cues   Assistive Device (Gait Training) rolling walker   Pattern Analysis (Gait Training) swing-through gait   Gait Analysis Deviations decreased rosa;decreased step length;decreased stride length;increased stride width;decreased weight-shifting ability   Impairments (Gait Analysis/Training) balance impaired;strength decreased   Stair Railings present on left side   Physical Assist/Nonphysical Assist (Stairs) supervision   Level of [place] (Stairs) contact guard   PT Discharge Planning   PT Plan progress activities for amb/stairs, LE strength/balance to increase endurance/activity tolerance.  Establish HEP   PT Discharge Recommendation (DC Rec) (S)  home with assist   PT Rationale for DC Rec pt lives in supportive environment with spouse able to  assist as needed   PT Brief overview of current status Amb 130' with FWW and CGA; endurance/activity tolerance limited due to fatigue/increased SOB   PT Equipment Needed at Discharge walker, rolling   Total Session Time   Timed Code Treatment Minutes 23   Total Session Time (sum of timed and untimed services) 33

## 2024-07-28 NOTE — PHARMACY-VANCOMYCIN DOSING SERVICE
Pharmacy Vancomycin Initial Note  Date of Service 2024  Patient's  1945  79 year old, male    Indication: Sepsis    Current estimated CrCl = Estimated Creatinine Clearance: 38.8 mL/min (A) (based on SCr of 1.6 mg/dL (H)).    Creatinine for last 3 days  2024:  5:23 PM Creatinine 1.60 mg/dL    Recent Vancomycin Level(s) for last 3 days  No results found for requested labs within last 3 days.      Vancomycin IV Administrations (past 72 hours)                     vancomycin (VANCOCIN) 1,750 mg in 0.9% NaCl 500 mL intermittent infusion (mg) 1,750 mg New Bag 24 1841                    Nephrotoxins and other renal medications (From now, onward)      Start     Dose/Rate Route Frequency Ordered Stop    24 1800  vancomycin (VANCOCIN) 1,250 mg in 0.9% NaCl 250 mL intermittent infusion         1,250 mg  166.7 mL/hr over 90 Minutes Intravenous EVERY 24 HOURS 24 0800  furosemide (LASIX) half-tab 10 mg        Note to Pharmacy: PTA Sig:Take 0.5 tablets (10 mg) by mouth daily      10 mg Oral DAILY 24 0000  piperacillin-tazobactam (ZOSYN) 3.375 g vial to attach to  mL bag         3.375 g  over 240 Minutes Intravenous EVERY 8 HOURS 24 2221              Contrast Orders - past 72 hours (72h ago, onward)      None            [employer] Prediction of Planned Initial Vancomycin Regimen  Regimen: 1250 mg IV every 24 hours.  Start time: 18:00 on 2024  Exposure target: AUC24 (range)400-600 mg/L.hr   AUC24,ss: 564 mg/L.hr  Probability of AUC24 > 400: 84 %  Ctrough,ss: 18.2 mg/L  Probability of Ctrough,ss > 20: 41 %  Probability of nephrotoxicity (Lodise LAINA ): 14 %          Plan:  Start vancomycin  1250 mg IV q24h.   Vancomycin monitoring method: AUC  Vancomycin therapeutic monitoring goal: 400-600 mg*h/L  Pharmacy will check vancomycin levels as appropriate in 1-3 Days.    Serum creatinine levels will be ordered daily for the first week of  therapy and at least twice weekly for subsequent weeks.      Dashawn Matute, RPH

## 2024-07-28 NOTE — PROGRESS NOTES
Problem: Adult Inpatient Plan of Care    Goal: Absence of Hospital-Acquired Illness or Injury  Intervention: Identify and Manage Fall Risk  Safety Promotion/Fall Prevention:   activity supervised   room door open   lighting adjusted  Intervention: Prevent and Manage VTE (Venous Thromboembolism) Risk  VTE Prevention/Management: (Upright in chair) SCDs off (sequential compression devices)  Intervention: Prevent Infection  Infection Prevention: hand hygiene promoted     Problem: Risk for Delirium    Goal: Optimal Coping  Intervention: Optimize Psychosocial Adjustment to Delirium  Supportive Measures: active listening utilized  Goal: Improved Behavioral Control  Intervention: Prevent and Manage Agitation  Environment Familiarity/Consistency: daily routine followed  Intervention: Minimize Safety Risk  Communication Enhancement Strategies: family involved in communication plan  Goal: Improved Sleep  Intervention: Promote Sleep  Sleep/Rest Enhancement:   awakenings minimized   comfort measures     Problem: Pneumonia    Goal: Effective Oxygenation and Ventilation  Intervention: Optimize Oxygenation and Ventilation  Head of Bed (HOB) Positioning: HOB flat       Goal Outcome Evaluation:    A & O x 4, generalized weakness. RA; LS clear; slight tachypnea/ SOB. SR; slight murmur heard on auscultation; edema in BLE, L > R, warmer, whitley. Denies calf tenderness. Echo completed. Skin overall slightly dusky/pale with clammy/ cool skin. See flowsheets for further skin concerns. PIV removed L upper arm- no infiltration concerns. Added L FA PIV, unknown placement. Voiding with urinal; adequate UOP. Up to chair for meals; ambulated to bathroom - PT eval completed.  COVID swab complete - Negative.

## 2024-07-28 NOTE — PROGRESS NOTES
Pt admitted to #304 from ED with LLE pain and LLL PNA. Pt to bathroom with 1 assist to void small incontinent amount. Steady on feet. Moderate BELL w/o drop in 02 sats. On room air 93-96%. VSS afebrile. Feet pale and cool, Left shin and calf warmer to touch than right, tender to touch and more swollen. Pt alert, can use call light to make needs known. Bed alarm on for safety as pt has fallen at home.

## 2024-07-29 ENCOUNTER — APPOINTMENT (OUTPATIENT)
Dept: PHYSICAL THERAPY | Facility: CLINIC | Age: 79
DRG: 871 | End: 2024-07-29
Payer: COMMERCIAL

## 2024-07-29 ENCOUNTER — APPOINTMENT (OUTPATIENT)
Dept: PHYSICAL THERAPY | Facility: CLINIC | Age: 79
DRG: 871 | End: 2024-07-29
Attending: STUDENT IN AN ORGANIZED HEALTH CARE EDUCATION/TRAINING PROGRAM
Payer: COMMERCIAL

## 2024-07-29 VITALS
HEART RATE: 68 BPM | BODY MASS INDEX: 34.87 KG/M2 | OXYGEN SATURATION: 95 % | SYSTOLIC BLOOD PRESSURE: 114 MMHG | RESPIRATION RATE: 18 BRPM | TEMPERATURE: 98.6 F | WEIGHT: 209.3 LBS | DIASTOLIC BLOOD PRESSURE: 60 MMHG | HEIGHT: 65 IN

## 2024-07-29 LAB
CREAT SERPL-MCNC: 1.16 MG/DL (ref 0.67–1.17)
EGFRCR SERPLBLD CKD-EPI 2021: 64 ML/MIN/1.73M2
INR PPP: 1.84 (ref 0.85–1.15)
MAGNESIUM SERPL-MCNC: 1.7 MG/DL (ref 1.7–2.3)
POTASSIUM SERPL-SCNC: 3.6 MMOL/L (ref 3.4–5.3)

## 2024-07-29 PROCEDURE — 250N000013 HC RX MED GY IP 250 OP 250 PS 637: Performed by: INTERNAL MEDICINE

## 2024-07-29 PROCEDURE — 97116 GAIT TRAINING THERAPY: CPT | Mod: GP

## 2024-07-29 PROCEDURE — 36415 COLL VENOUS BLD VENIPUNCTURE: CPT | Performed by: INTERNAL MEDICINE

## 2024-07-29 PROCEDURE — 250N000011 HC RX IP 250 OP 636: Performed by: INTERNAL MEDICINE

## 2024-07-29 PROCEDURE — 82565 ASSAY OF CREATININE: CPT | Performed by: INTERNAL MEDICINE

## 2024-07-29 PROCEDURE — 97535 SELF CARE MNGMENT TRAINING: CPT | Mod: GP

## 2024-07-29 PROCEDURE — 84132 ASSAY OF SERUM POTASSIUM: CPT | Performed by: INTERNAL MEDICINE

## 2024-07-29 PROCEDURE — 83735 ASSAY OF MAGNESIUM: CPT | Performed by: INTERNAL MEDICINE

## 2024-07-29 PROCEDURE — 99239 HOSP IP/OBS DSCHRG MGMT >30: CPT | Performed by: STUDENT IN AN ORGANIZED HEALTH CARE EDUCATION/TRAINING PROGRAM

## 2024-07-29 PROCEDURE — 85610 PROTHROMBIN TIME: CPT | Performed by: INTERNAL MEDICINE

## 2024-07-29 PROCEDURE — 250N000013 HC RX MED GY IP 250 OP 250 PS 637: Performed by: STUDENT IN AN ORGANIZED HEALTH CARE EDUCATION/TRAINING PROGRAM

## 2024-07-29 RX ORDER — DOXYCYCLINE 100 MG/1
100 CAPSULE ORAL EVERY 12 HOURS
Qty: 6 CAPSULE | Refills: 0 | Status: SHIPPED | OUTPATIENT
Start: 2024-07-29 | End: 2024-07-29

## 2024-07-29 RX ORDER — CEFDINIR 300 MG/1
300 CAPSULE ORAL 2 TIMES DAILY
Qty: 14 CAPSULE | Refills: 0 | Status: SHIPPED | OUTPATIENT
Start: 2024-07-29

## 2024-07-29 RX ORDER — CLINDAMYCIN HCL 150 MG
450 CAPSULE ORAL 3 TIMES DAILY
Qty: 63 CAPSULE | Refills: 0 | Status: SHIPPED | OUTPATIENT
Start: 2024-07-29

## 2024-07-29 RX ORDER — FUROSEMIDE 20 MG
10 TABLET ORAL DAILY
Qty: 45 TABLET | Refills: 3 | Status: SHIPPED | OUTPATIENT
Start: 2024-07-31

## 2024-07-29 RX ORDER — DOXYCYCLINE 100 MG/1
100 CAPSULE ORAL EVERY 12 HOURS
Qty: 3 CAPSULE | Refills: 0 | Status: SHIPPED | OUTPATIENT
Start: 2024-07-29 | End: 2024-08-07

## 2024-07-29 RX ADMIN — DOXYCYCLINE HYCLATE 100 MG: 100 CAPSULE ORAL at 09:10

## 2024-07-29 RX ADMIN — PIPERACILLIN AND TAZOBACTAM 3.38 G: 3; .375 INJECTION, POWDER, FOR SOLUTION INTRAVENOUS at 09:12

## 2024-07-29 RX ADMIN — UMECLIDINIUM 1 PUFF: 62.5 AEROSOL, POWDER ORAL at 09:10

## 2024-07-29 RX ADMIN — PANTOPRAZOLE SODIUM 40 MG: 40 TABLET, DELAYED RELEASE ORAL at 09:10

## 2024-07-29 RX ADMIN — WARFARIN SODIUM 3 MG: 2 TABLET ORAL at 17:22

## 2024-07-29 RX ADMIN — MICONAZOLE NITRATE: 2 POWDER TOPICAL at 09:10

## 2024-07-29 RX ADMIN — HYDROXYUREA 500 MG: 500 CAPSULE ORAL at 09:10

## 2024-07-29 RX ADMIN — ATORVASTATIN CALCIUM 80 MG: 40 TABLET, FILM COATED ORAL at 09:10

## 2024-07-29 RX ADMIN — ASPIRIN 81 MG: 81 TABLET, COATED ORAL at 09:10

## 2024-07-29 ASSESSMENT — ACTIVITIES OF DAILY LIVING (ADL)
ADLS_ACUITY_SCORE: 26

## 2024-07-29 NOTE — PLAN OF CARE
Goal Outcome Evaluation:      Plan of Care Reviewed With: patient, spouse          Outcome Evaluation: VSS on RA. Edema to BLE's L>R. Orders for lymphedema. IV abx as ordered. Pt states he will be discharged today.      Problem: Adult Inpatient Plan of Care  Goal: Absence of Hospital-Acquired Illness or Injury  Intervention: Identify and Manage Fall Risk  Recent Flowsheet Documentation  Taken 7/29/2024 0860 by Kathryn Lyn RN  Safety Promotion/Fall Prevention:   activity supervised   assistive device/personal items within reach   clutter free environment maintained   increased rounding and observation   increase visualization of patient   lighting adjusted   mobility aid in reach   nonskid shoes/slippers when out of bed   patient and family education   room near nurse's station   room organization consistent   safety round/check completed     Problem: Adult Inpatient Plan of Care  Goal: Optimal Comfort and Wellbeing  Outcome: Progressing     Problem: Pneumonia  Goal: Resolution of Infection Signs and Symptoms  Outcome: Progressing

## 2024-07-29 NOTE — DISCHARGE INSTRUCTIONS
Don't start Lasix until 7/31/2024. Once you restart metoprolol and losartan, measure your blood pressure a few times a day. Hold metoprolol and losartan if you systolic blood pressure is lower than 120mmHg. Hold metoprolol if your heart rate is lower than 70. Call PCP for guidance when blood pressure is lower than 100/60mmHg.     Follow up INR in a week with PCP.    Please reach out to PCP to schedule an appointment in a week.

## 2024-07-29 NOTE — DISCHARGE SUMMARY
[facility]  Hospitalist Discharge Summary      Date of Admission:  7/27/2024  Date of Discharge:  7/29/2024  Discharging Provider: KATE PATINO MD  Discharge Service: Hospitalist Service    Discharge Diagnoses   Severe sepsis due to cellulitis left leg  L lower leg and L retroperitoneum to thigh are swelling/having stranding on CT. US showed mildly prominent lymph nodes in the proximal left thigh with the largest measuring 2.3 x 0.6 x 1.8 cm, consider biopsy if symptom doesn't resolve with current abx.   MRSA negative.   BP improved with IVF fluid and holding BP meds. Received vancomycin and pip-tazo since admission.   Blood culture negative x 2 days.  - continue cefdinir and clindamycin at discharge x 7 days  - Likely will need follow up image for unusual site of cellulitis     LLL groundglass opacities  Possible community-acquired pneumonia  -Check COVID - negative  -Started doxycycline long with Zosyn as inpatient  -continue doxycycline for 3 days total and cefdinir x 7 days     Acute renal failure from low BP and ATN  Cr 1.5>1.1  - improved to baseline with IVF  - check UA and CK - negative     Hypertension  - HOLD all BP lowering drugs for severe sepsis on admission  - resumed metoprolol and losartan, continue to hold Lasix until 7/31  -advised BP monitoring at home     Hypomagnesemia  - electrolyte protocol     Troponin elevation, demand ischemia r/o ACS  34> 30  - repeat trop  - ECHO in AM - check for WMA, negative, however his EF is 40-45%, lower compared to last Echo 55%  - follow up with cardiology     Candidiasis of bilateral groin  More on the left than right  Blood-tinged moist crust of the groin on the left side  Miconazole powder twice a day    L leg swelling  Due to cellulitis and venous insufficiency  -lymphedema treatment as an inpatient and compression stockings later    Chronic Medical Conditions   1. CHF with history of EF of 35% - stable, denies SOB or CP  --- ECHO in  "June 2023 -- EF is now 40-45%, cardiology follow-up  2. CAD  3. PAF on counadin - recheck INR in a week as now he has doxycycline  4. History of PE and DVT right leg   5. Chronic anemia   6. PVD and seeing vascular surgery - History of occlusion of the left superficial artery with reconstituted flow in the above-knee popliteal artery.  The patient underwent successful revascularization of his left lower extremity on 6/29/2023.  7.  Myelodysplastic syndrome -on hydroxyurea 1000 mg a day, decrease to 500 mg while creatinine clearance is less than 60    Clinically Significant Risk Factors     # Obesity: Estimated body mass index is 34.83 kg/m  as calculated from the following:    Height as of this encounter: 1.651 m (5' 5\").    Weight as of this encounter: 94.9 kg (209 lb 4.8 oz).       Follow-ups Needed After Discharge   Follow-up Appointments     Follow-up and recommended labs and tests       Follow up with primary care provider, Steven Caraballo MD, within 7   days for hospital follow- up. Recommended lab: INR, CBC            Unresulted Labs Ordered in the Past 30 Days of this Admission       Date and Time Order Name Status Description    7/27/2024  7:26 PM Blood Culture Peripheral Blood Preliminary         These results will be followed up by our group    Discharge Disposition   Discharged to home  Condition at discharge: Stable    Hospital Course   79-year-old male with history of CHF with history of heart failure with recovered ejection fraction, CAD, pAF on counadin, history of PE and DVT right leg, myelodysplastic syndrome, PVD, history of occlusion of the left superficial artery with reconstituted flow in the above-knee popliteal artery and revascularization on 6/29/2023, who presented with left leg pain 7/27/2024.  He was found to have fever and hypotension, swelling left leg in the ED.  CT reviewed stranding of soft tissue abdomen the left retroperitoneal space extending to the thigh.  He was started on " vancomycin and Zosyn in the ED. His leg swelling significantly improved on 7/29 and vitals are stable so switched to oral abx. He will follow up with PCP next week.    Consultations This Hospital Stay   PHARMACY TO DOSE VANCO  PHARMACY TO DOSE VANCO  PHARMACY TO DOSE WARFARIN  PHYSICAL THERAPY ADULT IP CONSULT  LYMPHEDEMA THERAPY IP CONSULT    Code Status   Full Code    Time Spent on this Encounter   I, KATE PATINO MD, personally saw the patient today and spent greater than 30 minutes discharging this patient.       KATE PATINO MD  [facility]  [address] [place] [id number]  Phone: [phone]  Fax: [phone]  ______________________________________________________________________    Physical Exam   Vital Signs: Temp: 99.7  F (37.6  C) Temp src: Oral BP: 113/58 Pulse: 98   Resp: 20 SpO2: 94 % O2 Device: None (Room air)    Weight: 209 lbs 4.8 oz  General Appearance: Alert and wake, not in distress  Respiratory: clear lungs, no crackles or wheezing  Cardiovascular: rhythmic, normal S1 and S2, no murmur  Neurology: oriented x 3  Psych: cooperative and calm, normal affect  Extremities: LE edema: L>R, erythema, swelling, excessive warmth improved in the second day of antibiotics         Primary Care Physician   Steven Caraballo MD    Discharge Orders      Adult Cardiology [facility] Referral      Reason for your hospital stay    Soft tissue infection in L leg, pneumonia of L lung     Activity    Your activity upon discharge: activity as tolerated     Follow-up and recommended labs and tests     Follow up with primary care provider, Steven Caraballo MD, within 7 days for hospital follow- up. Recommended lab: INR, CBC     Diet    Follow this diet upon discharge: Orders Placed This Encounter      Low sodium diet     PRIMARY CARE FOLLOW-UP SCHEDULING    Please see details below            Significant Results and Procedures   Most Recent 3 CBC's:  Recent Labs    Lab Test 07/28/24  0415 07/27/24  2348 07/27/24  1723   WBC 9.4 12.7* 14.7*   HGB 10.7* 11.2* 13.0*   * 131* 127*    289 365     Most Recent 3 BMP's:  Recent Labs   Lab Test 07/29/24  0609 07/28/24  0415 07/27/24  1723 01/29/24  1517   NA  --  139 137 141   POTASSIUM 3.6 3.6 4.1 4.1   CHLORIDE  --  107 102 107   CO2  --  26 24 24   BUN  --  33.7* 30.7* 26.5*   CR 1.16 1.52* 1.60* 1.04   ANIONGAP  --  6* 11 10   GLENDY  --  7.9* 8.8 9.0   GLC  --  112* 107* 89     Most Recent 2 LFT's:  Recent Labs   Lab Test 07/28/24  0415 07/27/24  1723   AST 26 33   ALT 21 24   ALKPHOS 67 70   BILITOTAL 1.0 1.2   ,   Results for orders placed or performed during the hospital encounter of 07/27/24   XR Ankle Left G/E 3 Views    Narrative    EXAM: XR ANKLE LEFT G/E 3 VIEWS  LOCATION: [facility]  DATE: 7/27/2024    INDICATION: left medial ankle pain, eval for fx  COMPARISON: None.      Impression    IMPRESSION:       There are small minimally displaced ossific avulsion fracture fragments along the medial malleolus which could be chronic but are technically age indeterminate. Correlation with point tenderness would be helpful.    Scattered mild hindfoot and midfoot degenerative changes. There is diffuse soft tissue swelling over the ankle.   US Lower Extremity Venous Duplex Left    Narrative    EXAM: US LOWER EXTREMITY VENOUS DUPLEX LEFT  LOCATION: [facility]  DATE: 7/27/2024    INDICATION: left ankle and posterior calf swelling and pain, eval for possible DVT  COMPARISON: None.  TECHNIQUE: Venous Duplex ultrasound of the left lower extremity with and without compression, augmentation and duplex. Color flow and spectral Doppler with waveform analysis performed.    FINDINGS: Exam includes the common femoral, femoral, popliteal, and contralateral common femoral veins as well as segmentally visualized deep calf veins and greater saphenous vein.     LEFT: No deep vein  thrombosis. No superficial thrombophlebitis. No popliteal cyst.      Impression    IMPRESSION:  1.  No deep venous thrombosis in the left lower extremity.    2.  Incidentally noted are some mildly prominent lymph nodes in the proximal left thigh with the largest measuring 2.3 x 0.6 x 1.8 cm.   XR Chest Port 1 View    Narrative    EXAM: XR CHEST PORT 1 VIEW  LOCATION: [facility]  DATE: 7/27/2024    INDICATION: hypotension, eval for pna or pulm edema  COMPARISON: 8/17/2022      Impression    IMPRESSION: Borderline hyperinflation of both lungs. Mildly calcified thoracic aortic arch. No active CHF, inflammatory infiltrates, or pneumothorax. Since 8/17/2022 the prior seen right PICC has been removed.   CT Abdomen Pelvis w/o Contrast    Narrative    EXAM: CT ABDOMEN PELVIS W/O CONTRAST  LOCATION: [facility]  DATE: 7/27/2024    INDICATION: sepsis without clear source, eval for possible acute intraabdominal process  COMPARISON: None.  TECHNIQUE: CT scan of the abdomen and pelvis was performed without IV contrast. Multiplanar reformats were obtained. Dose reduction techniques were used.  CONTRAST: None.    FINDINGS:     LOWER CHEST: Groundglass opacities and interlobular septal thickening involving the dependent left lower lobe. Coronary calcifications.    Limited evaluation of solid organs given lack of intravenous contrast.     HEPATOBILIARY: Normal liver contours. No biliary dilation. Normal gallbladder.     PANCREAS: Normal contours.     SPLEEN: Normal contours.    ADRENAL GLANDS: Normal contours.     KIDNEYS/BLADDER: 1.1 cm indeterminate attenuation lesion within the left kidney (series 2 image 25). Nonobstructing bilateral nephrolithiasis. Normal urinary bladder.      BOWEL: No obstruction. Normal appendix. No bowel wall thickening or pneumatosis. There is stranding within the left retroperitoneum extending into the left inguinal region (series 2 image 58, 63,  70).    LYMPH NODES: No pathologically enlarged lymph nodes.    VASCULATURE: Nonaneurysmal aorta with severe aortoiliac calcifications. Mild dilation of the bilateral common iliac arteries measuring up to 2.2 cm on the right and 1.7 cm on the left. 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 Short segment stent graft is seen within the left external iliac artery (series 2 image 60).    PELVIC ORGANS: No pelvic masses.     MUSCULOSKELETAL: No acute osseous abnormalities. Soft tissue edema is seen throughout the left thigh and extending into the left inguinal region.      Impression    IMPRESSION:  1.  Groundglass opacities and interlobular septal thickening involving the left lower lobe, likely sequela of infection or aspiration.  2.  Nonspecific stranding/fluid tracking within the left retroperitoneum, left inguinal region, and surrounding left groin vasculature. Correlate with history and for recent procedures.  3.  Left thigh soft tissue edema.  4.  Additional chronic and ancillary findings as described       US Lower Extremity Non Vascular Left    Narrative    EXAM: US LOWER EXTREMITY NON VASCULAR LEFT  LOCATION: [facility]  DATE: 7/27/2024    INDICATION: back of left leg tender, fluctuant, came in fever and low BP    eval fo abscess  COMPARISON: None.  TECHNIQUE: Routine.    FINDINGS: Subcutaneous edema is seen in the visualized portion of the lower leg no organized fluid collections identified. The underlying Achilles tendon is somewhat thicker on the left than the right.      Impression    IMPRESSION:  1.  Subcutaneous edema and the visualized portion of the left lower leg, consistent with edema  2.  Asymmetric thickening of the left Achilles tendon, may reflect mild tendinosis. Correlation with Achilles tendon symptomatology.   US Pelvic Limited    Narrative    EXAM: US PELVIC LIMITED  LOCATION: [facility]  HOSPITAL  DATE: 2024    INDICATION: Abnormal CT abdomen, eval for retroperitoneal fluid collection.  COMPARISON: CT from 2024.  TECHNIQUE: Transabdominal scans were performed. Endovaginal ultrasound was performed to better visualize the adnexa.    FINDINGS:  Limited pelvic ultrasound with evaluation performed in the left lower quadrant corresponding with the area of stranding seen on the prior CT scan. No fluid collections or other sonographic abnormality visualized.       Impression    IMPRESSION:  1.  No fluid collections or sonographic abnormalities visualized in the left lower quadrant.         Echocardiogram Complete     Value    LVEF  40-45% (mildly reduced)    [facility]    [id number]  [id number]  [id number]  [id number] [address]     Name: HELENA FENTON  MRN: [id number]  : 1945  Study Date: 2024 12:51 PM  Age: 79 yrs  Gender: Male  Patient Location: [facility]  Reason For Study: CAD, CHF  Ordering Physician: DIANE OSCAR  Performed By: NIA     BSA: 2.0 m2  Height: 65 in  Weight: 209 lb  HR: 80  BP: 97/50 mmHg  ______________________________________________________________________________  Procedure  Complete Portable Echo Adult. Definity (NDC #53106-790) given intravenously.  Adequate quality two-dimensional was performed and interpreted. No  hemodynamically significant valvular abnormalities on 2D or color flow  imaging.  ______________________________________________________________________________  Interpretation Summary     The left ventricle is normal in size with normal left ventricular wall  thickness. Left ventricular function is decreased. The ejection fraction is  40-45% (mildly reduced).  Normal right ventricle size and systolic function.  No hemodynamically significant valvular abnormalities on 2D or color flow  imaging.  Compared to the prior study of 22 there has been an improvement in  left  ventricular function (previously LVEF was 30-35%)     ______________________________________________________________________________  Left Ventricle  The left ventricle is normal in size. Left ventricular function is decreased.  The ejection fraction is 40-45% (mildly reduced). There is normal left  ventricular wall thickness. Left ventricular diastolic function is normal.  There is mild global hypokinesia of the left ventricle.     Right Ventricle  Normal right ventricle size and systolic function.     Atria  The left atrium is mildly dilated. Right atrial size is normal. There is no  color Doppler evidence of an atrial shunt.     Mitral Valve  Mitral valve leaflets appear normal. There is no evidence of mitral stenosis  or clinically significant mitral regurgitation.     Tricuspid Valve  Tricuspid valve leaflets appear normal. There is no evidence of tricuspid  stenosis or clinically significant tricuspid regurgitation. Right ventricular  systolic pressure could not be approximated due to inadequate tricuspid  regurgitation.     Aortic Valve  The aortic valve is trileaflet. Aortic valve leaflets appear normal. There is  no evidence of aortic stenosis or clinically significant aortic regurgitation.     Pulmonic Valve  The pulmonic valve is not well seen, but is grossly normal. This degree of  valvular regurgitation is within normal limits. There is trace pulmonic  valvular regurgitation.     Vessels  The aorta root is normal. Normal size ascending aorta. IVC diameter <2.1 cm  collapsing >50% with sniff suggests a normal RA pressure of 3 mmHg.     Pericardium  There is no pericardial effusion.     Rhythm  Sinus rhythm was noted.  ______________________________________________________________________________  MMode/2D Measurements & Calculations     IVSd: 1.1 cm  LVIDd: 5.0 cm  LVIDs: 3.8 cm  LVPWd: 0.88 cm  FS: 23.2 %  LV mass(C)d: 173.1 grams  LV mass(C)dI: 85.9 grams/m2  Ao root diam: 3.1 cm  asc Aorta Diam:  3.1 cm  LVOT diam: 2.2 cm  LVOT area: 3.8 cm2  Ao root diam index Ht(cm/m): 1.9  Ao root diam index BSA (cm/m2): 1.5  Asc Ao diam index BSA (cm/m2): 1.5  Asc Ao diam index Ht(cm/m): 1.9  EF Biplane: 43.5 %     LA Volume Indexed (AL/bp): 27.9 ml/m2  RV Base: 4.8 cm  RWT: 0.36  TAPSE: 2.5 cm     Time Measurements  MM HR: 80.0 BPM     Doppler Measurements & Calculations  MV E max joe: 71.6 cm/sec  MV A max joe: 116.0 cm/sec  MV E/A: 0.62  MV dec slope: 373.0 cm/sec2  MV dec time: 0.19 sec  Ao V2 max: 185.5 cm/sec  Ao max P.0 mmHg  Ao V2 mean: 130.0 cm/sec  Ao mean P.0 mmHg  Ao V2 VTI: 34.3 cm  JAVIER(I,D): 2.6 cm2  JAVIER(V,D): 2.4 cm2  LV V1 max P.6 mmHg  LV V1 max: 118.0 cm/sec  LV V1 VTI: 23.7 cm  SV(LVOT): 90.1 ml  SI(LVOT): 44.7 ml/m2     PA acc time: 0.14 sec  AV Joe Ratio (DI): 0.64  JAVIER Index (cm2/m2): 1.3  E/E': 9.8  E/E' av.0  Lateral E/e': 6.2  Medial E/e': 9.8  Peak E' Joe: 7.3 cm/sec  RV S Joe: 13.4 cm/sec     ______________________________________________________________________________  Report approved by: Ryan Peters 2024 03:03 PM             Discharge Medications   Current Discharge Medication List        START taking these medications    Details   cefdinir (OMNICEF) 300 MG capsule Take 1 capsule (300 mg) by mouth 2 times daily  Qty: 14 capsule, Refills: 0    Associated Diagnoses: Pneumonia of left lower lobe due to infectious organism; Cellulitis of left lower extremity      clindamycin (CLEOCIN) 150 MG capsule Take 3 capsules (450 mg) by mouth 3 times daily  Qty: 63 capsule, Refills: 0    Associated Diagnoses: Cellulitis of left lower extremity      doxycycline hyclate (VIBRAMYCIN) 100 MG capsule Take 1 capsule (100 mg) by mouth every 12 hours  Qty: 3 capsule, Refills: 0    Associated Diagnoses: Pneumonia of left lower lobe due to infectious organism      miconazole (MICATIN) 2 % external powder Apply topically 2 times daily  Qty: 71 g, Refills: 1    Associated Diagnoses:  "Candidiasis of skin           CONTINUE these medications which have CHANGED    Details   furosemide (LASIX) 20 MG tablet Take 0.5 tablets (10 mg) by mouth daily  Qty: 45 tablet, Refills: 3    Associated Diagnoses: Chronic systolic congestive heart failure (H)           CONTINUE these medications which have NOT CHANGED    Details   albuterol (PROAIR HFA;PROVENTIL HFA;VENTOLIN HFA) 90 mcg/actuation inhaler Inhale 2 puffs into the lungs every 6 hours as needed for shortness of breath / dyspnea    Comments: May substitute the equivalent medication per insurance preference.      aspirin (ASPIRIN LOW DOSE) 81 MG EC tablet TAKE 1 TABLET (81 MG) BY MOUTH DAILY. START THE MORNING OF 4/27/22  Qty: 90 tablet, Refills: 1    Associated Diagnoses: Coronary artery disease involving native coronary artery of native heart with angina pectoris (H24); Ischemic cardiomyopathy      atorvastatin (LIPITOR) 80 MG tablet Take 1 tablet (80 mg) by mouth every morning  Qty: 90 tablet, Refills: 1    Associated Diagnoses: Coronary artery disease involving native coronary artery of native heart with angina pectoris (H24); Mixed hyperlipidemia      hydroxyurea (HYDREA) 500 MG capsule Take 1,000 mg by mouth daily    Associated Diagnoses: Chronic myeloproliferative disease (H)      losartan (COZAAR) 25 MG tablet Take 1 tablet (25 mg) by mouth daily  Qty: 90 tablet, Refills: 3    Associated Diagnoses: Chronic systolic congestive heart failure, NYHA class 2 (H)      metoprolol succinate ER (TOPROL-XL) 25 MG 24 hr tablet Take 25 mg by mouth daily      nitroGLYcerin (NITROSTAT) 0.4 MG sublingual tablet One tablet under the tongue every 5 minutes if needed for chest pain. May repeat every 5 minutes for a maximum of 3 doses in 15 minutes\"  Qty: 25 tablet, Refills: 3    Associated Diagnoses: Coronary artery disease involving native coronary artery of native heart with angina pectoris (H24); Ischemic cardiomyopathy      omeprazole (PRILOSEC) 20 MG " capsule Take 20 mg by mouth daily      tiotropium (SPIRIVA) 18 mcg inhalation capsule [TIOTROPIUM (SPIRIVA) 18 MCG INHALATION CAPSULE] Place 18 mcg into inhaler and inhale daily.      warfarin ANTICOAGULANT (COUMADIN) 1 MG tablet Take 3 tablets (3 mg) by mouth daily    Associated Diagnoses: History of pulmonary embolism           Allergies   No Known Allergies

## 2024-07-29 NOTE — PLAN OF CARE
Pt is A&Ox4 but can be a bit forgetful. Pt denied pain during shift thus far. Scattered scabs on skin. Sensation at baseline per pt. A1 with walker for transferring. Saline locked between IV abx. Voiding adequately with urinal at bedside. Education on medication administration, alarms, and use of call-light to reduce risk for falls and injury. VSS, denies chest pain, shortness of breath, and nausea. Tele NSR. Mag and K rechecks in tomorrow AM.

## 2024-07-30 NOTE — PROGRESS NOTES
Physical Therapy Discharge Summary    Reason for therapy discharge:    No further expectations of functional progress.  Patient/family request discontinuation of services.    Progress towards therapy goal(s). See goals on Care Plan in [facility] electronic health record for goal details.  Goals partially met.  Barriers to achieving goals:   limited tolerance for therapy.    Therapy recommendation(s):    Continue home exercise program.

## 2024-08-01 LAB — BACTERIA BLD CULT: NO GROWTH

## 2024-08-05 ENCOUNTER — LAB REQUISITION (OUTPATIENT)
Dept: LAB | Facility: CLINIC | Age: 79
End: 2024-08-05
Payer: COMMERCIAL

## 2024-08-05 ENCOUNTER — TRANSFERRED RECORDS (OUTPATIENT)
Dept: HEALTH INFORMATION MANAGEMENT | Facility: CLINIC | Age: 79
End: 2024-08-05
Payer: COMMERCIAL

## 2024-08-05 DIAGNOSIS — D64.9 ANEMIA, UNSPECIFIED: ICD-10-CM

## 2024-08-05 LAB
BASOPHILS # BLD AUTO: 0.1 10E3/UL (ref 0–0.2)
BASOPHILS NFR BLD AUTO: 2 %
EOSINOPHIL # BLD AUTO: 0.1 10E3/UL (ref 0–0.7)
EOSINOPHIL NFR BLD AUTO: 1 %
ERYTHROCYTE [DISTWIDTH] IN BLOOD BY AUTOMATED COUNT: 14.1 % (ref 10–15)
HCT VFR BLD AUTO: 30.9 % (ref 40–53)
HGB BLD-MCNC: 10.2 G/DL (ref 13.3–17.7)
IMM GRANULOCYTES # BLD: 0.1 10E3/UL
IMM GRANULOCYTES NFR BLD: 1 %
LYMPHOCYTES # BLD AUTO: 1.1 10E3/UL (ref 0.8–5.3)
LYMPHOCYTES NFR BLD AUTO: 11 %
MCH RBC QN AUTO: 43.4 PG (ref 26.5–33)
MCHC RBC AUTO-ENTMCNC: 33 G/DL (ref 31.5–36.5)
MCV RBC AUTO: 132 FL (ref 78–100)
MONOCYTES # BLD AUTO: 0.5 10E3/UL (ref 0–1.3)
MONOCYTES NFR BLD AUTO: 5 %
NEUTROPHILS # BLD AUTO: 7.6 10E3/UL (ref 1.6–8.3)
NEUTROPHILS NFR BLD AUTO: 80 %
NRBC # BLD AUTO: 0 10E3/UL
NRBC BLD AUTO-RTO: 0 /100
PLATELET # BLD AUTO: 414 10E3/UL (ref 150–450)
RBC # BLD AUTO: 2.35 10E6/UL (ref 4.4–5.9)
WBC # BLD AUTO: 9.6 10E3/UL (ref 4–11)

## 2024-08-05 PROCEDURE — 85025 COMPLETE CBC W/AUTO DIFF WBC: CPT | Performed by: STUDENT IN AN ORGANIZED HEALTH CARE EDUCATION/TRAINING PROGRAM

## 2024-08-06 PROBLEM — I50.22 CHRONIC SYSTOLIC HEART FAILURE (H): Status: ACTIVE | Noted: 2024-08-06

## 2024-08-07 ENCOUNTER — OFFICE VISIT (OUTPATIENT)
Dept: CARDIOLOGY | Facility: CLINIC | Age: 79
End: 2024-08-07
Attending: STUDENT IN AN ORGANIZED HEALTH CARE EDUCATION/TRAINING PROGRAM
Payer: COMMERCIAL

## 2024-08-07 VITALS
HEART RATE: 73 BPM | SYSTOLIC BLOOD PRESSURE: 127 MMHG | DIASTOLIC BLOOD PRESSURE: 75 MMHG | WEIGHT: 209 LBS | BODY MASS INDEX: 34.78 KG/M2 | RESPIRATION RATE: 14 BRPM

## 2024-08-07 DIAGNOSIS — I25.5 ISCHEMIC CARDIOMYOPATHY: ICD-10-CM

## 2024-08-07 DIAGNOSIS — I10 ESSENTIAL HYPERTENSION: ICD-10-CM

## 2024-08-07 DIAGNOSIS — N17.9 AKI (ACUTE KIDNEY INJURY) (H): ICD-10-CM

## 2024-08-07 DIAGNOSIS — I50.22 CHRONIC SYSTOLIC HEART FAILURE (H): Primary | ICD-10-CM

## 2024-08-07 PROBLEM — J90 PLEURAL EFFUSION ON LEFT: Status: RESOLVED | Noted: 2022-04-19 | Resolved: 2024-08-07

## 2024-08-07 PROBLEM — R06.02 SOB (SHORTNESS OF BREATH): Status: RESOLVED | Noted: 2022-04-01 | Resolved: 2024-08-07

## 2024-08-07 PROCEDURE — G2211 COMPLEX E/M VISIT ADD ON: HCPCS | Performed by: NURSE PRACTITIONER

## 2024-08-07 PROCEDURE — 99214 OFFICE O/P EST MOD 30 MIN: CPT | Performed by: NURSE PRACTITIONER

## 2024-08-07 NOTE — PROGRESS NOTES
Assessment/Recommendations   Assessment:    1.  Ischemic Cardiomyopathy with  chronic systolic heart failure with LVEF of 35% per echo in 2022, NYHA Class NYHA class II-III:  Patient was hospitalized from July 27 through July 29 with sepsis due to left leg cellulitis, left lower lobe pneumonia,  acute renal failure, hypotension, hypomagnesemia and elevated troponin suspected due to demand ischemia.  Echocardiogram on 7/28/2024 showed LVEF mildly reduced at 40 to 45% with no wall motion abnormalities noted.  Improved compared to previous echo.  NTproBNP level is 2117 in the hospital in July 2024.    He reports 5 to 10 pounds weight gain in the last 6 months due to limited physical activity from left leg cellulitis   Current weight is 188-189 lbs in 2023     Reports his home weight about the same as clinic weight.  Reports on low salt diet< 2000 mg per day reports adequate fluid intake.    Patient is mild hypervolemic on exam.    We discussed and reviewed about heart failure, medication management, and lifestyle management including low sodium diet <2 g/day, daily weight, and staying physically active as tolerated. Patient declines to meet with the HF CORE nurse clinician for heart failure education.    2.  Acute kidney injury: Creatinine was elevated at 1.6 and 1.52.  Most recent creatinine is 1.16 on 7/29/2024.    3.  Hypertension: Recent hospitalization with sepsis and hypotension.  Blood pressure is stable today    Plan/Recommendation:  -We discussed about rechecking her kidney function test and NT proBNP  and adjust his diuretic therapy as indicated.  Patient declined further medication changes at this time.  He wants to focus on getting his left leg cellulitis treated first.  -Continue on low-sodium diet <2000 mg per day, daily weight monitoring, and maintain fluid intake at 50 to 60 ounces per day follow-up with me in heart failure clinic as needed per patient's preference    Follow up with Dr. Larson  "in 3 to 4 months.      The longitudinal plan of care for ischemic cardiomyopathy, chronic systolic heart failure, acute kidney injury, hypertension was addressed during this visit.?Due to the added   complexity in care, I will continue to support Shaji Pompa in the subsequent management of this   condition(s) and with the ongoing continuity of care of this condition(s)\".     History of Present Illness/Subjective    Mr. Shaji Pompa is a 79 year old male with a past medical history of ischemic cardiomyopathy heart failure with reduced ejection fraction, coronary artery disease, paroxysmal atrial fibrillation on warfarin therapy, PE and DVT of right leg, chronic anemia, PVD, myelodysplastic syndrome and recent hospitalization with left lower lobe pneumonia and sepsis secondary to left leg cellulitis, and MING who is seen at [facility] for post hospitalization heart failure follow up.     Today, Hector presented to heart failure clinic accompanied by his spouse.  He reports 5 to 10 pounds weight gain in the last year or so due to physical activity limitation from left leg cellulitis.  He does report some shortness of breath on exertion although denies worsening.  He noticed swelling in his left leg which he thinks is due to cellulitis.    He denies fatigue, lightheadedness, shortness of breath, orthopnea, PND, palpitations, chest pain, and abdominal fullness/bloating. He does not report fever, chills or bleeding complications.    ECHO from 7/20/2024-Reviewed:   Interpretation Summary  The left ventricle is normal in size with normal left ventricular wall  thickness. Left ventricular function is decreased. The ejection fraction is  40-45% (mildly reduced).  Normal right ventricle size and systolic function.  No hemodynamically significant valvular abnormalities on 2D or color flow  imaging.  Compared to the prior study of 8/17/22 there has been an improvement in " left  ventricular function (previously LVEF was 30-35%)     Physical Examination Review of Systems   /75 (BP Location: Right arm, Patient Position: Sitting, Cuff Size: Adult Regular)   Pulse 73   Resp 14   Wt 94.8 kg (209 lb)   BMI 34.78 kg/m    Body mass index is 34.78 kg/m .  Wt Readings from Last 3 Encounters:   08/07/24 94.8 kg (209 lb)   07/28/24 94.9 kg (209 lb 4.8 oz)   04/29/24 93.8 kg (206 lb 14.4 oz)     General Appearance:   no distress, normal body habitus   ENT/Mouth: membranes moist, no oral lesions or bleeding gums.      EYES:  no scleral icterus, normal conjunctivae   Neck: no carotid bruits or thyromegaly   Chest/Lungs:   lungs are clear to auscultation, no rales or wheezing, equal chest wall expansion    Cardiovascular:   Heart rate regular. Normal first and second heart sounds with no murmurs, rubs, or gallops; JVP is difficult to assess due to the patient's obesity and body habitus   , 2+ edema in LLE    Abdomen:  no organomegaly, masses, bruits, or tenderness; bowel sounds are present   Extremities   no cyanosis or clubbing    CMS intact.   Skin: no xanthelasma, warm.  Erythema/Edema LLE - Cellulitis-seen by PCP   Neurologic: Alert and oriented X 3 no tremors   Psychiatric: calm and cooperative          Enc Vitals  BP: 127/75  Pulse: 73  Resp: 14  Weight: 94.8 kg (209 lb)                                        Negative unless noted in HPI     Medical History  Surgical History Family History Social History   Past Medical History:   Diagnosis Date    Cerebral infarction (H)     COPD (chronic obstructive pulmonary disease) (H)     HLD (hyperlipidemia)     Hypertension     Myeloproliferative disease (H)     Past Surgical History:   Procedure Laterality Date    CV CORONARY ANGIOGRAM N/A 4/26/2022    Procedure: CV CORONARY ANGIOGRAM;  Surgeon: Audrey Holder MD;  Location: [facility] CATH [facility] CV    CV LEFT HEART CATH N/A 4/26/2022    Procedure: Left Heart Catheterization;  Surgeon:  Audrey Holder MD;  Location: [facility] LAB CV    IR LOWER EXTREMITY ANGIOGRAM LEFT  2023    OPEN REDUCTION INTERNAL FIXATION FOOT Right     OTHER SURGICAL HISTORY      Lip lesion removal    PICC TRIPLE LUMEN PLACEMENT  2022         TONSILLECTOMY & ADENOIDECTOMY      Family History   Problem Relation Age of Onset    Alcoholism Mother     Social History     Socioeconomic History    Marital status:      Spouse name: Not on file    Number of children: Not on file    Years of education: Not on file    Highest education level: Not on file   Occupational History    Not on file   Tobacco Use    Smoking status: Former     Current packs/day: 0.00     Average packs/day: 1 pack/day for 46.6 years (46.6 ttl pk-yrs)     Types: Cigarettes     Start date: 1965     Quit date: 2012     Years since quittin.6    Smokeless tobacco: Never   Vaping Use    Vaping status: Never Used   Substance and Sexual Activity    Alcohol use: Yes     Alcohol/week: 19.0 standard drinks of alcohol    Drug use: Never    Sexual activity: Not on file   Other Topics Concern    Not on file   Social History Narrative    Not on file     Social Determinants of Health     Financial Resource Strain: Not on file   Food Insecurity: Not on file   Transportation Needs: Not on file   Physical Activity: Not on file   Stress: Not on file   Social Connections: Not on file   Interpersonal Safety: Not on file   Housing Stability: Not on file          Medications  Allergies   Current Outpatient Medications   Medication Sig Dispense Refill    albuterol (PROAIR HFA;PROVENTIL HFA;VENTOLIN HFA) 90 mcg/actuation inhaler Inhale 2 puffs into the lungs every 6 hours as needed for shortness of breath / dyspnea      aspirin (ASPIRIN LOW DOSE) 81 MG EC tablet TAKE 1 TABLET (81 MG) BY MOUTH DAILY. START THE MORNING OF 22 90 tablet 1    atorvastatin (LIPITOR) 80 MG tablet Take 1 tablet (80 mg) by mouth every morning 90 tablet 1    cefdinir  "(OMNICEF) 300 MG capsule Take 1 capsule (300 mg) by mouth 2 times daily 14 capsule 0    clindamycin (CLEOCIN) 150 MG capsule Take 3 capsules (450 mg) by mouth 3 times daily 63 capsule 0    furosemide (LASIX) 20 MG tablet Take 0.5 tablets (10 mg) by mouth daily 45 tablet 3    hydroxyurea (HYDREA) 500 MG capsule Take 1,000 mg by mouth daily      losartan (COZAAR) 25 MG tablet Take 1 tablet (25 mg) by mouth daily 90 tablet 3    metoprolol succinate ER (TOPROL-XL) 25 MG 24 hr tablet Take 25 mg by mouth daily      miconazole (MICATIN) 2 % external powder Apply topically 2 times daily 71 g 1    nitroGLYcerin (NITROSTAT) 0.4 MG sublingual tablet One tablet under the tongue every 5 minutes if needed for chest pain. May repeat every 5 minutes for a maximum of 3 doses in 15 minutes\" 25 tablet 3    omeprazole (PRILOSEC) 20 MG DR capsule Take 20 mg by mouth daily      tiotropium (SPIRIVA) 18 mcg inhalation capsule [TIOTROPIUM (SPIRIVA) 18 MCG INHALATION CAPSULE] Place 18 mcg into inhaler and inhale daily.      warfarin ANTICOAGULANT (COUMADIN) 1 MG tablet Take 3 tablets (3 mg) by mouth daily      doxycycline hyclate (VIBRAMYCIN) 100 MG capsule Take 1 capsule (100 mg) by mouth every 12 hours 3 capsule 0    No Known Allergies      Lab Results    Chemistry/lipid CBC Cardiac Enzymes/BNP/TSH/INR   Lab Results   Component Value Date    CHOL 77 07/28/2024    HDL 32 (L) 07/28/2024    TRIG 74 07/28/2024    BUN 33.7 (H) 07/28/2024     07/28/2024    CO2 26 07/28/2024    Lab Results   Component Value Date    WBC 9.6 08/05/2024    HGB 10.2 (L) 08/05/2024    HCT 30.9 (L) 08/05/2024     (H) 08/05/2024     08/05/2024    Lab Results   Component Value Date    TROPONINI 2.47 (HH) 08/17/2022     (H) 08/17/2022    TSH 1.01 07/28/2024    INR 1.84 (H) 07/29/2024        35  minutes spent on the date of encounter doing chart review, review of outside records, review of test results, interpretation with above tests, patient " visit, documentation, and discussion with family.        This note has been dictated using voice recognition software. Any grammatical, typographical, or context distortions are unintentional and inherent to the software

## 2024-08-07 NOTE — LETTER
8/7/2024    Steven Caraballo MD, MD [address] [place] [address]    RE: Shaji HAYDEE Peña       Dear Colleague,     I had the pleasure of seeing Shaji Pompa in the [facility].          Assessment/Recommendations   Assessment:    1.  Ischemic Cardiomyopathy with  chronic systolic heart failure with LVEF of 35% per echo in 2022, NYHA Class NYHA class II-III:  Patient was hospitalized from July 27 through July 29 with sepsis due to left leg cellulitis, left lower lobe pneumonia,  acute renal failure, hypotension, hypomagnesemia and elevated troponin suspected due to demand ischemia.  Echocardiogram on 7/28/2024 showed LVEF mildly reduced at 40 to 45% with no wall motion abnormalities noted.  Improved compared to previous echo.  NTproBNP level is 2117 in the hospital in July 2024.    He reports 5 to 10 pounds weight gain in the last 6 months due to limited physical activity from left leg cellulitis   Current weight is 188-189 lbs in 2023     Reports his home weight about the same as clinic weight.  Reports on low salt diet< 2000 mg per day reports adequate fluid intake.    Patient is mild hypervolemic on exam.    We discussed and reviewed about heart failure, medication management, and lifestyle management including low sodium diet <2 g/day, daily weight, and staying physically active as tolerated. Patient declines to meet with the HF CORE nurse clinician for heart failure education.    2.  Acute kidney injury: Creatinine was elevated at 1.6 and 1.52.  Most recent creatinine is 1.16 on 7/29/2024.    3.  Hypertension: Recent hospitalization with sepsis and hypotension.  Blood pressure is stable today    Plan/Recommendation:  -We discussed about rechecking her kidney function test and NT proBNP  and adjust his diuretic therapy as indicated.  Patient declined further medication changes at this time.  He wants to focus on getting his left leg cellulitis treated  "first.  -Continue on low-sodium diet <2000 mg per day, daily weight monitoring, and maintain fluid intake at 50 to 60 ounces per day follow-up with me in heart failure clinic as needed per patient's preference    Follow up with Dr. Larson in 3 to 4 months.      The longitudinal plan of care for ischemic cardiomyopathy, chronic systolic heart failure, acute kidney injury, hypertension was addressed during this visit.?Due to the added   complexity in care, I will continue to support Shaji Pompa in the subsequent management of this   condition(s) and with the ongoing continuity of care of this condition(s)\".     History of Present Illness/Subjective    Mr. Shaji Pompa is a 79 year old male with a past medical history of ischemic cardiomyopathy heart failure with reduced ejection fraction, coronary artery disease, paroxysmal atrial fibrillation on warfarin therapy, PE and DVT of right leg, chronic anemia, PVD, myelodysplastic syndrome and recent hospitalization with left lower lobe pneumonia and sepsis secondary to left leg cellulitis, and MING who is seen at [facility] for post hospitalization heart failure follow up.     Today, Hcetor presented to heart failure clinic accompanied by his spouse.  He reports 5 to 10 pounds weight gain in the last year or so due to physical activity limitation from left leg cellulitis.  He does report some shortness of breath on exertion although denies worsening.  He noticed swelling in his left leg which he thinks is due to cellulitis.    He denies fatigue, lightheadedness, shortness of breath, orthopnea, PND, palpitations, chest pain, and abdominal fullness/bloating. He does not report fever, chills or bleeding complications.    ECHO from 7/20/2024-Reviewed:   Interpretation Summary  The left ventricle is normal in size with normal left ventricular wall  thickness. Left ventricular function is decreased. The ejection fraction " is  40-45% (mildly reduced).  Normal right ventricle size and systolic function.  No hemodynamically significant valvular abnormalities on 2D or color flow  imaging.  Compared to the prior study of 8/17/22 there has been an improvement in left  ventricular function (previously LVEF was 30-35%)     Physical Examination Review of Systems   /75 (BP Location: Right arm, Patient Position: Sitting, Cuff Size: Adult Regular)   Pulse 73   Resp 14   Wt 94.8 kg (209 lb)   BMI 34.78 kg/m    Body mass index is 34.78 kg/m .  Wt Readings from Last 3 Encounters:   08/07/24 94.8 kg (209 lb)   07/28/24 94.9 kg (209 lb 4.8 oz)   04/29/24 93.8 kg (206 lb 14.4 oz)     General Appearance:   no distress, normal body habitus   ENT/Mouth: membranes moist, no oral lesions or bleeding gums.      EYES:  no scleral icterus, normal conjunctivae   Neck: no carotid bruits or thyromegaly   Chest/Lungs:   lungs are clear to auscultation, no rales or wheezing, equal chest wall expansion    Cardiovascular:   Heart rate regular. Normal first and second heart sounds with no murmurs, rubs, or gallops; JVP is difficult to assess due to the patient's obesity and body habitus   , 2+ edema in LLE    Abdomen:  no organomegaly, masses, bruits, or tenderness; bowel sounds are present   Extremities   no cyanosis or clubbing    CMS intact.   Skin: no xanthelasma, warm.  Erythema/Edema LLE - Cellulitis-seen by PCP   Neurologic: Alert and oriented X 3 no tremors   Psychiatric: calm and cooperative          Enc Vitals  BP: 127/75  Pulse: 73  Resp: 14  Weight: 94.8 kg (209 lb)                                        Negative unless noted in HPI     Medical History  Surgical History Family History Social History   Past Medical History:   Diagnosis Date     Cerebral infarction (H)      COPD (chronic obstructive pulmonary disease) (H)      HLD (hyperlipidemia)      Hypertension      Myeloproliferative disease (H)     Past Surgical History:   Procedure  Laterality Date     CV CORONARY ANGIOGRAM N/A 2022    Procedure: CV CORONARY ANGIOGRAM;  Surgeon: Audrey Holder MD;  Location: [facility] CATH LAB CV     CV LEFT HEART CATH N/A 2022    Procedure: Left Heart Catheterization;  Surgeon: Audrey Holder MD;  Location: [facility] CATH LAB CV     IR LOWER EXTREMITY ANGIOGRAM LEFT  2023     OPEN REDUCTION INTERNAL FIXATION FOOT Right      OTHER SURGICAL HISTORY      Lip lesion removal     PICC TRIPLE LUMEN PLACEMENT  2022          TONSILLECTOMY & ADENOIDECTOMY      Family History   Problem Relation Age of Onset     Alcoholism Mother     Social History     Socioeconomic History     Marital status:      Spouse name: Not on file     Number of children: Not on file     Years of education: Not on file     Highest education level: Not on file   Occupational History     Not on file   Tobacco Use     Smoking status: Former     Current packs/day: 0.00     Average packs/day: 1 pack/day for 46.6 years (46.6 ttl pk-yrs)     Types: Cigarettes     Start date: 1965     Quit date: 2012     Years since quittin.6     Smokeless tobacco: Never   Vaping Use     Vaping status: Never Used   Substance and Sexual Activity     Alcohol use: Yes     Alcohol/week: 19.0 standard drinks of alcohol     Drug use: Never     Sexual activity: Not on file   Other Topics Concern     Not on file   Social History Narrative     Not on file     Social Determinants of Health     Financial Resource Strain: Not on file   Food Insecurity: Not on file   Transportation Needs: Not on file   Physical Activity: Not on file   Stress: Not on file   Social Connections: Not on file   Interpersonal Safety: Not on file   Housing Stability: Not on file          Medications  Allergies   Current Outpatient Medications   Medication Sig Dispense Refill     albuterol (PROAIR HFA;PROVENTIL HFA;VENTOLIN HFA) 90 mcg/actuation inhaler Inhale 2 puffs into the lungs every 6 hours as needed for  "shortness of breath / dyspnea       aspirin (ASPIRIN LOW DOSE) 81 MG EC tablet TAKE 1 TABLET (81 MG) BY MOUTH DAILY. START THE MORNING OF 4/27/22 90 tablet 1     atorvastatin (LIPITOR) 80 MG tablet Take 1 tablet (80 mg) by mouth every morning 90 tablet 1     cefdinir (OMNICEF) 300 MG capsule Take 1 capsule (300 mg) by mouth 2 times daily 14 capsule 0     clindamycin (CLEOCIN) 150 MG capsule Take 3 capsules (450 mg) by mouth 3 times daily 63 capsule 0     furosemide (LASIX) 20 MG tablet Take 0.5 tablets (10 mg) by mouth daily 45 tablet 3     hydroxyurea (HYDREA) 500 MG capsule Take 1,000 mg by mouth daily       losartan (COZAAR) 25 MG tablet Take 1 tablet (25 mg) by mouth daily 90 tablet 3     metoprolol succinate ER (TOPROL-XL) 25 MG 24 hr tablet Take 25 mg by mouth daily       miconazole (MICATIN) 2 % external powder Apply topically 2 times daily 71 g 1     nitroGLYcerin (NITROSTAT) 0.4 MG sublingual tablet One tablet under the tongue every 5 minutes if needed for chest pain. May repeat every 5 minutes for a maximum of 3 doses in 15 minutes\" 25 tablet 3     omeprazole (PRILOSEC) 20 MG DR capsule Take 20 mg by mouth daily       tiotropium (SPIRIVA) 18 mcg inhalation capsule [TIOTROPIUM (SPIRIVA) 18 MCG INHALATION CAPSULE] Place 18 mcg into inhaler and inhale daily.       warfarin ANTICOAGULANT (COUMADIN) 1 MG tablet Take 3 tablets (3 mg) by mouth daily       doxycycline hyclate (VIBRAMYCIN) 100 MG capsule Take 1 capsule (100 mg) by mouth every 12 hours 3 capsule 0    No Known Allergies      Lab Results    Chemistry/lipid CBC Cardiac Enzymes/BNP/TSH/INR   Lab Results   Component Value Date    CHOL 77 07/28/2024    HDL 32 (L) 07/28/2024    TRIG 74 07/28/2024    BUN 33.7 (H) 07/28/2024     07/28/2024    CO2 26 07/28/2024    Lab Results   Component Value Date    WBC 9.6 08/05/2024    HGB 10.2 (L) 08/05/2024    HCT 30.9 (L) 08/05/2024     (H) 08/05/2024     08/05/2024    Lab Results   Component Value " Date    TROPONINI 2.47 (HH) 08/17/2022     (H) 08/17/2022    TSH 1.01 07/28/2024    INR 1.84 (H) 07/29/2024        35  minutes spent on the date of encounter doing chart review, review of outside records, review of test results, interpretation with above tests, patient visit, documentation, and discussion with family.        This note has been dictated using voice recognition software. Any grammatical, typographical, or context distortions are unintentional and inherent to the software          Thank you for allowing me to participate in the care of your patient.      Sincerely,     CLEVE Zayas [facility]  cc:   Haley Jackson MD  [facility] [place] [address]

## 2024-08-07 NOTE — PATIENT INSTRUCTIONS
Shaji Pompa,    It was a pleasure to see you today at the [facility].     My recommendations after this visit include:    - No medications changes made today    - Low sodium diet < 2000 mg/day, daily weight monitoring, and maintain adequate fluid intake at 50 to 60 ounces per day    -Please call if you experience persistent weight gain 2 to 3 pounds 2 days in a row or 5 pounds in a week with shortness of breath, abdominal bloating and leg swelling    - Follow up with Dr. Larson in 3-4 months    - Please call Heart Failure Nurse Line at [phone], if you have any questions or concerns

## 2024-08-26 ENCOUNTER — TRANSFERRED RECORDS (OUTPATIENT)
Dept: HEALTH INFORMATION MANAGEMENT | Facility: CLINIC | Age: 79
End: 2024-08-26

## 2024-09-12 DIAGNOSIS — I25.5 ISCHEMIC CARDIOMYOPATHY: ICD-10-CM

## 2024-09-12 DIAGNOSIS — I25.119 CORONARY ARTERY DISEASE INVOLVING NATIVE CORONARY ARTERY OF NATIVE HEART WITH ANGINA PECTORIS (H): ICD-10-CM

## 2024-09-12 DIAGNOSIS — E78.2 MIXED HYPERLIPIDEMIA: ICD-10-CM

## 2024-09-12 RX ORDER — ATORVASTATIN CALCIUM 80 MG/1
80 TABLET, FILM COATED ORAL EVERY MORNING
Qty: 90 TABLET | Refills: 0 | Status: SHIPPED | OUTPATIENT
Start: 2024-09-12

## 2024-09-12 RX ORDER — ASPIRIN 81 MG/1
81 TABLET, COATED ORAL DAILY
Qty: 90 TABLET | Refills: 0 | Status: SHIPPED | OUTPATIENT
Start: 2024-09-12

## 2024-10-28 ENCOUNTER — TRANSFERRED RECORDS (OUTPATIENT)
Dept: HEALTH INFORMATION MANAGEMENT | Facility: CLINIC | Age: 79
End: 2024-10-28

## 2024-10-28 ENCOUNTER — LAB REQUISITION (OUTPATIENT)
Dept: LAB | Facility: CLINIC | Age: 79
End: 2024-10-28

## 2024-10-28 DIAGNOSIS — I25.10 ATHEROSCLEROTIC HEART DISEASE OF NATIVE CORONARY ARTERY WITHOUT ANGINA PECTORIS: ICD-10-CM

## 2024-10-28 LAB
ANION GAP SERPL CALCULATED.3IONS-SCNC: 11 MMOL/L (ref 7–15)
BUN SERPL-MCNC: 26.4 MG/DL (ref 8–23)
CALCIUM SERPL-MCNC: 9.1 MG/DL (ref 8.8–10.4)
CHLORIDE SERPL-SCNC: 104 MMOL/L (ref 98–107)
CREAT SERPL-MCNC: 1.1 MG/DL (ref 0.67–1.17)
EGFRCR SERPLBLD CKD-EPI 2021: 68 ML/MIN/1.73M2
GLUCOSE SERPL-MCNC: 83 MG/DL (ref 70–99)
HCO3 SERPL-SCNC: 24 MMOL/L (ref 22–29)
POTASSIUM SERPL-SCNC: 4.6 MMOL/L (ref 3.4–5.3)
SODIUM SERPL-SCNC: 139 MMOL/L (ref 135–145)

## 2024-10-28 PROCEDURE — 82310 ASSAY OF CALCIUM: CPT | Performed by: FAMILY MEDICINE

## 2024-10-28 PROCEDURE — 80048 BASIC METABOLIC PNL TOTAL CA: CPT | Performed by: FAMILY MEDICINE

## 2024-11-18 DIAGNOSIS — E78.2 MIXED HYPERLIPIDEMIA: ICD-10-CM

## 2024-11-18 DIAGNOSIS — I25.119 CORONARY ARTERY DISEASE INVOLVING NATIVE CORONARY ARTERY OF NATIVE HEART WITH ANGINA PECTORIS (H): ICD-10-CM

## 2024-11-19 RX ORDER — ATORVASTATIN CALCIUM 80 MG/1
80 TABLET, FILM COATED ORAL EVERY MORNING
Qty: 90 TABLET | Refills: 1 | Status: SHIPPED | OUTPATIENT
Start: 2024-11-19

## 2024-12-05 ENCOUNTER — OFFICE VISIT (OUTPATIENT)
Dept: CARDIOLOGY | Facility: CLINIC | Age: 79
End: 2024-12-05
Payer: COMMERCIAL

## 2024-12-05 VITALS
WEIGHT: 207.3 LBS | SYSTOLIC BLOOD PRESSURE: 144 MMHG | OXYGEN SATURATION: 94 % | HEART RATE: 74 BPM | BODY MASS INDEX: 34.5 KG/M2 | DIASTOLIC BLOOD PRESSURE: 86 MMHG | RESPIRATION RATE: 20 BRPM

## 2024-12-05 DIAGNOSIS — I25.10 CORONARY ARTERY DISEASE INVOLVING NATIVE CORONARY ARTERY OF NATIVE HEART WITHOUT ANGINA PECTORIS: ICD-10-CM

## 2024-12-05 DIAGNOSIS — R60.9 2+ PITTING EDEMA: ICD-10-CM

## 2024-12-05 DIAGNOSIS — I48.0 PAROXYSMAL ATRIAL FIBRILLATION (H): ICD-10-CM

## 2024-12-05 DIAGNOSIS — I50.22 CHRONIC SYSTOLIC HEART FAILURE (H): Primary | ICD-10-CM

## 2024-12-05 DIAGNOSIS — N18.2 CKD (CHRONIC KIDNEY DISEASE) STAGE 2, GFR 60-89 ML/MIN: ICD-10-CM

## 2024-12-05 PROBLEM — N17.9 AKI (ACUTE KIDNEY INJURY) (H): Status: RESOLVED | Noted: 2024-08-07 | Resolved: 2024-12-05

## 2024-12-05 NOTE — PROGRESS NOTES
HEART CARE CONSULTATON NOTE        Assessment/Recommendations   Assessment:   1.  Chronic congestive heart failure with reduced ejection fraction.  Ischemic cardiomyopathy. LVEF: 40-45%. NYHA early II (stable).   2.  Coronary Artery disease with occluded mid right coronary artery.  Collaterals from the left and proximal RCA.  No anginal symptoms  3.  Severe coronary calcification on CT.   4.  Paroxysmal A. fib   5.  Ischemic cardiomyopathy  6.  Pulmonary embolism, bilateral  7.  DVT right leg   8.  Chronic anemia, improved, 2/2 MPD.   Stable.   Hemoglobin   Date Value Ref Range Status   08/05/2024 10.2 (L) 13.3 - 17.7 g/dL Final   9.  Thrombocytopenia, improved.   Platelet Count   Date Value Ref Range Status   08/05/2024 414 150 - 450 10e3/uL Final   10.  Myeloproliferative disorder, followed at [facility], Hgb stable.   11.  Recent MING in CKD stage II      Plan:   1.  Lasix 10 mg daily  2.  Losartan mg daily, home BP stable (reviewed).   3.  Continue atorvastatin to 80 mg daily.  Recent LDL well controlled.  LDL Cholesterol Calculated   Date Value Ref Range Status   07/28/2024 30 <=100 mg/dL Final   4.  Warfarin for DVT, INR 2-3.  Patient is noticing bruising across his arm.  We did discuss Eliquis and Xarelto therapy as an alternative.   5.  Aspirin 81 mg for coronary artery disease  6.  Continue metoprolol 25 mg XL daily.  7. Will ask MTM team to assist with starting SGLT2i, Jardiance 10 mg daily.  Given HFeEF, CKD stage II.           History of Present Illness/Subjective    HPI: Patient is a 79-year-old male with ischemic cardiomyopathy, heart failure with reduced ejection fraction, coronary artery disease, hypertension, hyperlipidemia presents to [facility] clinic in follow-up.     Since last being evaluated by myself he was hospitalized in July with cellulitis.  He has recovered well from that illness.  Of note during hospitalization he had a significant elevated BNP level.  Ejection fraction was  slightly improved ejection fraction 40 to 45%.  He currently has mild dyspnea on exertion.  He has mild to moderate lower extremity edema.  No significant orthopnea or PND symptoms.  I feel we need to optimize his heart failure regiment further.  Discussed SGLT2 inhibitors in detail with the patient.  Given he had acute kidney injury and likely has underlying chronic kidney disease along with his heart failure would feel Jardiance at 10 mg daily would be advised.  We work with our pharmacy team for coverage and optimization of medication.  Consultation placed.      ECHO:   The left ventricle is normal in size with normal left ventricular wall  thickness. Left ventricular function is decreased. The ejection fraction is  40-45% (mildly reduced).  Normal right ventricle size and systolic function.  No hemodynamically significant valvular abnormalities on 2D or color flow  imaging.  Compared to the prior study of 8/17/22 there has been an improvement in left  ventricular function (previously LVEF was 30-35%)    Echo reviewed from 8/17/22  The rhythm was rapid atrial fibrillation.  Left ventricular function is decreased. The ejection fraction is 30-35%  (moderately reduced).  There is moderate global hypokinesia of the left ventricle.  The left atrium is mildly dilated.  The study was technically difficult. No hemodynamically significant valvular  abnormalities on 2D or color flow imaging.    Coronary Angiogram: 4/26/22  Left Main   Mid LM to Dist LM lesion is 25% stenosed. The lesion is calcified.   Left Anterior Descending   Prox LAD to Mid LAD lesion is 20% stenosed. The lesion is calcified.   Ramus Intermedius   The vessel is large.   Left Circumflex   Mid Cx lesion is 30% stenosed. The lesion is calcified.   First Obtuse Marginal Branch   The vessel is small.   Second Obtuse Marginal Branch   The vessel is small.   Right Coronary Artery   Prox RCA lesion is 90% stenosed.   Prox RCA to Dist RCA lesion is 100%  stenosed.   Acute Marginal Branch   Collaterals   Acute Mrg filled by collaterals from RV Branch.      Right Posterior Descending Artery   Collaterals   RPDA filled by collaterals from 2nd Sept.      Right Posterior Atrioventricular Artery   Collaterals   RPAV filled by collaterals from Dist Cx          Physical Examination  Review of Systems   VITALS: BP (!) 144/86 (BP Location: Left arm, Patient Position: Sitting, Cuff Size: Adult Regular)   Pulse 74   Resp 20   Wt 94 kg (207 lb 4.8 oz)   SpO2 94%   BMI 34.50 kg/m    BMI: Body mass index is 34.5 kg/m .  Wt Readings from Last 3 Encounters:   12/05/24 94 kg (207 lb 4.8 oz)   08/07/24 94.8 kg (209 lb)   07/28/24 94.9 kg (209 lb 4.8 oz)       Wt Readings from Last 5 Encounters:   12/05/24 94 kg (207 lb 4.8 oz)   08/07/24 94.8 kg (209 lb)   07/28/24 94.9 kg (209 lb 4.8 oz)   04/29/24 93.8 kg (206 lb 14.4 oz)   12/05/23 95.3 kg (210 lb)       General Appearance:   no distress, normal body habitus, in bed.    ENT/Mouth: membranes moist, no oral lesions or bleeding gums.      EYES:  no scleral icterus, normal conjunctivae   Neck: no carotid bruits or thyromegaly   Chest/Lungs:    No wheezing.  Clear bilaterally.   Cardiovascular:   Regular. Normal first and second heart sounds with no murmurs, rubs, or gallops; the carotid, radial and posterior tibial pulses are intact, Jugular venous pressure normal, moderate bilateral lower extremity (worse).    Abdomen:  no  tenderness; bowel sounds are present   Extremities: no cyanosis or clubbing   Skin: no xanthelasma, warm.    Neurologic: normal  bilateral, no tremors     Psychiatric: alert and oriented x3, calm     Review Of Systems  Skin: negative  Eyes: negative  Ears/Nose/Throat: negative  Respiratory: Mild dyspnea.   Cardiovascular: Mild exertional dyspnea.  No anginal chest pain.  Gastrointestinal: negative  Genitourinary: negative  Musculoskeletal: negative  Neurologic: negative  Psychiatric:  negative  Hematologic/Lymphatic/Immunologic: negative  Endocrine: negative          Lab Results    Chemistry/lipid CBC Cardiac Enzymes/BNP/TSH/INR   Recent Labs   Lab Test 05/03/21  1557   CHOL 110   HDL 35*   LDL 52   TRIG 115     Recent Labs   Lab Test 05/03/21  1557 08/13/19  0926   LDL 52 111     Recent Labs   Lab Test 04/23/22  0441      POTASSIUM 3.8   CHLORIDE 106   CO2 27      BUN 22   CR 0.76   GFRESTIMATED >90   GLENDY 7.7*     Lab Results   Component Value Date    WBC 5.9 05/01/2022     Lab Results   Component Value Date    RBC 2.40 05/01/2022     Lab Results   Component Value Date    HGB 8.6 05/01/2022     Lab Results   Component Value Date    HCT 27.9 05/01/2022     No components found for: MCT  Lab Results   Component Value Date     05/01/2022     Lab Results   Component Value Date    MCH 35.8 05/01/2022     Lab Results   Component Value Date    MCHC 30.8 05/01/2022     Lab Results   Component Value Date    RDW  05/01/2022      Comment:      Dimorphic Population - Unable to Calculate     Lab Results   Component Value Date     05/01/2022          Recent Labs     Recent Labs   Lab Test 04/23/22  0441 04/22/22  0558 04/21/22  0502   HGB 7.0* 7.5* 7.3*    Recent Labs   Lab Test 04/20/22  0405 04/19/22  2143 04/19/22  1441   TROPONINI 0.09 0.11 0.10     Recent Labs   Lab Test 04/19/22  1441 04/01/22  1208   BNP 1,221* 379*     No results for input(s): TSH in the last 98026 hours.  Recent Labs   Lab Test 04/23/22  0441 04/21/22  0502 04/20/22  0405   INR 1.97* 2.82* 2.67*        Medical History  Surgical History Family History Social History   Past Medical History:   Diagnosis Date    Cerebral infarction (H)     COPD (chronic obstructive pulmonary disease) (H)     HLD (hyperlipidemia)     Hypertension     Myeloproliferative disease (H)     Pleural effusion on left 04/19/2022     Past Surgical History:   Procedure Laterality Date    CV CORONARY ANGIOGRAM N/A 4/26/2022    Procedure: CV  CORONARY ANGIOGRAM;  Surgeon: Audrey Holder MD;  Location: [facility] CATH LAB CV    CV LEFT HEART CATH N/A 2022    Procedure: Left Heart Catheterization;  Surgeon: Audrey Holder MD;  Location: [facility] CATH LAB CV    IR LOWER EXTREMITY ANGIOGRAM LEFT  2023    OPEN REDUCTION INTERNAL FIXATION FOOT Right     OTHER SURGICAL HISTORY      Lip lesion removal    PICC TRIPLE LUMEN PLACEMENT  2022         TONSILLECTOMY & ADENOIDECTOMY       Family History   Problem Relation Age of Onset    Alcoholism Mother         Social History     Socioeconomic History    Marital status:      Spouse name: Not on file    Number of children: Not on file    Years of education: Not on file    Highest education level: Not on file   Occupational History    Not on file   Tobacco Use    Smoking status: Former     Current packs/day: 0.00     Average packs/day: 1 pack/day for 46.6 years (46.6 ttl pk-yrs)     Types: Cigarettes     Start date: 1965     Quit date: 2012     Years since quittin.9    Smokeless tobacco: Never   Vaping Use    Vaping status: Never Used   Substance and Sexual Activity    Alcohol use: Yes     Alcohol/week: 19.0 standard drinks of alcohol    Drug use: Never    Sexual activity: Not on file   Other Topics Concern    Not on file   Social History Narrative    Not on file     Social Drivers of Health     Financial Resource Strain: Not on file   Food Insecurity: Not on file   Transportation Needs: Not on file   Physical Activity: Not on file   Stress: Not on file   Social Connections: Not on file   Interpersonal Safety: Not on file   Housing Stability: Not on file         Medications  Allergies   Current Outpatient Medications   Medication Sig Dispense Refill    albuterol (PROAIR HFA;PROVENTIL HFA;VENTOLIN HFA) 90 mcg/actuation inhaler Inhale 2 puffs into the lungs every 6 hours as needed for shortness of breath / dyspnea      ASPIRIN LOW DOSE 81 MG EC tablet TAKE 1 TABLET (81 MG) BY  "MOUTH DAILY. 90 tablet 0    atorvastatin (LIPITOR) 80 MG tablet Take 1 tablet (80 mg) by mouth every morning 90 tablet 1    furosemide (LASIX) 20 MG tablet Take 0.5 tablets (10 mg) by mouth daily 45 tablet 3    hydroxyurea (HYDREA) 500 MG capsule Take 1,000 mg by mouth daily      losartan (COZAAR) 25 MG tablet Take 1 tablet (25 mg) by mouth daily 90 tablet 3    metoprolol succinate ER (TOPROL-XL) 25 MG 24 hr tablet Take 25 mg by mouth daily      nitroGLYcerin (NITROSTAT) 0.4 MG sublingual tablet One tablet under the tongue every 5 minutes if needed for chest pain. May repeat every 5 minutes for a maximum of 3 doses in 15 minutes\" 25 tablet 3    omeprazole (PRILOSEC) 20 MG DR capsule Take 20 mg by mouth daily      tiotropium (SPIRIVA) 18 mcg inhalation capsule [TIOTROPIUM (SPIRIVA) 18 MCG INHALATION CAPSULE] Place 18 mcg into inhaler and inhale daily.      warfarin ANTICOAGULANT (COUMADIN) 1 MG tablet Take 3 tablets (3 mg) by mouth daily        No Known Allergies      Sp DINAH Larson DO    The longitudinal plan of care for the diagnosis(es)/condition(s) as documented were addressed during this visit. Due to the added complexity in care, I will continue to support Hector in the subsequent management and with ongoing continuity of care.  "

## 2024-12-05 NOTE — LETTER
12/5/2024    Steven Caarballo MD, MD  [address] [place] [address]    RE: Shaji Pompa       Dear Colleague,     I had the pleasure of seeing Shaji Pompa in the [facility].    HEART CARE CONSULTATON NOTE        Assessment/Recommendations   Assessment:   1.  Chronic congestive heart failure with reduced ejection fraction.  Ischemic cardiomyopathy. LVEF: 40-45%. NYHA early II (stable).   2.  Coronary Artery disease with occluded mid right coronary artery.  Collaterals from the left and proximal RCA.  No anginal symptoms  3.  Severe coronary calcification on CT.   4.  Paroxysmal A. fib   5.  Ischemic cardiomyopathy  6.  Pulmonary embolism, bilateral  7.  DVT right leg   8.  Chronic anemia, improved, 2/2 MPD.   Stable.   Hemoglobin   Date Value Ref Range Status   08/05/2024 10.2 (L) 13.3 - 17.7 g/dL Final   9.  Thrombocytopenia, improved.   Platelet Count   Date Value Ref Range Status   08/05/2024 414 150 - 450 10e3/uL Final   10.  Myeloproliferative disorder, followed at [facility], Hgb stable.   11.  Recent MING in CKD stage II      Plan:   1.  Lasix 10 mg daily  2.  Losartan mg daily, home BP stable (reviewed).   3.  Continue atorvastatin to 80 mg daily.  Recent LDL well controlled.  LDL Cholesterol Calculated   Date Value Ref Range Status   07/28/2024 30 <=100 mg/dL Final   4.  Warfarin for DVT, INR 2-3.  Patient is noticing bruising across his arm.  We did discuss Eliquis and Xarelto therapy as an alternative.   5.  Aspirin 81 mg for coronary artery disease  6.  Continue metoprolol 25 mg XL daily.  7. Will ask MTM team to assist with starting SGLT2i, Jardiance 10 mg daily.  Given HFeEF, CKD stage II.           History of Present Illness/Subjective    HPI: Patient is a 79-year-old male with ischemic cardiomyopathy, heart failure with reduced ejection fraction, coronary artery disease, hypertension, hyperlipidemia presents to [facility] in follow-up.      Since last being evaluated by myself he was hospitalized in July with cellulitis.  He has recovered well from that illness.  Of note during hospitalization he had a significant elevated BNP level.  Ejection fraction was slightly improved ejection fraction 40 to 45%.  He currently has mild dyspnea on exertion.  He has mild to moderate lower extremity edema.  No significant orthopnea or PND symptoms.  I feel we need to optimize his heart failure regiment further.  Discussed SGLT2 inhibitors in detail with the patient.  Given he had acute kidney injury and likely has underlying chronic kidney disease along with his heart failure would feel Jardiance at 10 mg daily would be advised.  We work with our pharmacy team for coverage and optimization of medication.  Consultation placed.      ECHO:   The left ventricle is normal in size with normal left ventricular wall  thickness. Left ventricular function is decreased. The ejection fraction is  40-45% (mildly reduced).  Normal right ventricle size and systolic function.  No hemodynamically significant valvular abnormalities on 2D or color flow  imaging.  Compared to the prior study of 8/17/22 there has been an improvement in left  ventricular function (previously LVEF was 30-35%)    Echo reviewed from 8/17/22  The rhythm was rapid atrial fibrillation.  Left ventricular function is decreased. The ejection fraction is 30-35%  (moderately reduced).  There is moderate global hypokinesia of the left ventricle.  The left atrium is mildly dilated.  The study was technically difficult. No hemodynamically significant valvular  abnormalities on 2D or color flow imaging.    Coronary Angiogram: 4/26/22  Left Main   Mid LM to Dist LM lesion is 25% stenosed. The lesion is calcified.   Left Anterior Descending   Prox LAD to Mid LAD lesion is 20% stenosed. The lesion is calcified.   Ramus Intermedius   The vessel is large.   Left Circumflex   Mid Cx lesion is 30% stenosed. The lesion is  calcified.   First Obtuse Marginal Branch   The vessel is small.   Second Obtuse Marginal Branch   The vessel is small.   Right Coronary Artery   Prox RCA lesion is 90% stenosed.   Prox RCA to Dist RCA lesion is 100% stenosed.   Acute Marginal Branch   Collaterals   Acute Mrg filled by collaterals from RV Branch.      Right Posterior Descending Artery   Collaterals   RPDA filled by collaterals from 2nd Sept.      Right Posterior Atrioventricular Artery   Collaterals   RPAV filled by collaterals from Dist Cx          Physical Examination  Review of Systems   VITALS: BP (!) 144/86 (BP Location: Left arm, Patient Position: Sitting, Cuff Size: Adult Regular)   Pulse 74   Resp 20   Wt 94 kg (207 lb 4.8 oz)   SpO2 94%   BMI 34.50 kg/m    BMI: Body mass index is 34.5 kg/m .  Wt Readings from Last 3 Encounters:   12/05/24 94 kg (207 lb 4.8 oz)   08/07/24 94.8 kg (209 lb)   07/28/24 94.9 kg (209 lb 4.8 oz)       Wt Readings from Last 5 Encounters:   12/05/24 94 kg (207 lb 4.8 oz)   08/07/24 94.8 kg (209 lb)   07/28/24 94.9 kg (209 lb 4.8 oz)   04/29/24 93.8 kg (206 lb 14.4 oz)   12/05/23 95.3 kg (210 lb)       General Appearance:   no distress, normal body habitus, in bed.    ENT/Mouth: membranes moist, no oral lesions or bleeding gums.      EYES:  no scleral icterus, normal conjunctivae   Neck: no carotid bruits or thyromegaly   Chest/Lungs:    No wheezing.  Clear bilaterally.   Cardiovascular:   Regular. Normal first and second heart sounds with no murmurs, rubs, or gallops; the carotid, radial and posterior tibial pulses are intact, Jugular venous pressure normal, moderate bilateral lower extremity (worse).    Abdomen:  no  tenderness; bowel sounds are present   Extremities: no cyanosis or clubbing   Skin: no xanthelasma, warm.    Neurologic: normal  bilateral, no tremors     Psychiatric: alert and oriented x3, calm     Review Of Systems  Skin: negative  Eyes: negative  Ears/Nose/Throat: negative  Respiratory:  Mild dyspnea.   Cardiovascular: Mild exertional dyspnea.  No anginal chest pain.  Gastrointestinal: negative  Genitourinary: negative  Musculoskeletal: negative  Neurologic: negative  Psychiatric: negative  Hematologic/Lymphatic/Immunologic: negative  Endocrine: negative          Lab Results    Chemistry/lipid CBC Cardiac Enzymes/BNP/TSH/INR   Recent Labs   Lab Test 05/03/21  1557   CHOL 110   HDL 35*   LDL 52   TRIG 115     Recent Labs   Lab Test 05/03/21  1557 08/13/19  0926   LDL 52 111     Recent Labs   Lab Test 04/23/22  0441      POTASSIUM 3.8   CHLORIDE 106   CO2 27      BUN 22   CR 0.76   GFRESTIMATED >90   GLENDY 7.7*     Lab Results   Component Value Date    WBC 5.9 05/01/2022     Lab Results   Component Value Date    RBC 2.40 05/01/2022     Lab Results   Component Value Date    HGB 8.6 05/01/2022     Lab Results   Component Value Date    HCT 27.9 05/01/2022     No components found for: MCT  Lab Results   Component Value Date     05/01/2022     Lab Results   Component Value Date    MCH 35.8 05/01/2022     Lab Results   Component Value Date    MCHC 30.8 05/01/2022     Lab Results   Component Value Date    RDW  05/01/2022      Comment:      Dimorphic Population - Unable to Calculate     Lab Results   Component Value Date     05/01/2022          Recent Labs     Recent Labs   Lab Test 04/23/22  0441 04/22/22  0558 04/21/22  0502   HGB 7.0* 7.5* 7.3*    Recent Labs   Lab Test 04/20/22  0405 04/19/22  2143 04/19/22  1441   TROPONINI 0.09 0.11 0.10     Recent Labs   Lab Test 04/19/22  1441 04/01/22  1208   BNP 1,221* 379*     No results for input(s): TSH in the last 49742 hours.  Recent Labs   Lab Test 04/23/22  0441 04/21/22  0502 04/20/22  0405   INR 1.97* 2.82* 2.67*        Medical History  Surgical History Family History Social History   Past Medical History:   Diagnosis Date     Cerebral infarction (H)      COPD (chronic obstructive pulmonary disease) (H)      HLD (hyperlipidemia)       Hypertension      Myeloproliferative disease (H)      Pleural effusion on left 2022     Past Surgical History:   Procedure Laterality Date     CV CORONARY ANGIOGRAM N/A 2022    Procedure: CV CORONARY ANGIOGRAM;  Surgeon: Audrey Holder MD;  Location: [facility] CATH LAB CV     CV LEFT HEART CATH N/A 2022    Procedure: Left Heart Catheterization;  Surgeon: Audrey Holder MD;  Location: [facility] CATH LAB CV     IR LOWER EXTREMITY ANGIOGRAM LEFT  2023     OPEN REDUCTION INTERNAL FIXATION FOOT Right      OTHER SURGICAL HISTORY      Lip lesion removal     PICC TRIPLE LUMEN PLACEMENT  2022          TONSILLECTOMY & ADENOIDECTOMY       Family History   Problem Relation Age of Onset     Alcoholism Mother         Social History     Socioeconomic History     Marital status:      Spouse name: Not on file     Number of children: Not on file     Years of education: Not on file     Highest education level: Not on file   Occupational History     Not on file   Tobacco Use     Smoking status: Former     Current packs/day: 0.00     Average packs/day: 1 pack/day for 46.6 years (46.6 ttl pk-yrs)     Types: Cigarettes     Start date: 1965     Quit date: 2012     Years since quittin.9     Smokeless tobacco: Never   Vaping Use     Vaping status: Never Used   Substance and Sexual Activity     Alcohol use: Yes     Alcohol/week: 19.0 standard drinks of alcohol     Drug use: Never     Sexual activity: Not on file   Other Topics Concern     Not on file   Social History Narrative     Not on file     Social Drivers of Health     Financial Resource Strain: Not on file   Food Insecurity: Not on file   Transportation Needs: Not on file   Physical Activity: Not on file   Stress: Not on file   Social Connections: Not on file   Interpersonal Safety: Not on file   Housing Stability: Not on file         Medications  Allergies   Current Outpatient Medications   Medication Sig Dispense Refill      "albuterol (PROAIR HFA;PROVENTIL HFA;VENTOLIN HFA) 90 mcg/actuation inhaler Inhale 2 puffs into the lungs every 6 hours as needed for shortness of breath / dyspnea       ASPIRIN LOW DOSE 81 MG EC tablet TAKE 1 TABLET (81 MG) BY MOUTH DAILY. 90 tablet 0     atorvastatin (LIPITOR) 80 MG tablet Take 1 tablet (80 mg) by mouth every morning 90 tablet 1     furosemide (LASIX) 20 MG tablet Take 0.5 tablets (10 mg) by mouth daily 45 tablet 3     hydroxyurea (HYDREA) 500 MG capsule Take 1,000 mg by mouth daily       losartan (COZAAR) 25 MG tablet Take 1 tablet (25 mg) by mouth daily 90 tablet 3     metoprolol succinate ER (TOPROL-XL) 25 MG 24 hr tablet Take 25 mg by mouth daily       nitroGLYcerin (NITROSTAT) 0.4 MG sublingual tablet One tablet under the tongue every 5 minutes if needed for chest pain. May repeat every 5 minutes for a maximum of 3 doses in 15 minutes\" 25 tablet 3     omeprazole (PRILOSEC) 20 MG DR capsule Take 20 mg by mouth daily       tiotropium (SPIRIVA) 18 mcg inhalation capsule [TIOTROPIUM (SPIRIVA) 18 MCG INHALATION CAPSULE] Place 18 mcg into inhaler and inhale daily.       warfarin ANTICOAGULANT (COUMADIN) 1 MG tablet Take 3 tablets (3 mg) by mouth daily        No Known Allergies      Sp Larson DO    The longitudinal plan of care for the diagnosis(es)/condition(s) as documented were addressed during this visit. Due to the added complexity in care, I will continue to support Hector in the subsequent management and with ongoing continuity of care.      Thank you for allowing me to participate in the care of your patient.      Sincerely,     Sp Larson DO     [facility]  cc:   Sp Larson DO  [address] [place] [address]      "

## 2024-12-05 NOTE — PATIENT INSTRUCTIONS
Please contact direct nurses line Monday through Friday 8 AM to 5 PM @ [phone]    After-hours contact cardiology office at [phone].    Plan:    Start SGLT2i (Jardiance).   Will have pharmacy team assist.     Continue other medications   Follow-up in 6 months (return from [place]).

## 2024-12-09 ENCOUNTER — VIRTUAL VISIT (OUTPATIENT)
Facility: CLINIC | Age: 79
End: 2024-12-09
Attending: INTERNAL MEDICINE
Payer: COMMERCIAL

## 2024-12-09 ENCOUNTER — TELEPHONE (OUTPATIENT)
Dept: CARDIOLOGY | Facility: CLINIC | Age: 79
End: 2024-12-09
Payer: COMMERCIAL

## 2024-12-09 DIAGNOSIS — J44.9 CHRONIC OBSTRUCTIVE PULMONARY DISEASE, UNSPECIFIED COPD TYPE (H): ICD-10-CM

## 2024-12-09 DIAGNOSIS — Z86.711 HISTORY OF PULMONARY EMBOLISM: ICD-10-CM

## 2024-12-09 DIAGNOSIS — I25.10 CORONARY ARTERY DISEASE INVOLVING NATIVE CORONARY ARTERY OF NATIVE HEART WITHOUT ANGINA PECTORIS: ICD-10-CM

## 2024-12-09 DIAGNOSIS — D47.1 CHRONIC MYELOPROLIFERATIVE DISEASE (H): ICD-10-CM

## 2024-12-09 DIAGNOSIS — K21.00 GASTROESOPHAGEAL REFLUX DISEASE WITH ESOPHAGITIS WITHOUT HEMORRHAGE: ICD-10-CM

## 2024-12-09 DIAGNOSIS — I50.22 CHRONIC SYSTOLIC HEART FAILURE (H): Primary | ICD-10-CM

## 2024-12-09 NOTE — TELEPHONE ENCOUNTER
30 days supply test claims requested for the drugs below:  Jardiance 10mg daily, Farxiga 10mg daily     Jardiance 10mg   -$150.42 copay          Farxiga 10mg  -Plan exclusion            Patient currently in the donut hole

## 2024-12-09 NOTE — PATIENT INSTRUCTIONS
"Recommendations from today's MTM visit:                                                    MTM (medication therapy management) is a service provided by a clinical pharmacist designed to help you get the most of out of your medicines.   Today we reviewed what your medicines are for, how to know if they are working, that your medicines are safe and how to make your medicine regimen as easy as possible.      Start Jardiance 10 mg daily   I will call [facility] today to tell them to hold filling Jardiance at this time given the high co-pay  Please call [facility] 1/1/2025 to have Jardiance filled and to check the copay after your insurance coverage has reset. If the cost is still too high in the new year, please reach out to me.   Recheck labs in 2-4 weeks after starting Jardiance. Call [phone] to schedule a lab only appointment at your preferred [facility] location     Follow-up: 4 weeks after starting Jardiance as scheduled     It was great speaking with you today.  I value your experience and would be very thankful for your time in providing feedback in our clinic survey. In the next few days, you may receive an email or text message from [employer] with a link to a survey related to your  clinical pharmacist.\"     To schedule another MTM appointment, please call the clinic directly or you may call the MTM scheduling line at [phone].    My Clinical Pharmacist's contact information:                                                      Please feel free to contact me with any questions or concerns you have.      Bree Verde, PharmD  Medication Therapy Management Pharmacist  [facility]   "

## 2024-12-09 NOTE — Clinical Note
Hi Dr. Neo Gomez co-pay is high at this time as patient is currently in Medicare coverage gap. We will plan to start Jardiance in 1/2025 once coverage resets. [facility] pharmacy has prescription on hold and patient will call in Jan to check new cost. Labs ordered and will check in via phone to see how he is tolerating before leaving for [place] in Feb. Thanks!

## 2024-12-09 NOTE — PROGRESS NOTES
Medication Therapy Management (MTM) Encounter    ASSESSMENT:                            Medication Adherence/Access: No issues identified.    Hyperlipidemia , CAD, hx of STEMI  Stable. LDL at goal.     COPD   Stable.      GERD    Stable.      HFrEF (</=40%)   Current GDMT including metoprolol succinate 25 mg daily, losartan 25 mg daily, and furosemide 20 mg half a tablets (10 mg) daily. Patient would benefit from starting SGLT2i Jardiance. Provided education on Jardiance dosing and side effects. Test claim for Jardiance reveals high co-pay as patient is currently in Medicare coverage gap. Will plan to start Jardiance in 01/2025 when coverage resets.     Future considerations:  -adding spironolactone   -switching losartan to Entresto     History of PE:   Continue management and plan per [facility].     Chronic myeloproliferative disease   Stable, continue follow up and plan per [facility].     PLAN:                            Start Jardiance 10 mg daily   I will call "[facility] today to tell them to hold filling Jardiance at this time given the high co-pay  Please call "[facility] 1/1/2025 to have Jardiance filled and to check the copay after your insurance coverage has reset. If the cost is still too high in the new year, please reach out to me.   Recheck labs in 2-4 weeks after starting Jardiance. Call [phone] to schedule a lab only appointment at your preferred [facility] location     Follow-up: 4 weeks after starting Jardiance as scheduled     SUBJECTIVE/OBJECTIVE:                          Hector Pompa is a 79 year old male seen for an initial visit. He was referred to me from Dr. Larson.      Reason for visit: GDMT. Assistance on patient starting SGLT2i .    Allergies/ADRs: Reviewed in chart  Past Medical History: Reviewed in chart  Tobacco: He reports that he quit smoking about 12 years ago. His smoking use included cigarettes. He started smoking about 59 years ago. He has a 46.6  pack-year smoking history. He has never used smokeless tobacco.    Medication Adherence/Access: no issues reported. Uses a monthly pill box.     Hyperlipidemia , CAD, hx of STEMI  atorvastatin 80 mg daily  Aspirin 81 mg daily     Patient reports no significant myalgias or other side effects.  The ASCVD Risk score (Gavin PHELPS, et al., 2019) failed to calculate for the following reasons:    Risk score cannot be calculated because patient has a medical history suggesting prior/existing ASCVD     Recent Labs   Lab Test 07/28/24  0415 04/24/23  1120   CHOL 77 111   HDL 32* 48   LDL 30 47   TRIG 74 81       COPD   Tiotropium 18 mcg 1 puff daily   Albuterol inhaler 2 puffs every 6 hours as needed -- not using often, can't remember the last time he used it       Patient reports no current medication side effects.    Patient reports the following symptoms: none.     GERD    Omeprazole 20 mg once daily   Patient feels that current regimen is effective.       Heart Failure   HFrEF (</=40%)   Beta Blocker: metoprolol succinate 25 mg daily   ACEi/ARB/ARNI: losartan 25 mg daily   Diuretic(s): furosemide 20 mg half a tablets (10 mg) daily     Patient reports no current medication side effects.     Patient reports these HF symptoms: none.    Patient is measuring daily weights  and reports fluctuating a pound or 2.   Patient self-monitors blood pressure.  Home BP monitoring daily. Reports SBP around 140.       BP Readings from Last 3 Encounters:   12/05/24 (!) 144/86   08/07/24 127/75   07/29/24 114/60     History of PE:   Warfarin daily -- manage by Dr. Cee with [facility].     Reports next INR check next week. Reports bleeding and bruising easily.     Chronic myeloproliferative disease   Hydroxyurea 500 mg 2 tablets daily     Followed by [facility]     Today's Vitals: There were no vitals taken for this visit.  ----------------      I spent 12 minutes with this patient today. All changes were made via collaborative  practice agreement with Sp Larson. A copy of the visit note was provided to the patient's provider(s).    A summary of these recommendations was sent via [employer].    Bree Verde, SugeyD  Medication Therapy Management Pharmacist  [facility]    Telemedicine Visit Details  The patient's medications can be safely assessed via a telemedicine encounter.  Type of service:  Telephone visit  Originating Location (pt. Location): Home    Distant Location (provider location):  Off-site  Start Time:  9:00 AM  End Time: 9:12 AM     Medication Therapy Recommendations  No medication therapy recommendations to display

## 2024-12-12 DIAGNOSIS — I25.5 ISCHEMIC CARDIOMYOPATHY: ICD-10-CM

## 2024-12-12 DIAGNOSIS — I25.119 CORONARY ARTERY DISEASE INVOLVING NATIVE CORONARY ARTERY OF NATIVE HEART WITH ANGINA PECTORIS (H): ICD-10-CM

## 2024-12-16 RX ORDER — ASPIRIN 81 MG/1
81 TABLET, COATED ORAL DAILY
Qty: 90 TABLET | Refills: 1 | Status: SHIPPED | OUTPATIENT
Start: 2024-12-16

## 2025-01-18 ENCOUNTER — HEALTH MAINTENANCE LETTER (OUTPATIENT)
Age: 80
End: 2025-01-18

## 2025-01-27 ENCOUNTER — VIRTUAL VISIT (OUTPATIENT)
Facility: CLINIC | Age: 80
End: 2025-01-27
Attending: INTERNAL MEDICINE
Payer: COMMERCIAL

## 2025-01-27 DIAGNOSIS — I50.22 CHRONIC SYSTOLIC HEART FAILURE (H): ICD-10-CM

## 2025-01-27 DIAGNOSIS — I25.10 CORONARY ARTERY DISEASE INVOLVING NATIVE CORONARY ARTERY OF NATIVE HEART WITHOUT ANGINA PECTORIS: Primary | ICD-10-CM

## 2025-01-27 NOTE — PATIENT INSTRUCTIONS
"Recommendations from today's MTM visit:                                                      Continue your current medications  Continue checking blood pressure at home daily.     Follow-up: 3 months as scheduled     It was great speaking with you today.  I value your experience and would be very thankful for your time in providing feedback in our clinic survey. In the next few days, you may receive an email or text message from [facility] [employer] with a link to a survey related to your  clinical pharmacist.\"     To schedule another MTM appointment, please call the clinic directly or you may call the MTM scheduling line at [phone].    My Clinical Pharmacist's contact information:                                                      Please feel free to contact me with any questions or concerns you have.      Bree Verde, PharmD  Medication Therapy Management Pharmacist  [facility]   "

## 2025-01-27 NOTE — PROGRESS NOTES
Medication Therapy Management (MTM) Encounter    ASSESSMENT:                            Medication Adherence/Access: No issues identified.    Hyperlipidemia , CAD, hx of STEMI  Stable. LDL at goal.      HFrEF (</=40%)   Current GDMT including metoprolol succinate 25 mg daily, losartan 25 mg daily, Jardiance 10 mg daily and furosemide 20 mg half a tablets (10 mg) daily. Tolerating new Jardiance well. Patient is going to [place] for the next 2 months, no changes today.      Future considerations:  -adding spironolactone   -switching losartan to Entresto       PLAN:                            Continue your current medications  Continue checking blood pressure at home daily.     Follow-up: 3 months as scheduled     SUBJECTIVE/OBJECTIVE:                          Hector Pompa is a 79 year old male seen for a follow up visit. He was referred to me from Dr. Larson.      Reason for visit: jardiance follow up     Allergies/ADRs: Reviewed in chart  Past Medical History: Reviewed in chart  Tobacco: He reports that he quit smoking about 13 years ago. His smoking use included cigarettes. He started smoking about 59 years ago. He has a 46.6 pack-year smoking history. He has never used smokeless tobacco.    Medication Adherence/Access: no issues reported. Uses a monthly pill box.     Hyperlipidemia , CAD, hx of STEMI  atorvastatin 80 mg daily  Aspirin 81 mg daily     Patient reports no significant myalgias or other side effects.  The ASCVD Risk score (Gavin PHELPS, et al., 2019) failed to calculate for the following reasons:    Risk score cannot be calculated because patient has a medical history suggesting prior/existing ASCVD     Recent Labs   Lab Test 07/28/24  0415 04/24/23  1120   CHOL 77 111   HDL 32* 48   LDL 30 47   TRIG 74 81       Heart Failure   HFrEF (</=40%)   Beta Blocker: metoprolol succinate 25 mg daily   ACEi/ARB/ARNI: losartan 25 mg daily   SGLT2i: Jardiance 10 mg daily -- started 2 weeks.   Diuretic(s): furosemide 20  mg half a tablets (10 mg) daily     Patient reports no current medication side effects.  Has not noticed any changes since starting Jardiance. He is leaving for [place] in a week and will be gone for two months. He does not think he will be able to get his labs checked before he leaves.   Patient reports these HF symptoms: none.    Patient is measuring daily weights  and reports fluctuating a pound or 2.   Patient self-monitors blood pressure.  Home BP monitoring daily. Reports BPs have come down a bit since starting the Jardiance - -140, DBP 80-90.      BP Readings from Last 3 Encounters:   12/05/24 (!) 144/86   08/07/24 127/75   07/29/24 114/60         Today's Vitals: There were no vitals taken for this visit.  ----------------      I spent 10 minutes with this patient today. All changes were made via collaborative practice agreement with Sp Larson. A copy of the visit note was provided to the patient's provider(s).    A summary of these recommendations was sent via clinic portal    Sugey VarelaD  Medication Therapy Management Pharmacist  [facility]    Telemedicine Visit Details  The patient's medications can be safely assessed via a telemedicine encounter.  Type of service:  Telephone visit  Originating Location (pt. Location): Home    Distant Location (provider location):  Off-site  Start Time:  9:00 AM  End Time: 9:10 AM     Medication Therapy Recommendations  No medication therapy recommendations to display

## 2025-04-22 ENCOUNTER — ANCILLARY PROCEDURE (OUTPATIENT)
Dept: VASCULAR ULTRASOUND | Facility: CLINIC | Age: 80
End: 2025-04-22
Attending: RADIOLOGY
Payer: COMMERCIAL

## 2025-04-22 ENCOUNTER — OFFICE VISIT (OUTPATIENT)
Dept: VASCULAR SURGERY | Facility: CLINIC | Age: 80
End: 2025-04-22
Attending: RADIOLOGY
Payer: COMMERCIAL

## 2025-04-22 VITALS
SYSTOLIC BLOOD PRESSURE: 139 MMHG | OXYGEN SATURATION: 98 % | HEART RATE: 74 BPM | TEMPERATURE: 97.7 F | RESPIRATION RATE: 18 BRPM | DIASTOLIC BLOOD PRESSURE: 81 MMHG

## 2025-04-22 DIAGNOSIS — I73.9 PAD (PERIPHERAL ARTERY DISEASE): ICD-10-CM

## 2025-04-22 DIAGNOSIS — Z48.812 ENCOUNTER FOR SURGICAL AFTERCARE FOLLOWING SURGERY ON THE CIRCULATORY SYSTEM: ICD-10-CM

## 2025-04-22 DIAGNOSIS — I65.23 CAROTID STENOSIS, ASYMPTOMATIC, BILATERAL: Primary | ICD-10-CM

## 2025-04-22 PROCEDURE — 93924 LWR XTR VASC STDY BILAT: CPT

## 2025-04-22 PROCEDURE — 93926 LOWER EXTREMITY STUDY: CPT | Mod: LT

## 2025-04-22 PROCEDURE — 93880 EXTRACRANIAL BILAT STUDY: CPT

## 2025-04-22 ASSESSMENT — PAIN SCALES - GENERAL: PAINLEVEL_OUTOF10: NO PAIN (0)

## 2025-04-22 NOTE — Clinical Note
Hi, pt will  need to see Dr. Farrar in 1 year with RADHA and art duplex in 1 year. Orders are in Thanks!

## 2025-04-22 NOTE — PATIENT INSTRUCTIONS
"Kevin Girard,    Thank you for entrusting your care with us today. After your visit today with Dr. Eulalio Farrar this is the plan that was discussed at your appointment.    Complete CTA head and neck at [facility] on Wednesday 4/30/25 with  check-in time of 12:35 pm. Dr. Farrar will call with results. Follow up in 1 year with repeat imaging done prior to visit. We will call you for your follow up when time gets closer.     I am including additional information on these things and our contact information if you have any questions or concerns.   Please do not hesitate to reach out to us if you felt we did not answer your questions or you are unsure of the treatment plan after your visit today. Our number is [phone].Thank you for trusting us with your care.         Again thank you for your time.     Patient Education   Peripheral Arterial Disease (PAD): Care Instructions  Overview  Peripheral arterial disease (PAD) occurs when the blood vessels (arteries) that supply blood to the legs, belly, pelvis, arms, or neck get narrow or blocked. This reduces blood flow to that area. The legs are affected most often.  PAD is often caused by fatty buildup (plaque) in the arteries. This buildup is also called \"hardening\" of the arteries. Your risk of PAD increases if you smoke or have diabetes, high cholesterol, high blood pressure, or a family history of PAD.  Many people don't have symptoms. If you do have symptoms, you may have weak or tired legs, difficulty walking or balancing, or pain. If you have pain, you might feel a tight, aching, or squeezing pain in the calf, foot, thigh, or buttock that occurs during exercise. The pain usually gets worse during exercise and goes away when you rest. If PAD gets worse, you may have symptoms of poor blood flow, such as leg pain when you rest.  Medicine and a specialized exercise program may help relieve symptoms. Medicine and a heart-healthy lifestyle can help slow the progress of " the disease and lower your risk of heart attack and stroke. If PAD is severe, a procedure or surgery may be done to improve blood flow.  Follow-up care is a key part of your treatment and safety. Be sure to make and go to all appointments, and call your doctor if you are having problems. It's also a good idea to know your test results and keep a list of the medicines you take.  How can you care for yourself at home?  Try to quit or cut back on using tobacco and other nicotine products. This includes smoking and vaping. If you need help quitting, talk to your doctor about stop-smoking programs and medicines. These can increase your chances of quitting for good. Try to avoid secondhand smoke too.  Take your medicines exactly as prescribed. Call your doctor if you think you are having a problem with your medicine.  If you take a medicine to prevent blood clots, be sure to get instructions about how to take your medicine safely. This medicine can cause serious bleeding problems.  If you have symptoms when you exercise, ask your doctor if a specialized exercise program is right for you. This program may help relieve your symptoms and help you walk farther without pain.  Eat heart-healthy foods. These include vegetables, fruits, nuts, beans, lean meat, fish, and whole grains. Limit sodium, sugar, and alcohol.  Be active. Try to get at least 30 minutes of physical activity on most days of the week. Talk to your doctor about what type and level of exercise is safe for you.  Stay at a weight that's healthy for you. Talk to your doctor if you need help losing weight.  Try to get 7 to 9 hours of sleep each night.  Manage other health problems such as high blood pressure, high cholesterol, and diabetes. If you think you may have a problem with alcohol or drug use, talk to your doctor.  Avoid infections such as COVID-19, colds, and the flu. Get the flu vaccine every year. Get a pneumococcal vaccine. If you have had one before,  ask your doctor whether you need another dose. Stay up to date on your COVID-19 vaccines.  Watch for new or worse symptoms. Know when to call your doctor or get help right away.  Take good care of your feet.  Wash your feet every day. Use warm (not hot) water.  Check for blisters, cuts, cracks, or sores. If you have a foot problem, see your doctor. Don't try to treat a foot problem at home.  Use moisturizing skin cream to keep your feet soft.  Wear shoes and socks that fit well.  Keep your nails trimmed.  When should you call for help?   Call 911 anytime you think you may need emergency care. For example, call if:    You have symptoms of a heart attack. These may include:  Chest pain or pressure, or a strange feeling in the chest.  Sweating.  Shortness of breath.  Nausea or vomiting.  Pain, pressure, or a strange feeling in the back, neck, jaw, or upper belly or in one or both shoulders or arms.  Lightheadedness or sudden weakness.  A fast or irregular heartbeat.   After you call 911, the  may tell you to chew 1 adult-strength or 2 to 4 low-dose aspirin. Wait for an ambulance. Do not try to drive yourself.    You have sudden symptoms in your leg or foot such as severe pain, numbness, weakness, tingling, cool skin, or skin color changes. Your skin may be pale, bluish, or purplish.     You have symptoms of a stroke. These may include:  Sudden numbness, tingling, weakness, or loss of movement in your face, arm, or leg, especially on only one side of your body.  Sudden vision changes.  Sudden trouble speaking.  Sudden confusion or trouble understanding simple statements.  Sudden problems with walking or balance.  A sudden, severe headache that is different from past headaches.   Call your doctor now or seek immediate medical care if:    You have leg pain that does not go away even if you rest.     Your leg pain changes or gets worse. For example, if you have more pain with normal activity or the same pain with  "decreased activity, you should call.     You have cold, tingly, weak, or numb feet or toes.     You have leg or foot sores that are slow to heal.     The skin on your legs or feet changes color. It may be pale, bluish, or purplish.     The skin on your legs or feet has blisters or looks shiny.     You have an open sore on your leg or foot that is infected. Signs of infection include:  Increased pain, swelling, warmth, or redness.  Red streaks leading from the sore.  Pus draining from the sore.  A fever.   Watch closely for changes in your health, and be sure to contact your doctor if you have any problems.  Where can you learn more?  Go to https://www.[employer].net/patiented  Enter H735 in the search box to learn more about \"Peripheral Arterial Disease (PAD): Care Instructions.\"  Current as of: July 31, 2024  Content Version: 14.4    1511-1619 [employer].   Care instructions adapted under license by your healthcare professional. If you have questions about a medical condition or this instruction, always ask your healthcare professional. [employer] disclaims any warranty or liability for your use of this information.       Carotid Artery Disease      What is carotid artery disease?  A carotid artery on each side of the neck supplies blood to the brain. Carotid artery disease occurs when a substance called plaque builds up in either or both arteries. The buildup may narrow the artery and limit blood flow to the brain. If this plaque breaks open, it may form a blood clot. Or pieces of the plaque may break off. A piece of plaque or a blood clot could move to the brain and cause a stroke or transient ischemic attack (TIA).    The narrowing in an artery is called stenosis. The more narrow an artery becomes, the greater the risk of stroke or TIA.        What causes it?  Carotid artery disease is caused by a process called hardening of the arteries, or atherosclerosis. Plaque builds up inside the " "carotid arteries. Things that can lead to this buildup include:    Smoking.  High blood pressure.  High cholesterol.  Diabetes.  A family history of hardening of the arteries.    What are the symptoms?  Many people have no symptoms. In some people, a doctor can hear a sound in their neck called a bruit (pronounced \"broo-EE\"). This is a whooshing sound that happens when a carotid artery is narrowed.    For some people, a transient ischemic attack (TIA) or stroke is the first sign of the disease.    Know the warning signs and symptoms of stroke: BE FAST     B = Balance loss   E = Eyesight changes   F = Facial droop or numbness   A = Arm or leg weakness   S = Speech difficulty, slurred speech   T = Time to call 911 for help    How is carotid artery disease treated?  The goal of treatment is to lower your risk of a stroke. Treatment depends on whether you have symptoms and how narrow your arteries are. You probably will take medicine. You also will be encouraged to have a heart-healthy lifestyle. Some people also have a procedure to lower their risk.    Medicines  You may take aspirin or another medicine to prevent blood clots. You will likely also take medicine to lower cholesterol. You may also take medicine to help manage blood pressure.    Heart-healthy lifestyle  A heart-healthy lifestyle can help lower your risk of stroke.    Don't smoke. Avoid secondhand smoke too.  Eat heart-healthy foods.  Be active. Ask your doctor what type of exercise is safe for you.  Stay at a healthy weight. Lose weight if you need to.  Manage other health problems, such as diabetes. If you think you may have a problem with alcohol or drug use, talk to your doctor.  Get vaccinated against COVID-19, the flu, and pneumonia.    Surgery or stenting  Surgery in the arteries is called carotid endarterectomy. The doctor makes a cut in the neck and takes the plaque out of the artery.    Some people have a stent placed inside a carotid artery. A " stent may be inserted during a catheter procedure. In this procedure, a doctor puts a thin tube, called a catheter, into a blood vessel in your groin or arm. The doctor threads the tube up to the carotid artery in the neck. The catheter is used to place the stent. In another type of procedure, a stent is placed in the artery through a cut in the neck.    Surgery and stenting may help lower your risk of a stroke. But they also have a risk of serious problems. You and your doctor can decide together if you want to have surgery or stenting.    Current as of: June 24, 2023  Author: [employer] StaffYou are leaving this website for information purposes only  Clinical Review BoardYou are leaving this website for information purposes only  All [employer] education is reviewed by a team that includes physicians, nurses, advanced practitioners, registered dieticians, and other healthcare professionals.    Your risk factors for stroke or TIA (transient ischemic attack):     Your Risk Factors Your Results Goals Additional education   Please scan QR code   [x] High blood pressure /81   Pulse 74   Temp 97.7  F (36.5  C)   Resp 18   SpO2 98%    Less than 120/80    [x] Cholesterol            Total  Cholesterol   Date Value Ref Range Status   07/28/2024 77 <200 mg/dL Final    Less than 150     Triglycerides   Triglycerides   Date Value Ref Range Status   07/28/2024 74 <150 mg/dL Final    Less than 150     LDL LDL Cholesterol Calculated   Date Value Ref Range Status   07/28/2024 30 <=100 mg/dL Final      Less than 70     HDL Direct Measure HDL   Date Value Ref Range Status   07/28/2024 32 (L) >=40 mg/dL Final    Greater than 40 (men)  Greater than 50 (women)    [] Diabetes                A1C Hemoglobin A1C   Date Value Ref Range Status   07/28/2024 5.0 <5.7 % Final     Comment:     Normal <5.7%   Prediabetes 5.7-6.4%    Diabetes 6.5% or higher     Note: Adopted from ADA consensus guidelines.    Less than 5.7    []  "Smoking/tobacco use   Tobacco Use      Smoking status: Former        Packs/day: 0.00        Years: 1 pack/day for 46.6 years (46.6 ttl pk-yrs)        Types: Cigarettes        Start date: 1965        Quit date: 2012        Years since quittin.3      Smokeless tobacco: Never   Quit smoking and tobacco    [] Overweight Estimated body mass index is 34.5 kg/m  as calculated from the following:    Height as of 24: 5' 5\" (1.651 m).    Weight as of 24: 207 lb 4.8 oz (94 kg).  Less than 25                     "

## 2025-04-22 NOTE — PROGRESS NOTES
[facility]        Patient is here for a 1 year follow up  to discuss Carotid stenosis. Patient has no symptoms.  Peripheral artery disease (PAD). Symptoms include none.    Pt is currently taking Aspirin and Statin.    /81   Pulse 74   Temp 97.7  F (36.5  C)   Resp 18   SpO2 98%     The provider has been notified that the patient has no concerns.     Questions patient would like addressed today are: N/A.    Refills are needed: No    Has homecare services and agency name:  No

## 2025-04-22 NOTE — PROGRESS NOTES
VASCULAR AND INTERVENTIONAL OUTPATIENT CONSULT OR VISIT  PHYSICIAN: Eulalio Farrar MD  ESTABLISHED PATIENT    LOCATION: [facility]    Shaji Pompa   Medical Record #:  [id number]  YOB: 1945  Age:  79 year old     Date of Service: 4/22/2025    PRIMARY CARE PROVIDER: Steven Caraballo    Reason for visit: Peripheral vascular disease follow-up     IMPRESSION: 79-year-old male with past history of a nonhealing left heel wound in setting of peripheral vascular disease status post revascularization.  The patient reports complete healing of his heel wound and reports no claudication type symptoms.  Noninvasive imaging performed the lab demonstrates an ankle-brachial index of 0.77 on the right and 1.04 on the left.  Both toe pressures are adequate for wound healing.  The patient denies any lifestyle-limiting claudication at this time.  Overall, stable peripheral vascular disease.     RECOMMENDATION:    1.  Guideline recommended medical therapy for peripheral arterial disease (Janay category 0)  -The patient is on full dose anticoagulation with warfarin plus low-dose aspirin  -Continue high intensity statin therapy with Lipitor 80 mg once daily  -Continue smoking cessation  - Severe right carotid stenosis on today's screening ultrasound.  Will obtain a CT angiogram neck for further evaluation.     Continue medical management as noted above.  If there is severe stenosis noted on the CT angiogram neck we will make a referral for possible revascularization.  Otherwise, plan for 12-month follow-up or sooner should the need arise.     HPI:  Shaji Pompa is a 79 year old male who was evaluated today in follow up for peripheral vascular disease in the setting of a nonhealing left heel ulcer. The patient underwent a left lower extremity angiogram with successful revascularization on 6/29/2023.  His perioperative course was unremarkable and there has been complete healing of his  wound. The patient denies any claudication symptoms.  He is a former smoker.  He has a history of coronary artery disease.    PHH:    Past Medical History:   Diagnosis Date    Cerebral infarction (H)     COPD (chronic obstructive pulmonary disease) (H)     HLD (hyperlipidemia)     Hypertension     Myeloproliferative disease (H)     Pleural effusion on left 04/19/2022        Past Surgical History:   Procedure Laterality Date    CV CORONARY ANGIOGRAM N/A 4/26/2022    Procedure: CV CORONARY ANGIOGRAM;  Surgeon: Audrey Holder MD;  Location: [facility] CATH LAB CV    CV LEFT HEART CATH N/A 4/26/2022    Procedure: Left Heart Catheterization;  Surgeon: Audrey Holder MD;  Location: [facility] CATH LAB CV    IR LOWER EXTREMITY ANGIOGRAM LEFT  6/29/2023    OPEN REDUCTION INTERNAL FIXATION FOOT Right 2010    OTHER SURGICAL HISTORY  2001    Lip lesion removal    PICC TRIPLE LUMEN PLACEMENT  8/17/2022         TONSILLECTOMY & ADENOIDECTOMY         ALLERGIES:  Patient has no known allergies.    MEDS:    Current Outpatient Medications:     albuterol (PROAIR HFA;PROVENTIL HFA;VENTOLIN HFA) 90 mcg/actuation inhaler, Inhale 2 puffs into the lungs every 6 hours as needed for shortness of breath / dyspnea, Disp: , Rfl:     aspirin (ASPIRIN LOW DOSE) 81 MG EC tablet, TAKE 1 TABLET (81 MG) BY MOUTH DAILY., Disp: 90 tablet, Rfl: 1    atorvastatin (LIPITOR) 80 MG tablet, Take 1 tablet (80 mg) by mouth every morning, Disp: 90 tablet, Rfl: 1    empagliflozin (JARDIANCE) 10 MG TABS tablet, Take 1 tablet (10 mg) by mouth daily., Disp: 90 tablet, Rfl: 3    furosemide (LASIX) 20 MG tablet, Take 0.5 tablets (10 mg) by mouth daily, Disp: 45 tablet, Rfl: 3    hydroxyurea (HYDREA) 500 MG capsule, Take 1,000 mg by mouth daily, Disp: , Rfl:     losartan (COZAAR) 25 MG tablet, Take 1 tablet (25 mg) by mouth daily, Disp: 90 tablet, Rfl: 3    metoprolol succinate ER (TOPROL-XL) 25 MG 24 hr tablet, Take 25 mg by mouth daily, Disp: , Rfl:      "nitroGLYcerin (NITROSTAT) 0.4 MG sublingual tablet, One tablet under the tongue every 5 minutes if needed for chest pain. May repeat every 5 minutes for a maximum of 3 doses in 15 minutes\", Disp: 25 tablet, Rfl: 3    omeprazole (PRILOSEC) 20 MG DR capsule, Take 20 mg by mouth daily, Disp: , Rfl:     tiotropium (SPIRIVA) 18 mcg inhalation capsule, [TIOTROPIUM (SPIRIVA) 18 MCG INHALATION CAPSULE] Place 18 mcg into inhaler and inhale daily., Disp: , Rfl:     warfarin ANTICOAGULANT (COUMADIN) 1 MG tablet, Take 3 tablets (3 mg) by mouth daily, Disp: , Rfl:     SOCIAL HABITS:    History   Smoking Status    Former    Types: Cigarettes   Smokeless Tobacco    Never     Social History    Substance and Sexual Activity      Alcohol use: Yes        Alcohol/week: 19.0 standard drinks of alcohol      History   Drug Use Unknown       FAMILY HISTORY:    Family History   Problem Relation Age of Onset    Alcoholism Mother        ADVANCE CARE DIRECTIVES:    Advance care directives reviewed in the chart and no changes made.     PE:  /81   Pulse 74   Temp 97.7  F (36.5  C)   Resp 18   SpO2 98%   Wt Readings from Last 1 Encounters:   12/05/24 94 kg (207 lb 4.8 oz)     There is no height or weight on file to calculate BMI.    EXAM:  GENERAL: This is a well-developed 79 year old male who appears his stated age  EYES: Grossly normal.  MOUTH: Buccal mucosa normal   MUSCULOSKELETAL: Grossly normal and both lower extremities are intact.  HEME/LYMPH: No lymphedema  NEUROLOGIC: Focally intact, Alert and oriented x 3.   PSYCH: appropriate affect  INTEGUMENT: No open lesions or ulcers    DIAGNOSTIC STUDIES:     Images:  No results found.    LABS:      Sodium   Date Value Ref Range Status   10/28/2024 139 135 - 145 mmol/L Final   07/28/2024 139 135 - 145 mmol/L Final   07/27/2024 137 135 - 145 mmol/L Final     Urea Nitrogen   Date Value Ref Range Status   10/28/2024 26.4 (H) 8.0 - 23.0 mg/dL Final   07/28/2024 33.7 (H) 8.0 - 23.0 mg/dL " Final   07/27/2024 30.7 (H) 8.0 - 23.0 mg/dL Final   08/19/2022 22 8 - 28 mg/dL Final   08/18/2022 21 8 - 28 mg/dL Final   08/17/2022 27 8 - 28 mg/dL Final     Hemoglobin   Date Value Ref Range Status   08/05/2024 10.2 (L) 13.3 - 17.7 g/dL Final   07/28/2024 10.7 (L) 13.3 - 17.7 g/dL Final   07/27/2024 11.2 (L) 13.3 - 17.7 g/dL Final     Platelet Count   Date Value Ref Range Status   08/05/2024 414 150 - 450 10e3/uL Final   07/28/2024 232 150 - 450 10e3/uL Final   07/27/2024 289 150 - 450 10e3/uL Final     BNP   Date Value Ref Range Status   08/17/2022 200 (H) 0 - 80 pg/mL Final   04/19/2022 1,221 (H) 0 - 78 pg/mL Final   04/01/2022 379 (H) 0 - 78 pg/mL Final     INR   Date Value Ref Range Status   07/29/2024 1.84 (H) 0.85 - 1.15 Final   07/28/2024 2.62 (H) 0.85 - 1.15 Final   07/27/2024 2.40 (H) 0.85 - 1.15 Final     INR (External)   Date Value Ref Range Status   10/05/2022 3.1 (A) 0.9 - 1.1 Final       This was a in person visit in which 30 minutes of  total time was spent (either in face-to-face or non-face-to-face time).    Eulalio Farrar MD, [facility]  VASCULAR AND INTERVENTIONAL PHYSICIAN  VASCULAR MEDICINE  INTERNAL MEDICINE  PAGER: [phone]  CALL SERVICE: [phone]

## 2025-04-27 ENCOUNTER — HEALTH MAINTENANCE LETTER (OUTPATIENT)
Age: 80
End: 2025-04-27

## 2025-05-01 ENCOUNTER — TELEPHONE (OUTPATIENT)
Dept: VASCULAR SURGERY | Facility: CLINIC | Age: 80
End: 2025-05-01
Payer: COMMERCIAL

## 2025-05-01 DIAGNOSIS — I73.9 PAD (PERIPHERAL ARTERY DISEASE): ICD-10-CM

## 2025-05-01 DIAGNOSIS — I65.23 CAROTID STENOSIS, ASYMPTOMATIC, BILATERAL: Primary | ICD-10-CM

## 2025-05-01 NOTE — TELEPHONE ENCOUNTER
Called and spoke with Jaimee Young to provide them an update that a message was sent to Dr. Farrar, and that we are waiting to hear back. No further questions at this time- just wondering what the next steps are.

## 2025-05-01 NOTE — TELEPHONE ENCOUNTER
Caller: Nidhi    Provider: Dr. Eulalio Farrar    Detailed reason for call: Nidhi is calling stating yesterday Hector was unable to do the CTA due to his kidney function.  She is concerned because Dr. Farrar wanted this test done, she is wondering what Hector needs to do next.    Best phone number to contact: [phone]     Best time to contact: any    Ok to leave a detailed message: Yes    Ok to speak to authorized person if needed: Yes: Hector       (Noted to patient if reason is related to wound or incision, to please send a photo via email or [employer].)

## 2025-05-05 ENCOUNTER — OFFICE VISIT (OUTPATIENT)
Dept: NEPHROLOGY | Facility: CLINIC | Age: 80
End: 2025-05-05
Attending: RADIOLOGY
Payer: COMMERCIAL

## 2025-05-05 ENCOUNTER — LAB (OUTPATIENT)
Dept: LAB | Facility: CLINIC | Age: 80
End: 2025-05-05
Payer: COMMERCIAL

## 2025-05-05 VITALS
WEIGHT: 205 LBS | SYSTOLIC BLOOD PRESSURE: 108 MMHG | DIASTOLIC BLOOD PRESSURE: 60 MMHG | BODY MASS INDEX: 34.11 KG/M2 | OXYGEN SATURATION: 96 % | HEART RATE: 90 BPM

## 2025-05-05 DIAGNOSIS — N17.9 AKI (ACUTE KIDNEY INJURY): Primary | ICD-10-CM

## 2025-05-05 DIAGNOSIS — N18.4 CKD (CHRONIC KIDNEY DISEASE) STAGE 4, GFR 15-29 ML/MIN (H): ICD-10-CM

## 2025-05-05 DIAGNOSIS — N18.4 ANEMIA OF CHRONIC RENAL FAILURE, STAGE 4 (SEVERE) (H): ICD-10-CM

## 2025-05-05 DIAGNOSIS — I65.23 CAROTID STENOSIS, ASYMPTOMATIC, BILATERAL: ICD-10-CM

## 2025-05-05 DIAGNOSIS — I73.9 PAD (PERIPHERAL ARTERY DISEASE): ICD-10-CM

## 2025-05-05 DIAGNOSIS — D63.1 ANEMIA OF CHRONIC RENAL FAILURE, STAGE 4 (SEVERE) (H): ICD-10-CM

## 2025-05-05 DIAGNOSIS — I70.1 RENAL ARTERY ARTERIOSCLEROSIS: ICD-10-CM

## 2025-05-05 LAB
ALBUMIN MFR UR ELPH: 19.4 MG/DL
ALBUMIN SERPL BCG-MCNC: 4.1 G/DL (ref 3.5–5.2)
ALBUMIN UR-MCNC: ABNORMAL MG/DL
ANION GAP SERPL CALCULATED.3IONS-SCNC: 10 MMOL/L (ref 7–15)
APPEARANCE UR: CLEAR
BACTERIA #/AREA URNS HPF: ABNORMAL /HPF
BILIRUB UR QL STRIP: NEGATIVE
BUN SERPL-MCNC: 27.7 MG/DL (ref 8–23)
CALCIUM SERPL-MCNC: 9.5 MG/DL (ref 8.8–10.4)
CHLORIDE SERPL-SCNC: 104 MMOL/L (ref 98–107)
COLOR UR AUTO: YELLOW
CREAT SERPL-MCNC: 1.44 MG/DL (ref 0.67–1.17)
CREAT UR-MCNC: 95.3 MG/DL
CREAT UR-MCNC: 95.3 MG/DL
EGFRCR SERPLBLD CKD-EPI 2021: 49 ML/MIN/1.73M2
ERYTHROCYTE [DISTWIDTH] IN BLOOD BY AUTOMATED COUNT: 21.6 % (ref 10–15)
FERRITIN SERPL-MCNC: 199 NG/ML (ref 31–409)
GLUCOSE SERPL-MCNC: 93 MG/DL (ref 70–99)
GLUCOSE UR STRIP-MCNC: 100 MG/DL
HCO3 SERPL-SCNC: 25 MMOL/L (ref 22–29)
HCT VFR BLD AUTO: 36.7 % (ref 40–53)
HGB BLD-MCNC: 12.5 G/DL (ref 13.3–17.7)
HGB UR QL STRIP: ABNORMAL
KETONES UR STRIP-MCNC: NEGATIVE MG/DL
LEUKOCYTE ESTERASE UR QL STRIP: ABNORMAL
MCH RBC QN AUTO: 41 PG (ref 26.5–33)
MCHC RBC AUTO-ENTMCNC: 34.1 G/DL (ref 31.5–36.5)
MCV RBC AUTO: 120 FL (ref 78–100)
MICROALBUMIN UR-MCNC: 42.5 MG/L
MICROALBUMIN/CREAT UR: 44.6 MG/G CR (ref 0–17)
NITRATE UR QL: NEGATIVE
PH UR STRIP: 6 [PH] (ref 5–8)
PHOSPHATE SERPL-MCNC: 3.4 MG/DL (ref 2.5–4.5)
PLAT MORPH BLD: ABNORMAL
PLATELET # BLD AUTO: 346 10E3/UL (ref 150–450)
POTASSIUM SERPL-SCNC: 5.9 MMOL/L (ref 3.4–5.3)
PROT/CREAT 24H UR: 0.2 MG/MG CR (ref 0–0.2)
PTH-INTACT SERPL-MCNC: 31 PG/ML (ref 15–65)
RBC # BLD AUTO: 3.05 10E6/UL (ref 4.4–5.9)
RBC #/AREA URNS AUTO: ABNORMAL /HPF
RBC MORPH BLD: ABNORMAL
SODIUM SERPL-SCNC: 139 MMOL/L (ref 135–145)
SP GR UR STRIP: 1.01 (ref 1–1.03)
SQUAMOUS #/AREA URNS AUTO: ABNORMAL /LPF
STOMATOCYTES BLD QL SMEAR: SLIGHT
UROBILINOGEN UR STRIP-ACNC: 0.2 E.U./DL
VIT D+METAB SERPL-MCNC: 22 NG/ML (ref 20–50)
WBC # BLD AUTO: 6.7 10E3/UL (ref 4–11)
WBC #/AREA URNS AUTO: ABNORMAL /HPF

## 2025-05-05 PROCEDURE — 80069 RENAL FUNCTION PANEL: CPT

## 2025-05-05 PROCEDURE — 82043 UR ALBUMIN QUANTITATIVE: CPT

## 2025-05-05 PROCEDURE — 81001 URINALYSIS AUTO W/SCOPE: CPT

## 2025-05-05 PROCEDURE — 82728 ASSAY OF FERRITIN: CPT

## 2025-05-05 PROCEDURE — 83970 ASSAY OF PARATHORMONE: CPT

## 2025-05-05 PROCEDURE — 82306 VITAMIN D 25 HYDROXY: CPT

## 2025-05-05 PROCEDURE — 82570 ASSAY OF URINE CREATININE: CPT

## 2025-05-05 PROCEDURE — 85027 COMPLETE CBC AUTOMATED: CPT

## 2025-05-05 PROCEDURE — 36415 COLL VENOUS BLD VENIPUNCTURE: CPT

## 2025-05-05 PROCEDURE — 84156 ASSAY OF PROTEIN URINE: CPT

## 2025-05-05 NOTE — PATIENT INSTRUCTIONS
Monitor blood pressure twice a day for 2 weeks   Do ultrasound of kidneys     Repeat labs in 2-4 weeks to check kidney function    Return June 30 at noon

## 2025-05-05 NOTE — PROGRESS NOTES
Assessment and Plan:  79 year old male with history of peripheral arterial disease, CAD, HFrEF with EF improved from 35% to 40-45%, hypertension, DVT/PE on anticoagulation, cellulitis/ leg wounds,myeloproliferative disease on hydroxyurea who presents for evaluation.  His creatinine is up to 2.7 on recent check at time of CT scan (which was cancelled). His baseline Scr was 1-1.1 mg/dL  # MING: his Scr has increased from 1.1 in December 2024 to 2.7 in April 2025 noted at time of CT scan. He was started on SGLT 2 inhibitor in December for cardiac benefit and this is a steep increase in his creatinine. He had MING in 7/2024 in setting of infection with Scr up to 1.6 but had returned to 1.1 in October 2024   - will check urine studies, duplex US kidneys and consider holding jardiance.  Typically GFR can decrease up to 30% but his creatinine more than doubled thus this is exaggerated and suggests volume depletion, hypotension, renal artery stenosis possibly underlying.  He has no systemic symptoms to suggest autoimmune disease, so will hold off on serology, but consider immunofixation and further studies pending labs today    - will have him drinks 50 ounces a day, and repeat labs and do US doppler    - also had small kidney stones noted on CT in 7/2024 thus need imaging for that.    # Hypertension: his blood pressure today is 110/60 which is lower than his home readings of 130-140   - he will check it at home twice a day for then next couple of weeks    # Anemia in chronic renal disease:   - Hgb: was down in setting of hospitalization/ infection, repeat hgb and immunofixation  - Iron studies: Not checked recently    # Electrolytes:   - Potassium; level: Normal  - Bicarbonate; level: Normal    # Mineral Bone Disorder:   - Calcium; level is:  Normal  - Phosphorus; level is: Normal      Assessment and plan was discussed with patient and he voiced his understanding and agreement.    Consult:  Shaji Pompa was seen in  consultation at the request of Dr. Walker for MING.    Reason for Visit:  Shaji Pompa is a 79 year old male with MING, who presents for evaluation.    HPI:  79 year old male with history of peripheral arterial disease, CAD, HFrEF with EF improved from 35% to 40-45%, hypertension, DVT/PE on anticoagulation, cellulitis/ leg wounds, who presents for evaluation.  His creatinine is up to 2.7 on recent check at time of CT scan (which was cancelled). His baseline Scr was 1-1.1 mg/dL  In December 2024, he was started on jardiance given his heart failure.  His creatinine was 1.1 in October after recovering from MING with Scr 1.6 in July 2024.  His next creatinine in April at time of CT for his carotid stenosis showed Scr up to 2.7  He admits that he does not hydrate much, but denies NSAIDs.  He denies family history of kidney diease, urinary symptoms, or trouble with urination.  He does have CT scan last year that showed some small kidney stones possibly.  He is accompanied by his wife. They eat fairly well but do eat junk food more in [place] where they winter.  He has chronic LE swelling, on the right from a fall from the roof and on the left with history of DVT and cellulitis.    Renal History:   Elevated creatinine/ MING  HTN  PAD    ROS:   A comprehensive review of systems was obtained and negative, except as noted in the HPI or PMH.    Active Medical Problems:  Patient Active Problem List   Diagnosis    Cellulitis    Abnormal gait    Chronic obstructive pulmonary disease (H)    Essential hypertension    History of stroke without residual deficits    Mixed hyperlipidemia    Chronic myeloproliferative disease (H)    Peripheral venous insufficiency    Elevated brain natriuretic peptide (BNP) level    Elevated d-dimer    Macrocytic anemia    Elevated troponin    Pneumonia of both lungs due to infectious organism, unspecified part of lung    Multiple subsegmental pulmonary emboli without acute cor pulmonale (H)     Acute deep vein thrombosis (DVT) of popliteal vein of right lower extremity (H)    Thrombocytopenia    History of pulmonary embolism    Status post coronary angiogram    Coronary artery disease involving native coronary artery of native heart without angina pectoris    Ischemic cardiomyopathy    Other specified anemias    NSTEMI (non-ST elevated myocardial infarction) (H)    History of myelodysplastic syndrome    Anemia, unspecified type    Ulcer of heel, left, with necrosis of muscle (H)    Lactic acidosis    Pneumonia of left lower lobe due to infectious organism    Sepsis, due to unspecified organism, unspecified whether acute organ dysfunction present (H)    Chronic systolic heart failure (H)     PMH:   Medical record was reviewed and PMH was discussed with patient and noted below.  Past Medical History:   Diagnosis Date    Cerebral infarction (H)     COPD (chronic obstructive pulmonary disease) (H)     HLD (hyperlipidemia)     Hypertension     Myeloproliferative disease (H)     Pleural effusion on left 04/19/2022     PSH:   Past Surgical History:   Procedure Laterality Date    CV CORONARY ANGIOGRAM N/A 4/26/2022    Procedure: CV CORONARY ANGIOGRAM;  Surgeon: Audrey Holder MD;  Location: [facility] CATH LAB CV    CV LEFT HEART CATH N/A 4/26/2022    Procedure: Left Heart Catheterization;  Surgeon: Audrey Holder MD;  Location: [facility] CATH LAB CV    IR LOWER EXTREMITY ANGIOGRAM LEFT  6/29/2023    OPEN REDUCTION INTERNAL FIXATION FOOT Right 2010    OTHER SURGICAL HISTORY  2001    Lip lesion removal    PICC TRIPLE LUMEN PLACEMENT  8/17/2022         TONSILLECTOMY & ADENOIDECTOMY         Family Hx:   Family History   Problem Relation Age of Onset    Alcoholism Mother      Personal Hx:   Social History     Tobacco Use    Smoking status: Former     Current packs/day: 0.00     Average packs/day: 1 pack/day for 46.6 years (46.6 ttl pk-yrs)     Types: Cigarettes     Start date: 6/8/1965     Quit date: 1/1/2012      "Years since quittin.3    Smokeless tobacco: Never   Substance Use Topics    Alcohol use: Yes     Alcohol/week: 19.0 standard drinks of alcohol       Allergies:  No Known Allergies    Medications:  Current Outpatient Medications   Medication Sig Dispense Refill    albuterol (PROAIR HFA;PROVENTIL HFA;VENTOLIN HFA) 90 mcg/actuation inhaler Inhale 2 puffs into the lungs every 6 hours as needed for shortness of breath / dyspnea      aspirin (ASPIRIN LOW DOSE) 81 MG EC tablet TAKE 1 TABLET (81 MG) BY MOUTH DAILY. 90 tablet 1    atorvastatin (LIPITOR) 80 MG tablet Take 1 tablet (80 mg) by mouth every morning 90 tablet 1    empagliflozin (JARDIANCE) 10 MG TABS tablet Take 1 tablet (10 mg) by mouth daily. 90 tablet 3    furosemide (LASIX) 20 MG tablet Take 0.5 tablets (10 mg) by mouth daily 45 tablet 3    hydroxyurea (HYDREA) 500 MG capsule Take 1,000 mg by mouth daily      losartan (COZAAR) 25 MG tablet Take 1 tablet (25 mg) by mouth daily 90 tablet 3    metoprolol succinate ER (TOPROL-XL) 25 MG 24 hr tablet Take 25 mg by mouth daily      nitroGLYcerin (NITROSTAT) 0.4 MG sublingual tablet One tablet under the tongue every 5 minutes if needed for chest pain. May repeat every 5 minutes for a maximum of 3 doses in 15 minutes\" 25 tablet 3    omeprazole (PRILOSEC) 20 MG DR capsule Take 20 mg by mouth daily      tiotropium (SPIRIVA) 18 mcg inhalation capsule [TIOTROPIUM (SPIRIVA) 18 MCG INHALATION CAPSULE] Place 18 mcg into inhaler and inhale daily.      warfarin ANTICOAGULANT (COUMADIN) 1 MG tablet Take 3 tablets (3 mg) by mouth daily       No current facility-administered medications for this visit.      Vitals:  /60   Pulse 90   Wt 93 kg (205 lb)   SpO2 96%   BMI 34.11 kg/m      Exam:  GENERAL APPEARANCE: alert and no distress  HENT: mouth without ulcers or lesions  RESP: lungs clear to auscultation - no rales, rhonchi or wheezes  CV: regular rhythm, normal rate, no rub, no murmur  EDEMA: 1+ LE edema " bilaterally  ABDOMEN: soft, nondistended, nontender, bowel sounds normal  MS: extremities normal - no gross deformities noted, no evidence of inflammation in joints, no muscle tenderness  SKIN: no rash    LABS:   CMP  Recent Labs   Lab Test 04/30/25  1254 10/28/24  1147 07/29/24  0609 07/28/24  0415 07/27/24  1723 01/29/24  1517 04/01/22  1208 06/14/21  1810 05/03/21  1557 11/07/19  1042 10/31/19  2000   NA  --  139  --  139 137 141   < > 138 139 140 141   POTASSIUM  --  4.6 3.6 3.6 4.1 4.1   < > 4.0 4.2 5.2* 4.2   CHLORIDE  --  104  --  107 102 107   < > 105 104 105 107   CO2  --  24  --  26 24 24   < > 25 27 28 27   ANIONGAP  --  11  --  6* 11 10   < > 8 8 7 7   GLC  --  83  --  112* 107* 89   < > 118 112 88 97   BUN  --  26.4*  --  33.7* 30.7* 26.5*   < > 24 18 30* 23   CR 2.7* 1.10 1.16 1.52* 1.60* 1.04   < > 1.08 0.84 1.14 0.97   GFRESTIMATED 23* 68 64 46* 44* 73   < > >60 >60 >60 >60   GFRESTBLACK  --   --   --   --   --   --   --  >60 >60 >60 >60   GLENDY  --  9.1  --  7.9* 8.8 9.0   < > 8.7 8.9 10.1 9.7    < > = values in this interval not displayed.     Recent Labs   Lab Test 07/28/24  0415 07/27/24  1723 08/17/22  2104 04/20/22  0405   BILITOTAL 1.0 1.2 0.5 1.3*   ALKPHOS 67 70 52 78   ALT 21 24 21 22   AST 26 33 38 17     CBC  Recent Labs   Lab Test 08/05/24  1028 07/28/24  0415 07/27/24  2348 07/27/24  1723   HGB 10.2* 10.7* 11.2* 13.0*   WBC 9.6 9.4 12.7* 14.7*   RBC 2.35* 2.46* 2.60* 3.01*   HCT 30.9* 31.7* 34.1* 38.1*   * 129* 131* 127*   MCH 43.4* 43.5* 43.1* 43.2*   MCHC 33.0 33.8 32.8 34.1   RDW 14.1 14.1 14.2 14.0    232 289 365     URINE STUDIES  Recent Labs   Lab Test 05/05/25  1024 07/27/24 1958 06/14/21 1927   COLOR Yellow Yellow Yellow   APPEARANCE Clear Clear Clear   URINEGLC 100* Negative Negative   URINEBILI Negative Negative Negative   URINEKETONE Negative Trace* Negative   SG 1.010 1.029 1.022   UBLD Small* Negative Negative   URINEPH 6.0 5.5 6.0   PROTEIN Trace* 30* 20  mg/dL   UROBILINOGEN 0.2  --  Normal   NITRITE Negative Negative Negative   LEUKEST Large* Negative Negative   RBCU 0-2 2 0-2  1   WBCU 5-10* 4 0-5  1     No lab results found.  PTH  No lab results found.  IRON STUDIES  Recent Labs   Lab Test 04/30/22  1502   IRON 31*   BIMAL 1,747*         Nena Josh Khalil MD

## 2025-05-07 LAB
LOCATION OF TASK: NORMAL
PROT ELPH PNL UR ELPH: NORMAL

## 2025-05-08 ENCOUNTER — RESULTS FOLLOW-UP (OUTPATIENT)
Dept: NEPHROLOGY | Facility: CLINIC | Age: 80
End: 2025-05-08

## 2025-05-10 ENCOUNTER — HOSPITAL ENCOUNTER (OUTPATIENT)
Dept: MRI IMAGING | Facility: CLINIC | Age: 80
Discharge: HOME | End: 2025-05-10
Attending: RADIOLOGY | Admitting: RADIOLOGY
Payer: COMMERCIAL

## 2025-05-10 DIAGNOSIS — I73.9 PAD (PERIPHERAL ARTERY DISEASE): ICD-10-CM

## 2025-05-10 DIAGNOSIS — I65.23 CAROTID STENOSIS, ASYMPTOMATIC, BILATERAL: ICD-10-CM

## 2025-05-10 PROCEDURE — 70549 MR ANGIOGRAPH NECK W/O&W/DYE: CPT

## 2025-05-10 PROCEDURE — 255N000002 HC RX 255 OP 636: Performed by: RADIOLOGY

## 2025-05-10 PROCEDURE — A9585 GADOBUTROL INJECTION: HCPCS | Performed by: RADIOLOGY

## 2025-05-10 RX ORDER — GADOBUTROL 604.72 MG/ML
10 INJECTION INTRAVENOUS ONCE
Status: COMPLETED | OUTPATIENT
Start: 2025-05-10 | End: 2025-05-10

## 2025-05-10 RX ADMIN — GADOBUTROL 10 ML: 604.72 INJECTION INTRAVENOUS at 08:37

## 2025-05-12 ENCOUNTER — TELEPHONE (OUTPATIENT)
Dept: NEPHROLOGY | Facility: CLINIC | Age: 80
End: 2025-05-12
Payer: COMMERCIAL

## 2025-05-12 NOTE — CONFIDENTIAL NOTE
Spoke with patient's spouse Nidhi to inform her that per her request renal labs have been faxed to [facility] fax # [phone].   ADAM Diaz Care Coordinator  Nephrology

## 2025-05-12 NOTE — TELEPHONE ENCOUNTER
[facility] Call Center    Phone Message    May a detailed message be left on voicemail: yes     Reason for Call: Other: Patient's wife called regarding kidney panel testing. Patient's wife states they would like lab orders sent to [facility] in [place] to be performed on Friday, 5/16, to coincide with other lab tests that will be performed at that location that same day. Please send orders to [facility] in [facility], at phone number [phone], and call back patient's wife to verify.       Action Taken: Message routed to:  Other: [facility]    Travel Screening: Not Applicable

## 2025-05-13 ENCOUNTER — HOSPITAL ENCOUNTER (OUTPATIENT)
Dept: ULTRASOUND IMAGING | Facility: CLINIC | Age: 80
Discharge: HOME | End: 2025-05-13
Attending: INTERNAL MEDICINE
Payer: COMMERCIAL

## 2025-05-13 DIAGNOSIS — I70.1 RENAL ARTERY ARTERIOSCLEROSIS: ICD-10-CM

## 2025-05-13 DIAGNOSIS — I65.23 CAROTID STENOSIS, ASYMPTOMATIC, BILATERAL: ICD-10-CM

## 2025-05-13 DIAGNOSIS — N17.9 AKI (ACUTE KIDNEY INJURY): ICD-10-CM

## 2025-05-13 DIAGNOSIS — I73.9 PAD (PERIPHERAL ARTERY DISEASE): ICD-10-CM

## 2025-05-13 DIAGNOSIS — D63.1 ANEMIA OF CHRONIC RENAL FAILURE, STAGE 4 (SEVERE) (H): ICD-10-CM

## 2025-05-13 DIAGNOSIS — N18.4 ANEMIA OF CHRONIC RENAL FAILURE, STAGE 4 (SEVERE) (H): ICD-10-CM

## 2025-05-13 PROCEDURE — 76770 US EXAM ABDO BACK WALL COMP: CPT

## 2025-05-15 ENCOUNTER — LAB REQUISITION (OUTPATIENT)
Dept: LAB | Facility: CLINIC | Age: 80
End: 2025-05-15

## 2025-05-15 DIAGNOSIS — N18.2 CHRONIC KIDNEY DISEASE, STAGE 2 (MILD): ICD-10-CM

## 2025-05-15 DIAGNOSIS — I48.0 PAROXYSMAL ATRIAL FIBRILLATION (H): ICD-10-CM

## 2025-05-15 LAB
INR PPP: 1.75 (ref 0.85–1.15)
PROTHROMBIN TIME: 20.1 SECONDS (ref 11.8–14.8)

## 2025-05-15 PROCEDURE — 85610 PROTHROMBIN TIME: CPT | Performed by: FAMILY MEDICINE

## 2025-05-20 ENCOUNTER — RESULTS FOLLOW-UP (OUTPATIENT)
Dept: NEPHROLOGY | Facility: CLINIC | Age: 80
End: 2025-05-20

## 2025-05-27 ENCOUNTER — LAB REQUISITION (OUTPATIENT)
Dept: LAB | Facility: CLINIC | Age: 80
End: 2025-05-27

## 2025-05-27 DIAGNOSIS — N18.2 CHRONIC KIDNEY DISEASE, STAGE 2 (MILD): ICD-10-CM

## 2025-05-27 PROCEDURE — 80048 BASIC METABOLIC PNL TOTAL CA: CPT | Performed by: FAMILY MEDICINE

## 2025-05-28 LAB
ANION GAP SERPL CALCULATED.3IONS-SCNC: 13 MMOL/L (ref 7–15)
BUN SERPL-MCNC: 20.2 MG/DL (ref 8–23)
CALCIUM SERPL-MCNC: 9.1 MG/DL (ref 8.8–10.4)
CHLORIDE SERPL-SCNC: 105 MMOL/L (ref 98–107)
CREAT SERPL-MCNC: 1.17 MG/DL (ref 0.67–1.17)
EGFRCR SERPLBLD CKD-EPI 2021: 63 ML/MIN/1.73M2
GLUCOSE SERPL-MCNC: 106 MG/DL (ref 70–99)
HCO3 SERPL-SCNC: 21 MMOL/L (ref 22–29)
POTASSIUM SERPL-SCNC: 4.3 MMOL/L (ref 3.4–5.3)
SODIUM SERPL-SCNC: 139 MMOL/L (ref 135–145)

## 2025-06-10 NOTE — PROGRESS NOTES
HEART CARE ENCOUNTER NOTE      [facility]  [phone]    Primary Care: Nena Khalil  Primary Cardiologist: Dr. Larson    Assessment/Recommendations   Assessment:  Chronic systolic heart failure, ischemic cardiomyopathy  LVEF 40 to 45%  Diuretic: Lasix 10 mg  GDMT: Jardiance 10 mg, metoprolol succinate 25 mg losartan 25 mg.  Will hold off on MRA given recent MING and hyperkalemia of 5.9  On exam appears euvolemic and compensated.  Perhaps mild increase in lower extreme edema, offered temporary increase in Lasix and updated BNP but patient declined and would like to continue current dosage.  Given he is asymptomatic I feel this is reasonable.    CAD  Known  of mid RCA with collaterals.  Diffuse severe coronary calcification  Denies anginal symptoms  On aspirin and atorvastatin with excellent control of lipids  Paroxysmal A-fib  On warfarin  Asymptomatic    Other pertinent medical hx reviewed:  MING with hyperkalemia-following with nephrology, improving  History of bilateral PE  History of DVT  Chronic anemia, secondary to Myeloproliferative disorder    Plan:  Continue current heart failure regimen.  Could consider addition of MRA in the future if issues with volume retention versus increasing Lasix dosage  Continue aspirin and atorvastatin for known CAD  Continue warfarin for A-fib and history of PE - could consider switch to DOAC if patient would like to in the future, currently wants to continue with warfarin    Follow up with Dr. Larson in December or January prior to going to [place] for the winter.    The longitudinal plan of care for the diagnosis(es)/condition(s) as documented were addressed during this visit. Due to the added complexity in care, I will continue to support Hector in the subsequent management and with ongoing continuity of care.      History of Present Illness/Subjective     Shaji Pompa is a 80 year old male with PMHx of chronic systolic heart failure,  ischemic cardiomyopathy, CAD, paroxysmal A-fib, bilateral PE and DVT history, chronic anemia secondary to myeloproliferative disorder presenting for follow-up.      He was last seen by Dr. Larson on 12/5/2024.  At that time, patient was following up from hospitalization for cellulitis recovering well.  He was noted to have an elevated BNP during that admission and was having mild BELL at the time of his visit with mild to moderate lower extremity edema.  It was felt his GDMT needs to be optimized.  He was recommended starting SGLT2 inhibitor Jardiance 10 mg.  He was also recommended to continue warfarin for his DVT and INR goal of 2-3 with consideration for switching to Eliquis or Xarelto.    He had a recent MING in beginning of May associated with hyperkalemia to 5.9.  Following with nephrology and recent lab work shows improvement back to baseline.  He remains asymptomatic from a cardiac standpoint and has no complaints today.  His wife feels like his leg swelling is a little bit increased from December but the patient feels it is about the same.  He denies any shortness of breath, orthopnea, weight gain.    Cardiac Testing       Echo:    TTE, 7/28/2024  The left ventricle is normal in size with normal left ventricular wall  thickness. Left ventricular function is decreased. The ejection fraction is  40-45% (mildly reduced).  Normal right ventricle size and systolic function.  No hemodynamically significant valvular abnormalities on 2D or color flow  imaging.  Compared to the prior study of 8/17/22 there has been an improvement in left  ventricular function (previously LVEF was 30-35%)      Cardiac Cath 4/26/2022:  Acute systolic heart failure in the setting of a loculated pleural effusion, pulmonary emboli and COPD. Coronary calcification seen on CT scan. He is also anemic and thrombocytopenic due to myloproliferative syndrome.  Diffuse, severe coronary calcification.  Mild left coronary system stenoses.  The RCA  "is chronically occluded in its mid-distal segments. The distal RCA branches fill by left sided collaterals.  LV EDP low/normal. AV gradient 5 mmHg by pullback.    Labs:  LDL 30  Creatinine 1.17  Potassium 4.3  Hemoglobin 12.5  Platelet 346        Physical Examination    Vitals: /71 (BP Location: Right arm, Patient Position: Sitting, Cuff Size: Adult Large)   Pulse 74   Resp 16   Ht 1.676 m (5' 6\")   Wt 93 kg (205 lb)   SpO2 94%   BMI 33.09 kg/m      General: Well appearing, in no acute distress. Resting comfortably in exam chair  Skin: No clubbing, no cyanosis.  Eyes: Extra ocular movements intact  Neck: No jugular venous distention, no carotid bruits, carotids have a normal upstroke  Lungs: Clear to auscultation bilaterally, no wheezing or rhonchi.  Heart: Regular rhythm, PMI not displaced, S1, S2 normal, no S3, no S4, no heaves, no rub and no murmur.  Abdomen: Soft, nontender  Extremities: 1-2+ BLE pitting edema. Grade 2/4 distal pulses bilaterally.  Neuro: Oriented to person, place and time, alert, cooperative, gait coordinated.    BP Readings from Last 3 Encounters:   06/12/25 137/71   05/05/25 108/60   04/22/25 139/81       Pulse Readings from Last 3 Encounters:   06/12/25 74   05/05/25 90   04/22/25 74       Wt Readings from Last 3 Encounters:   06/12/25 93 kg (205 lb)   05/05/25 93 kg (205 lb)   12/05/24 94 kg (207 lb 4.8 oz)         Review of Systems      Please refer above for cardiac ROS details.       History     MEDICAL HISTORY:  Past Medical History:   Diagnosis Date    Cerebral infarction (H)     COPD (chronic obstructive pulmonary disease) (H)     HLD (hyperlipidemia)     Hypertension     Myeloproliferative disease (H)     Pleural effusion on left 04/19/2022     SURGICAL HISTORY  Past Surgical History:   Procedure Laterality Date    CV CORONARY ANGIOGRAM N/A 4/26/2022    Procedure: CV CORONARY ANGIOGRAM;  Surgeon: Audrey Holder MD;  Location: [facility] CATH LAB CV    CV LEFT HEART CATH " N/A 2022    Procedure: Left Heart Catheterization;  Surgeon: Audrey Holder MD;  Location: [facility] CATH LAB CV    IR LOWER EXTREMITY ANGIOGRAM LEFT  2023    OPEN REDUCTION INTERNAL FIXATION FOOT Right     OTHER SURGICAL HISTORY      Lip lesion removal    PICC TRIPLE LUMEN PLACEMENT  2022         TONSILLECTOMY & ADENOIDECTOMY        FAMILY HISTORY:  Family History   Problem Relation Age of Onset    Alcoholism Mother     FAMILY HISTORY:  Family History   Problem Relation Age of Onset    Alcoholism Mother       SOCIAL HISTORY:  Social History     Socioeconomic History    Marital status:      Spouse name: Not on file    Number of children: Not on file    Years of education: Not on file    Highest education level: Not on file   Occupational History    Not on file   Tobacco Use    Smoking status: Former     Current packs/day: 0.00     Average packs/day: 1 pack/day for 46.6 years (46.6 ttl pk-yrs)     Types: Cigarettes     Start date: 1965     Quit date: 2012     Years since quittin.4    Smokeless tobacco: Never   Vaping Use    Vaping status: Never Used   Substance and Sexual Activity    Alcohol use: Yes     Alcohol/week: 19.0 standard drinks of alcohol    Drug use: Never    Sexual activity: Not on file   Other Topics Concern    Not on file   Social History Narrative    Not on file     Social Drivers of Health     Financial Resource Strain: Not on file   Food Insecurity: Not on file   Transportation Needs: Not on file   Physical Activity: Not on file   Stress: Not on file   Social Connections: Not on file   Interpersonal Safety: Not on file   Housing Stability: Not on file    ALLERGIES:  No Known Allergies         Medications:     Current Outpatient Medications   Medication Sig Dispense Refill    albuterol (PROAIR HFA;PROVENTIL HFA;VENTOLIN HFA) 90 mcg/actuation inhaler Inhale 2 puffs into the lungs every 6 hours as needed for shortness of breath / dyspnea      aspirin (ASPIRIN  "LOW DOSE) 81 MG EC tablet TAKE 1 TABLET (81 MG) BY MOUTH DAILY. 90 tablet 1    atorvastatin (LIPITOR) 80 MG tablet Take 1 tablet (80 mg) by mouth every morning 90 tablet 1    empagliflozin (JARDIANCE) 10 MG TABS tablet Take 1 tablet (10 mg) by mouth daily. 90 tablet 3    furosemide (LASIX) 20 MG tablet Take 0.5 tablets (10 mg) by mouth daily 45 tablet 3    hydroxyurea (HYDREA) 500 MG capsule Take 1,000 mg by mouth daily      losartan (COZAAR) 25 MG tablet Take 1 tablet (25 mg) by mouth daily 90 tablet 3    metoprolol succinate ER (TOPROL-XL) 25 MG 24 hr tablet Take 25 mg by mouth daily      nitroGLYcerin (NITROSTAT) 0.4 MG sublingual tablet One tablet under the tongue every 5 minutes if needed for chest pain. May repeat every 5 minutes for a maximum of 3 doses in 15 minutes\" 25 tablet 3    omeprazole (PRILOSEC) 20 MG DR capsule Take 20 mg by mouth daily      tiotropium (SPIRIVA) 18 mcg inhalation capsule [TIOTROPIUM (SPIRIVA) 18 MCG INHALATION CAPSULE] Place 18 mcg into inhaler and inhale daily.      warfarin ANTICOAGULANT (COUMADIN) 1 MG tablet Take 3 tablets (3 mg) by mouth daily             Cody Herndon PA-C  June 12, 2025    This note was partially generated using Dragon voice recognition system, and there may be some incorrect words,  spellings, and punctuation that were not noted in checking the note before saving.  "

## 2025-06-11 DIAGNOSIS — I25.119 CORONARY ARTERY DISEASE INVOLVING NATIVE CORONARY ARTERY OF NATIVE HEART WITH ANGINA PECTORIS: ICD-10-CM

## 2025-06-11 DIAGNOSIS — E78.2 MIXED HYPERLIPIDEMIA: ICD-10-CM

## 2025-06-12 ENCOUNTER — OFFICE VISIT (OUTPATIENT)
Dept: CARDIOLOGY | Facility: CLINIC | Age: 80
End: 2025-06-12
Payer: COMMERCIAL

## 2025-06-12 VITALS
OXYGEN SATURATION: 94 % | HEART RATE: 74 BPM | SYSTOLIC BLOOD PRESSURE: 137 MMHG | DIASTOLIC BLOOD PRESSURE: 71 MMHG | BODY MASS INDEX: 32.95 KG/M2 | WEIGHT: 205 LBS | HEIGHT: 66 IN | RESPIRATION RATE: 16 BRPM

## 2025-06-12 DIAGNOSIS — I50.22 CHRONIC SYSTOLIC HEART FAILURE (H): ICD-10-CM

## 2025-06-12 DIAGNOSIS — D47.1 CHRONIC MYELOPROLIFERATIVE DISEASE (H): Primary | ICD-10-CM

## 2025-06-12 DIAGNOSIS — R79.89 ELEVATED SERUM CREATININE: ICD-10-CM

## 2025-06-12 DIAGNOSIS — I25.10 CORONARY ARTERY DISEASE INVOLVING NATIVE CORONARY ARTERY OF NATIVE HEART WITHOUT ANGINA PECTORIS: ICD-10-CM

## 2025-06-12 DIAGNOSIS — I48.0 PAROXYSMAL ATRIAL FIBRILLATION (H): ICD-10-CM

## 2025-06-12 DIAGNOSIS — N18.2 CKD (CHRONIC KIDNEY DISEASE) STAGE 2, GFR 60-89 ML/MIN: ICD-10-CM

## 2025-06-12 RX ORDER — ATORVASTATIN CALCIUM 80 MG/1
80 TABLET, FILM COATED ORAL EVERY MORNING
Qty: 90 TABLET | Refills: 2 | Status: SHIPPED | OUTPATIENT
Start: 2025-06-12

## 2025-06-12 NOTE — LETTER
6/12/2025    Nena Khalil MD  [address] [address]  [place] [address]    RE: Shaji Pompa       Dear Colleague,     I had the [facility].  HEART CARE ENCOUNTER NOTE      [facility]  [phone]    Primary Care: Nena Khalil  Primary Cardiologist: Dr. Larson    Assessment/Recommendations   Assessment:  Chronic systolic heart failure, ischemic cardiomyopathy  LVEF 40 to 45%  Diuretic: Lasix 10 mg  GDMT: Jardiance 10 mg, metoprolol succinate 25 mg losartan 25 mg.  Will hold off on MRA given recent MING and hyperkalemia of 5.9  On exam appears euvolemic and compensated.  Perhaps mild increase in lower extreme edema, offered temporary increase in Lasix and updated BNP but patient declined and would like to continue current dosage.  Given he is asymptomatic I feel this is reasonable.    CAD  Known  of mid RCA with collaterals.  Diffuse severe coronary calcification  Denies anginal symptoms  On aspirin and atorvastatin with excellent control of lipids  Paroxysmal A-fib  On warfarin  Asymptomatic    Other pertinent medical hx reviewed:  MING with hyperkalemia-following with nephrology, improving  History of bilateral PE  History of DVT  Chronic anemia, secondary to Myeloproliferative disorder    Plan:  Continue current heart failure regimen.  Could consider addition of MRA in the future if issues with volume retention versus increasing Lasix dosage  Continue aspirin and atorvastatin for known CAD  Continue warfarin for A-fib and history of PE - could consider switch to DOAC if patient would like to in the future, currently wants to continue with warfarin    Follow up with Dr. Larson in December or January prior to going to [place] for the winter.    The longitudinal plan of care for the diagnosis(es)/condition(s) as documented were addressed during this visit. Due to the added complexity in care, I  will continue to support Hector in the subsequent management and with ongoing continuity of care.      History of Present Illness/Subjective     Shaji Pompa is a 80 year old male with PMHx of chronic systolic heart failure, ischemic cardiomyopathy, CAD, paroxysmal A-fib, bilateral PE and DVT history, chronic anemia secondary to myeloproliferative disorder presenting for follow-up.      He was last seen by Dr. Larson on 12/5/2024.  At that time, patient was following up from hospitalization for cellulitis recovering well.  He was noted to have an elevated BNP during that admission and was having mild BELL at the time of his visit with mild to moderate lower extremity edema.  It was felt his GDMT needs to be optimized.  He was recommended starting SGLT2 inhibitor Jardiance 10 mg.  He was also recommended to continue warfarin for his DVT and INR goal of 2-3 with consideration for switching to Eliquis or Xarelto.    He had a recent MING in beginning of May associated with hyperkalemia to 5.9.  Following with nephrology and recent lab work shows improvement back to baseline.  He remains asymptomatic from a cardiac standpoint and has no complaints today.  His wife feels like his leg swelling is a little bit increased from December but the patient feels it is about the same.  He denies any shortness of breath, orthopnea, weight gain.    Cardiac Testing       Echo:    TTE, 7/28/2024  The left ventricle is normal in size with normal left ventricular wall  thickness. Left ventricular function is decreased. The ejection fraction is  40-45% (mildly reduced).  Normal right ventricle size and systolic function.  No hemodynamically significant valvular abnormalities on 2D or color flow  imaging.  Compared to the prior study of 8/17/22 there has been an improvement in left  ventricular function (previously LVEF was 30-35%)      Cardiac Cath 4/26/2022:  Acute systolic heart failure in the setting of a loculated pleural  "effusion, pulmonary emboli and COPD. Coronary calcification seen on CT scan. He is also anemic and thrombocytopenic due to myloproliferative syndrome.  Diffuse, severe coronary calcification.  Mild left coronary system stenoses.  The RCA is chronically occluded in its mid-distal segments. The distal RCA branches fill by left sided collaterals.  LV EDP low/normal. AV gradient 5 mmHg by pullback.    Labs:  LDL 30  Creatinine 1.17  Potassium 4.3  Hemoglobin 12.5  Platelet 346        Physical Examination    Vitals: /71 (BP Location: Right arm, Patient Position: Sitting, Cuff Size: Adult Large)   Pulse 74   Resp 16   Ht 1.676 m (5' 6\")   Wt 93 kg (205 lb)   SpO2 94%   BMI 33.09 kg/m      General: Well appearing, in no acute distress. Resting comfortably in exam chair  Skin: No clubbing, no cyanosis.  Eyes: Extra ocular movements intact  Neck: No jugular venous distention, no carotid bruits, carotids have a normal upstroke  Lungs: Clear to auscultation bilaterally, no wheezing or rhonchi.  Heart: Regular rhythm, PMI not displaced, S1, S2 normal, no S3, no S4, no heaves, no rub and no murmur.  Abdomen: Soft, nontender  Extremities: 1-2+ BLE pitting edema. Grade 2/4 distal pulses bilaterally.  Neuro: Oriented to person, place and time, alert, cooperative, gait coordinated.    BP Readings from Last 3 Encounters:   06/12/25 137/71   05/05/25 108/60   04/22/25 139/81       Pulse Readings from Last 3 Encounters:   06/12/25 74   05/05/25 90   04/22/25 74       Wt Readings from Last 3 Encounters:   06/12/25 93 kg (205 lb)   05/05/25 93 kg (205 lb)   12/05/24 94 kg (207 lb 4.8 oz)         Review of Systems      Please refer above for cardiac ROS details.       History     MEDICAL HISTORY:  Past Medical History:   Diagnosis Date     Cerebral infarction (H)      COPD (chronic obstructive pulmonary disease) (H)      HLD (hyperlipidemia)      Hypertension      Myeloproliferative disease (H)      Pleural effusion on left " 2022     SURGICAL HISTORY  Past Surgical History:   Procedure Laterality Date     CV CORONARY ANGIOGRAM N/A 2022    Procedure: CV CORONARY ANGIOGRAM;  Surgeon: Audrey Holder MD;  Location: [facility] CATH LAB CV     CV LEFT HEART CATH N/A 2022    Procedure: Left Heart Catheterization;  Surgeon: uAdrey Holder MD;  Location: [facility] CATH LAB CV     IR LOWER EXTREMITY ANGIOGRAM LEFT  2023     OPEN REDUCTION INTERNAL FIXATION FOOT Right      OTHER SURGICAL HISTORY      Lip lesion removal     PICC TRIPLE LUMEN PLACEMENT  2022          TONSILLECTOMY & ADENOIDECTOMY        FAMILY HISTORY:  Family History   Problem Relation Age of Onset     Alcoholism Mother     FAMILY HISTORY:  Family History   Problem Relation Age of Onset     Alcoholism Mother       SOCIAL HISTORY:  Social History     Socioeconomic History     Marital status:      Spouse name: Not on file     Number of children: Not on file     Years of education: Not on file     Highest education level: Not on file   Occupational History     Not on file   Tobacco Use     Smoking status: Former     Current packs/day: 0.00     Average packs/day: 1 pack/day for 46.6 years (46.6 ttl pk-yrs)     Types: Cigarettes     Start date: 1965     Quit date: 2012     Years since quittin.4     Smokeless tobacco: Never   Vaping Use     Vaping status: Never Used   Substance and Sexual Activity     Alcohol use: Yes     Alcohol/week: 19.0 standard drinks of alcohol     Drug use: Never     Sexual activity: Not on file   Other Topics Concern     Not on file   Social History Narrative     Not on file     Social Drivers of Health     Financial Resource Strain: Not on file   Food Insecurity: Not on file   Transportation Needs: Not on file   Physical Activity: Not on file   Stress: Not on file   Social Connections: Not on file   Interpersonal Safety: Not on file   Housing Stability: Not on file    ALLERGIES:  No Known Allergies  "        Medications:     Current Outpatient Medications   Medication Sig Dispense Refill     albuterol (PROAIR HFA;PROVENTIL HFA;VENTOLIN HFA) 90 mcg/actuation inhaler Inhale 2 puffs into the lungs every 6 hours as needed for shortness of breath / dyspnea       aspirin (ASPIRIN LOW DOSE) 81 MG EC tablet TAKE 1 TABLET (81 MG) BY MOUTH DAILY. 90 tablet 1     atorvastatin (LIPITOR) 80 MG tablet Take 1 tablet (80 mg) by mouth every morning 90 tablet 1     empagliflozin (JARDIANCE) 10 MG TABS tablet Take 1 tablet (10 mg) by mouth daily. 90 tablet 3     furosemide (LASIX) 20 MG tablet Take 0.5 tablets (10 mg) by mouth daily 45 tablet 3     hydroxyurea (HYDREA) 500 MG capsule Take 1,000 mg by mouth daily       losartan (COZAAR) 25 MG tablet Take 1 tablet (25 mg) by mouth daily 90 tablet 3     metoprolol succinate ER (TOPROL-XL) 25 MG 24 hr tablet Take 25 mg by mouth daily       nitroGLYcerin (NITROSTAT) 0.4 MG sublingual tablet One tablet under the tongue every 5 minutes if needed for chest pain. May repeat every 5 minutes for a maximum of 3 doses in 15 minutes\" 25 tablet 3     omeprazole (PRILOSEC) 20 MG DR capsule Take 20 mg by mouth daily       tiotropium (SPIRIVA) 18 mcg inhalation capsule [TIOTROPIUM (SPIRIVA) 18 MCG INHALATION CAPSULE] Place 18 mcg into inhaler and inhale daily.       warfarin ANTICOAGULANT (COUMADIN) 1 MG tablet Take 3 tablets (3 mg) by mouth daily             Cody Herndon PA-C  June 12, 2025    This note was partially generated using Dragon voice recognition system, and there may be some incorrect words,  spellings, and punctuation that were not noted in checking the note before saving.    Thank you for allowing me to participate in the care of your patient.      Sincerely,     Cody Herndon PA-C     [facility]  cc:   Sp Larson, DO  [address] [place] [address]      "

## 2025-06-12 NOTE — PATIENT INSTRUCTIONS
It was great to see you today! Your care team includes myself and Dr. Larson.    Recommendations:  No changes to your medications were made today.   Monitor for signs or symptoms of fluid retention such as increased leg or abdominal swelling, worsening shortness of breath, difficulty breathing or rapid weight gain (>3 pounds in 1 day or >5 pounds in 1 week)   Notify the office if you are having shortness of breath or chest pain.    Continue a heart healthy and low sodium diet (plant based or mediterranean diet).  Work on increasing physical activity with an end goal of 150 minutes per week of mild to moderate intensity exercise (brisk walk, biking, swimming)     Follow up with Dr. Larson in December or January.      If you have questions, concerns, or new concerning symptoms prior to your next appointment, please contact the Cardiology Nursing team at [phone].    For scheduling issues/changes, please call [phone].

## 2025-06-16 DIAGNOSIS — I50.22 CHRONIC SYSTOLIC CONGESTIVE HEART FAILURE, NYHA CLASS 2 (H): ICD-10-CM

## 2025-06-16 RX ORDER — LOSARTAN POTASSIUM 25 MG/1
25 TABLET ORAL DAILY
Qty: 90 TABLET | Refills: 1 | Status: SHIPPED | OUTPATIENT
Start: 2025-06-16

## 2025-06-30 ENCOUNTER — OFFICE VISIT (OUTPATIENT)
Dept: NEPHROLOGY | Facility: CLINIC | Age: 80
End: 2025-06-30
Payer: COMMERCIAL

## 2025-06-30 VITALS
HEART RATE: 80 BPM | DIASTOLIC BLOOD PRESSURE: 68 MMHG | SYSTOLIC BLOOD PRESSURE: 115 MMHG | BODY MASS INDEX: 33.09 KG/M2 | WEIGHT: 205 LBS

## 2025-06-30 DIAGNOSIS — I25.5 ISCHEMIC CARDIOMYOPATHY: ICD-10-CM

## 2025-06-30 DIAGNOSIS — Z86.2 HISTORY OF MYELODYSPLASTIC SYNDROME: ICD-10-CM

## 2025-06-30 DIAGNOSIS — N17.9 AKI (ACUTE KIDNEY INJURY): Primary | ICD-10-CM

## 2025-06-30 PROCEDURE — 3078F DIAST BP <80 MM HG: CPT | Performed by: INTERNAL MEDICINE

## 2025-06-30 PROCEDURE — 3074F SYST BP LT 130 MM HG: CPT | Performed by: INTERNAL MEDICINE

## 2025-06-30 PROCEDURE — 99213 OFFICE O/P EST LOW 20 MIN: CPT | Performed by: INTERNAL MEDICINE

## 2025-06-30 NOTE — PROGRESS NOTES
Assessment and Plan:  80 year old male with history of peripheral arterial disease, CAD, HFrEF with EF improved from 35% to 40-45%, hypertension, DVT/PE on anticoagulation, cellulitis/ leg wounds,myeloproliferative disease on hydroxyurea who presents for followup.  He was seen April 2025 for MING, with his creatinine is up to 2.7 on istat creatinine 4/30, at time of CT scan (which was cancelled). His baseline Scr was 1-1.1 mg/dL  # MING: his Scr has increased from 1.1 in December 2024 to 2.7 in April 2025 noted at time of CT scan. He was started on SGLT 2 inhibitor in December for cardiac benefit and this is a steep increase in his creatinine. He had MING in 7/2024 in setting of infection with Scr up to 1.6 but had returned to 1.1 in October 2024   -Duplex US kidneys was normal with some tortuosity on right renal artery.  - continued jardiance and he is hydrating better.  Typically GFR can decrease up to 30% but his creatinine more than doubled thus this is exaggerated and suggests volume depletion, hypotension, renal artery stenosis possibly underlying.  He has no systemic symptoms to suggest autoimmune disease  - Scr back to 1.1 on recent recheck   - will have him drinks 50 ounces a day, and hold furosemide, empagliflozin and losartan if he is sick.   - he will return to nephrology as needed / if creatinine above 1.5 without clear volume depletion presence.    # Hypertension: his blood pressure today is 115/68, continue to monitor and if lower than 105, would need to re-adjust his medications      # Anemia in chronic renal disease: Not anemic  - Hgb: was down in setting of hospitalization/ infection, repeat hgb and immunofixation  - Iron studies: Not checked recently    # Electrolytes:   - Potassium; level: Normal- was high on his check in May but recheck was normal.   - Bicarbonate; level: Normal    # Mineral Bone Disorder:   - Calcium; level is:  Normal  - Phosphorus; level is: Normal      Assessment and plan was  discussed with patient and he voiced his understanding and agreement.    Reason for Visit:  Shaji Pompa is a 80 year old male with MING, who presents for follow up of MING    HPI:  80 year old male with history of peripheral arterial disease, CAD, HFrEF with EF improved from 35% to 40-45%, hypertension, DVT/PE on anticoagulation, cellulitis/ leg wounds, who presents for evaluation.  His creatinine is up to 2.7 on recent check at time of CT scan (which was cancelled). His baseline Scr was 1-1.1 mg/dL  In December 2024, he was started on jardiance given his heart failure.  His creatinine was 1.1 in October after recovering from MING with Scr 1.6 in July 2024.  His next creatinine in April at time of CT for his carotid stenosis showed Scr up to 2.7  He admits that he does not hydrate much, but denies NSAIDs.  He denies family history of kidney diease, urinary symptoms, or trouble with urination.  He does have CT scan last year that showed some small kidney stones possibly.  He is accompanied by his wife again today  We reviewed his ultrasound, his labs.  He saw cardiology recently and they are happy with his current status.  He is trying to hydrate better. We reviewed sick day protocol for his medications    He will again winter in [place]  He has chronic LE swelling, on the right from a fall from the roof and on the left with history of DVT and cellulitis that is stable.     Renal History:   Elevated creatinine/ MING  HTN  PAD    ROS:   A comprehensive review of systems was obtained and negative, except as noted in the HPI or PMH.    Active Medical Problems:  Patient Active Problem List   Diagnosis    Cellulitis    Abnormal gait    Chronic obstructive pulmonary disease (H)    Essential hypertension    History of stroke without residual deficits    Mixed hyperlipidemia    Chronic myeloproliferative disease (H)    Peripheral venous insufficiency    Elevated brain natriuretic peptide (BNP) level    Elevated d-dimer     Macrocytic anemia    Elevated troponin    Pneumonia of both lungs due to infectious organism, unspecified part of lung    Multiple subsegmental pulmonary emboli without acute cor pulmonale (H)    Acute deep vein thrombosis (DVT) of popliteal vein of right lower extremity (H)    Thrombocytopenia    History of pulmonary embolism    Status post coronary angiogram    Coronary artery disease involving native coronary artery of native heart without angina pectoris    Ischemic cardiomyopathy    Other specified anemias    Paroxysmal atrial fibrillation (H)    NSTEMI (non-ST elevated myocardial infarction) (H)    History of myelodysplastic syndrome    Anemia, unspecified type    Ulcer of heel, left, with necrosis of muscle (H)    Lactic acidosis    Pneumonia of left lower lobe due to infectious organism    Sepsis, due to unspecified organism, unspecified whether acute organ dysfunction present (H)    Chronic systolic heart failure (H)     PMH:   Medical record was reviewed and PMH was discussed with patient and noted below.  Past Medical History:   Diagnosis Date    Cerebral infarction (H)     COPD (chronic obstructive pulmonary disease) (H)     HLD (hyperlipidemia)     Hypertension     Myeloproliferative disease (H)     Pleural effusion on left 04/19/2022     PSH:   Past Surgical History:   Procedure Laterality Date    CV CORONARY ANGIOGRAM N/A 4/26/2022    Procedure: CV CORONARY ANGIOGRAM;  Surgeon: Audrey Holder MD;  Location: [facility] CATH LAB CV    CV LEFT HEART CATH N/A 4/26/2022    Procedure: Left Heart Catheterization;  Surgeon: Audrey Holder MD;  Location: [facility] CATH LAB CV    IR LOWER EXTREMITY ANGIOGRAM LEFT  6/29/2023    OPEN REDUCTION INTERNAL FIXATION FOOT Right 2010    OTHER SURGICAL HISTORY  2001    Lip lesion removal    PICC TRIPLE LUMEN PLACEMENT  8/17/2022         TONSILLECTOMY & ADENOIDECTOMY         Family Hx:   Family History   Problem Relation Age of Onset    Alcoholism Mother      Personal  "Hx:   Social History     Tobacco Use    Smoking status: Former     Current packs/day: 0.00     Average packs/day: 1 pack/day for 46.6 years (46.6 ttl pk-yrs)     Types: Cigarettes     Start date: 1965     Quit date: 2012     Years since quittin.5    Smokeless tobacco: Never   Substance Use Topics    Alcohol use: Yes     Alcohol/week: 19.0 standard drinks of alcohol       Allergies:  No Known Allergies    Medications:  Current Outpatient Medications   Medication Sig Dispense Refill    albuterol (PROAIR HFA;PROVENTIL HFA;VENTOLIN HFA) 90 mcg/actuation inhaler Inhale 2 puffs into the lungs every 6 hours as needed for shortness of breath / dyspnea      aspirin (ASPIRIN LOW DOSE) 81 MG EC tablet TAKE 1 TABLET (81 MG) BY MOUTH DAILY. 90 tablet 1    atorvastatin (LIPITOR) 80 MG tablet Take 1 tablet (80 mg) by mouth every morning 90 tablet 2    empagliflozin (JARDIANCE) 10 MG TABS tablet Take 1 tablet (10 mg) by mouth daily. 90 tablet 3    furosemide (LASIX) 20 MG tablet Take 0.5 tablets (10 mg) by mouth daily 45 tablet 3    hydroxyurea (HYDREA) 500 MG capsule Take 1,000 mg by mouth daily      losartan (COZAAR) 25 MG tablet Take 1 tablet (25 mg) by mouth daily 90 tablet 1    metoprolol succinate ER (TOPROL-XL) 25 MG 24 hr tablet Take 25 mg by mouth daily      nitroGLYcerin (NITROSTAT) 0.4 MG sublingual tablet One tablet under the tongue every 5 minutes if needed for chest pain. May repeat every 5 minutes for a maximum of 3 doses in 15 minutes\" 25 tablet 3    omeprazole (PRILOSEC) 20 MG DR capsule Take 20 mg by mouth daily      tiotropium (SPIRIVA) 18 mcg inhalation capsule [TIOTROPIUM (SPIRIVA) 18 MCG INHALATION CAPSULE] Place 18 mcg into inhaler and inhale daily.      warfarin ANTICOAGULANT (COUMADIN) 1 MG tablet Take 3 tablets (3 mg) by mouth daily       No current facility-administered medications for this visit.      Vitals:  /68   Pulse 80   Wt 93 kg (205 lb)   BMI 33.09 kg/m      Exam:  GENERAL " APPEARANCE: alert and no distress  HENT: mouth without ulcers or lesions  RESP: lungs clear to auscultation - no rales, rhonchi or wheezes  CV: regular rhythm, normal rate, no rub, no murmur  EDEMA: 1+ LE edema bilaterally  ABDOMEN: soft, nondistended, nontender, bowel sounds normal  MS: extremities normal - no gross deformities noted, no evidence of inflammation in joints, no muscle tenderness  SKIN: no rash    LABS:   CMP  Recent Labs   Lab Test 05/27/25  1018 05/05/25  0934 04/30/25  1254 10/28/24  1147 07/29/24  0609 07/28/24  0415 04/01/22  1208 06/14/21  1810 05/03/21  1557 11/07/19  1042 10/31/19  2000    139  --  139  --  139   < > 138 139 140 141   POTASSIUM 4.3 5.9*  --  4.6 3.6 3.6   < > 4.0 4.2 5.2* 4.2   CHLORIDE 105 104  --  104  --  107   < > 105 104 105 107   CO2 21* 25  --  24  --  26   < > 25 27 28 27   ANIONGAP 13 10  --  11  --  6*   < > 8 8 7 7   * 93  --  83  --  112*   < > 118 112 88 97   BUN 20.2 27.7*  --  26.4*  --  33.7*   < > 24 18 30* 23   CR 1.17 1.44* 2.7* 1.10 1.16 1.52*   < > 1.08 0.84 1.14 0.97   GFRESTIMATED 63 49* 23* 68 64 46*   < > >60 >60 >60 >60   GFRESTBLACK  --   --   --   --   --   --   --  >60 >60 >60 >60   GLENDY 9.1 9.5  --  9.1  --  7.9*   < > 8.7 8.9 10.1 9.7    < > = values in this interval not displayed.     Recent Labs   Lab Test 07/28/24  0415 07/27/24  1723 08/17/22  2104 04/20/22  0405   BILITOTAL 1.0 1.2 0.5 1.3*   ALKPHOS 67 70 52 78   ALT 21 24 21 22   AST 26 33 38 17     CBC  Recent Labs   Lab Test 05/05/25  0934 08/05/24  1028 07/28/24  0415 07/27/24  2348   HGB 12.5* 10.2* 10.7* 11.2*   WBC 6.7 9.6 9.4 12.7*   RBC 3.05* 2.35* 2.46* 2.60*   HCT 36.7* 30.9* 31.7* 34.1*   * 132* 129* 131*   MCH 41.0* 43.4* 43.5* 43.1*   MCHC 34.1 33.0 33.8 32.8   RDW 21.6* 14.1 14.1 14.2    414 232 289     URINE STUDIES  Recent Labs   Lab Test 05/05/25  1024 07/27/24 1958 06/14/21 1927   COLOR Yellow Yellow Yellow   APPEARANCE Clear Clear Clear    URINEGLC 100* Negative Negative   URINEBILI Negative Negative Negative   URINEKETONE Negative Trace* Negative   SG 1.010 1.029 1.022   UBLD Small* Negative Negative   URINEPH 6.0 5.5 6.0   PROTEIN Trace* 30* 20 mg/dL   UROBILINOGEN 0.2  --  Normal   NITRITE Negative Negative Negative   LEUKEST Large* Negative Negative   RBCU 0-2 2 0-2  1   WBCU 5-10* 4 0-5  1     No lab results found.  PTH  Recent Labs   Lab Test 05/05/25  0934   PTHI 31     IRON STUDIES  Recent Labs   Lab Test 05/05/25  0934 04/30/22  1502   IRON  --  31*   BIMAL 199 1,747*         Nena Josh Khalil MD

## 2025-06-30 NOTE — PATIENT INSTRUCTIONS
If blood pressure is lower than 105 or you have dizziness, let your doctor know    If you are not eating / hydrating (sick/ stomach flu etc), would hold the furosemide, empagliflozin and losartan until you are feeling well    If your creatinine is up to 1.5 or higher, give us a call to review or schedule you back in

## 2025-07-09 DIAGNOSIS — I25.5 ISCHEMIC CARDIOMYOPATHY: ICD-10-CM

## 2025-07-09 DIAGNOSIS — I25.119 CORONARY ARTERY DISEASE INVOLVING NATIVE CORONARY ARTERY OF NATIVE HEART WITH ANGINA PECTORIS: ICD-10-CM

## 2025-07-09 RX ORDER — ASPIRIN 81 MG/1
81 TABLET, COATED ORAL DAILY
Qty: 90 TABLET | Refills: 2 | Status: SHIPPED | OUTPATIENT
Start: 2025-07-09

## (undated) DEVICE — GUIDEWIRE VASCULAR 0.035IN DIA 145CML 15CML/6CML STAINLESS

## (undated) DEVICE — CATH ANGIO INFINITI JL3.5 4FRX100CM 538418

## (undated) DEVICE — KIT HAND CONTROL ACIST 014644 AR-P54

## (undated) DEVICE — INTRO MICRO MINI STICK 4FR STD NITINOL

## (undated) DEVICE — CUSTOM PACK CORONARY SAN5BCRHEA

## (undated) DEVICE — SYR ANGIOGRAPHY MULTIUSE KIT ACIST 014612

## (undated) DEVICE — MANIFOLD KIT ANGIO AUTOMATED 014613

## (undated) DEVICE — SLEEVE TR BAND RADIAL COMPRESSION DEVICE 29CM XX-RF06L

## (undated) DEVICE — ELECTRODE ADULT PACING MULTI P-211-M1

## (undated) DEVICE — SHEATH GLIDE RADIAL 4FR 25CM 0.021

## (undated) DEVICE — CATH DIAG 4FR JR 5.0 538423

## (undated) RX ORDER — LIDOCAINE HYDROCHLORIDE 10 MG/ML
INJECTION, SOLUTION INFILTRATION; PERINEURAL
Status: DISPENSED
Start: 2023-06-29

## (undated) RX ORDER — PROTAMINE SULFATE 10 MG/ML
INJECTION, SOLUTION INTRAVENOUS
Status: DISPENSED
Start: 2023-06-29

## (undated) RX ORDER — LIDOCAINE HYDROCHLORIDE 10 MG/ML
INJECTION, SOLUTION EPIDURAL; INFILTRATION; INTRACAUDAL; PERINEURAL
Status: DISPENSED
Start: 2022-04-19

## (undated) RX ORDER — FENTANYL CITRATE 50 UG/ML
INJECTION, SOLUTION INTRAMUSCULAR; INTRAVENOUS
Status: DISPENSED
Start: 2023-06-29

## (undated) RX ORDER — CEFAZOLIN SODIUM/WATER 2 G/20 ML
SYRINGE (ML) INTRAVENOUS
Status: DISPENSED
Start: 2023-06-29

## (undated) RX ORDER — VERAPAMIL HYDROCHLORIDE 2.5 MG/ML
INJECTION, SOLUTION INTRAVENOUS
Status: DISPENSED
Start: 2023-06-29

## (undated) RX ORDER — FENTANYL CITRATE 50 UG/ML
INJECTION, SOLUTION INTRAMUSCULAR; INTRAVENOUS
Status: DISPENSED
Start: 2022-04-26

## (undated) RX ORDER — NITROGLYCERIN 5 MG/ML
VIAL (ML) INTRAVENOUS
Status: DISPENSED
Start: 2023-06-29

## (undated) RX ORDER — HEPARIN SODIUM 1000 [USP'U]/ML
INJECTION, SOLUTION INTRAVENOUS; SUBCUTANEOUS
Status: DISPENSED
Start: 2023-06-29

## (undated) RX ORDER — ASPIRIN 325 MG
TABLET ORAL
Status: DISPENSED
Start: 2022-04-26